# Patient Record
Sex: MALE | Race: WHITE | NOT HISPANIC OR LATINO | Employment: OTHER | URBAN - METROPOLITAN AREA
[De-identification: names, ages, dates, MRNs, and addresses within clinical notes are randomized per-mention and may not be internally consistent; named-entity substitution may affect disease eponyms.]

---

## 2020-08-28 ENCOUNTER — OFFICE VISIT (OUTPATIENT)
Dept: FAMILY MEDICINE CLINIC | Facility: CLINIC | Age: 65
End: 2020-08-28
Payer: COMMERCIAL

## 2020-08-28 VITALS
WEIGHT: 255 LBS | TEMPERATURE: 97.5 F | HEART RATE: 84 BPM | DIASTOLIC BLOOD PRESSURE: 60 MMHG | RESPIRATION RATE: 16 BRPM | OXYGEN SATURATION: 95 % | BODY MASS INDEX: 37.77 KG/M2 | SYSTOLIC BLOOD PRESSURE: 116 MMHG | HEIGHT: 69 IN

## 2020-08-28 DIAGNOSIS — Z11.4 SCREENING FOR HIV (HUMAN IMMUNODEFICIENCY VIRUS): ICD-10-CM

## 2020-08-28 DIAGNOSIS — I48.20 CHRONIC ATRIAL FIBRILLATION (HCC): Primary | ICD-10-CM

## 2020-08-28 DIAGNOSIS — I42.9 CARDIOMYOPATHY, UNSPECIFIED TYPE (HCC): ICD-10-CM

## 2020-08-28 DIAGNOSIS — G47.09 OTHER INSOMNIA: ICD-10-CM

## 2020-08-28 DIAGNOSIS — E78.2 MIXED HYPERLIPIDEMIA: ICD-10-CM

## 2020-08-28 DIAGNOSIS — F41.9 ANXIETY: ICD-10-CM

## 2020-08-28 DIAGNOSIS — Z76.89 ENCOUNTER TO ESTABLISH CARE: ICD-10-CM

## 2020-08-28 DIAGNOSIS — F32.A DEPRESSION, UNSPECIFIED DEPRESSION TYPE: ICD-10-CM

## 2020-08-28 DIAGNOSIS — I10 ESSENTIAL HYPERTENSION: ICD-10-CM

## 2020-08-28 DIAGNOSIS — Z11.59 NEED FOR HEPATITIS C SCREENING TEST: ICD-10-CM

## 2020-08-28 DIAGNOSIS — I25.10 CORONARY ARTERY DISEASE INVOLVING NATIVE CORONARY ARTERY OF NATIVE HEART WITHOUT ANGINA PECTORIS: ICD-10-CM

## 2020-08-28 LAB
ALBUMIN SERPL BCP-MCNC: 3.9 G/DL (ref 3.5–5)
ALP SERPL-CCNC: 150 U/L (ref 46–116)
ALT SERPL W P-5'-P-CCNC: 58 U/L (ref 12–78)
ANION GAP SERPL CALCULATED.3IONS-SCNC: 10 MMOL/L (ref 4–13)
AST SERPL W P-5'-P-CCNC: 52 U/L (ref 5–45)
BASOPHILS # BLD AUTO: 0.08 THOUSANDS/ΜL (ref 0–0.1)
BASOPHILS NFR BLD AUTO: 1 % (ref 0–1)
BILIRUB SERPL-MCNC: 0.59 MG/DL (ref 0.2–1)
BUN SERPL-MCNC: 10 MG/DL (ref 5–25)
CALCIUM SERPL-MCNC: 9.4 MG/DL (ref 8.3–10.1)
CHLORIDE SERPL-SCNC: 105 MMOL/L (ref 100–108)
CHOLEST SERPL-MCNC: 159 MG/DL (ref 50–200)
CO2 SERPL-SCNC: 22 MMOL/L (ref 21–32)
CREAT SERPL-MCNC: 1.11 MG/DL (ref 0.6–1.3)
EOSINOPHIL # BLD AUTO: 0.41 THOUSAND/ΜL (ref 0–0.61)
EOSINOPHIL NFR BLD AUTO: 7 % (ref 0–6)
ERYTHROCYTE [DISTWIDTH] IN BLOOD BY AUTOMATED COUNT: 15.1 % (ref 11.6–15.1)
GFR SERPL CREATININE-BSD FRML MDRD: 69 ML/MIN/1.73SQ M
GLUCOSE SERPL-MCNC: 97 MG/DL (ref 65–140)
HCT VFR BLD AUTO: 38.2 % (ref 36.5–49.3)
HDLC SERPL-MCNC: 61 MG/DL
HGB BLD-MCNC: 12.8 G/DL (ref 12–17)
IMM GRANULOCYTES # BLD AUTO: 0.01 THOUSAND/UL (ref 0–0.2)
IMM GRANULOCYTES NFR BLD AUTO: 0 % (ref 0–2)
LDLC SERPL CALC-MCNC: 37 MG/DL (ref 0–100)
LYMPHOCYTES # BLD AUTO: 1.44 THOUSANDS/ΜL (ref 0.6–4.47)
LYMPHOCYTES NFR BLD AUTO: 26 % (ref 14–44)
MCH RBC QN AUTO: 33.4 PG (ref 26.8–34.3)
MCHC RBC AUTO-ENTMCNC: 33.5 G/DL (ref 31.4–37.4)
MCV RBC AUTO: 100 FL (ref 82–98)
MONOCYTES # BLD AUTO: 0.53 THOUSAND/ΜL (ref 0.17–1.22)
MONOCYTES NFR BLD AUTO: 9 % (ref 4–12)
NEUTROPHILS # BLD AUTO: 3.17 THOUSANDS/ΜL (ref 1.85–7.62)
NEUTS SEG NFR BLD AUTO: 57 % (ref 43–75)
NONHDLC SERPL-MCNC: 98 MG/DL
NRBC BLD AUTO-RTO: 0 /100 WBCS
PLATELET # BLD AUTO: 219 THOUSANDS/UL (ref 149–390)
PMV BLD AUTO: 10.1 FL (ref 8.9–12.7)
POTASSIUM SERPL-SCNC: 3.8 MMOL/L (ref 3.5–5.3)
PROT SERPL-MCNC: 7.8 G/DL (ref 6.4–8.2)
RBC # BLD AUTO: 3.83 MILLION/UL (ref 3.88–5.62)
SODIUM SERPL-SCNC: 137 MMOL/L (ref 136–145)
TRIGL SERPL-MCNC: 306 MG/DL
WBC # BLD AUTO: 5.64 THOUSAND/UL (ref 4.31–10.16)

## 2020-08-28 PROCEDURE — 36415 COLL VENOUS BLD VENIPUNCTURE: CPT | Performed by: NURSE PRACTITIONER

## 2020-08-28 PROCEDURE — 99204 OFFICE O/P NEW MOD 45 MIN: CPT | Performed by: NURSE PRACTITIONER

## 2020-08-28 PROCEDURE — 3288F FALL RISK ASSESSMENT DOCD: CPT | Performed by: NURSE PRACTITIONER

## 2020-08-28 PROCEDURE — 1036F TOBACCO NON-USER: CPT | Performed by: NURSE PRACTITIONER

## 2020-08-28 PROCEDURE — 87389 HIV-1 AG W/HIV-1&-2 AB AG IA: CPT | Performed by: NURSE PRACTITIONER

## 2020-08-28 PROCEDURE — 3078F DIAST BP <80 MM HG: CPT | Performed by: NURSE PRACTITIONER

## 2020-08-28 PROCEDURE — 80053 COMPREHEN METABOLIC PANEL: CPT | Performed by: NURSE PRACTITIONER

## 2020-08-28 PROCEDURE — 1101F PT FALLS ASSESS-DOCD LE1/YR: CPT | Performed by: NURSE PRACTITIONER

## 2020-08-28 PROCEDURE — 3725F SCREEN DEPRESSION PERFORMED: CPT | Performed by: NURSE PRACTITIONER

## 2020-08-28 PROCEDURE — 85025 COMPLETE CBC W/AUTO DIFF WBC: CPT | Performed by: NURSE PRACTITIONER

## 2020-08-28 PROCEDURE — 86803 HEPATITIS C AB TEST: CPT | Performed by: NURSE PRACTITIONER

## 2020-08-28 PROCEDURE — 80061 LIPID PANEL: CPT | Performed by: NURSE PRACTITIONER

## 2020-08-28 PROCEDURE — 3074F SYST BP LT 130 MM HG: CPT | Performed by: NURSE PRACTITIONER

## 2020-08-28 PROCEDURE — 3008F BODY MASS INDEX DOCD: CPT | Performed by: NURSE PRACTITIONER

## 2020-08-28 RX ORDER — CANDESARTAN 32 MG/1
TABLET ORAL DAILY
COMMUNITY
Start: 2020-08-10 | End: 2021-03-08 | Stop reason: SDUPTHER

## 2020-08-28 RX ORDER — ASPIRIN 325 MG/1
325 TABLET, FILM COATED ORAL DAILY
COMMUNITY
End: 2021-07-09 | Stop reason: ALTCHOICE

## 2020-08-28 RX ORDER — METOPROLOL TARTRATE 100 MG/1
TABLET ORAL DAILY
COMMUNITY
Start: 2020-08-10 | End: 2021-04-01 | Stop reason: SDUPTHER

## 2020-08-28 RX ORDER — PRAZOSIN HYDROCHLORIDE 2 MG/1
2 CAPSULE ORAL 3 TIMES DAILY
Qty: 270 CAPSULE | Refills: 1 | Status: SHIPPED | OUTPATIENT
Start: 2020-08-28 | End: 2021-03-11

## 2020-08-28 RX ORDER — ZOLPIDEM TARTRATE 10 MG/1
TABLET ORAL
COMMUNITY
Start: 2020-07-02 | End: 2020-12-22 | Stop reason: SDUPTHER

## 2020-08-28 RX ORDER — PRAZOSIN HYDROCHLORIDE 2 MG/1
2 CAPSULE ORAL 3 TIMES DAILY
COMMUNITY
Start: 2020-07-13 | End: 2020-08-28 | Stop reason: SDUPTHER

## 2020-08-28 RX ORDER — RISPERIDONE 2 MG/1
TABLET, FILM COATED ORAL
COMMUNITY
Start: 2020-07-02 | End: 2021-04-09 | Stop reason: DRUGHIGH

## 2020-08-28 RX ORDER — ALPRAZOLAM 1 MG/1
TABLET ORAL
COMMUNITY
Start: 2020-08-25 | End: 2021-08-13 | Stop reason: HOSPADM

## 2020-08-28 RX ORDER — BUPROPION HYDROCHLORIDE 150 MG/1
150 TABLET, EXTENDED RELEASE ORAL 2 TIMES DAILY
COMMUNITY
Start: 2020-07-08

## 2020-08-28 RX ORDER — NAPROXEN 375 MG/1
375 TABLET ORAL DAILY
COMMUNITY
Start: 2020-08-07 | End: 2020-09-17 | Stop reason: HOSPADM

## 2020-08-28 RX ORDER — PRAVASTATIN SODIUM 40 MG
TABLET ORAL DAILY
COMMUNITY
Start: 2020-07-23 | End: 2021-11-05 | Stop reason: SDUPTHER

## 2020-08-28 RX ORDER — FLUOXETINE HYDROCHLORIDE 20 MG/1
CAPSULE ORAL DAILY
COMMUNITY
Start: 2020-07-02

## 2020-08-28 RX ORDER — AMLODIPINE BESYLATE 5 MG/1
TABLET ORAL
COMMUNITY
Start: 2020-07-23 | End: 2021-03-22 | Stop reason: SDUPTHER

## 2020-08-28 NOTE — PROGRESS NOTES
Assessment/Plan:  1  Chronic atrial fibrillation (HCC)  Managed by cardio  Rate controlled  Continue current meds  Monitor    - CBC and differential  - Comprehensive metabolic panel  2  Coronary artery disease involving native coronary artery of native heart without angina pectoris  Stable  Managed by cardio  Continue current meds  - CBC and differential  - Comprehensive metabolic panel  3  Essential hypertension  Stable  Well controlled  Limit salt   - CBC and differential  - Comprehensive metabolic panel  - prazosin (MINIPRESS) 2 mg capsule; Take 1 capsule (2 mg total) by mouth 3 (three) times a day  Dispense: 270 capsule; Refill: 1  4  Cardiomyopathy, unspecified type ([address] )  Managed by cardio  Stable  No change to current tx plan  - CBC and differential  - Comprehensive metabolic panel  5  Mixed hyperlipidemia  Heart healthy diet  Continue statin  - CBC and differential  - Comprehensive metabolic panel  - Lipid panel  6  Anxiety  Stress management  Activities to divert attention when possible  Conscious breathing techniques as discussed  Coping mechanisms and strategies vary from person to person so try to utilize strategies that you think may work for you (such as meditation, music, etc  )  Consider or continue counseling as discussed  Anti anxiety/depression medications as prescribed  - CBC and differential  - Comprehensive metabolic panel  7  Other insomnia  - CBC and differential  - Comprehensive metabolic panel  8  Encounter to establish care  Heart healthy diet- limit red meat, limit saturated fat, moderate salt intake, limit junk food, etc    Regular exercise  Stress management  Routine labwork and screenings as ordered  - CBC and differential  - Comprehensive metabolic panel  9  Depression, unspecified depression type  Stable  Managed by psych  Anticipatory guidance  - CBC and differential  - Comprehensive metabolic panel  10   Screening for HIV (human immunodeficiency virus)  - HIV 1/2 Antigen/Antibody (4th Generation) w Reflex [facility]  11  Need for hepatitis C screening test  - Hepatitis C antibody    Patient was counseled regarding instructions for management which included: impression/diagnosis, risk/benefits of treatment options, importance of compliance with treatment, risk factor reduction, and prognosis  I have reviewed the instructions with the patient answering all questions and patient verbalized understanding  BMI Counseling: Body mass index is 38 21 kg/m²  The BMI is above normal  Nutrition recommendations include reducing portion sizes, decreasing overall calorie intake, moderation in carbohydrate intake, reducing intake of saturated fat and trans fat and reducing intake of cholesterol  Exercise recommendations include exercising 3-5 times per week  Depression Screening Follow-up Plan: Patient's depression screening was negative with a PHQ-2 score of 0  Patient assessed for underlying major depression  They have no active suicidal ideations  Brief counseling provided and recommend additional follow-up/re-evaluation next office visit  Continue regular follow-up with their psychologist/therapist/psychiatrist who is managing their mental health condition(s)  Falls Plan of Care: Balance, strength, and gait training instructions were provided  Subjective:      Patient ID: Latosha Bal is a 72 y o  male who presents to establish care    Here to establish care   PMH, meds, allergies, PSH, SH, FH reviewed  Multiple cardiac issues, managed by cardiology- Dr [facility] for anxiety, Dr Tevin Mcdermott, sees every 3 months  Lives alone, disabled due to anxiety and depression  etoh- socially, non smoker  No current issues or concerns     Feels well  No recent illness        The following portions of the patient's history were reviewed and updated as appropriate: allergies, current medications, past family history, past medical history, past social history, past surgical history and problem list     Review of Systems   Constitutional: Negative for chills, diaphoresis, fatigue and fever  HENT: Negative for congestion, sinus pressure, sinus pain and sore throat  Eyes: Negative for visual disturbance  Respiratory: Negative for cough, chest tightness, shortness of breath and wheezing  Cardiovascular: Negative for chest pain, palpitations and leg swelling  Gastrointestinal: Negative for abdominal distention, abdominal pain, diarrhea and nausea  Endocrine: Negative for cold intolerance, heat intolerance, polydipsia, polyphagia and polyuria  Genitourinary: Negative for dysuria, frequency and urgency  Musculoskeletal: Negative for arthralgias, back pain and neck pain  Skin: Negative for rash  Allergic/Immunologic: Negative for immunocompromised state  Neurological: Negative for dizziness, weakness and headaches  Hematological: Negative for adenopathy  Psychiatric/Behavioral: Negative for confusion, self-injury and suicidal ideas  The patient is nervous/anxious  Sees psychiatrist for issues with anxiety and depression   All other systems reviewed and are negative  Objective:      /60 (BP Location: Right arm, Patient Position: Sitting, Cuff Size: Adult)   Pulse 84   Temp 97 5 °F (36 4 °C) (Temporal)   Resp 16   Ht 5' 8 5" (1 74 m)   Wt 116 kg (255 lb)   SpO2 95%   BMI 38 21 kg/m²          Physical Exam  Vitals signs reviewed  Constitutional:       General: He is not in acute distress  Appearance: He is obese  He is not ill-appearing  Eyes:      General: No scleral icterus  Neck:      Musculoskeletal: Normal range of motion and neck supple  Vascular: No carotid bruit  Comments: No JVD  Cardiovascular:      Rate and Rhythm: Normal rate and regular rhythm  Pulmonary:      Effort: Pulmonary effort is normal  No respiratory distress  Breath sounds: Normal breath sounds  No wheezing or rhonchi     Musculoskeletal:      Right lower leg: Edema (1+) present  Left lower leg: Edema (1+) present  Skin:     General: Skin is warm and dry  Coloration: Skin is not jaundiced or pale  Neurological:      General: No focal deficit present  Mental Status: He is alert and oriented to person, place, and time  Cranial Nerves: No cranial nerve deficit  Sensory: No sensory deficit  Motor: No weakness  Coordination: Coordination normal       Gait: Gait normal    Psychiatric:         Behavior: Behavior normal          Thought Content: Thought content normal          Judgment: Judgment normal       Comments: Flat affect  Well groomed  Dressed appropriately for the weather  Calm  Pleasant   Cooperative  Good eye contact  Converses freely and appropriately

## 2020-08-28 NOTE — PATIENT INSTRUCTIONS
Continue with current medications  No change to current treatment plan  Labwork as ordered  Heart healthy diet- limit red meat, limit saturated fat, moderate salt intake, limit junk food, etc    Regular exercise  Stress management  Follow up as discussed or return to office earlier if you have any new issues or concerns

## 2020-08-29 LAB — HCV AB SER QL: NORMAL

## 2020-08-31 ENCOUNTER — TELEPHONE (OUTPATIENT)
Dept: ADMINISTRATIVE | Facility: OTHER | Age: 65
End: 2020-08-31

## 2020-08-31 LAB — HIV 1+2 AB+HIV1 P24 AG SERPL QL IA: NORMAL

## 2020-09-17 ENCOUNTER — OFFICE VISIT (OUTPATIENT)
Dept: FAMILY MEDICINE CLINIC | Facility: CLINIC | Age: 65
End: 2020-09-17
Payer: COMMERCIAL

## 2020-09-17 VITALS
SYSTOLIC BLOOD PRESSURE: 128 MMHG | WEIGHT: 256 LBS | RESPIRATION RATE: 16 BRPM | TEMPERATURE: 98.1 F | HEART RATE: 100 BPM | BODY MASS INDEX: 37.92 KG/M2 | HEIGHT: 69 IN | DIASTOLIC BLOOD PRESSURE: 50 MMHG

## 2020-09-17 DIAGNOSIS — Z00.00 MEDICARE ANNUAL WELLNESS VISIT, SUBSEQUENT: Primary | ICD-10-CM

## 2020-09-17 DIAGNOSIS — Z23 NEED FOR IMMUNIZATION AGAINST INFLUENZA: ICD-10-CM

## 2020-09-17 DIAGNOSIS — Z23 NEED FOR VACCINATION WITH 13-POLYVALENT PNEUMOCOCCAL CONJUGATE VACCINE: ICD-10-CM

## 2020-09-17 DIAGNOSIS — Z12.11 SCREENING FOR MALIGNANT NEOPLASM OF COLON: ICD-10-CM

## 2020-09-17 PROBLEM — E78.1 HYPERTRIGLYCERIDEMIA: Status: ACTIVE | Noted: 2020-09-17

## 2020-09-17 PROCEDURE — 3725F SCREEN DEPRESSION PERFORMED: CPT | Performed by: NURSE PRACTITIONER

## 2020-09-17 PROCEDURE — 90662 IIV NO PRSV INCREASED AG IM: CPT

## 2020-09-17 PROCEDURE — G0439 PPPS, SUBSEQ VISIT: HCPCS | Performed by: NURSE PRACTITIONER

## 2020-09-17 PROCEDURE — G0008 ADMIN INFLUENZA VIRUS VAC: HCPCS

## 2020-09-17 PROCEDURE — 1036F TOBACCO NON-USER: CPT | Performed by: NURSE PRACTITIONER

## 2020-09-17 PROCEDURE — 4040F PNEUMOC VAC/ADMIN/RCVD: CPT | Performed by: NURSE PRACTITIONER

## 2020-09-17 PROCEDURE — 3078F DIAST BP <80 MM HG: CPT | Performed by: NURSE PRACTITIONER

## 2020-09-17 PROCEDURE — 90670 PCV13 VACCINE IM: CPT

## 2020-09-17 PROCEDURE — G0009 ADMIN PNEUMOCOCCAL VACCINE: HCPCS

## 2020-09-17 NOTE — PROGRESS NOTES
Assessment and Plan:   1  Medicare annual wellness visit, subsequent  Heart healthy diet- limit red meat, limit saturated fat, moderate salt intake, limit junk food, etc    Regular exercise  Stress management  Routine labwork and screenings as ordered  2  Need for vaccination with 13-polyvalent pneumococcal conjugate vaccine  - PNEUMOCOCCAL CONJUGATE VACCINE 13-VALENT GREATER THAN 6 MONTHS  3  Need for immunization against influenza  - influenza vaccine, high-dose, PF 0 7 mL (FLUZONE HIGH-DOSE)  4  Screening for malignant neoplasm of colon  - Cologuard; Future      BMI Counseling: Body mass index is 38 36 kg/m²  The BMI is above normal  Nutrition recommendations include reducing portion sizes, decreasing overall calorie intake, 3-5 servings of fruits/vegetables daily, consuming healthier snacks, moderation in carbohydrate intake, increasing intake of lean protein, reducing intake of saturated fat and trans fat and reducing intake of cholesterol  Exercise recommendations include exercising 3-5 times per week  Depression Screening Follow-up Plan: Patient's depression screening was positive with a PHQ-2 score of 4  Their PHQ-9 score was 10  Continue regular follow-up with their psychologist/therapist/psychiatrist who is managing their mental health condition(s)  Problem List Items Addressed This Visit     None           Preventive health issues were discussed with patient, and age appropriate screening tests were ordered as noted in patient's After Visit Summary  Personalized health advice and appropriate referrals for health education or preventive services given if needed, as noted in patient's After Visit Summary       History of Present Illness:     Patient presents for Medicare Annual Wellness visit    Patient Care Team:  Meron Leal as PCP - General (Family Medicine)     Problem List:     Patient Active Problem List   Diagnosis    [facility]    Coronary artery disease involving native coronary artery of native heart without angina pectoris    Mixed hyperlipidemia    Essential hypertension    Cardiomyopathy ([facility] )    Anxiety    Other insomnia    Depression      Past Medical and Surgical History:     Past Medical History:   Diagnosis Date    Anxiety     Atrial fibrillation ([facility] )     Depression     Hypertension     Insomnia      No past surgical history on file  Family History:     Family History   Problem Relation Age of Onset    Ovarian cancer Sister     Substance Abuse Neg Hx     Mental illness Neg Hx       Social History:        Social History     Socioeconomic History    Marital status: Single     Spouse name: None    Number of children: None    Years of education: None    Highest education level: None   Occupational History    None   Social Needs    Financial resource strain: None    Food insecurity     Worry: None     Inability: None    Transportation needs     Medical: None     Non-medical: None   Tobacco Use    Smoking status: Former Smoker     Types: Cigarettes     Last attempt to quit: 2015     Years since quittin 0    Smokeless tobacco: Never Used   Substance and Sexual Activity    Alcohol use:  Yes     Alcohol/week: 4 0 standard drinks     Types: 4 Cans of beer per week     Frequency: 2-4 times a month     Drinks per session: 3 or 4     Binge frequency: Never    Drug use: Not Currently     Types: Cocaine    Sexual activity: Not Currently     Partners: Female   Lifestyle    Physical activity     Days per week: None     Minutes per session: None    Stress: None   Relationships    Social connections     Talks on phone: None     Gets together: None     Attends [employer] service: None     Active member of club or organization: None     Attends meetings of clubs or organizations: None     Relationship status: None    Intimate partner violence     Fear of current or ex partner: None     Emotionally abused: None     Physically abused: None     Forced sexual activity: None   Other Topics Concern    None   Social History Narrative    None      Medications and Allergies:     Current Outpatient Medications   Medication Sig Dispense Refill    ALPRAZolam (XANAX) 1 mg tablet TAKE 1 TABLET BY MOUTH THREE TIMES DAILY AS DIRECTED FOR ANXIETY OR PANIC      amLODIPine (NORVASC) 5 mg tablet       aspirin buffered (Bufferin) 325 mg Take 325 mg by mouth daily      buPROPion (WELLBUTRIN SR) 150 mg 12 hr tablet Take 150 mg by mouth 2 (two) times a day       candesartan (ATACAND) 32 MG tablet       FLUoxetine (PROzac) 20 mg capsule       metoprolol tartrate (LOPRESSOR) 100 mg tablet       pravastatin (PRAVACHOL) 40 mg tablet       prazosin (MINIPRESS) 2 mg capsule Take 1 capsule (2 mg total) by mouth 3 (three) times a day 270 capsule 1    risperiDONE (RisperDAL) 2 mg tablet       zolpidem (AMBIEN) 10 mg tablet        No current facility-administered medications for this visit  No Known Allergies   Immunizations: There is no immunization history on file for this patient  Health Maintenance:         Topic Date Due    Hepatitis C Screening  Completed         Topic Date Due    DTaP,Tdap,and Td Vaccines (1 - Tdap) 01/14/1976    Pneumococcal Vaccine: 65+ Years (1 of 1 - PPSV23) 01/14/2020    Influenza Vaccine  07/01/2020      Medicare Health Risk Assessment:     /50 (BP Location: Left arm, Patient Position: Sitting, Cuff Size: Adult)   Pulse 100   Temp 98 1 °F (36 7 °C) (Temporal)   Resp 16   Ht 5' 8 5" (1 74 m)   Wt 116 kg (256 lb)   BMI 38 36 kg/m²      Harini Clifton is here for his Subsequent Wellness visit  Last Medicare Wellness visit information reviewed, patient interviewed and updates made to the record today  Health Risk Assessment:   Patient rates overall health as good  Patient feels that their physical health rating is slightly worse  Eyesight was rated as same  Hearing was rated as same   Patient feels that their emotional and mental health rating is same  Pain experienced in the last 7 days has been none  Patient states that he has experienced no weight loss or gain in last 6 months  Depression Screening:   PHQ-2 Score: 4  PHQ-9 Score: 10      Fall Risk Screening: In the past year, patient has experienced: no history of falling in past year      Home Safety:  Patient does not have trouble with stairs inside or outside of their home  Patient has working smoke alarms and has working carbon monoxide detector  Home safety hazards include: none  Nutrition:   Current diet is Regular  Medications:   Patient is currently taking over-the-counter supplements  OTC medications include: see medication list  Patient is able to manage medications  Activities of Daily Living (ADLs)/Instrumental Activities of Daily Living (IADLs):   Walk and transfer into and out of bed and chair?: Yes  Dress and groom yourself?: Yes    Bathe or shower yourself?: Yes    Feed yourself?  Yes  Do your laundry/housekeeping?: Yes  Manage your money, pay your bills and track your expenses?: Yes  Make your own meals?: Yes    Do your own shopping?: Yes    Previous Hospitalizations:   Any hospitalizations or ED visits within the last 12 months?: No      Advance Care Planning:   Living will: No    Durable POA for healthcare: No    Advanced directive: No    Advanced directive counseling given: No    Five wishes given: Yes    Patient declined ACP directive: No    End of Life Decisions reviewed with patient: No      Cognitive Screening:   Provider or family/friend/caregiver concerned regarding cognition?: No    PREVENTIVE SCREENINGS      Cardiovascular Screening:    General: History Lipid Disorder, Risks and Benefits Discussed and Screening Current      Diabetes Screening:     General: Screening Current and Risks and Benefits Discussed      Colorectal Cancer Screening:     General: Risks and Benefits Discussed    Due for: Cologuard      Prostate Cancer Screening:    General: Risks and Benefits Discussed    Due for: PSA      Osteoporosis Screening:    General: Patient Declines and Risks and Benefits Discussed      Abdominal Aortic Aneurysm (AAA) Screening:    Risk factors include: age between 73-67 yo        General: Risks and Benefits Discussed and Patient Declines      Lung Cancer Screening:     General: Risks and Benefits Discussed and Patient Declines      Hepatitis C Screening:    General: Screening Current    Other Counseling Topics:   Alcohol use counseling, car/seat belt/driving safety, skin self-exam and regular weightbearing exercise  Patient drinks a few beers per day  Education on risks of drinking alcohol  Encouraged moderation  Always wears seatbelts  Performs skin self-exam    Has not been exercising because he recently sprained his ankle  Was going to gym before TONY Simon

## 2020-09-17 NOTE — PATIENT INSTRUCTIONS

## 2020-12-22 ENCOUNTER — OFFICE VISIT (OUTPATIENT)
Dept: FAMILY MEDICINE CLINIC | Facility: CLINIC | Age: 65
End: 2020-12-22
Payer: COMMERCIAL

## 2020-12-22 VITALS
BODY MASS INDEX: 37.77 KG/M2 | TEMPERATURE: 96.6 F | DIASTOLIC BLOOD PRESSURE: 80 MMHG | SYSTOLIC BLOOD PRESSURE: 119 MMHG | HEART RATE: 84 BPM | OXYGEN SATURATION: 96 % | RESPIRATION RATE: 16 BRPM | WEIGHT: 255 LBS | HEIGHT: 69 IN

## 2020-12-22 DIAGNOSIS — F32.A DEPRESSION, UNSPECIFIED DEPRESSION TYPE: ICD-10-CM

## 2020-12-22 DIAGNOSIS — I25.10 CORONARY ARTERY DISEASE INVOLVING NATIVE CORONARY ARTERY OF NATIVE HEART WITHOUT ANGINA PECTORIS: ICD-10-CM

## 2020-12-22 DIAGNOSIS — E78.1 HYPERTRIGLYCERIDEMIA: ICD-10-CM

## 2020-12-22 DIAGNOSIS — M54.50 CHRONIC MIDLINE LOW BACK PAIN WITHOUT SCIATICA: ICD-10-CM

## 2020-12-22 DIAGNOSIS — E78.2 MIXED HYPERLIPIDEMIA: ICD-10-CM

## 2020-12-22 DIAGNOSIS — G47.09 OTHER INSOMNIA: ICD-10-CM

## 2020-12-22 DIAGNOSIS — G89.29 CHRONIC MIDLINE LOW BACK PAIN WITHOUT SCIATICA: ICD-10-CM

## 2020-12-22 DIAGNOSIS — I48.20 CHRONIC ATRIAL FIBRILLATION (HCC): Primary | ICD-10-CM

## 2020-12-22 DIAGNOSIS — I42.9 CARDIOMYOPATHY, UNSPECIFIED TYPE (HCC): ICD-10-CM

## 2020-12-22 DIAGNOSIS — F41.9 ANXIETY: ICD-10-CM

## 2020-12-22 DIAGNOSIS — I10 ESSENTIAL HYPERTENSION: ICD-10-CM

## 2020-12-22 DIAGNOSIS — F33.9 DEPRESSION, RECURRENT (HCC): ICD-10-CM

## 2020-12-22 DIAGNOSIS — I77.810 DILATED AORTIC ROOT (HCC): ICD-10-CM

## 2020-12-22 PROCEDURE — 3725F SCREEN DEPRESSION PERFORMED: CPT | Performed by: NURSE PRACTITIONER

## 2020-12-22 PROCEDURE — 3074F SYST BP LT 130 MM HG: CPT | Performed by: NURSE PRACTITIONER

## 2020-12-22 PROCEDURE — 99214 OFFICE O/P EST MOD 30 MIN: CPT | Performed by: NURSE PRACTITIONER

## 2020-12-22 PROCEDURE — 3079F DIAST BP 80-89 MM HG: CPT | Performed by: NURSE PRACTITIONER

## 2020-12-22 PROCEDURE — 1036F TOBACCO NON-USER: CPT | Performed by: NURSE PRACTITIONER

## 2020-12-22 PROCEDURE — 3008F BODY MASS INDEX DOCD: CPT | Performed by: NURSE PRACTITIONER

## 2020-12-22 RX ORDER — NAPROXEN 500 MG/1
500 TABLET ORAL 2 TIMES DAILY PRN
Qty: 60 TABLET | Refills: 2 | Status: SHIPPED | OUTPATIENT
Start: 2020-12-22 | End: 2021-03-08 | Stop reason: SDUPTHER

## 2020-12-22 RX ORDER — ZOLPIDEM TARTRATE 10 MG/1
10 TABLET ORAL
Qty: 90 TABLET | Refills: 0 | Status: ON HOLD | OUTPATIENT
Start: 2020-12-22 | End: 2021-08-13 | Stop reason: SDUPTHER

## 2021-03-03 ENCOUNTER — IMMUNIZATIONS (OUTPATIENT)
Dept: FAMILY MEDICINE CLINIC | Facility: HOSPITAL | Age: 66
End: 2021-03-03

## 2021-03-03 DIAGNOSIS — Z23 ENCOUNTER FOR IMMUNIZATION: Primary | ICD-10-CM

## 2021-03-03 PROCEDURE — 91301 SARS-COV-2 / COVID-19 MRNA VACCINE (MODERNA) 100 MCG: CPT

## 2021-03-03 PROCEDURE — 0011A SARS-COV-2 / COVID-19 MRNA VACCINE (MODERNA) 100 MCG: CPT

## 2021-03-08 ENCOUNTER — LAB (OUTPATIENT)
Dept: LAB | Facility: CLINIC | Age: 66
End: 2021-03-08
Payer: COMMERCIAL

## 2021-03-08 DIAGNOSIS — I10 ESSENTIAL HYPERTENSION: ICD-10-CM

## 2021-03-08 DIAGNOSIS — E78.2 MIXED HYPERLIPIDEMIA: ICD-10-CM

## 2021-03-08 DIAGNOSIS — I48.20 CHRONIC ATRIAL FIBRILLATION (HCC): ICD-10-CM

## 2021-03-08 DIAGNOSIS — E78.1 HYPERTRIGLYCERIDEMIA: ICD-10-CM

## 2021-03-08 DIAGNOSIS — I25.10 CORONARY ARTERY DISEASE INVOLVING NATIVE CORONARY ARTERY OF NATIVE HEART WITHOUT ANGINA PECTORIS: ICD-10-CM

## 2021-03-08 DIAGNOSIS — G89.29 CHRONIC MIDLINE LOW BACK PAIN WITHOUT SCIATICA: ICD-10-CM

## 2021-03-08 DIAGNOSIS — I10 ESSENTIAL HYPERTENSION: Primary | ICD-10-CM

## 2021-03-08 DIAGNOSIS — M54.50 CHRONIC MIDLINE LOW BACK PAIN WITHOUT SCIATICA: ICD-10-CM

## 2021-03-08 DIAGNOSIS — I42.9 CARDIOMYOPATHY, UNSPECIFIED TYPE (HCC): ICD-10-CM

## 2021-03-08 LAB
ALBUMIN SERPL BCP-MCNC: 3.9 G/DL (ref 3.5–5)
ALP SERPL-CCNC: 245 U/L (ref 46–116)
ALT SERPL W P-5'-P-CCNC: 36 U/L (ref 12–78)
ANION GAP SERPL CALCULATED.3IONS-SCNC: 12 MMOL/L (ref 4–13)
AST SERPL W P-5'-P-CCNC: 43 U/L (ref 5–45)
BASOPHILS # BLD AUTO: 0.07 THOUSANDS/ΜL (ref 0–0.1)
BASOPHILS NFR BLD AUTO: 1 % (ref 0–1)
BILIRUB SERPL-MCNC: 0.83 MG/DL (ref 0.2–1)
BUN SERPL-MCNC: 7 MG/DL (ref 5–25)
CALCIUM SERPL-MCNC: 9.5 MG/DL (ref 8.3–10.1)
CHLORIDE SERPL-SCNC: 103 MMOL/L (ref 100–108)
CHOLEST SERPL-MCNC: 150 MG/DL (ref 50–200)
CO2 SERPL-SCNC: 23 MMOL/L (ref 21–32)
CREAT SERPL-MCNC: 0.76 MG/DL (ref 0.6–1.3)
EOSINOPHIL # BLD AUTO: 0.39 THOUSAND/ΜL (ref 0–0.61)
EOSINOPHIL NFR BLD AUTO: 5 % (ref 0–6)
ERYTHROCYTE [DISTWIDTH] IN BLOOD BY AUTOMATED COUNT: 12.8 % (ref 11.6–15.1)
GFR SERPL CREATININE-BSD FRML MDRD: 95 ML/MIN/1.73SQ M
GLUCOSE SERPL-MCNC: 96 MG/DL (ref 65–140)
HCT VFR BLD AUTO: 42.4 % (ref 36.5–49.3)
HDLC SERPL-MCNC: 65 MG/DL
HGB BLD-MCNC: 14.3 G/DL (ref 12–17)
IMM GRANULOCYTES # BLD AUTO: 0.03 THOUSAND/UL (ref 0–0.2)
IMM GRANULOCYTES NFR BLD AUTO: 0 % (ref 0–2)
LDLC SERPL CALC-MCNC: 37 MG/DL (ref 0–100)
LYMPHOCYTES # BLD AUTO: 1.59 THOUSANDS/ΜL (ref 0.6–4.47)
LYMPHOCYTES NFR BLD AUTO: 21 % (ref 14–44)
MCH RBC QN AUTO: 34 PG (ref 26.8–34.3)
MCHC RBC AUTO-ENTMCNC: 33.7 G/DL (ref 31.4–37.4)
MCV RBC AUTO: 101 FL (ref 82–98)
MONOCYTES # BLD AUTO: 0.91 THOUSAND/ΜL (ref 0.17–1.22)
MONOCYTES NFR BLD AUTO: 12 % (ref 4–12)
NEUTROPHILS # BLD AUTO: 4.69 THOUSANDS/ΜL (ref 1.85–7.62)
NEUTS SEG NFR BLD AUTO: 61 % (ref 43–75)
NONHDLC SERPL-MCNC: 85 MG/DL
NRBC BLD AUTO-RTO: 0 /100 WBCS
PLATELET # BLD AUTO: 247 THOUSANDS/UL (ref 149–390)
PMV BLD AUTO: 9.7 FL (ref 8.9–12.7)
POTASSIUM SERPL-SCNC: 3.7 MMOL/L (ref 3.5–5.3)
PROT SERPL-MCNC: 7.7 G/DL (ref 6.4–8.2)
RBC # BLD AUTO: 4.21 MILLION/UL (ref 3.88–5.62)
SODIUM SERPL-SCNC: 138 MMOL/L (ref 136–145)
TRIGL SERPL-MCNC: 238 MG/DL
WBC # BLD AUTO: 7.68 THOUSAND/UL (ref 4.31–10.16)

## 2021-03-08 PROCEDURE — 80061 LIPID PANEL: CPT

## 2021-03-08 PROCEDURE — 36415 COLL VENOUS BLD VENIPUNCTURE: CPT

## 2021-03-08 PROCEDURE — 85025 COMPLETE CBC W/AUTO DIFF WBC: CPT

## 2021-03-08 PROCEDURE — 80053 COMPREHEN METABOLIC PANEL: CPT

## 2021-03-09 RX ORDER — CANDESARTAN 32 MG/1
32 TABLET ORAL DAILY
Qty: 90 TABLET | Refills: 1 | Status: SHIPPED | OUTPATIENT
Start: 2021-03-09 | End: 2021-08-13 | Stop reason: HOSPADM

## 2021-03-09 RX ORDER — NAPROXEN 500 MG/1
500 TABLET ORAL 2 TIMES DAILY PRN
Qty: 180 TABLET | Refills: 1 | Status: SHIPPED | OUTPATIENT
Start: 2021-03-09 | End: 2021-07-09 | Stop reason: ALTCHOICE

## 2021-03-10 DIAGNOSIS — I10 ESSENTIAL HYPERTENSION: ICD-10-CM

## 2021-03-11 RX ORDER — PRAZOSIN HYDROCHLORIDE 2 MG/1
CAPSULE ORAL
Qty: 270 CAPSULE | Refills: 3 | Status: SHIPPED | OUTPATIENT
Start: 2021-03-11 | End: 2021-08-13 | Stop reason: HOSPADM

## 2021-03-19 RX ORDER — RISPERIDONE 3 MG/1
2 TABLET, FILM COATED ORAL DAILY
COMMUNITY
Start: 2021-01-14 | End: 2021-11-05 | Stop reason: ALTCHOICE

## 2021-03-22 ENCOUNTER — OFFICE VISIT (OUTPATIENT)
Dept: FAMILY MEDICINE CLINIC | Facility: CLINIC | Age: 66
End: 2021-03-22
Payer: COMMERCIAL

## 2021-03-22 VITALS
DIASTOLIC BLOOD PRESSURE: 78 MMHG | SYSTOLIC BLOOD PRESSURE: 104 MMHG | OXYGEN SATURATION: 94 % | BODY MASS INDEX: 37.77 KG/M2 | HEART RATE: 80 BPM | RESPIRATION RATE: 16 BRPM | TEMPERATURE: 97.3 F | WEIGHT: 255 LBS | HEIGHT: 69 IN

## 2021-03-22 DIAGNOSIS — R74.8 ELEVATED ALKALINE PHOSPHATASE LEVEL: ICD-10-CM

## 2021-03-22 DIAGNOSIS — I42.9 CARDIOMYOPATHY, UNSPECIFIED TYPE (HCC): ICD-10-CM

## 2021-03-22 DIAGNOSIS — E78.1 HYPERTRIGLYCERIDEMIA: ICD-10-CM

## 2021-03-22 DIAGNOSIS — I10 ESSENTIAL HYPERTENSION: ICD-10-CM

## 2021-03-22 DIAGNOSIS — M54.50 LOW BACK PAIN WITHOUT SCIATICA, UNSPECIFIED BACK PAIN LATERALITY, UNSPECIFIED CHRONICITY: ICD-10-CM

## 2021-03-22 DIAGNOSIS — I77.810 DILATED AORTIC ROOT (HCC): ICD-10-CM

## 2021-03-22 DIAGNOSIS — I25.10 CORONARY ARTERY DISEASE INVOLVING NATIVE CORONARY ARTERY OF NATIVE HEART WITHOUT ANGINA PECTORIS: ICD-10-CM

## 2021-03-22 DIAGNOSIS — F41.9 ANXIETY: ICD-10-CM

## 2021-03-22 DIAGNOSIS — E78.2 MIXED HYPERLIPIDEMIA: ICD-10-CM

## 2021-03-22 DIAGNOSIS — I48.20 CHRONIC ATRIAL FIBRILLATION (HCC): Primary | ICD-10-CM

## 2021-03-22 DIAGNOSIS — G47.09 OTHER INSOMNIA: ICD-10-CM

## 2021-03-22 DIAGNOSIS — F33.9 DEPRESSION, RECURRENT (HCC): ICD-10-CM

## 2021-03-22 PROCEDURE — 3008F BODY MASS INDEX DOCD: CPT | Performed by: NURSE PRACTITIONER

## 2021-03-22 PROCEDURE — 3078F DIAST BP <80 MM HG: CPT | Performed by: NURSE PRACTITIONER

## 2021-03-22 PROCEDURE — 1160F RVW MEDS BY RX/DR IN RCRD: CPT | Performed by: NURSE PRACTITIONER

## 2021-03-22 PROCEDURE — 3074F SYST BP LT 130 MM HG: CPT | Performed by: NURSE PRACTITIONER

## 2021-03-22 PROCEDURE — 1036F TOBACCO NON-USER: CPT | Performed by: NURSE PRACTITIONER

## 2021-03-22 PROCEDURE — 99214 OFFICE O/P EST MOD 30 MIN: CPT | Performed by: NURSE PRACTITIONER

## 2021-03-22 RX ORDER — AMLODIPINE BESYLATE 5 MG/1
5 TABLET ORAL DAILY
Qty: 90 TABLET | Refills: 1 | Status: SHIPPED | OUTPATIENT
Start: 2021-03-22 | End: 2021-08-13 | Stop reason: HOSPADM

## 2021-03-22 NOTE — PROGRESS NOTES
Assessment/Plan:  1  Chronic atrial fibrillation (HCC)  Stable  Managed by cardio  2  Coronary artery disease involving native coronary artery of native heart without angina pectoris  Stable  Managed by cardio  No recent issues  3  Essential hypertension  Stable  Continue blood pressure medications as ordered  Monitor blood pressure  Bring diary/log of readings to next appointment  Limit salt in diet  - amLODIPine (NORVASC) 5 mg tablet; Take 1 tablet (5 mg total) by mouth daily  Dispense: 90 tablet; Refill: 1  4  Cardiomyopathy, unspecified type ([facility] )  Stable  Managed by cardio  5  Dilated aortic root (HCC)  Stable  Managed by cardio  6  Mixed hyperlipidemia  Heart healthy diet  Regular exercise  7  Anxiety  Stress management  Activities to divert attention when possible  Conscious breathing techniques as discussed  Coping mechanisms and strategies vary from person to person so try to utilize strategies that you think may work for you (such as meditation, music, etc  )  continue counseling as discussed  Anti anxiety/depression medications as prescribed  8  Other insomnia  Stable  No change to current tx plan  9  Depression, recurrent ([facility] )  Stable  Denies suicidal ideation  Managed by psych  Continue current meds  10  Hypertriglyceridemia  - US abdomen complete; Future  11  Elevated alkaline phosphatase level  - US abdomen complete; Future  - XR spine lumbar minimum 4 views non injury; Future  12  Low back pain without sciatica, unspecified back pain laterality, unspecified chronicity  - XR spine lumbar minimum 4 views non injury; Future    Patient was counseled regarding instructions for management which included: impression/diagnosis, risk/benefits of treatment options, importance of compliance with treatment, risk factor reduction, and prognosis  I have reviewed the instructions with the patient answering all questions and patient verbalized understanding  BMI Counseling:  Body mass index is 38 21 kg/m²  The BMI is above normal  Nutrition recommendations include reducing portion sizes, decreasing overall calorie intake, moderation in carbohydrate intake, reducing intake of saturated fat and trans fat and reducing intake of cholesterol  Exercise recommendations include exercising 3-5 times per week  Subjective:      Patient ID: Alexandria Hollins is a 77 y o  male who presents for follow up    Here for follow up  C/o low back pain for "quite a while"  May have started towards end of 2020  No known injury  No radiation  No abd pain  No n/v/d  Drinks a couple of beers a day  No recent illness  No change in diet  Sees psychiatrist regularly who manages meds  The following portions of the patient's history were reviewed and updated as appropriate: allergies, current medications, past family history, past medical history, past social history, past surgical history and problem list     Review of Systems   Constitutional: Negative for activity change, appetite change, fever and unexpected weight change  Respiratory: Negative for chest tightness and shortness of breath  Cardiovascular: Negative for chest pain and palpitations  Gastrointestinal: Negative for abdominal pain, diarrhea, nausea and vomiting  Endocrine: Negative for cold intolerance, heat intolerance, polydipsia, polyphagia and polyuria  Musculoskeletal: Positive for back pain  Neurological: Negative for dizziness and headaches  Psychiatric/Behavioral: Positive for dysphoric mood  Negative for self-injury and suicidal ideas  The patient is nervous/anxious            Objective:    Recent Results (from the past 672 hour(s))   CBC and differential    Collection Time: 03/08/21 11:02 AM   Result Value Ref Range    WBC 7 68 4 31 - 10 16 Thousand/uL    RBC 4 21 3 88 - 5 62 Million/uL    Hemoglobin 14 3 12 0 - 17 0 g/dL    Hematocrit 42 4 36 5 - 49 3 %     (H) 82 - 98 fL    MCH 34 0 26 8 - 34 3 pg    MCHC 33 7 31 4 - 37 4 g/dL    RDW 12 8 11 6 - 15 1 %    MPV 9 7 8 9 - 12 7 fL    Platelets 096 099 - 696 Thousands/uL    nRBC 0 /100 WBCs    Neutrophils Relative 61 43 - 75 %    Immat GRANS % 0 0 - 2 %    Lymphocytes Relative 21 14 - 44 %    Monocytes Relative 12 4 - 12 %    Eosinophils Relative 5 0 - 6 %    Basophils Relative 1 0 - 1 %    Neutrophils Absolute 4 69 1 85 - 7 62 Thousands/µL    Immature Grans Absolute 0 03 0 00 - 0 20 Thousand/uL    Lymphocytes Absolute 1 59 0 60 - 4 47 Thousands/µL    Monocytes Absolute 0 91 0 17 - 1 22 Thousand/µL    Eosinophils Absolute 0 39 0 00 - 0 61 Thousand/µL    Basophils Absolute 0 07 0 00 - 0 10 Thousands/µL   Comprehensive metabolic panel    Collection Time: 03/08/21 11:02 AM   Result Value Ref Range    Sodium 138 136 - 145 mmol/L    Potassium 3 7 3 5 - 5 3 mmol/L    Chloride 103 100 - 108 mmol/L    CO2 23 21 - 32 mmol/L    ANION GAP 12 4 - 13 mmol/L    BUN 7 5 - 25 mg/dL    Creatinine 0 76 0 60 - 1 30 mg/dL    Glucose 96 65 - 140 mg/dL    Calcium 9 5 8 3 - 10 1 mg/dL    AST 43 5 - 45 U/L    ALT 36 12 - 78 U/L    Alkaline Phosphatase 245 (H) 46 - 116 U/L    Total Protein 7 7 6 4 - 8 2 g/dL    Albumin 3 9 3 5 - 5 0 g/dL    Total Bilirubin 0 83 0 20 - 1 00 mg/dL    eGFR 95 ml/min/1 73sq m   Lipid panel    Collection Time: 03/08/21 11:02 AM   Result Value Ref Range    Cholesterol 150 50 - 200 mg/dL    Triglycerides 238 (H) <=150 mg/dL    HDL, Direct 65 >=40 mg/dL    LDL Calculated 37 0 - 100 mg/dL    Non-HDL-Chol (CHOL-HDL) 85 mg/dl   Reviewed lab/diagnostic results with pt including both normal and abnormal findings  In depth counseling and instructions given  All questions answered during visit  /78 (BP Location: Right arm, Patient Position: Sitting, Cuff Size: Adult)   Pulse 80   Temp (!) 97 3 °F (36 3 °C) (Temporal)   Resp 16   Ht 5' 8 5" (1 74 m)   Wt 116 kg (255 lb)   SpO2 94%   BMI 38 21 kg/m²          Physical Exam  Vitals signs reviewed     Constitutional: General: He is not in acute distress  Appearance: He is obese  He is not ill-appearing  Eyes:      General: No scleral icterus  Neck:      Musculoskeletal: Normal range of motion and neck supple  Vascular: No carotid bruit  Comments: No JVD  Cardiovascular:      Rate and Rhythm: Normal rate and regular rhythm  Pulmonary:      Effort: Pulmonary effort is normal  No respiratory distress  Breath sounds: Normal breath sounds  No wheezing  Abdominal:      General: Bowel sounds are normal  There is no distension  Palpations: Abdomen is soft  Tenderness: There is no abdominal tenderness  There is no guarding  Skin:     General: Skin is warm and dry  Coloration: Skin is not jaundiced or pale  Neurological:      Mental Status: He is alert  Psychiatric:         Behavior: Behavior normal          Thought Content: Thought content normal          Judgment: Judgment normal       Comments: Affect flat  Well groomed  Dressed appropriately for the weather  Calm  Pleasant   Cooperative  Good eye contact  Converses freely and appropriately

## 2021-03-31 ENCOUNTER — RA CDI HCC (OUTPATIENT)
Dept: OTHER | Facility: HOSPITAL | Age: 66
End: 2021-03-31

## 2021-03-31 NOTE — PROGRESS NOTES
Nanc[facility] [address]  coding oppertunities             Chart reviewed, (number of) suggestions sent to provider: 1                 DX: [phone] Morbid (severe) obesity due to excess calories- BMI 38 with comorbidity of hypertension

## 2021-04-01 ENCOUNTER — IMMUNIZATIONS (OUTPATIENT)
Dept: FAMILY MEDICINE CLINIC | Facility: HOSPITAL | Age: 66
End: 2021-04-01

## 2021-04-01 DIAGNOSIS — I10 ESSENTIAL HYPERTENSION: ICD-10-CM

## 2021-04-01 DIAGNOSIS — Z23 ENCOUNTER FOR IMMUNIZATION: Primary | ICD-10-CM

## 2021-04-01 DIAGNOSIS — I25.10 CORONARY ARTERY DISEASE INVOLVING NATIVE HEART WITHOUT ANGINA PECTORIS, UNSPECIFIED VESSEL OR LESION TYPE: Primary | ICD-10-CM

## 2021-04-01 PROBLEM — E66.01 SEVERE OBESITY (BMI 35.0-39.9) WITH COMORBIDITY (HCC): Status: ACTIVE | Noted: 2021-04-01

## 2021-04-01 PROCEDURE — 91301 SARS-COV-2 / COVID-19 MRNA VACCINE (MODERNA) 100 MCG: CPT

## 2021-04-01 PROCEDURE — 0012A SARS-COV-2 / COVID-19 MRNA VACCINE (MODERNA) 100 MCG: CPT

## 2021-04-01 RX ORDER — METOPROLOL TARTRATE 100 MG/1
100 TABLET ORAL DAILY
Qty: 90 TABLET | Refills: 1 | Status: SHIPPED | OUTPATIENT
Start: 2021-04-01 | End: 2021-08-13 | Stop reason: HOSPADM

## 2021-04-07 ENCOUNTER — HOSPITAL ENCOUNTER (OUTPATIENT)
Dept: RADIOLOGY | Facility: HOSPITAL | Age: 66
Discharge: HOME | End: 2021-04-07
Payer: COMMERCIAL

## 2021-04-07 DIAGNOSIS — E78.1 HYPERTRIGLYCERIDEMIA: ICD-10-CM

## 2021-04-07 DIAGNOSIS — R74.8 ELEVATED ALKALINE PHOSPHATASE LEVEL: ICD-10-CM

## 2021-04-07 PROCEDURE — 76700 US EXAM ABDOM COMPLETE: CPT

## 2021-04-09 ENCOUNTER — OFFICE VISIT (OUTPATIENT)
Dept: FAMILY MEDICINE CLINIC | Facility: CLINIC | Age: 66
End: 2021-04-09
Payer: COMMERCIAL

## 2021-04-09 ENCOUNTER — TRANSCRIBE ORDERS (OUTPATIENT)
Dept: ADMINISTRATIVE | Facility: HOSPITAL | Age: 66
End: 2021-04-09

## 2021-04-09 ENCOUNTER — APPOINTMENT (OUTPATIENT)
Dept: LAB | Facility: CLINIC | Age: 66
End: 2021-04-09
Payer: COMMERCIAL

## 2021-04-09 VITALS
TEMPERATURE: 97.2 F | RESPIRATION RATE: 16 BRPM | OXYGEN SATURATION: 97 % | WEIGHT: 243 LBS | HEART RATE: 76 BPM | HEIGHT: 69 IN | SYSTOLIC BLOOD PRESSURE: 128 MMHG | BODY MASS INDEX: 35.99 KG/M2 | DIASTOLIC BLOOD PRESSURE: 80 MMHG

## 2021-04-09 DIAGNOSIS — R60.0 LOCALIZED EDEMA: ICD-10-CM

## 2021-04-09 DIAGNOSIS — E66.01 SEVERE OBESITY (BMI 35.0-39.9) WITH COMORBIDITY (HCC): ICD-10-CM

## 2021-04-09 DIAGNOSIS — M54.50 BILATERAL LOW BACK PAIN WITHOUT SCIATICA, UNSPECIFIED CHRONICITY: Primary | ICD-10-CM

## 2021-04-09 DIAGNOSIS — R60.0 EDEMA OF BOTH LEGS: ICD-10-CM

## 2021-04-09 DIAGNOSIS — R06.02 SHORTNESS OF BREATH: ICD-10-CM

## 2021-04-09 DIAGNOSIS — R06.02 SHORTNESS OF BREATH: Primary | ICD-10-CM

## 2021-04-09 DIAGNOSIS — K82.4 GALLBLADDER POLYP: ICD-10-CM

## 2021-04-09 DIAGNOSIS — R74.8 ELEVATED ALKALINE PHOSPHATASE LEVEL: ICD-10-CM

## 2021-04-09 LAB — NT-PROBNP SERPL-MCNC: 645 PG/ML

## 2021-04-09 PROCEDURE — 36415 COLL VENOUS BLD VENIPUNCTURE: CPT

## 2021-04-09 PROCEDURE — 3008F BODY MASS INDEX DOCD: CPT | Performed by: NURSE PRACTITIONER

## 2021-04-09 PROCEDURE — 99214 OFFICE O/P EST MOD 30 MIN: CPT | Performed by: NURSE PRACTITIONER

## 2021-04-09 PROCEDURE — 1160F RVW MEDS BY RX/DR IN RCRD: CPT | Performed by: NURSE PRACTITIONER

## 2021-04-09 PROCEDURE — 83880 ASSAY OF NATRIURETIC PEPTIDE: CPT

## 2021-04-09 PROCEDURE — 1036F TOBACCO NON-USER: CPT | Performed by: NURSE PRACTITIONER

## 2021-04-09 RX ORDER — BACLOFEN 20 MG/1
20 TABLET ORAL
Qty: 20 TABLET | Refills: 0 | Status: SHIPPED | OUTPATIENT
Start: 2021-04-09 | End: 2021-06-10 | Stop reason: SDUPTHER

## 2021-04-09 NOTE — PATIENT INSTRUCTIONS
Naprosyn as needed with food for back pain  Heat 20 minutes at a time to low back  Baclofen at bedtime (muscle relaxer) for low back pain  Xray as ordered  Continue all other medications  Will repeat the liver function tests in 3 months

## 2021-04-14 ENCOUNTER — APPOINTMENT (OUTPATIENT)
Dept: RADIOLOGY | Facility: CLINIC | Age: 66
End: 2021-04-14
Payer: COMMERCIAL

## 2021-04-14 DIAGNOSIS — R74.8 ELEVATED ALKALINE PHOSPHATASE LEVEL: ICD-10-CM

## 2021-04-14 DIAGNOSIS — M54.50 LOW BACK PAIN WITHOUT SCIATICA, UNSPECIFIED BACK PAIN LATERALITY, UNSPECIFIED CHRONICITY: ICD-10-CM

## 2021-04-14 PROCEDURE — 72110 X-RAY EXAM L-2 SPINE 4/>VWS: CPT

## 2021-04-15 NOTE — PROGRESS NOTES
Assessment/Plan:  1  Bilateral low back pain without sciatica, unspecified chronicity  Xray as ordered  - baclofen 20 mg tablet; Take 1 tablet (20 mg total) by mouth daily at bedtime as needed for muscle spasms  Dispense: 20 tablet; Refill: 0  2  Elevated alkaline phosphatase level  Will repeat in 3 month  - Comprehensive metabolic panel; Future  3  Gallbladder polyp  Yearly surveillance ultrasound as per recs  4  Severe obesity (BMI 35 0-39  9) with comorbidity ([address] )  Weight loss is recommended to improve overall health  Dietary changes- limit carbohydrates, decrease overall caloric intake, reduce portion sizes, healthier snack choices, limit saturated fats, increase intake of fruits and vegetables, limit junk food  Increase exercise to 3-5 times per week  Consider adding strength exercises to exercise routine  5  Edema of both legs  Currently undergoing eval by cardio  BNP pending  Echo scheduled  Patient was counseled regarding instructions for management which included: impression/diagnosis, risk/benefits of treatment options, importance of compliance with treatment, risk factor reduction, and prognosis  I have reviewed the instructions with the patient answering all questions and patient verbalized understanding  Subjective:      Patient ID: Daisy Harding is a 77 y o  male who presents for follow up    Here for follow up on elevated LFT, low back pain  Had ultrasound  Has had ongoing low back pain  Xray ordered, but not done yet  Saw cardiology  Has echo scheduled end of April  Denies sob  Denies n/v/d  Denies abd pain          The following portions of the patient's history were reviewed and updated as appropriate: allergies, current medications, past family history, past medical history, past social history, past surgical history and problem list     Review of Systems   Constitutional: Negative for unexpected weight change  Respiratory: Negative for shortness of breath  Cardiovascular: Positive for leg swelling  Negative for palpitations  Gastrointestinal: Negative for abdominal pain, diarrhea, nausea and vomiting  Musculoskeletal: Positive for back pain  Skin: Negative for rash and wound  Neurological: Negative for dizziness and headaches  Objective:  Abdominal ultrasound 4/7/2021  IMPRESSION:     Hepatomegaly     Moderate hepatic steatosis     Borderline splenomegaly     Trace right perinephric nonspecific free fluid     Multiple gallbladder polyps largest 9 mm  According to current literature guidelines (JACR 2013;10:953-956) polyps 7 - 9 mm should be sonographically surveyed annually to ensure no interval growth  Reviewed lab/diagnostic results with pt including both normal and abnormal findings  In depth counseling and instructions given  All questions answered during visit  /80 (BP Location: Right arm, Patient Position: Sitting, Cuff Size: Adult)   Pulse 76   Temp (!) 97 2 °F (36 2 °C) (Temporal)   Resp 16   Ht 5' 8 5" (1 74 m)   Wt 110 kg (243 lb)   SpO2 97%   BMI 36 41 kg/m²          Physical Exam  Vitals signs reviewed  Constitutional:       General: He is not in acute distress  Appearance: He is not ill-appearing  Cardiovascular:      Rate and Rhythm: Normal rate and regular rhythm  Comments: 2-3+ pitting edema BLE  Pulmonary:      Effort: Pulmonary effort is normal  No respiratory distress  Breath sounds: Normal breath sounds  No wheezing or rales  Abdominal:      General: Bowel sounds are normal  There is no distension  Palpations: Abdomen is soft  There is no mass  Tenderness: There is no abdominal tenderness  There is no guarding  Musculoskeletal:      Right lower leg: Edema present  Left lower leg: Edema present  Skin:     General: Skin is warm and dry  Coloration: Skin is not jaundiced or pale  Neurological:      General: No focal deficit present        Mental Status: He is alert and oriented to person, place, and time  Cranial Nerves: No cranial nerve deficit  Sensory: No sensory deficit  Psychiatric:         Behavior: Behavior normal          Thought Content:  Thought content normal          Judgment: Judgment normal       Comments: Affect flat LR @ 30 cc/hr

## 2021-04-21 DIAGNOSIS — M51.36 LUMBAR DEGENERATIVE DISC DISEASE: Primary | ICD-10-CM

## 2021-04-21 DIAGNOSIS — G89.29 CHRONIC BILATERAL LOW BACK PAIN WITHOUT SCIATICA: ICD-10-CM

## 2021-04-21 DIAGNOSIS — M54.50 CHRONIC BILATERAL LOW BACK PAIN WITHOUT SCIATICA: ICD-10-CM

## 2021-04-27 ENCOUNTER — EVALUATION (OUTPATIENT)
Dept: PHYSICAL THERAPY | Facility: CLINIC | Age: 66
End: 2021-04-27
Payer: COMMERCIAL

## 2021-04-27 VITALS — SYSTOLIC BLOOD PRESSURE: 130 MMHG | DIASTOLIC BLOOD PRESSURE: 80 MMHG | HEART RATE: 76 BPM | TEMPERATURE: 98 F

## 2021-04-27 DIAGNOSIS — M54.50 CHRONIC BILATERAL LOW BACK PAIN WITHOUT SCIATICA: ICD-10-CM

## 2021-04-27 DIAGNOSIS — M51.36 LUMBAR DEGENERATIVE DISC DISEASE: ICD-10-CM

## 2021-04-27 DIAGNOSIS — G89.29 CHRONIC BILATERAL LOW BACK PAIN WITHOUT SCIATICA: ICD-10-CM

## 2021-04-27 PROCEDURE — 97161 PT EVAL LOW COMPLEX 20 MIN: CPT | Performed by: PHYSICAL THERAPIST

## 2021-04-27 NOTE — LETTER
2021    Shama Staff, Yojana Mcdonald [address] [address]    Patient: Hari Mendes  YOB: 1955   Date of Visit: 2021      Dear Dr Fernando Rose:    One of your patients, Hari Mendes, was referred to me by the [facility]   Please review the attached evaluation summary from Kacy Carroll recent visit  Please verify that you agree with the plan of care by signing the attached document and sending it back to our office  If you have any questions or concerns, please don't hesitate to call  Sincerely,    Arlis Pallas, PT      Primary Care Provider:      I certify that I have read the below Plan of Care and certify the need for these services furnished under this plan of treatment while under my care  Shama Oquendo, Yojana [facility]elCo[facility] [address] [facility] [address]  [address] [place]           PT Evaluation     Today's date: 2021  Patient name: Hari Mendes  : 1955  MRN: [id number]  Referring provider: TONY Geiger  Dx:   Encounter Diagnosis     ICD-10-CM    1  Lumbar degenerative disc disease  M51 36 Ambulatory referral to Comprehensive Spine PT   2  Chronic bilateral low back pain without sciatica  M54 5 Ambulatory referral to Comprehensive Spine PT    G89 [id number]                   Assessment  Assessment details: 2021: Pt presents with constant pain signs and symptoms present for more than 16 days, are above the knee which are increasing and are consistent with derangement which would benefit from directional specific mechanical correction, as well as poor LE flexibility which will benefit from stretching  Symptoms respond with abolishment upon mechanical assessment to determine directional preference of extension   Pt will benefit from skilled physical therapy 1-2X/week for 4 weeks to address deficits in range of motion, strength and function and return to PLOF by reaching short and long term goals  Impairments: abnormal or restricted ROM, activity intolerance, lacks appropriate home exercise program, pain with function, poor posture  and poor body mechanics  Other impairment: poor lucy to stairs, min difficulty w/ bed mobility  Functional limitations: poor tolerance to bending, lifting from floor or overheadUnderstanding of Dx/Px/POC: good   Prognosis: good    Goals  Short Tem Goals to be met in 4 weeks (target date 2021)  1  Pt to be independent w/ HEP  2  Pt to demonstrate ease w/ bed mobility and transfers  3  Restore lumbar AROM to min calero to improve functional mobility  Long Term Goal to be met in 8 weeks (target date 2021)  1  Pt to demonstrate ease w/ sit to stand w/o UE assist   2  Pt to be independent w/ symptom management and prevention  3  Pt Reduce c/o pain to 0-4/10 with activity and prolonged positions  4  Improve LE flexibility to min calero B/L LE's  5  Improve FOTO score to 53  Plan  Plan details:   Age > 72  PURNIMA was not dangerous  Pt does no  c/o paresthesias  Patient would benefit from: skilled speech therapy  Planned modality interventions: traction, unattended electrical stimulation and thermotherapy: hydrocollator packs  Planned therapy interventions: joint mobilization, manual therapy, abdominal trunk stabilization, patient education, postural training, stretching, transfer training, home exercise program and strengthening  Frequency: 1-2x/week   Plan of Care beginning date: 2021  Plan of Care expiration date: 2021  Treatment plan discussed with: patient        Subjective Evaluation    History of Present Illness  Mechanism of injury: 2021: Pt reports back pain for the past 9 or 10 months which is intermittent, worse w/ prolonged standing and bending over  Pain radiates from low back to his neck  Pain  At best pain ratin  At worst pain rating: 10  Pain location: across low back and B/L paraspinals to neck    Quality: tight  Alleviating factors: sitting  Aggravating factors: standing (bending forward)  Progression: worsening      Diagnostic Tests  X-ray: abnormal (L1 wedge deformity; DDD L2-5)  Treatments  Previous treatment: medication  Current treatment: physical therapy  Patient Goals  Patient goals for therapy: decreased pain  Patient goal: improve standing tolerance        Objective    Flowsheet Rows      Most Recent Value   PT/OT G-Codes   Current Score  40   Projected Score  53      Posture: Lumbar lordosis is absent in standing  There is no lateral shift  Lumbar AROM limitations:  (* =  Pain)     4/27/2021  Lumbar flexion: min  Lumbar extension: shekhar 80%  R side glide:  shekhar 90%  L side glide:  shekhar 90%    Mechanical Asessment: pre-test symtpoms include 5/10 pain across low back  Repeated Extension in Standing (MOHINDER)1x10: abolish    Strength:  Core strength: Poor - diastasis rectii       Right  Left     4/27/2021 4/27/2021  Hip flexion:  4+/5  4+/5  Knee ext  4/5  4/5  Ankle DF  5/5  5/5  Great toe ext  5/5  5/5  Ankle PF  5/5  5/5  Knee flex  4-/5  4-/5      Hip abduction  4+/5  4+/5  Hip extension  3+/*5  3+/5*    Sensation: WNL B/L LE's; pitting edema B/L LE's - noted in last visit w/ PCP - pt following up w/ cardio     Flexibility: poor B/L hams, pirif and hip flexors    Function: standing tolerance 30'      Palpation: poor post to ant mobility t/o lumbar spine  No TTP noted in lumbar paraspinals, QL or gluts          Precautions: diastasis rectii; pitting edema B/L LE's  Past Medical History:   Diagnosis Date    Anxiety     Atrial fibrillation ([facility] [address] )     Depression     Hypertension     Insomnia    SOC: 4/27/2021  FOTO: 4/27/2021  POC expiration: 7/20/2021  Daily Treatment log:  Date 4/27/2021       Visit # 1       Manual                                There Exer         MOHINDER against counter 2x10       Sit LAQ 5"x10 R/L       Hip flexor stretch on step 10"x2 R/L       Sit pirif stretch 10"x2 R/L       Sup ham stretch        bridges        LTR        There activ        Sit to stand                HEP        NMRed        Alt taps to step        Tandem balance                                                                Modalities                                  Access Code: [email or web address]  URL: ExcitingPage [employer]  [email or web address]  Date: 04/27/2021  Prepared by:  Yonis Kumar    Exercises  Seated Piriformis Stretch with Trunk Bend - 1 x daily - 7 x weekly - 1 sets - 3 reps - 20 hold  Seated Long Arc Quad - 1 x daily - 7 x weekly - 1 sets - 10 reps - 5 hold  Hip Flexor Stretch on Step - 1 x daily - 7 x weekly - 1 sets - 3 reps - 20 hold  Standing Lumbar Extension with Counter - 5 x daily - 7 x weekly - 1 sets - 10 reps

## 2021-04-27 NOTE — PROGRESS NOTES
PT Evaluation     Today's date: 2021  Patient name: Alen Mitchell  : 1955  MRN: [id number]  Referring provider: TONY Abrams  Dx:   Encounter Diagnosis     ICD-10-CM    1  Lumbar degenerative disc disease  M51 36 Ambulatory referral to Comprehensive Spine PT   2  Chronic bilateral low back pain without sciatica  M54 5 Ambulatory referral to Comprehensive Spine PT    G89 29                   Assessment  Assessment details: 2021: Pt presents with constant pain signs and symptoms present for more than 16 days, are above the knee which are increasing and are consistent with derangement which would benefit from directional specific mechanical correction, as well as poor LE flexibility which will benefit from stretching  Symptoms respond with abolishment upon mechanical assessment to determine directional preference of extension  Pt will benefit from skilled physical therapy 1-2X/week for 4 weeks to address deficits in range of motion, strength and function and return to PLOF by reaching short and long term goals  Impairments: abnormal or restricted ROM, activity intolerance, lacks appropriate home exercise program, pain with function, poor posture  and poor body mechanics  Other impairment: poor lucy to stairs, min difficulty w/ bed mobility  Functional limitations: poor tolerance to bending, lifting from floor or overheadUnderstanding of Dx/Px/POC: good   Prognosis: good    Goals  Short Tem Goals to be met in 4 weeks (target date 2021)  1  Pt to be independent w/ HEP  2  Pt to demonstrate ease w/ bed mobility and transfers  3  Restore lumbar AROM to min calero to improve functional mobility  Long Term Goal to be met in 8 weeks (target date 2021)  1  Pt to demonstrate ease w/ sit to stand w/o UE assist   2  Pt to be independent w/ symptom management and prevention  3  Pt Reduce c/o pain to 0-4/10 with activity and prolonged positions    4  Improve LE flexibility to min calero B/L VESTA's  5  Improve FOTO score to 53  Plan  Plan details:   Age > 72  PURNIMA was not dangerous  Pt does no  c/o paresthesias  Patient would benefit from: skilled speech therapy  Planned modality interventions: traction, unattended electrical stimulation and thermotherapy: hydrocollator packs  Planned therapy interventions: joint mobilization, manual therapy, abdominal trunk stabilization, patient education, postural training, stretching, transfer training, home exercise program and strengthening  Frequency: 1-2x/week   Plan of Care beginning date: 2021  Plan of Care expiration date: 2021  Treatment plan discussed with: patient        Subjective Evaluation    History of Present Illness  Mechanism of injury: 2021: Pt reports back pain for the past 9 or 10 months which is intermittent, worse w/ prolonged standing and bending over  Pain radiates from low back to his neck  Pain  At best pain ratin  At worst pain rating: 10  Pain location: across low back and B/L paraspinals to neck  Quality: tight  Alleviating factors: sitting  Aggravating factors: standing (bending forward)  Progression: worsening      Diagnostic Tests  X-ray: abnormal (L1 wedge deformity; DDD L2-5)  Treatments  Previous treatment: medication  Current treatment: physical therapy  Patient Goals  Patient goals for therapy: decreased pain  Patient goal: improve standing tolerance        Objective    Flowsheet Rows      Most Recent Value   PT/OT G-Codes   Current Score  40   Projected Score  53      Posture: Lumbar lordosis is absent in standing  There is no lateral shift       Lumbar AROM limitations:  (* =  Pain)     2021  Lumbar flexion: min  Lumbar extension: shekhar 80%  R side glide:  shekhar 90%  L side glide:  shekhar 90%    Mechanical Asessment: pre-test symtpoms include 5/10 pain across low back  Repeated Extension in Standing (MOHINDER)1x10: abolish    Strength:  Core strength: Poor - diastasis rectii       Right  Left     4/27/2021 4/27/2021  Hip flexion:  4+/5  4+/5  Knee ext  4/5  4/5  Ankle DF  5/5  5/5  Great toe ext  5/5  5/5  Ankle PF  5/5  5/5  Knee flex  4-/5  4-/5      Hip abduction  4+/5  4+/5  Hip extension  3+/*5  3+/5*    Sensation: WNL B/L LE's; pitting edema B/L LE's - noted in last visit w/ PCP - pt following up w/ cardio     Flexibility: poor B/L hams, pirif and hip flexors    Function: standing tolerance 30'      Palpation: poor post to ant mobility t/o lumbar spine  No TTP noted in lumbar paraspinals, QL or gluts  Precautions: diastasis rectii; pitting edema B/L LE's  Past Medical History:   Diagnosis Date    Anxiety     Atrial fibrillation ([facility] [address] )     Depression     Hypertension     Insomnia    SOC: 4/27/2021  FOTO: 4/27/2021  POC expiration: 7/20/2021  Daily Treatment log:  Date 4/27/2021       Visit # 1       Manual                                There Exer         MOHINDER against counter 2x10       Sit LAQ 5"x10 R/L       Hip flexor stretch on step 10"x2 R/L       Sit pirif stretch 10"x2 R/L       Sup ham stretch        bridges        LTR        There activ        Sit to stand                HEP        NMRed        Alt taps to step        Tandem balance                                                                Modalities                                  Access Code: [email or web address]  URL: [employer]Page [employer]  com/  Date: 04/27/2021  Prepared by:  Wilmer Epstein    Exercises  Seated Piriformis Stretch with Trunk Bend - 1 x daily - 7 x weekly - 1 sets - 3 reps - 20 hold  Seated Long Arc Quad - 1 x daily - 7 x weekly - 1 sets - 10 reps - 5 hold  Hip Flexor Stretch on Step - 1 x daily - 7 x weekly - 1 sets - 3 reps - 20 hold  Standing Lumbar Extension with Counter - 5 x daily - 7 x weekly - 1 sets - 10 reps

## 2021-04-28 ENCOUNTER — TRANSCRIBE ORDERS (OUTPATIENT)
Dept: PHYSICAL THERAPY | Facility: CLINIC | Age: 66
End: 2021-04-28

## 2021-04-28 DIAGNOSIS — M54.50 CHRONIC BILATERAL LOW BACK PAIN WITHOUT SCIATICA: ICD-10-CM

## 2021-04-28 DIAGNOSIS — G89.29 CHRONIC BILATERAL LOW BACK PAIN WITHOUT SCIATICA: ICD-10-CM

## 2021-04-28 DIAGNOSIS — M51.36 LUMBAR DEGENERATIVE DISC DISEASE: Primary | ICD-10-CM

## 2021-05-04 ENCOUNTER — TELEPHONE (OUTPATIENT)
Dept: PHYSICAL THERAPY | Facility: CLINIC | Age: 66
End: 2021-05-04

## 2021-05-04 NOTE — TELEPHONE ENCOUNTER
Pt had called yesterday stating he will discontinue PT due to copay costs  I attempted to call today to follow up and inquire about status w/ HEP  Google assistant indicates pt is "not available" and does not allow me to leave a message

## 2021-05-17 NOTE — PROGRESS NOTES
[id number]: Will resolve episode of care  Pt had called to self D/C stating he is indep w/ HEP and copay is an issue  I did attempt to call him, unable to leave message w/ "google assistant"

## 2021-06-10 DIAGNOSIS — M54.50 BILATERAL LOW BACK PAIN WITHOUT SCIATICA, UNSPECIFIED CHRONICITY: ICD-10-CM

## 2021-06-10 RX ORDER — CHLORTHALIDONE 25 MG/1
TABLET ORAL
COMMUNITY
Start: 2021-04-15 | End: 2021-08-13 | Stop reason: HOSPADM

## 2021-06-10 RX ORDER — BACLOFEN 20 MG/1
20 TABLET ORAL
Qty: 20 TABLET | Refills: 0 | Status: SHIPPED | OUTPATIENT
Start: 2021-06-10 | End: 2021-07-09 | Stop reason: ALTCHOICE

## 2021-07-01 ENCOUNTER — RA CDI HCC (OUTPATIENT)
Dept: OTHER | Facility: HOSPITAL | Age: 66
End: 2021-07-01

## 2021-07-01 NOTE — PROGRESS NOTES
Nancie [facility]  coding opportunities          Chart reviewed, no opportunity found: CHART REVIEWED, NO OPPORTUNITY FOUND                     Patients insurance company: [employer] [facility], [facility]  - [facility] only)

## 2021-07-09 ENCOUNTER — OFFICE VISIT (OUTPATIENT)
Dept: FAMILY MEDICINE CLINIC | Facility: CLINIC | Age: 66
End: 2021-07-09
Payer: COMMERCIAL

## 2021-07-09 VITALS
SYSTOLIC BLOOD PRESSURE: 120 MMHG | DIASTOLIC BLOOD PRESSURE: 72 MMHG | OXYGEN SATURATION: 93 % | HEIGHT: 69 IN | BODY MASS INDEX: 35.4 KG/M2 | WEIGHT: 239 LBS | HEART RATE: 88 BPM | TEMPERATURE: 96.9 F | RESPIRATION RATE: 16 BRPM

## 2021-07-09 DIAGNOSIS — M54.41 CHRONIC MIDLINE LOW BACK PAIN WITH BILATERAL SCIATICA: ICD-10-CM

## 2021-07-09 DIAGNOSIS — I10 ESSENTIAL HYPERTENSION: Primary | ICD-10-CM

## 2021-07-09 DIAGNOSIS — E78.2 MIXED HYPERLIPIDEMIA: ICD-10-CM

## 2021-07-09 DIAGNOSIS — I48.20 CHRONIC ATRIAL FIBRILLATION (HCC): ICD-10-CM

## 2021-07-09 DIAGNOSIS — G89.29 CHRONIC MIDLINE LOW BACK PAIN WITH BILATERAL SCIATICA: ICD-10-CM

## 2021-07-09 DIAGNOSIS — R60.0 EDEMA OF BOTH LOWER LEGS: ICD-10-CM

## 2021-07-09 DIAGNOSIS — M62.838 MUSCLE SPASM: ICD-10-CM

## 2021-07-09 DIAGNOSIS — M51.36 LUMBAR DEGENERATIVE DISC DISEASE: ICD-10-CM

## 2021-07-09 DIAGNOSIS — E78.1 HYPERTRIGLYCERIDEMIA: ICD-10-CM

## 2021-07-09 DIAGNOSIS — M54.42 CHRONIC MIDLINE LOW BACK PAIN WITH BILATERAL SCIATICA: ICD-10-CM

## 2021-07-09 PROCEDURE — 3008F BODY MASS INDEX DOCD: CPT | Performed by: NURSE PRACTITIONER

## 2021-07-09 PROCEDURE — 99214 OFFICE O/P EST MOD 30 MIN: CPT | Performed by: NURSE PRACTITIONER

## 2021-07-09 PROCEDURE — 3078F DIAST BP <80 MM HG: CPT | Performed by: NURSE PRACTITIONER

## 2021-07-09 PROCEDURE — 1160F RVW MEDS BY RX/DR IN RCRD: CPT | Performed by: NURSE PRACTITIONER

## 2021-07-09 PROCEDURE — 3074F SYST BP LT 130 MM HG: CPT | Performed by: NURSE PRACTITIONER

## 2021-07-09 PROCEDURE — 1036F TOBACCO NON-USER: CPT | Performed by: NURSE PRACTITIONER

## 2021-07-09 RX ORDER — TORSEMIDE 20 MG/1
TABLET ORAL
COMMUNITY
Start: 2021-06-30

## 2021-07-09 RX ORDER — POTASSIUM CHLORIDE 1500 MG/1
TABLET, EXTENDED RELEASE ORAL
COMMUNITY
Start: 2021-06-30 | End: 2021-08-13 | Stop reason: HOSPADM

## 2021-07-09 RX ORDER — METHOCARBAMOL 750 MG/1
750 TABLET, FILM COATED ORAL
Qty: 30 TABLET | Refills: 0 | Status: SHIPPED | OUTPATIENT
Start: 2021-07-09 | End: 2021-08-13 | Stop reason: HOSPADM

## 2021-07-09 RX ORDER — MELOXICAM 15 MG/1
15 TABLET ORAL DAILY
Qty: 30 TABLET | Refills: 0 | Status: SHIPPED | OUTPATIENT
Start: 2021-07-09 | End: 2021-08-13 | Stop reason: HOSPADM

## 2021-07-09 NOTE — PATIENT INSTRUCTIONS
Heart healthy, carbohydrate controlled diet- limit red meat, limit saturated fat, moderate salt intake, limit junk food, etc    Regular exercise  Stress management  Routine labwork and screenings as ordered  Meloxicam 15mg daily  Robaxin 750mg at bedtime as needed for muscle spasm  Schedule appt with orthopedics as discussed  Continue all other medications

## 2021-07-09 NOTE — PROGRESS NOTES
Assessment/Plan:    1  Essential hypertension  Stable  Continue blood pressure medications as ordered  Monitor blood pressure  Bring diary/log of readings to next appointment  Limit salt in diet  - CBC and differential; Future  - Comprehensive metabolic panel; Future  2  Chronic atrial fibrillation (HCC)  Stable  Managed by cardiology  - CBC and differential; Future  - Comprehensive metabolic panel; Future  3  Mixed hyperlipidemia  - CBC and differential; Future  - Comprehensive metabolic panel; Future  - Lipid panel; Future  4  Hypertriglyceridemia  - CBC and differential; Future  - Comprehensive metabolic panel; Future  - Lipid panel; Future  5  Chronic midline low back pain with bilateral sciatica  - Ambulatory referral to Orthopedic Surgery; Future  - meloxicam (MOBIC) 15 mg tablet; Take 1 tablet (15 mg total) by mouth daily  Dispense: 30 tablet; Refill: 0  6  Muscle spasm  - methocarbamol (ROBAXIN) 750 mg tablet; Take 1 tablet (750 mg total) by mouth daily at bedtime as needed for muscle spasms  Dispense: 30 tablet; Refill: 0  7  Lumbar degenerative disc disease  - Ambulatory referral to Orthopedic Surgery; Future  8  Edema of both lower legs  Diuretics, monitor weight  Patient was counseled regarding instructions for management which included: impression/diagnosis, risk/benefits of treatment options, importance of compliance with treatment, risk factor reduction, and prognosis  I have reviewed the instructions with the patient answering all questions and patient verbalized understanding  BMI Counseling: Body mass index is 35 81 kg/m²  The BMI is above normal  Nutrition recommendations include reducing portion sizes, decreasing overall calorie intake, moderation in carbohydrate intake, reducing intake of saturated fat and trans fat and reducing intake of cholesterol  Exercise recommendations include exercising 3-5 times per week  Subjective:      Patient ID: Pierre Cervantes is a 77 y o  male who presents for follow up    Here to follow up on multiple chronic conditions  Has been having low back pain for several months  No known injury  Now with some radiation of pain from low back to thighs with intermittent tingling sensation  Went to therapy for one session but cannot afford copay for ongoing tx  Sometimes back is tight and goes into spasm  Pain iw worse when bending forward and with arms outstretched  Was given rx for baclofen and that helps but makes him groggy  Would like to try something else for pain and spasms  Took naprosyn, didn't help  xrays were done and were normal    Saw cardiology last week   Was put on water pill and potassium  Denies leg pain  No chest pain  No sob  Sees psychiatrist in [address] [place] [address] every 2 months, doing mostly virtual visits  Has been on same meds/doses for years  No recent exac of any psych symptoms  The following portions of the patient's history were reviewed and updated as appropriate: allergies, current medications, past family history, past medical history, past social history, past surgical history and problem list     Review of Systems   Constitutional: Negative for unexpected weight change  Respiratory: Negative for cough and shortness of breath  Cardiovascular: Positive for leg swelling  Negative for chest pain  Endocrine: Negative for cold intolerance, heat intolerance, polydipsia, polyphagia and polyuria  Musculoskeletal: Positive for back pain  Skin: Negative for rash and wound  Neurological: Positive for numbness (from low back to upper legs/thighs)  Psychiatric/Behavioral: Positive for dysphoric mood  Negative for self-injury and suicidal ideas  The patient is nervous/anxious            Objective:      /72 (BP Location: Right arm, Patient Position: Sitting, Cuff Size: Standard)   Pulse 88   Temp (!) 96 9 °F (36 1 °C) (Temporal)   Resp 16   Ht 5' 8 5" (1 74 m)   Wt 108 kg (239 lb)   SpO2 93%   BMI 35 81 kg/m² Physical Exam  Vitals reviewed  Constitutional:       General: He is not in acute distress  Appearance: He is obese  He is not ill-appearing or toxic-appearing  HENT:      Head: Normocephalic  Eyes:      General: No scleral icterus  Neck:      Vascular: No carotid bruit  Comments: 2+ pitting edema BLE, no calf tenderness  No JVD  Cardiovascular:      Rate and Rhythm: Normal rate and regular rhythm  Pulmonary:      Effort: Pulmonary effort is normal  No respiratory distress  Breath sounds: Normal breath sounds  No wheezing or rales  Abdominal:      General: There is no distension  Palpations: Abdomen is soft  Tenderness: There is no abdominal tenderness  Musculoskeletal:      Cervical back: Normal range of motion and neck supple  Right lower leg: Edema present  Left lower leg: Edema present  Comments: No point vertebral tenderness  Full lumbar rom  Neg SLR   Skin:     General: Skin is warm and dry  Coloration: Skin is not jaundiced or pale  Neurological:      General: No focal deficit present  Mental Status: He is oriented to person, place, and time  Cranial Nerves: No cranial nerve deficit  Psychiatric:         Thought Content: Thought content normal          Judgment: Judgment normal       Comments: Affect flat  Well groomed  Dressed appropriately for the weather  Calm  Pleasant   Cooperative  Good eye contact  Converses freely and appropriately

## 2021-08-01 ENCOUNTER — APPOINTMENT (EMERGENCY)
Dept: RADIOLOGY | Facility: HOSPITAL | Age: 66
DRG: 871 | End: 2021-08-01
Payer: COMMERCIAL

## 2021-08-01 ENCOUNTER — HOSPITAL ENCOUNTER (INPATIENT)
Facility: HOSPITAL | Age: 66
LOS: 12 days | Discharge: NURSING, REHAB OR LONG-TERM CARE | DRG: 871 | End: 2021-08-13
Attending: EMERGENCY MEDICINE | Admitting: ANESTHESIOLOGY
Payer: COMMERCIAL

## 2021-08-01 DIAGNOSIS — F41.9 ANXIETY: ICD-10-CM

## 2021-08-01 DIAGNOSIS — I48.91 ATRIAL FIBRILLATION WITH RAPID VENTRICULAR RESPONSE (HCC): ICD-10-CM

## 2021-08-01 DIAGNOSIS — Z72.89 ALCOHOL USE: ICD-10-CM

## 2021-08-01 DIAGNOSIS — K59.00 CONSTIPATION: ICD-10-CM

## 2021-08-01 DIAGNOSIS — E11.69 TYPE 2 DIABETES MELLITUS WITH OTHER SPECIFIED COMPLICATION, WITHOUT LONG-TERM CURRENT USE OF INSULIN (HCC): ICD-10-CM

## 2021-08-01 DIAGNOSIS — I50.9 CHF (CONGESTIVE HEART FAILURE) (HCC): Primary | ICD-10-CM

## 2021-08-01 DIAGNOSIS — G47.09 OTHER INSOMNIA: ICD-10-CM

## 2021-08-01 DIAGNOSIS — I50.23 ACUTE ON CHRONIC SYSTOLIC (CONGESTIVE) HEART FAILURE (HCC): ICD-10-CM

## 2021-08-01 DIAGNOSIS — I48.20 CHRONIC ATRIAL FIBRILLATION (HCC): ICD-10-CM

## 2021-08-01 DIAGNOSIS — G47.30 SLEEP APNEA, UNSPECIFIED TYPE: ICD-10-CM

## 2021-08-01 DIAGNOSIS — J44.1 CHRONIC OBSTRUCTIVE PULMONARY DISEASE WITH ACUTE EXACERBATION (HCC): ICD-10-CM

## 2021-08-01 PROBLEM — I10 ESSENTIAL HYPERTENSION: Chronic | Status: ACTIVE | Noted: 2020-08-28

## 2021-08-01 PROBLEM — F33.9 DEPRESSION, RECURRENT (HCC): Chronic | Status: ACTIVE | Noted: 2020-08-28

## 2021-08-01 PROBLEM — N17.9 AKI (ACUTE KIDNEY INJURY) (HCC): Status: ACTIVE | Noted: 2021-08-01

## 2021-08-01 PROBLEM — E78.2 MIXED HYPERLIPIDEMIA: Chronic | Status: ACTIVE | Noted: 2020-08-28

## 2021-08-01 PROBLEM — J96.01 ACUTE RESPIRATORY FAILURE WITH HYPOXIA (HCC): Status: ACTIVE | Noted: 2021-08-01

## 2021-08-01 PROBLEM — I42.9 CARDIOMYOPATHY (HCC): Chronic | Status: ACTIVE | Noted: 2020-08-28

## 2021-08-01 PROBLEM — E66.01 SEVERE OBESITY (BMI 35.0-39.9) WITH COMORBIDITY (HCC): Chronic | Status: ACTIVE | Noted: 2021-04-01

## 2021-08-01 LAB
ALBUMIN SERPL BCP-MCNC: 2.9 G/DL (ref 3.5–5)
ALP SERPL-CCNC: 226 U/L (ref 46–116)
ALT SERPL W P-5'-P-CCNC: 59 U/L (ref 12–78)
ANION GAP SERPL CALCULATED.3IONS-SCNC: 12 MMOL/L (ref 4–13)
APAP SERPL-MCNC: <2 UG/ML (ref 10–20)
APTT PPP: 49 SECONDS (ref 23–37)
ARTERIAL PATENCY WRIST A: YES
AST SERPL W P-5'-P-CCNC: 59 U/L (ref 5–45)
BACTERIA UR QL AUTO: ABNORMAL /HPF
BASE EXCESS BLDA CALC-SCNC: -4.6 MMOL/L
BASOPHILS # BLD AUTO: 0.03 THOUSANDS/ΜL (ref 0–0.1)
BASOPHILS NFR BLD AUTO: 0 % (ref 0–1)
BILIRUB SERPL-MCNC: 1.65 MG/DL (ref 0.2–1)
BILIRUB UR QL STRIP: ABNORMAL
BODY TEMPERATURE: 98.8 DEGREES FEHRENHEIT
BUN SERPL-MCNC: 21 MG/DL (ref 5–25)
CALCIUM ALBUM COR SERPL-MCNC: 10.4 MG/DL (ref 8.3–10.1)
CALCIUM SERPL-MCNC: 9.5 MG/DL (ref 8.3–10.1)
CHLORIDE SERPL-SCNC: 104 MMOL/L (ref 100–108)
CK MB SERPL-MCNC: 4.2 NG/ML (ref 0–5)
CK MB SERPL-MCNC: <1 % (ref 0–2.5)
CK SERPL-CCNC: 1565 U/L (ref 39–308)
CLARITY UR: CLEAR
CO2 SERPL-SCNC: 24 MMOL/L (ref 21–32)
COLOR UR: ABNORMAL
CREAT SERPL-MCNC: 1.46 MG/DL (ref 0.6–1.3)
EOSINOPHIL # BLD AUTO: 0 THOUSAND/ΜL (ref 0–0.61)
EOSINOPHIL NFR BLD AUTO: 0 % (ref 0–6)
ERYTHROCYTE [DISTWIDTH] IN BLOOD BY AUTOMATED COUNT: 13.5 % (ref 11.6–15.1)
ETHANOL SERPL-MCNC: <3 MG/DL (ref 0–3)
FINE GRAN CASTS URNS QL MICRO: ABNORMAL /LPF
GFR SERPL CREATININE-BSD FRML MDRD: 49 ML/MIN/1.73SQ M
GLUCOSE SERPL-MCNC: 112 MG/DL (ref 65–140)
GLUCOSE UR STRIP-MCNC: NEGATIVE MG/DL
HCO3 BLDA-SCNC: 20.2 MMOL/L (ref 22–28)
HCT VFR BLD AUTO: 37.7 % (ref 36.5–49.3)
HGB BLD-MCNC: 12.5 G/DL (ref 12–17)
HGB UR QL STRIP.AUTO: ABNORMAL
HYALINE CASTS #/AREA URNS LPF: ABNORMAL /LPF
IMM GRANULOCYTES # BLD AUTO: 0.17 THOUSAND/UL (ref 0–0.2)
IMM GRANULOCYTES NFR BLD AUTO: 1 % (ref 0–2)
INR PPP: 1.52 (ref 0.84–1.19)
IPAP: 10
KETONES UR STRIP-MCNC: NEGATIVE MG/DL
LACTATE SERPL-SCNC: 1.8 MMOL/L (ref 0.5–2)
LEUKOCYTE ESTERASE UR QL STRIP: NEGATIVE
LIPASE SERPL-CCNC: 58 U/L (ref 73–393)
LYMPHOCYTES # BLD AUTO: 0.32 THOUSANDS/ΜL (ref 0.6–4.47)
LYMPHOCYTES NFR BLD AUTO: 2 % (ref 14–44)
MCH RBC QN AUTO: 34.6 PG (ref 26.8–34.3)
MCHC RBC AUTO-ENTMCNC: 33.2 G/DL (ref 31.4–37.4)
MCV RBC AUTO: 104 FL (ref 82–98)
MONOCYTES # BLD AUTO: 0.52 THOUSAND/ΜL (ref 0.17–1.22)
MONOCYTES NFR BLD AUTO: 4 % (ref 4–12)
MUCOUS THREADS UR QL AUTO: ABNORMAL
NEUTROPHILS # BLD AUTO: 13.1 THOUSANDS/ΜL (ref 1.85–7.62)
NEUTS SEG NFR BLD AUTO: 93 % (ref 43–75)
NITRITE UR QL STRIP: NEGATIVE
NON VENT- BIPAP: ABNORMAL
NON-SQ EPI CELLS URNS QL MICRO: ABNORMAL /HPF
NRBC BLD AUTO-RTO: 0 /100 WBCS
NT-PROBNP SERPL-MCNC: 4649 PG/ML
O2 CT BLDA-SCNC: 16.5 ML/DL (ref 16–23)
OXYHGB MFR BLDA: 96.2 % (ref 94–97)
PCO2 BLDA: 36.5 MM HG (ref 36–44)
PCO2 TEMP ADJ BLDA: 36.7 MM HG (ref 36–44)
PEEP MAX SETTING VENT: 5 CM[H2O]
PH BLD: 7.36 [PH] (ref 7.35–7.45)
PH BLDA: 7.36 [PH] (ref 7.35–7.45)
PH UR STRIP.AUTO: 5.5 [PH]
PLATELET # BLD AUTO: 209 THOUSANDS/UL (ref 149–390)
PMV BLD AUTO: 10.3 FL (ref 8.9–12.7)
PO2 BLD: 97.7 MM HG (ref 75–129)
PO2 BLDA: 97.1 MM HG (ref 75–129)
POTASSIUM SERPL-SCNC: 4.2 MMOL/L (ref 3.5–5.3)
PROT SERPL-MCNC: 7.2 G/DL (ref 6.4–8.2)
PROT UR STRIP-MCNC: ABNORMAL MG/DL
PROTHROMBIN TIME: 18.2 SECONDS (ref 11.6–14.5)
RBC # BLD AUTO: 3.61 MILLION/UL (ref 3.88–5.62)
RBC #/AREA URNS AUTO: ABNORMAL /HPF
SALICYLATES SERPL-MCNC: <3 MG/DL (ref 3–20)
SARS-COV-2 RNA RESP QL NAA+PROBE: NEGATIVE
SODIUM SERPL-SCNC: 140 MMOL/L (ref 136–145)
SP GR UR STRIP.AUTO: 1.02 (ref 1–1.03)
SPECIMEN SOURCE: ABNORMAL
TROPONIN I SERPL-MCNC: <0.02 NG/ML
TSH SERPL DL<=0.05 MIU/L-ACNC: 1.2 UIU/ML (ref 0.36–3.74)
UROBILINOGEN UR QL STRIP.AUTO: 1 E.U./DL
VENT BIPAP FIO2: 50 %
WBC # BLD AUTO: 14.14 THOUSAND/UL (ref 4.31–10.16)
WBC #/AREA URNS AUTO: ABNORMAL /HPF

## 2021-08-01 PROCEDURE — 94760 N-INVAS EAR/PLS OXIMETRY 1: CPT

## 2021-08-01 PROCEDURE — 96375 TX/PRO/DX INJ NEW DRUG ADDON: CPT

## 2021-08-01 PROCEDURE — 93005 ELECTROCARDIOGRAM TRACING: CPT

## 2021-08-01 PROCEDURE — 87081 CULTURE SCREEN ONLY: CPT | Performed by: ANESTHESIOLOGY

## 2021-08-01 PROCEDURE — 80179 DRUG ASSAY SALICYLATE: CPT | Performed by: EMERGENCY MEDICINE

## 2021-08-01 PROCEDURE — 70450 CT HEAD/BRAIN W/O DYE: CPT

## 2021-08-01 PROCEDURE — 83690 ASSAY OF LIPASE: CPT | Performed by: EMERGENCY MEDICINE

## 2021-08-01 PROCEDURE — 99223 1ST HOSP IP/OBS HIGH 75: CPT | Performed by: ANESTHESIOLOGY

## 2021-08-01 PROCEDURE — 71045 X-RAY EXAM CHEST 1 VIEW: CPT

## 2021-08-01 PROCEDURE — 83880 ASSAY OF NATRIURETIC PEPTIDE: CPT | Performed by: EMERGENCY MEDICINE

## 2021-08-01 PROCEDURE — U0005 INFEC AGEN DETEC AMPLI PROBE: HCPCS | Performed by: EMERGENCY MEDICINE

## 2021-08-01 PROCEDURE — 82805 BLOOD GASES W/O2 SATURATION: CPT | Performed by: EMERGENCY MEDICINE

## 2021-08-01 PROCEDURE — 85610 PROTHROMBIN TIME: CPT | Performed by: EMERGENCY MEDICINE

## 2021-08-01 PROCEDURE — 96374 THER/PROPH/DIAG INJ IV PUSH: CPT

## 2021-08-01 PROCEDURE — 82550 ASSAY OF CK (CPK): CPT | Performed by: EMERGENCY MEDICINE

## 2021-08-01 PROCEDURE — 84484 ASSAY OF TROPONIN QUANT: CPT | Performed by: EMERGENCY MEDICINE

## 2021-08-01 PROCEDURE — U0003 INFECTIOUS AGENT DETECTION BY NUCLEIC ACID (DNA OR RNA); SEVERE ACUTE RESPIRATORY SYNDROME CORONAVIRUS 2 (SARS-COV-2) (CORONAVIRUS DISEASE [COVID-19]), AMPLIFIED PROBE TECHNIQUE, MAKING USE OF HIGH THROUGHPUT TECHNOLOGIES AS DESCRIBED BY CMS-2020-01-R: HCPCS | Performed by: EMERGENCY MEDICINE

## 2021-08-01 PROCEDURE — 80143 DRUG ASSAY ACETAMINOPHEN: CPT | Performed by: EMERGENCY MEDICINE

## 2021-08-01 PROCEDURE — 84484 ASSAY OF TROPONIN QUANT: CPT | Performed by: PHYSICIAN ASSISTANT

## 2021-08-01 PROCEDURE — 82553 CREATINE MB FRACTION: CPT | Performed by: EMERGENCY MEDICINE

## 2021-08-01 PROCEDURE — 99291 CRITICAL CARE FIRST HOUR: CPT | Performed by: EMERGENCY MEDICINE

## 2021-08-01 PROCEDURE — 87040 BLOOD CULTURE FOR BACTERIA: CPT | Performed by: EMERGENCY MEDICINE

## 2021-08-01 PROCEDURE — 80053 COMPREHEN METABOLIC PANEL: CPT | Performed by: EMERGENCY MEDICINE

## 2021-08-01 PROCEDURE — 85730 THROMBOPLASTIN TIME PARTIAL: CPT | Performed by: EMERGENCY MEDICINE

## 2021-08-01 PROCEDURE — 82077 ASSAY SPEC XCP UR&BREATH IA: CPT | Performed by: EMERGENCY MEDICINE

## 2021-08-01 PROCEDURE — 85025 COMPLETE CBC W/AUTO DIFF WBC: CPT | Performed by: EMERGENCY MEDICINE

## 2021-08-01 PROCEDURE — 94002 VENT MGMT INPAT INIT DAY: CPT

## 2021-08-01 PROCEDURE — 84443 ASSAY THYROID STIM HORMONE: CPT | Performed by: EMERGENCY MEDICINE

## 2021-08-01 PROCEDURE — 83605 ASSAY OF LACTIC ACID: CPT | Performed by: EMERGENCY MEDICINE

## 2021-08-01 PROCEDURE — 81001 URINALYSIS AUTO W/SCOPE: CPT | Performed by: EMERGENCY MEDICINE

## 2021-08-01 PROCEDURE — 99285 EMERGENCY DEPT VISIT HI MDM: CPT

## 2021-08-01 PROCEDURE — 36415 COLL VENOUS BLD VENIPUNCTURE: CPT | Performed by: EMERGENCY MEDICINE

## 2021-08-01 PROCEDURE — 87086 URINE CULTURE/COLONY COUNT: CPT | Performed by: EMERGENCY MEDICINE

## 2021-08-01 RX ORDER — RISPERIDONE 1 MG/1
2 TABLET, FILM COATED ORAL DAILY
Status: DISCONTINUED | OUTPATIENT
Start: 2021-08-02 | End: 2021-08-13 | Stop reason: HOSPADM

## 2021-08-01 RX ORDER — METHOCARBAMOL 500 MG/1
750 TABLET, FILM COATED ORAL
Status: DISCONTINUED | OUTPATIENT
Start: 2021-08-01 | End: 2021-08-11

## 2021-08-01 RX ORDER — ZOLPIDEM TARTRATE 5 MG/1
10 TABLET ORAL
Status: DISCONTINUED | OUTPATIENT
Start: 2021-08-01 | End: 2021-08-01

## 2021-08-01 RX ORDER — METOPROLOL TARTRATE 5 MG/5ML
5 INJECTION INTRAVENOUS ONCE
Status: COMPLETED | OUTPATIENT
Start: 2021-08-01 | End: 2021-08-01

## 2021-08-01 RX ORDER — ALPRAZOLAM 0.5 MG/1
1 TABLET ORAL 3 TIMES DAILY PRN
Status: DISCONTINUED | OUTPATIENT
Start: 2021-08-01 | End: 2021-08-04

## 2021-08-01 RX ORDER — BUPROPION HYDROCHLORIDE 150 MG/1
150 TABLET ORAL DAILY
Status: DISCONTINUED | OUTPATIENT
Start: 2021-08-02 | End: 2021-08-13 | Stop reason: HOSPADM

## 2021-08-01 RX ORDER — FOLIC ACID 1 MG/1
1 TABLET ORAL DAILY
Status: DISCONTINUED | OUTPATIENT
Start: 2021-08-02 | End: 2021-08-13 | Stop reason: HOSPADM

## 2021-08-01 RX ORDER — HEPARIN SODIUM 5000 [USP'U]/ML
5000 INJECTION, SOLUTION INTRAVENOUS; SUBCUTANEOUS EVERY 8 HOURS SCHEDULED
Status: DISCONTINUED | OUTPATIENT
Start: 2021-08-01 | End: 2021-08-09

## 2021-08-01 RX ORDER — DILTIAZEM HYDROCHLORIDE 5 MG/ML
15 INJECTION INTRAVENOUS ONCE
Status: COMPLETED | OUTPATIENT
Start: 2021-08-01 | End: 2021-08-01

## 2021-08-01 RX ORDER — PRAVASTATIN SODIUM 40 MG
40 TABLET ORAL
Status: DISCONTINUED | OUTPATIENT
Start: 2021-08-02 | End: 2021-08-05

## 2021-08-01 RX ORDER — LANOLIN ALCOHOL/MO/W.PET/CERES
100 CREAM (GRAM) TOPICAL DAILY
Status: DISCONTINUED | OUTPATIENT
Start: 2021-08-02 | End: 2021-08-13 | Stop reason: HOSPADM

## 2021-08-01 RX ORDER — ACETAMINOPHEN 325 MG/1
650 TABLET ORAL EVERY 6 HOURS PRN
Status: DISCONTINUED | OUTPATIENT
Start: 2021-08-01 | End: 2021-08-13 | Stop reason: HOSPADM

## 2021-08-01 RX ORDER — METOPROLOL TARTRATE 50 MG/1
100 TABLET, FILM COATED ORAL DAILY
Status: DISCONTINUED | OUTPATIENT
Start: 2021-08-02 | End: 2021-08-04

## 2021-08-01 RX ORDER — METOPROLOL TARTRATE 5 MG/5ML
5 INJECTION INTRAVENOUS EVERY 6 HOURS PRN
Status: DISCONTINUED | OUTPATIENT
Start: 2021-08-01 | End: 2021-08-11

## 2021-08-01 RX ORDER — FLUOXETINE HYDROCHLORIDE 20 MG/1
20 CAPSULE ORAL DAILY
Status: DISCONTINUED | OUTPATIENT
Start: 2021-08-02 | End: 2021-08-13 | Stop reason: HOSPADM

## 2021-08-01 RX ORDER — LORAZEPAM 2 MG/ML
0.5 INJECTION INTRAMUSCULAR ONCE
Status: COMPLETED | OUTPATIENT
Start: 2021-08-01 | End: 2021-08-01

## 2021-08-01 RX ORDER — FUROSEMIDE 10 MG/ML
40 INJECTION INTRAMUSCULAR; INTRAVENOUS ONCE
Status: COMPLETED | OUTPATIENT
Start: 2021-08-01 | End: 2021-08-01

## 2021-08-01 RX ADMIN — HEPARIN SODIUM 5000 UNITS: 5000 INJECTION INTRAVENOUS; SUBCUTANEOUS at 21:53

## 2021-08-01 RX ADMIN — DILTIAZEM HYDROCHLORIDE 15 MG: 5 INJECTION INTRAVENOUS at 18:25

## 2021-08-01 RX ADMIN — ALPRAZOLAM 1 MG: 0.5 TABLET ORAL at 22:35

## 2021-08-01 RX ADMIN — DILTIAZEM HYDROCHLORIDE 15 MG: 5 INJECTION INTRAVENOUS at 23:33

## 2021-08-01 RX ADMIN — FUROSEMIDE 40 MG: 10 INJECTION, SOLUTION INTRAMUSCULAR; INTRAVENOUS at 17:30

## 2021-08-01 RX ADMIN — LORAZEPAM 0.5 MG: 2 INJECTION INTRAMUSCULAR; INTRAVENOUS at 18:00

## 2021-08-01 RX ADMIN — ACETAMINOPHEN 650 MG: 325 TABLET, FILM COATED ORAL at 23:25

## 2021-08-01 RX ADMIN — METOPROLOL TARTRATE 5 MG: 5 INJECTION INTRAVENOUS at 22:09

## 2021-08-01 RX ADMIN — METOPROLOL TARTRATE 5 MG: 5 INJECTION INTRAVENOUS at 22:49

## 2021-08-01 NOTE — ASSESSMENT & PLAN NOTE
· Hx cardiomyopathy echo 2017 with EF 40% without significant valvular abnormality, subsequent nuclear stress test 2017 EF reported 65%   · Follows with cardiology, no follow up echos noted - check ECHO   · On home torsemide 20 mg daily and chlorthalidone 25 mg daily, reports taking as prescribed   · CXR with evidence of volume overload, BNP elevated 4649  · Received 40 mg IV Lasix x 1 in ER   · Place gallo to assess response   · Will redose lasix as needed for UOP and goal negative fluid balance
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
## 2021-08-01 NOTE — ASSESSMENT & PLAN NOTE
· Presented with multiple days of fatigue, dry cough and shortness of breath, hypoxic to 88% on room air on presentation   Placed on 4 L NC with improvement in spo2 but persistent increased WOB requiring BIPAP without significant improvement   · Suspect etiology CHF exacerbation with CXR patchy opacifications concerning for volume overload, BNP 4649  · Less likely infectious process, afebrile, COVID negative, CXR without focal consolidation - monitor off abx at this time   · Continue BIPAP 8/6 50%  · Continue diuresis for goal negative fluid balance

## 2021-08-01 NOTE — ED PROVIDER NOTES
History  Chief Complaint   Patient presents with    Weakness - Generalized     feeling "crappy" for a few days, bought cold medicine at the [employer] store  Known drinker, states he hasn't drank in several days  Patient fell due to being weak  77yoM hx ETOH, AFib, suspected CHF (on torsemide but doesn't know why), c/o feeling weak and having a cough/SOB for a few days; had a fall yesterday, called EMS for help today  Denies head injury or neck pain  No chest pain  EMS reports alcohol and pill bottles all over the home, pt denies being a drinker to me, although reports to charge RN his "last drink was 3d ago "            Prior to Admission Medications   Prescriptions Last Dose Informant Patient Reported? Taking?    ALPRAZolam (XANAX) 1 mg tablet  Self Yes No   Sig: TAKE 1 TABLET BY MOUTH THREE TIMES DAILY AS DIRECTED FOR ANXIETY OR PANIC   FLUoxetine (PROzac) 20 mg capsule  Self Yes No   Sig: daily    Klor-Con M20 20 MEQ tablet  Self Yes No   amLODIPine (NORVASC) 5 mg tablet  Self No No   Sig: Take 1 tablet (5 mg total) by mouth daily   buPROPion (WELLBUTRIN SR) 150 mg 12 hr tablet  Self Yes No   Sig: Take 150 mg by mouth 2 (two) times a day    candesartan (ATACAND) 32 MG tablet  Self No No   Sig: Take 1 tablet (32 mg total) by mouth daily   chlorthalidone 25 mg tablet  Self Yes No   meloxicam (MOBIC) 15 mg tablet   No No   Sig: Take 1 tablet (15 mg total) by mouth daily   methocarbamol (ROBAXIN) 750 mg tablet   No No   Sig: Take 1 tablet (750 mg total) by mouth daily at bedtime as needed for muscle spasms   metoprolol tartrate (LOPRESSOR) 100 mg tablet  Self No No   Sig: Take 1 tablet (100 mg total) by mouth daily   pravastatin (PRAVACHOL) 40 mg tablet  Self Yes No   Sig: daily    prazosin (MINIPRESS) 2 mg capsule  Self No No   Sig: TAKE 1 CAPSULE THREE TIMES A DAY   risperiDONE (RisperDAL) 3 mg tablet  Self Yes No   Sig: Take 2 mg by mouth daily    torsemide (DEMADEX) 20 mg tablet  Self Yes No   zolpidem (AMBIEN) 10 mg tablet  Self No No   Sig: Take 1 tablet (10 mg total) by mouth daily at bedtime as needed for sleep      Facility-Administered Medications: None       Past Medical History:   Diagnosis Date    Anxiety     Atrial fibrillation ([facility] [address] )     Depression     Hypertension     Insomnia        History reviewed  No pertinent surgical history  Family History   Problem Relation Age of Onset    Ovarian cancer Sister     Substance Abuse Neg Hx     Mental illness Neg Hx      I have reviewed and agree with the history as documented  E-Cigarette/Vaping    E-Cigarette Use Never User      E-Cigarette/Vaping Substances     Social History     Tobacco Use    Smoking status: Current Some Day Smoker     Packs/day: 1 00     Years: 40 00     Pack years: 40 00     Types: Cigarettes     Last attempt to quit: 2015     Years since quittin 9    Smokeless tobacco: Never Used   Vaping Use    Vaping Use: Never used   Substance Use Topics    Alcohol use: Yes     Alcohol/week: 4 0 standard drinks     Types: 4 Cans of beer per week    Drug use: Not Currently     Types: Cocaine       Review of Systems   Constitutional: Negative for fever  Respiratory: Positive for cough and shortness of breath  Gastrointestinal: Negative for abdominal pain  Musculoskeletal: Negative for back pain  Neurological: Negative for headaches  All other systems reviewed and are negative  Physical Exam  Physical Exam  Vitals reviewed  Constitutional:       General: He is not in acute distress  Appearance: He is well-developed  He is ill-appearing  HENT:      Head: Normocephalic and atraumatic  Right Ear: External ear normal       Left Ear: External ear normal       Nose: Nose normal  No rhinorrhea  Mouth/Throat:      Mouth: Mucous membranes are moist    Eyes:      Conjunctiva/sclera: Conjunctivae normal    Cardiovascular:      Rate and Rhythm: Normal rate and regular rhythm     Pulmonary:      Effort: Pulmonary effort is normal       Breath sounds: Rhonchi and rales present  No wheezing  Abdominal:      Palpations: Abdomen is soft  Tenderness: There is no abdominal tenderness  Musculoskeletal:      Cervical back: Neck supple  Right lower leg: Edema present  Left lower leg: Edema present  Skin:     General: Skin is warm and dry  Neurological:      Mental Status: He is oriented to person, place, and time     Psychiatric:         Mood and Affect: Mood normal          Vital Signs  ED Triage Vitals   Temperature Pulse Respirations Blood Pressure SpO2   08/01/21 1406 08/01/21 1406 08/01/21 1410 08/01/21 1410 08/01/21 1406   98 8 °F (37 1 °C) (!) 128 14 111/68 (!) 88 %      Temp Source Heart Rate Source Patient Position - Orthostatic VS BP Location FiO2 (%)   08/01/21 1406 08/01/21 1406 08/01/21 1410 08/01/21 1410 08/01/21 1704   Oral Monitor Sitting Right arm 50      Pain Score       08/01/21 2325       4           Vitals:    08/12/21 2107 08/12/21 2137 08/12/21 2257 08/13/21 0815   BP:  105/66 103/73 97/53   Pulse: 102 82 88 56   Patient Position - Orthostatic VS:  Lying Lying          Visual Acuity  Visual Acuity      Most Recent Value   L Pupil Size (mm)  2   R Pupil Size (mm)  3   L Pupil Shape  Round   R Pupil Shape  Round          ED Medications  Medications   furosemide (LASIX) injection 40 mg (40 mg Intravenous Given 8/1/21 1730)   LORazepam (ATIVAN) injection 0 5 mg (0 5 mg Intravenous Given 8/1/21 1800)   diltiazem (CARDIZEM) injection 15 mg (15 mg Intravenous Given 8/1/21 1825)   metoprolol (LOPRESSOR) injection 5 mg (5 mg Intravenous Given 8/1/21 2249)   diltiazem (CARDIZEM) injection 15 mg (15 mg Intravenous Given 8/1/21 2333)   diltiazem (CARDIZEM) injection 10 mg (10 mg Intravenous Given 8/2/21 0827)   furosemide (LASIX) injection 40 mg (40 mg Intravenous Given 8/2/21 1047)   diltiazem (CARDIZEM) injection 10 mg (10 mg Intravenous Given 8/2/21 1047)   albumin human (FLEXBUMIN) 5 % injection 12 5 g (0 g Intravenous Stopped 8/2/21 1150)   cefTRIAXone (ROCEPHIN) IVPB (premix in dextrose) 2,000 mg 50 mL (0 mg Intravenous Stopped 8/8/21 1230)   azithromycin (ZITHROMAX) 500 mg in sodium chloride 0 9 % 250 mL IVPB (0 mg Intravenous Stopped 8/6/21 1430)   metoprolol (LOPRESSOR) injection 5 mg (5 mg Intravenous Given 8/2/21 2300)   metoprolol (LOPRESSOR) injection 5 mg (5 mg Intravenous Given 8/3/21 0825)   LORazepam (ATIVAN) injection 0 5 mg (0 5 mg Intravenous Given 8/3/21 0824)   furosemide (LASIX) injection 40 mg (40 mg Intravenous Given 8/3/21 1247)   furosemide (LASIX) injection 40 mg (40 mg Intravenous Given 8/4/21 0822)   metroNIDAZOLE (FLAGYL) IVPB (premix) 500 mg 100 mL (500 mg Intravenous New Bag 8/8/21 1820)   furosemide (LASIX) injection 40 mg (40 mg Intravenous Given 8/4/21 1616)   potassium chloride (K-DUR,KLOR-CON) CR tablet 40 mEq (40 mEq Oral Given 8/5/21 0856)   furosemide (LASIX) injection 40 mg (40 mg Intravenous Given 8/5/21 1027)   potassium chloride (K-DUR,KLOR-CON) CR tablet 40 mEq (40 mEq Oral Given 8/6/21 0622)   furosemide (LASIX) injection 40 mg (40 mg Intravenous Given 8/6/21 0850)   predniSONE tablet 30 mg (30 mg Oral Given 8/12/21 0852)   potassium chloride (K-DUR,KLOR-CON) CR tablet 40 mEq (40 mEq Oral Given 8/11/21 1032)       Diagnostic Studies  Results Reviewed     Procedure Component Value Units Date/Time    Blood culture #1 [[id number]] Collected: 08/01/21 1602    Lab Status: Final result Specimen: Blood from Hand, Left Updated: 08/07/21 0001     Blood Culture No Growth After 5 Days  Blood culture #2 [[id number]] Collected: 08/01/21 1601    Lab Status: Final result Specimen: Blood from Arm, Left Updated: 08/07/21 0001     Blood Culture No Growth After 5 Days      Urine culture [[id number]] Collected: 08/01/21 1932    Lab Status: Final result Specimen: Urine, Indwelling Gudino Catheter Updated: 08/02/21 1705     Urine Culture No Growth <1000 cfu/mL    Comprehensive metabolic panel [[id number]]  (Abnormal) Collected: 08/02/21 0545    Lab Status: Final result Specimen: Blood from Arm, Right Updated: 08/02/21 0615     Sodium 142 mmol/L      Potassium 4 7 mmol/L      Chloride 105 mmol/L      CO2 24 mmol/L      ANION GAP 13 mmol/L      BUN 24 mg/dL      Creatinine 1 49 mg/dL      Glucose 110 mg/dL      Calcium 9 4 mg/dL      Corrected Calcium 10 4 mg/dL      AST 83 U/L      ALT 57 U/L      Alkaline Phosphatase 187 U/L      Total Protein 7 9 g/dL      Albumin 2 8 g/dL      Total Bilirubin 2 04 mg/dL      eGFR 48 ml/min/1 73sq m     Narrative:      [facility] guidelines for Chronic Kidney Disease (CKD):     Stage 1 with normal or high GFR (GFR > 90 mL/min/1 73 square meters)    Stage 2 Mild CKD (GFR = 60-89 mL/min/1 73 square meters)    Stage 3A Moderate CKD (GFR = 45-59 mL/min/1 73 square meters)    Stage 3B Moderate CKD (GFR = 30-44 mL/min/1 73 square meters)    Stage 4 Severe CKD (GFR = 15-29 mL/min/1 73 square meters)    Stage 5 End Stage CKD (GFR <15 mL/min/1 73 square meters)  Note: GFR calculation is accurate only with a steady state creatinine    Magnesium [[id number]]  (Normal) Collected: 08/02/21 0545    Lab Status: Final result Specimen: Blood from Arm, Right Updated: 08/02/21 0615     Magnesium 2 2 mg/dL     Calcium, ionized [[id number]]  (Normal) Collected: 08/02/21 0545    Lab Status: Final result Specimen: Blood from Arm, Right Updated: 08/02/21 0554     Calcium, Ionized 1 22 mmol/L     CBC [[id number]]  (Abnormal) Collected: 08/02/21 0545    Lab Status: Final result Specimen: Blood from Arm, Right Updated: 08/02/21 0552     WBC 13 21 Thousand/uL      RBC 3 36 Million/uL      Hemoglobin 11 3 g/dL      Hematocrit 35 2 %       fL      MCH 33 6 pg      MCHC 32 1 g/dL      RDW 13 9 %      Platelets 171 Thousands/uL      MPV 9 5 fL     Troponin I [[id number]]  (Normal) Collected: 08/01/21 2158    Lab Status: Final result Specimen: Blood from Arm, Right Updated: 08/01/21 2228     Troponin I <0 02 ng/mL     Troponin I [[id number]]  (Normal) Collected: 08/01/21 1935    Lab Status: Final result Specimen: Blood from Arm, Left Updated: 08/01/21 2009     Troponin I <0 02 ng/mL     Urine Microscopic [[id number]]  (Abnormal) Collected: 08/01/21 1931    Lab Status: Final result Specimen: Urine, Straight Cath Updated: 08/01/21 1951     RBC, UA 1-2 /hpf      WBC, UA 4-10 /hpf      Epithelial Cells None Seen /hpf      Bacteria, UA Moderate /hpf      Hyaline Casts, UA 1-2 /lpf      Fine granular casts 3-5 /lpf      MUCUS THREADS Occasional    UA (URINE) with reflex to Scope [[id number]]  (Abnormal) Collected: 08/01/21 1931    Lab Status: Final result Specimen: Urine, Straight Cath Updated: 08/01/21 1943     Color, UA Orange     Clarity, UA Clear     Specific Gravity, UA 1 025     pH, UA 5 5     Leukocytes, UA Negative     Nitrite, UA Negative     Protein,  (2+) mg/dl      Glucose, UA Negative mg/dl      Ketones, UA Negative mg/dl      Urobilinogen, UA 1 0 E U /dl      Bilirubin, UA Interference- unable to analyze     Blood, UA Large    CKMB [[id number]]  (Normal) Collected: 08/01/21 1601    Lab Status: Final result Specimen: Blood from Arm, Left Updated: 08/01/21 1758     CK-MB Index <1 0 %      CK-MB 4 2 ng/mL     Blood gas, arterial [[id number]]  (Abnormal) Collected: 08/01/21 1717    Lab Status: Final result Specimen: Blood, Arterial from Radial, Left Updated: 08/01/21 1722     pH, Arterial 7 361     PH ART TC 7 360     pCO2, Arterial 36 5 mm Hg      PCO2 (TC) Arterial 36 7 mm Hg      pO2, Arterial 97 1 mm Hg      PO2 (TC) Arterial 97 7 mm Hg      HCO3, Arterial 20 2 mmol/L      Base Excess, Arterial -4 6 mmol/L      O2 Content, Arterial 16 5 mL/dL      O2 HGB,Arterial  96 2 %      SOURCE Radial, Left     CARLOS TEST Yes     Temperature 98 8 Degrees Fehrenheit      Non Vent type BIPAP BIPAP     IPAP 10     EPAP 5     BIPAP fio2 50 %     Novel Coronavirus [facility] - 2 Hour Stat [[id number]]  (Normal) Collected: 08/01/21 1608    Lab Status: Final result Specimen: Nares from Nasopharyngeal Swab Updated: 08/01/21 1715     SARS-CoV-2 Negative    Narrative: The specimen collection materials, transport medium, and/or testing methodology utilized in the production of these test results have been proven to be reliable in a limited validation with an abbreviated program under the Emergency Utilization Authorization provided by the FDA  Testing reported as "Presumptive positive" will be confirmed with secondary testing to ensure result accuracy  Clinical caution and judgement should be used with the interpretation of these results with consideration of the clinical impression and other laboratory testing  Testing reported as "Positive" or "Negative" has been proven to be accurate according to standard laboratory validation requirements  All testing is performed with control materials showing appropriate reactivity at standard intervals        Troponin I [[id number]]  (Normal) Collected: 08/01/21 1602    Lab Status: Final result Specimen: Blood from Hand, Left Updated: 08/01/21 1713     Troponin I <7 90 ng/mL     Salicylate level [[id number]]  (Abnormal) Collected: 08/01/21 1601    Lab Status: Final result Specimen: Blood from Arm, Left Updated: 84/46/96 9135     Salicylate Lvl <3 mg/dL     Acetaminophen level-"If concentration is detectable, please discuss with medical  on call " [[id number]]  (Abnormal) Collected: 08/01/21 1601    Lab Status: Final result Specimen: Blood from Arm, Left Updated: 08/01/21 1700     Acetaminophen Level <2 ug/mL     CK (with reflex to MB) [[id number]]  (Abnormal) Collected: 08/01/21 1601    Lab Status: Final result Specimen: Blood from Arm, Left Updated: 08/01/21 1655     Total CK 1,565 U/L     Protime-INR [[id number]]  (Abnormal) Collected: 08/01/21 1637    Lab Status: Final result Specimen: Blood from Arm, Left Updated: 08/01/21 1655     Protime 18 2 seconds      INR 1 52    APTT [[id number]]  (Abnormal) Collected: 08/01/21 1637    Lab Status: Final result Specimen: Blood from Arm, Left Updated: 08/01/21 1655     PTT 49 seconds     Lipase [[id number]]  (Abnormal) Collected: 08/01/21 1601    Lab Status: Final result Specimen: Blood from Arm, Left Updated: 08/01/21 1643     Lipase 58 u/L     NT-BNP PRO [[id number]]  (Abnormal) Collected: 08/01/21 1601    Lab Status: Final result Specimen: Blood from Arm, Left Updated: 08/01/21 1643     NT-proBNP 4,649 pg/mL     TSH [[id number]]  (Normal) Collected: 08/01/21 1601    Lab Status: Final result Specimen: Blood from Arm, Left Updated: 08/01/21 1643     TSH 3RD GENERATON 1 197 uIU/mL     Narrative:      Patients undergoing fluorescein dye angiography may retain small amounts of fluorescein in the body for 48-72 hours post procedure  Samples containing fluorescein can produce falsely depressed TSH values  If the patient had this procedure,a specimen should be resubmitted post fluorescein clearance  Lactic acid [[id number]]  (Normal) Collected: 08/01/21 1601    Lab Status: Final result Specimen: Blood from Arm, Left Updated: 08/01/21 1638     LACTIC ACID 1 8 mmol/L     Narrative:      Result may be elevated if tourniquet was used during collection      CBC and differential [[id number]]  (Abnormal) Collected: 08/01/21 1602    Lab Status: Final result Specimen: Blood from Hand, Left Updated: 08/01/21 1637     WBC 14 14 Thousand/uL      RBC 3 61 Million/uL      Hemoglobin 12 5 g/dL      Hematocrit 37 7 %       fL      MCH 34 6 pg      MCHC 33 2 g/dL      RDW 13 5 %      MPV 10 3 fL      Platelets 591 Thousands/uL      nRBC 0 /100 WBCs      Neutrophils Relative 93 %      Immat GRANS % 1 %      Lymphocytes Relative 2 %      Monocytes Relative 4 %      Eosinophils Relative 0 %      Basophils Relative 0 %      Neutrophils Absolute 13 10 Thousands/µL      Immature Grans Absolute 0 17 Thousand/uL Lymphocytes Absolute 0 32 Thousands/µL      Monocytes Absolute 0 52 Thousand/µL      Eosinophils Absolute 0 00 Thousand/µL      Basophils Absolute 0 03 Thousands/µL     Narrative: This is an appended report  These results have been appended to a previously verified report  Comprehensive metabolic panel [[id number]]  (Abnormal) Collected: 08/01/21 1601    Lab Status: Final result Specimen: Blood from Arm, Left Updated: 08/01/21 1635     Sodium 140 mmol/L      Potassium 4 2 mmol/L      Chloride 104 mmol/L      CO2 24 mmol/L      ANION GAP 12 mmol/L      BUN 21 mg/dL      Creatinine 1 46 mg/dL      Glucose 112 mg/dL      Calcium 9 5 mg/dL      Corrected Calcium 10 4 mg/dL      AST 59 U/L      ALT 59 U/L      Alkaline Phosphatase 226 U/L      Total Protein 7 2 g/dL      Albumin 2 9 g/dL      Total Bilirubin 1 65 mg/dL      eGFR 49 ml/min/1 73sq m     Narrative:      [facility] guidelines for Chronic Kidney Disease (CKD):     Stage 1 with normal or high GFR (GFR > 90 mL/min/1 73 square meters)    Stage 2 Mild CKD (GFR = 60-89 mL/min/1 73 square meters)    Stage 3A Moderate CKD (GFR = 45-59 mL/min/1 73 square meters)    Stage 3B Moderate CKD (GFR = 30-44 mL/min/1 73 square meters)    Stage 4 Severe CKD (GFR = 15-29 mL/min/1 73 square meters)    Stage 5 End Stage CKD (GFR <15 mL/min/1 73 square meters)  Note: GFR calculation is accurate only with a steady state creatinine    Ethanol [[id number]]  (Normal) Collected: 08/01/21 1601    Lab Status: Final result Specimen: Blood from Arm, Left Updated: 08/01/21 1634     Ethanol Lvl <3 mg/dL                  XR chest portable   Final Result by Nader Muñiz MD (08/13 1396)      Partial clearing of bilateral pulmonary opacities, most likely multifocal pneumonia, since 8/6/2021                    Workstation performed: [id number]         XR chest portable ICU   Final Result by Scarlet Milligan MD (08/06 0932)      Improved airspace disease  Workstation performed: [id number]         XR chest portable ICU   Final Result by Miguelito Lr MD (14/09 0327)      Interval worsening of bilateral airspace disease  Workstation performed: [id number]         XR chest portable   Final Result by Rocio Durham DO (08/04 1426)      Slight worsening of bilateral airspace disease, favored to represent multifocal pneumonia  Correlate with clinical scenario  Workstation performed: [id number]         XR chest portable ICU   Final Result by Barbara Espinoza MD (08/04 1059)      Areas of consolidation in the right upper and lower lobes and, probably, the left lower lobe, either asymmetric pulmonary edema or multifocal pneumonia  Workstation performed: [id number]         CT head without contrast   Final Result by Stewart Isabel MD (08/01 1541)      No acute intracranial abnormality  Workstation performed: [id number]         XR chest 1 view portable   Final Result by Riddhi Tobar MD (08/02 0818)      Right upper and lower lobe consolidation concerning for multifocal pneumonia                    Workstation performed: [id number]                    Procedures  CriticalCare Time  Performed by: Alisa Victoria DO  Authorized by: Alisa Victoria DO     Critical care provider statement:     Critical care time (minutes):  54    Critical care was time spent personally by me on the following activities:  Ordering and review of laboratory studies, ordering and review of radiographic studies, re-evaluation of patient's condition, ordering and performing treatments and interventions, discussions with consultants and evaluation of patient's response to treatment             ED Course                                           MDM  Number of Diagnoses or Management Options  Atrial fibrillation with rapid ventricular response ([facility] [address] )  CHF (congestive heart failure) ([facility] [address] )  Diagnosis management comments: Exam most consistent with CHF, confirmed on bedside u/s and CXR  Pt started on bipap for tachypnea, lasix 40mg IV given  EKG shows rapid AFib with slight ST depressions laterally  Repeat looks similar  No chest pain  Pt requesting his daily dose of xanax, gave ativan 0 5mg IV - no significant change in HR, doubt significant ETOH withdrawal    Given persistent tachycardia, decision made to give cardizem 15mg IV  HR improving down to 100  Pt accepted to stepdown ICU by Dr Ivet Gary           Disposition  Final diagnoses:   [facility]   Atrial fibrillation with rapid ventricular response ([facility] )     Time reflects when diagnosis was documented in both MDM as applicable and the Disposition within this note     Time User Action Codes Description Comment    8/1/2021  6:37 PM Enrique Dopp Add [I50 9] CHF (congestive heart failure) ([facility] )     8/1/2021  6:38 PM Enrique Dopp Add [I48 91] Atrial fibrillation with rapid ventricular response ([facility] )     8/3/2021 10:08 AM Detjose ckOg Add [I48 20] Chronic atrial fibrillation ([facility] )     8/11/2021  6:28 AM LunguMichele Add [I50 23] Acute on chronic systolic (congestive) heart failure ([facility] )     8/12/2021  8:50 AM [facility] Add [G47 30] Sleep apnea, unspecified type     8/13/2021  2:21 PM Christian Millers, [address] [place] [address] Drive [J21 58] Other insomnia     8/13/2021  2:22 PM Luciana Apley Modify [G47 09] Other insomnia     8/13/2021  2:27 PM Luciana Apley Add [F41 9] Anxiety     8/13/2021  2:27 PM Luciana Apley Add [E11 69] Type 2 diabetes mellitus with other specified complication, without long-term current use of insulin ([facility] )     8/13/2021  2:27 PM Luciana Apley Add [Z72 89] Alcohol use     8/13/2021  2:27 PM Luciana Apley Add [K59 00] Constipation     8/13/2021  2:27 PM Luciana Apley Add [J44 1] Suspected Chronic obstructive pulmonary disease with acute exacerbation [facility])       ED Disposition     ED Disposition Condition Date/Time Comment    Admit Stable Sun Aug 1, 2021  6:37 PM Case was discussed with Dr Joelle Chen and the patient's admission status was agreed to be Admission Status: inpatient status to the service of Dr Joelle Chen MD Documentation      Most Recent Value   Accepting Facility Name, [address] [facility]    (Name & Tel number)  [facility]   Transported by (Company and Unit #)  [employer]      RN Documentation      Most [address] [facility] [address] [facility]    (Name & Tel number)  [facility]   Transported by [employer] and Unit #)  [facility]   Level of Care  Other (Comment) [W/C Van]   Transfer Date  08/13/21   Transfer Time  1600      Follow-up Information     Follow up With Specialties Details Why Contact Info Additional [address] Sleep Medicine Follow up Please call to schedule overnight sleep study to qualify for home CPAP machine  [address] [address]  [facility]  [address] , [address] [facility], [place], [place], [address] [facility]    Marvin Everett PA-C Pulmonology, Pulmonary Disease, Physician Assistant Follow up Please schedule an appointment to follow-up as soon as possible   [facility]  [phone]       [facility], [facility] Family Medicine, Nurse Practitioner Follow up  [address] [address]  [phone]       [facility] Cardiology Schedule an appointment as soon as possible for a visit in 2 week(s)  [address] [facility], [place] [address] [facility], [facility] [facility] [address]  [address] [address] [place], [place], [address]          Discharge Medication List as of 8/13/2021  3:27 PM      START taking these medications    Details   apixaban (ELIQUIS) 5 mg Take 1 tablet (5 mg total) by mouth 2 (two) times a day, Starting Thu 8/12/2021, No Print      folic acid (FOLVITE) 1 mg tablet Take 1 tablet (1 mg total) by mouth daily, Starting Sat 8/14/2021, No Print      !! insulin lispro (HumaLOG) 100 units/mL injection Inject 2-12 Units under the skin 3 (three) times a day before meals, Starting Fri 8/13/2021, No Print      !! insulin lispro (HumaLOG) 100 units/mL injection Inject 2-12 Units under the skin daily at bedtime, Starting Fri 8/13/2021, No Print      ipratropium-albuterol (DUO-NEB) 0 5-2 5 mg/3 mL nebulizer solution Take 3 mL by nebulization every 6 (six) hours as needed for wheezing, Starting Fri 8/13/2021, No Print      metFORMIN (GLUCOPHAGE) 500 mg tablet Take 1 tablet (500 mg total) by mouth daily with breakfast, Starting Fri 8/13/2021, No Print      polyethylene glycol (MIRALAX) 17 g packet Take 17 g by mouth daily as needed (Constipation), Starting Fri 8/13/2021, No Print      predniSONE 10 mg tablet Take 20mg/day for 2 days then 10mg/day for 2 days, No Print      senna-docusate sodium (SENOKOT S) 8 6-50 mg per tablet Take 1 tablet by mouth daily at bedtime, Starting Fri 8/13/2021, No Print      thiamine 100 MG tablet Take 1 tablet (100 mg total) by mouth daily, Starting Sat 8/14/2021, No Print       !! - Potential duplicate medications found  Please discuss with provider        CONTINUE these medications which have CHANGED    Details   ALPRAZolam (XANAX) 0 5 mg tablet Take 1 tablet (0 5 mg total) by mouth 2 (two) times a day as needed for anxiety (DAYTIME ONLY) for up to 6 doses, Starting Fri 8/13/2021, Print      metoprolol tartrate (LOPRESSOR) 100 mg tablet Take 1 tablet (100 mg total) by mouth every 12 (twelve) hours, Starting Thu 8/12/2021, No Print      zolpidem (AMBIEN) 5 mg tablet Take 1 tablet (5 mg total) by mouth daily at bedtime as needed for sleep for up to 3 days, Starting Fri 8/13/2021, Until Mon 8/16/2021 at 2359, Print         CONTINUE these medications which have NOT CHANGED    Details   buPROPion (WELLBUTRIN SR) 150 mg 12 hr tablet Take 150 mg by mouth 2 (two) times a day , Starting Wed 7/8/2020, Historical Med      FLUoxetine (PROzac) 20 mg capsule daily , Starting Thu 7/2/2020, Historical Med      pravastatin (PRAVACHOL) 40 mg tablet daily , Starting Thu 7/23/2020, Historical Med      risperiDONE (RisperDAL) 3 mg tablet Take 2 mg by mouth daily , Starting Thu 1/14/2021, Historical Med      torsemide (DEMADEX) 20 mg tablet Starting Wed 6/30/2021, Historical Med         STOP taking these medications       amLODIPine (NORVASC) 5 mg tablet Comments:   Reason for Stopping:         candesartan (ATACAND) 32 MG tablet Comments:   Reason for Stopping:         chlorthalidone 25 mg tablet Comments:   Reason for Stopping:         Klor-Con M20 20 MEQ tablet Comments:   Reason for Stopping:         meloxicam (MOBIC) 15 mg tablet Comments:   Reason for Stopping:         methocarbamol (ROBAXIN) 750 mg tablet Comments:   Reason for Stopping:         prazosin (MINIPRESS) 2 mg capsule Comments:   Reason for Stopping:             Outpatient Discharge Orders   Discharge Diet     Discharge Condtion:  Stabilized     Physical Therapy Eval And Treat     Occupational Therapy Eval and Treat     Activity:  As Tolerated     Future Lab Orders at SNF     Discharge Condtion:  Stabilized     Occupational Therapy Eval and Treat     Physical Therapy Eval And Treat     Split Study   Standing Status: Future Standing Exp   Date: 02/12/22       PDMP Review     None          ED Provider  Electronically Signed by           Dileep Portillo DO  08/19/21 5377

## 2021-08-01 NOTE — ASSESSMENT & PLAN NOTE
· Chronic atrial fibrillation on metoprolol 100 mg daily with RVR rates 130-140s in ER   · Received 15 mg diltiazem x 1 in ER with rate improvement to 100s  · Will give metoprolol bolus dosing PRN for rate control   · Continue home PO metoprolol   · On no chronic anticoagulation - per chart review DOAC too expensive and not interested in wafarin   · Will hold systemic AC here as AF chronic and on no home [facility]

## 2021-08-01 NOTE — ASSESSMENT & PLAN NOTE
· Cr 1 46 on presentation   · Unclear baseline with minimal prior labs, 0 7-1 1   · Likely 2/2 to congestion in setting of CHF exacerbation   · Place gallo   · UA pending   · Lasix for UOP and goal negative fluid balance

## 2021-08-01 NOTE — ASSESSMENT & PLAN NOTE
· Home regimen: Amlodipine 5 mg daily, Candesartan 32 mg daily, prazosin 2 mg TID, metoprolol 100 mg daily, chlorthalidone 25 mg daily   · Hold home medications besides metoprolol while working on rate control for atrial fibrillation with RVR

## 2021-08-02 ENCOUNTER — APPOINTMENT (INPATIENT)
Dept: NON INVASIVE DIAGNOSTICS | Facility: HOSPITAL | Age: 66
DRG: 871 | End: 2021-08-02
Payer: COMMERCIAL

## 2021-08-02 ENCOUNTER — APPOINTMENT (INPATIENT)
Dept: RADIOLOGY | Facility: HOSPITAL | Age: 66
DRG: 871 | End: 2021-08-02
Payer: COMMERCIAL

## 2021-08-02 LAB
ALBUMIN SERPL BCP-MCNC: 2.8 G/DL (ref 3.5–5)
ALP SERPL-CCNC: 187 U/L (ref 46–116)
ALT SERPL W P-5'-P-CCNC: 57 U/L (ref 12–78)
ANION GAP SERPL CALCULATED.3IONS-SCNC: 13 MMOL/L (ref 4–13)
AST SERPL W P-5'-P-CCNC: 83 U/L (ref 5–45)
ATRIAL RATE: 141 BPM
ATRIAL RATE: 87 BPM
BACTERIA UR CULT: NORMAL
BILIRUB SERPL-MCNC: 2.04 MG/DL (ref 0.2–1)
BUN SERPL-MCNC: 24 MG/DL (ref 5–25)
CA-I BLD-SCNC: 1.22 MMOL/L (ref 1.12–1.32)
CALCIUM ALBUM COR SERPL-MCNC: 10.4 MG/DL (ref 8.3–10.1)
CALCIUM SERPL-MCNC: 9.4 MG/DL (ref 8.3–10.1)
CHLORIDE SERPL-SCNC: 105 MMOL/L (ref 100–108)
CO2 SERPL-SCNC: 24 MMOL/L (ref 21–32)
CREAT SERPL-MCNC: 1.49 MG/DL (ref 0.6–1.3)
ERYTHROCYTE [DISTWIDTH] IN BLOOD BY AUTOMATED COUNT: 13.9 % (ref 11.6–15.1)
GFR SERPL CREATININE-BSD FRML MDRD: 48 ML/MIN/1.73SQ M
GLUCOSE SERPL-MCNC: 110 MG/DL (ref 65–140)
HCT VFR BLD AUTO: 35.2 % (ref 36.5–49.3)
HGB BLD-MCNC: 11.3 G/DL (ref 12–17)
MAGNESIUM SERPL-MCNC: 2.2 MG/DL (ref 1.6–2.6)
MCH RBC QN AUTO: 33.6 PG (ref 26.8–34.3)
MCHC RBC AUTO-ENTMCNC: 32.1 G/DL (ref 31.4–37.4)
MCV RBC AUTO: 105 FL (ref 82–98)
PLATELET # BLD AUTO: 235 THOUSANDS/UL (ref 149–390)
PMV BLD AUTO: 9.5 FL (ref 8.9–12.7)
POTASSIUM SERPL-SCNC: 4.7 MMOL/L (ref 3.5–5.3)
PROCALCITONIN SERPL-MCNC: 7.64 NG/ML
PROT SERPL-MCNC: 7.9 G/DL (ref 6.4–8.2)
QRS AXIS: -4 DEGREES
QRS AXIS: -7 DEGREES
QRSD INTERVAL: 100 MS
QRSD INTERVAL: 106 MS
QT INTERVAL: 308 MS
QT INTERVAL: 318 MS
QTC INTERVAL: 447 MS
QTC INTERVAL: 454 MS
RBC # BLD AUTO: 3.36 MILLION/UL (ref 3.88–5.62)
SODIUM SERPL-SCNC: 142 MMOL/L (ref 136–145)
T WAVE AXIS: 0 DEGREES
T WAVE AXIS: 5 DEGREES
VENTRICULAR RATE: 119 BPM
VENTRICULAR RATE: 131 BPM
WBC # BLD AUTO: 13.21 THOUSAND/UL (ref 4.31–10.16)

## 2021-08-02 PROCEDURE — 84145 PROCALCITONIN (PCT): CPT | Performed by: STUDENT IN AN ORGANIZED HEALTH CARE EDUCATION/TRAINING PROGRAM

## 2021-08-02 PROCEDURE — 80053 COMPREHEN METABOLIC PANEL: CPT | Performed by: PHYSICIAN ASSISTANT

## 2021-08-02 PROCEDURE — 93010 ELECTROCARDIOGRAM REPORT: CPT | Performed by: INTERNAL MEDICINE

## 2021-08-02 PROCEDURE — 97163 PT EVAL HIGH COMPLEX 45 MIN: CPT

## 2021-08-02 PROCEDURE — 94760 N-INVAS EAR/PLS OXIMETRY 1: CPT

## 2021-08-02 PROCEDURE — 97167 OT EVAL HIGH COMPLEX 60 MIN: CPT

## 2021-08-02 PROCEDURE — 94660 CPAP INITIATION&MGMT: CPT

## 2021-08-02 PROCEDURE — 82330 ASSAY OF CALCIUM: CPT | Performed by: PHYSICIAN ASSISTANT

## 2021-08-02 PROCEDURE — 93306 TTE W/DOPPLER COMPLETE: CPT

## 2021-08-02 PROCEDURE — 99291 CRITICAL CARE FIRST HOUR: CPT | Performed by: INTERNAL MEDICINE

## 2021-08-02 PROCEDURE — 93306 TTE W/DOPPLER COMPLETE: CPT | Performed by: INTERNAL MEDICINE

## 2021-08-02 PROCEDURE — 71045 X-RAY EXAM CHEST 1 VIEW: CPT

## 2021-08-02 PROCEDURE — 83735 ASSAY OF MAGNESIUM: CPT | Performed by: PHYSICIAN ASSISTANT

## 2021-08-02 PROCEDURE — 85027 COMPLETE CBC AUTOMATED: CPT | Performed by: PHYSICIAN ASSISTANT

## 2021-08-02 RX ORDER — METOPROLOL TARTRATE 5 MG/5ML
5 INJECTION INTRAVENOUS ONCE
Status: COMPLETED | OUTPATIENT
Start: 2021-08-02 | End: 2021-08-02

## 2021-08-02 RX ORDER — DILTIAZEM HYDROCHLORIDE 5 MG/ML
10 INJECTION INTRAVENOUS ONCE
Status: COMPLETED | OUTPATIENT
Start: 2021-08-02 | End: 2021-08-02

## 2021-08-02 RX ORDER — CEFTRIAXONE 2 G/50ML
2000 INJECTION, SOLUTION INTRAVENOUS EVERY 24 HOURS
Status: COMPLETED | OUTPATIENT
Start: 2021-08-02 | End: 2021-08-08

## 2021-08-02 RX ORDER — ALBUMIN, HUMAN INJ 5% 5 %
12.5 SOLUTION INTRAVENOUS ONCE
Status: COMPLETED | OUTPATIENT
Start: 2021-08-02 | End: 2021-08-02

## 2021-08-02 RX ORDER — FUROSEMIDE 10 MG/ML
40 INJECTION INTRAMUSCULAR; INTRAVENOUS ONCE
Status: COMPLETED | OUTPATIENT
Start: 2021-08-02 | End: 2021-08-02

## 2021-08-02 RX ADMIN — AZITHROMYCIN MONOHYDRATE 500 MG: 500 INJECTION, POWDER, LYOPHILIZED, FOR SOLUTION INTRAVENOUS at 11:50

## 2021-08-02 RX ADMIN — HEPARIN SODIUM 5000 UNITS: 5000 INJECTION INTRAVENOUS; SUBCUTANEOUS at 21:26

## 2021-08-02 RX ADMIN — ACETAMINOPHEN 650 MG: 325 TABLET, FILM COATED ORAL at 17:02

## 2021-08-02 RX ADMIN — DILTIAZEM HYDROCHLORIDE 10 MG: 5 INJECTION, SOLUTION INTRAVENOUS at 08:27

## 2021-08-02 RX ADMIN — METOPROLOL TARTRATE 100 MG: 50 TABLET, FILM COATED ORAL at 08:28

## 2021-08-02 RX ADMIN — DILTIAZEM HYDROCHLORIDE 10 MG: 5 INJECTION, SOLUTION INTRAVENOUS at 10:47

## 2021-08-02 RX ADMIN — ALBUMIN (HUMAN) 12.5 G: 12.5 INJECTION, SOLUTION INTRAVENOUS at 11:11

## 2021-08-02 RX ADMIN — THIAMINE HCL TAB 100 MG 100 MG: 100 TAB at 08:28

## 2021-08-02 RX ADMIN — BUPROPION 150 MG: 150 TABLET, EXTENDED RELEASE ORAL at 08:59

## 2021-08-02 RX ADMIN — ACETAMINOPHEN 650 MG: 325 TABLET, FILM COATED ORAL at 11:06

## 2021-08-02 RX ADMIN — ALPRAZOLAM 1 MG: 0.5 TABLET ORAL at 06:45

## 2021-08-02 RX ADMIN — FLUOXETINE 20 MG: 20 CAPSULE ORAL at 08:59

## 2021-08-02 RX ADMIN — FUROSEMIDE 40 MG: 10 INJECTION, SOLUTION INTRAMUSCULAR; INTRAVENOUS at 10:47

## 2021-08-02 RX ADMIN — METOPROLOL TARTRATE 5 MG: 5 INJECTION INTRAVENOUS at 06:45

## 2021-08-02 RX ADMIN — RISPERIDONE 2 MG: 1 TABLET ORAL at 08:59

## 2021-08-02 RX ADMIN — HEPARIN SODIUM 5000 UNITS: 5000 INJECTION INTRAVENOUS; SUBCUTANEOUS at 14:32

## 2021-08-02 RX ADMIN — PRAVASTATIN SODIUM 40 MG: 40 TABLET ORAL at 17:02

## 2021-08-02 RX ADMIN — HEPARIN SODIUM 5000 UNITS: 5000 INJECTION INTRAVENOUS; SUBCUTANEOUS at 06:06

## 2021-08-02 RX ADMIN — METOPROLOL TARTRATE 5 MG: 5 INJECTION INTRAVENOUS at 23:00

## 2021-08-02 RX ADMIN — CEFTRIAXONE 2000 MG: 2 INJECTION, SOLUTION INTRAVENOUS at 11:49

## 2021-08-02 RX ADMIN — FOLIC ACID 1 MG: 1 TABLET ORAL at 08:28

## 2021-08-02 RX ADMIN — ALPRAZOLAM 1 MG: 0.5 TABLET ORAL at 16:57

## 2021-08-02 NOTE — ASSESSMENT & PLAN NOTE
· Hx cardiomyopathy echo 2017 with EF 40% without significant valvular abnormality, subsequent nuclear stress test 2017 EF reported 65%   · Troponin negative x 3  · Follows with cardiology, no follow up echos noted - check ECHO   · On home torsemide 20 mg daily and chlorthalidone 25 mg daily, reports taking as prescribed   · CXR with evidence of volume overload, BNP elevated 4649  · Received 40 mg IV Lasix x 1 in ER   · Gudino placed with initially 700 cc drained with continued adequate UOP   · Redose lasix today for goal negative fluid balance

## 2021-08-02 NOTE — ASSESSMENT & PLAN NOTE
· Chronic atrial fibrillation on metoprolol 100 mg daily with RVR rates 130-140s in ER   · Received 15 mg diltiazem x 1 in ER with rate improvement to 100s  · HR came back up to 140-150s, received metoprolol total 10 mg without any improvement, redosed with dilitazem 15 mg and HR responded to 110-120s   · Continue home PO metoprolol   · On no chronic anticoagulation - per chart review DOAC too expensive and not interested in wafarin   · Will hold systemic AC here as AF chronic and on no home [facility]

## 2021-08-02 NOTE — ASSESSMENT & PLAN NOTE
· Cr 1 46 on presentation   · Unclear baseline with minimal prior labs, 0 7-1 1   · Likely 2/2 to congestion in setting of CHF exacerbation   · Gudino in place    · UA with mod bacteria, 4-10 WBC but negative leuks/nitrites   · Reflexed to culture   · Lasix for goal negative fluid balance

## 2021-08-02 NOTE — UTILIZATION REVIEW
Initial Clinical Review    Admission: Date/Time/Statement:   Admission Orders (From admission, onward)     Ordered        08/01/21 1839  Inpatient Admission  Once                   Orders Placed This Encounter   Procedures    Inpatient Admission     Standing Status:   Standing     Number of Occurrences:   1     Order Specific Question:   Level of Care     Answer:   Level 1 Stepdown [13]     Order Specific Question:   Estimated length of stay     Answer:   More than 2 Midnights     Order Specific Question:   Certification     Answer:   I certify that inpatient services are medically necessary for this patient for a duration of greater than two midnights  See H&P and MD Progress Notes for additional information about the patient's course of treatment  ED Arrival Information     Expected Arrival Acuity    - 8/1/2021 13:58 Urgent         Means of arrival Escorted by Service Admission type    Ambulance [place] [facility]/ICU Urgent         Arrival complaint    weakness        Chief Complaint   Patient presents with    Weakness - Generalized     feeling "crappy" for a few days, bought cold medicine at the [employer] store  Known drinker, states he hasn't drank in several days  Patient fell due to being weak  Initial Presentation:   77 yom to E$R via EMS from home s/p fall yesterday, c/o weakness, cough & SOB for few days  Hx ETOH, AFib, suspected CHF (on torsemide but doesn't know why)  EMS reports alcohol and pill bottles all over the home, pt denies being a drinker, although reports to charge RN his "last drink was 3d ago "  presents hypoxic, tachypneictachycardic with rhonchi & rales, BLE edema noted  Admission work-up showing leukocytosis, elevated creatinine, LFT's, CK, BNP,multifocal pneumonia & afib  Admitted to inpatient admission for acute respiratory failure with hypoxia 2nd pneumonia   Placed on BIPAP for work of breathing/Thoracoabdominal dyssynchrony and accessory muscle use noted, started on IVABT & IV cardizem gtt  Date: 8/2/21 Day 2:   HPI/24hr events: admitted to ICU for acute hypoxic respiratory failure requiring BIPAP with continued respiratory distress and AF RVR  received 40 mg IV lasix and 15 mg diltiazem in ER with eventual improvement in respiratory distress and HR  Remained on BIPAP overnight  HR initially controlled but returned to 140-150s, trialed metoprolol 5 mg x 2 without any improvement in HR  Redosed with diltiazem 15 mg and HR improved to 110-120s  Lungs with rhonchi noted  Patient HR remains elevated in 150s-160s this morning  Patient required additional dose of diltiazem 10mg, at 0830, initially responded but HR then increased again to 150s  Patient then started on cardizem drip  Temp 99 6  WBC remains elevated at 13 21  Procalcitionin ordered, start empiric treatment for CAP given consolidation on CXR      ED Triage Vitals   Temperature Pulse Respirations Blood Pressure SpO2   08/01/21 1406 08/01/21 1406 08/01/21 1410 08/01/21 1410 08/01/21 1406   98 8 °F (37 1 °C) (!) 128 14 111/68 (!) 88 %      Temp Source Heart Rate Source Patient Position - Orthostatic VS BP Location FiO2 (%)   08/01/21 1406 08/01/21 1406 08/01/21 1410 08/01/21 1410 08/01/21 1704   Oral Monitor Sitting Right arm 50      Pain Score       08/01/21 2325       4          Wt Readings from Last 1 Encounters:   08/01/21 110 kg (243 lb)     Additional Vital Signs:   08/02/21 1146  --  117Abnormal   32Abnormal   101/55  72  91 %  --  --  --  --  --  --  --   08/02/21 1139  --  --  --  --  --  92 %  100  --  40 L/min  --  High flow nasal cannula  --  --   08/02/21 0951  --  --  --  --  --  94 %  --  --  --  --  --  Face mask  --   08/02/21 0800  --  120Abnormal   19  99/67  79  93 %  --  --  --  --  --  --  --   08/02/21 0700  97 9 °F (36 6 °C)  143Abnormal   48Abnormal   104/70  81  95 %  --  --  --  --  --  --  --   08/02/21 0600  --  124Abnormal   60Abnormal   106/59  74  95 %  --  --  --  --  --  --  --   08/02/21 0500  --  141Abnormal   34Abnormal   112/67  85  99 %  --  --  --  --  --  --  --   08/02/21 0425  --  --  --  --  --  97 %  50  --  --  --  BiPAP  Face mask  --   08/02/21 0400  --  120Abnormal   50Abnormal   111/68  85  98 %  --  --  --  --  --  --  --   08/02/21 0358  97 7 °F (36 5 °C)  --  --  --  --  --  --  --  --  --  --  --  --   08/02/21 0300  --  106Abnormal   28Abnormal   95/59  71  95 %  --  --  --  --  --  --  --   08/02/21 0200  --  109Abnormal   32Abnormal   89/56Abnormal   67  90 %  --  --  --  --  --  --  --   08/02/21 0100  --  121Abnormal   30Abnormal   106/58  74  97 %  --  --  --  --  --  --  --   08/02/21 0031  --  --  --  --  --  96 %  50  --  --  --  BiPAP  Face mask  --   08/02/21 0000  97 7 °F (36 5 °C)  --  --  --  --  --  --  --  --  --  --  --  --   08/01/21 2200  --  146Abnormal   33Abnormal   137/83  102  97 %  --  --  --  --  --  --  --   08/01/21 2100  --  115Abnormal   31Abnormal   120/78  94  97 %  --  --  --  --  --  --  --   08/01/21 2016  --  --  --  --  --  93 %  50  --  --  --  BiPAP  Face mask  --   08/01/21 1945  --  110Abnormal   20  --  --  92 %  --  --  --  --  --  --  --   08/01/21 1930  --  116Abnormal   26Abnormal   --  --  92 %  --  --  --  --  --  --  --   08/01/21 1915  --  116Abnormal   20  --  --  94 %  --  --  --  --  BiPAP  --  --     Pertinent Labs/Diagnostic Test Results:   Results from last 7 days   Lab Units 08/01/21  1608   SARS-COV-2  Negative     Results from last 7 days   Lab Units 08/02/21  0545 08/01/21  1602   WBC Thousand/uL 13 21* 14 14*   HEMOGLOBIN g/dL 11 3* 12 5   HEMATOCRIT % 35 2* 37 7   PLATELETS Thousands/uL 235 209   NEUTROS ABS Thousands/µL  --  13 10*     Results from last 7 days   Lab Units 08/02/21  0545 08/01/21  1601   SODIUM mmol/L 142 140   POTASSIUM mmol/L 4 7 4 2   CHLORIDE mmol/L 105 104   CO2 mmol/L 24 24   ANION GAP mmol/L 13 12   BUN mg/dL 24 21   CREATININE mg/dL 1 49* 1 46*   EGFR ml/min/1 73sq m 48 49   CALCIUM mg/dL 9 4 9  5   CALCIUM, IONIZED mmol/L 1 22  --    MAGNESIUM mg/dL 2 2  --      Results from last 7 days   Lab Units 08/02/21  0545 08/01/21  1601   AST U/L 83* 59*   ALT U/L 57 59   ALK PHOS U/L 187* 226*   TOTAL PROTEIN g/dL 7 9 7 2   ALBUMIN g/dL 2 8* 2 9*   TOTAL BILIRUBIN mg/dL 2 04* 1 65*     Results from last 7 days   Lab Units 08/02/21  0545 08/01/21  1601   GLUCOSE RANDOM mg/dL 110 112     Results from last 7 days   Lab Units 08/01/21  1717   PH ART  7 361   PCO2 ART mm Hg 36 5   PO2 ART mm Hg 97 1   HCO3 ART mmol/L 20 2*   BASE EXC ART mmol/L -4 6   O2 CONTENT ART mL/dL 16 5   O2 HGB, ARTERIAL % 96 2   ABG SOURCE  Radial, Left     Results from last 7 days   Lab Units 08/01/21  1601   CK TOTAL U/L 1,565*   CK MB INDEX % <1 0   CK MB ng/mL 4 2     Results from last 7 days   Lab Units 08/01/21  2158 08/01/21  1935 08/01/21  1602   TROPONIN I ng/mL <0 02 <0 02 <0 02     Results from last 7 days   Lab Units 08/01/21  1637   PROTIME seconds 18 2*   INR  1 52*   PTT seconds 49*     Results from last 7 days   Lab Units 08/01/21  1601   TSH 3RD GENERATON uIU/mL 1  197     Results from last 7 days   Lab Units 08/01/21  1601   LACTIC ACID mmol/L 1 8     Results from last 7 days   Lab Units 08/01/21  1601   NT-PRO BNP pg/mL 4,649*     Results from last 7 days   Lab Units 08/01/21  1601   LIPASE u/L 58*     Results from last 7 days   Lab Units 08/01/21  1931   CLARITY UA  Clear   COLOR UA  [place]   SPEC GRAV UA  1 025   PH UA  5 5   GLUCOSE UA mg/dl Negative   KETONES UA mg/dl Negative   BLOOD UA  Large*   PROTEIN UA mg/dl 100 (2+)*   NITRITE UA  Negative   BILIRUBIN UA  Interference- unable to analyze*   UROBILINOGEN UA E U /dl 1 0   LEUKOCYTES UA  Negative   WBC UA /hpf 4-10*   RBC UA /hpf 1-2   BACTERIA UA /hpf Moderate*   EPITHELIAL CELLS WET PREP /hpf None Seen   MUCUS THREADS  Occasional*     Results from last 7 days   Lab Units 08/01/21  1601   ETHANOL LVL mg/dL <3   ACETAMINOPHEN LVL ug/mL <2*   SALICYLATE LVL mg/dL <3* Results from last 7 days   Lab Units 08/01/21  1602 08/01/21  1601   BLOOD CULTURE  Received in Microbiology Lab  Culture in Progress  Received in Microbiology Lab  Culture in Progress  8/1  Cxr=  Right upper and lower lobe consolidation concerning for multifocal pneumonia  CT head=  No acute intracranial abnormality  Ekg=   Atrial fibrillation with rapid ventricular response  ST & T wave abnormality, consider anterolateral ischemia    ED Treatment:   Medication Administration from 08/01/2021 1358 to 08/01/2021 1955       Date/Time Order Dose Route Action     08/01/2021 1730 furosemide (LASIX) injection 40 mg 40 mg Intravenous Given     08/01/2021 1800 LORazepam (ATIVAN) injection 0 5 mg 0 5 mg Intravenous Given     08/01/2021 1825 diltiazem (CARDIZEM) injection 15 mg 15 mg Intravenous Given        Past Medical History:   Diagnosis Date    Anxiety     Atrial fibrillation ([facility] )     Depression     Hypertension     Insomnia      Present on Admission:   Acute [facility] failure with hypoxia ([facility])   Cardiomyopathy ([facility] )   Chronic atrial fibrillation ([facility])   Essential hypertension   Mixed hyperlipidemia   Severe obesity (BMI 35 0-39  9) with comorbidity ([place] [address] )   Depression, recurrent ([facility] )   Anxiety   MAX (acute kidney injury) ([facility] )    Admitting Diagnosis: CHF (congestive heart failure) (HCC) [I50 9]  Weakness [R53 1]  Atrial fibrillation with rapid ventricular response (HCC) [I48 91]  Age/Sex: 77 y o  male  Admission Orders:  Nursing dysphagia assessment  Scd/foot pumps  Pt/ot eval & tx  Gudino cath  Neuro checks q4h    Scheduled Medications:  azithromycin, 500 mg, Intravenous, Q24H  buPROPion, 150 mg, Oral, Daily  cefTRIAXone, 2,000 mg, Intravenous, Q24H  FLUoxetine, 20 mg, Oral, Daily  folic acid, 1 mg, Oral, Daily  heparin (porcine), 5,000 Units, Subcutaneous, Q8H [address]  metoprolol tartrate, 100 mg, Oral, Daily  pravastatin, 40 mg, Oral, Daily With Dinner  risperiDONE, 2 mg, Oral, Daily  thiamine, 100 mg, Oral, Daily    Continuous IV Infusions:  diltiazem, 1-15 mg/hr, Intravenous, Titrated    PRN Meds:  acetaminophen, 650 mg, Oral, Q6H PRN  ALPRAZolam, 1 mg, Oral, TID PRN  methocarbamol, 750 mg, Oral, HS PRN  metoprolol, 5 mg, Intravenous, Q6H PRN  Network Utilization Review Department  ATTENTION: Please call with any questions or concerns to [phone] and carefully listen to the prompts so that you are directed to the right person  All voicemails are confidential   Judith List all requests for admission clinical reviews, approved or denied determinations and any other requests to dedicated fax number below belonging to the campus where the patient is receiving treatment   List of dedicated fax numbers for the Facilities:  [address] DENIALS (Administrative/Medical Necessity) [phone]   [address] (Maternity/NICU/Pediatrics) [phone]   [address] Andrzej Leoneel Devin [phone] [address] [place] [address] [facility] [facility] [phone]

## 2021-08-02 NOTE — ASSESSMENT & PLAN NOTE
· Presented with multiple days of fatigue, dry cough and shortness of breath, hypoxic to 88% on room air on presentation   Placed on 4 L NC with improvement in spo2 but persistent increased WOB requiring BIPAP without significant improvement   · Suspect etiology CHF exacerbation with CXR patchy opacifications concerning for volume overload, BNP 4649  · Less likely infectious process, afebrile, COVID negative, CXR without focal consolidation - monitor off abx at this time   · BIPAP 8/4 50%   · WOB significantly improved, trial weaning to NC as tolerated   · Continue diuresis for goal negative fluid balance

## 2021-08-02 NOTE — QUICK NOTE
Interim note update:  Patient HR remains elevated in 150s-160s this morning  Patient required additional dose of diltiazem 10mg, at 0830, initially responded but HR then increased again to 150s  Patient then started on cardizem drip  Patient temperature increased to 99 6  WBC remains elevated at 13 21  Will check Procalcitionin and start empiric treatment for CAP given consolidation on CXR  Family update over phone, all questions answered      To be cosigned by Dr Maura Bailey

## 2021-08-02 NOTE — PROGRESS NOTES
Mally 45  Progress Note - Jan Pakiara 1955, 77 y o  male MRN: [id number]  Unit/Bed#: ICU 12 Encounter: [id number]  Primary Care Provider: Jermain Perez   Date and time admitted to hospital: 8/1/2021  1:59 PM    * Acute respiratory failure with hypoxia [facility])  Assessment & Plan  · Presented with multiple days of fatigue, dry cough and shortness of breath, hypoxic to 88% on room air on presentation   Placed on 4 L NC with improvement in spo2 but persistent increased WOB requiring BIPAP without significant improvement   · Suspect etiology CHF exacerbation with CXR patchy opacifications concerning for volume overload, BNP 4649  · Less likely infectious process, afebrile, COVID negative, CXR without focal consolidation - monitor off abx at this time   · BIPAP 8/4 50%   · WOB significantly improved, trial weaning to NC as tolerated   · Continue diuresis for goal negative fluid balance      [facility]  Assessment & Plan  · Hx cardiomyopathy echo 2017 with EF 40% without significant valvular abnormality, subsequent nuclear stress test 2017 EF reported 65%   · Troponin negative x 3  · Follows with cardiology, no follow up echos noted - check ECHO   · On home torsemide 20 mg daily and chlorthalidone 25 mg daily, reports taking as prescribed   · CXR with evidence of volume overload, BNP elevated 4649  · Received 40 mg IV Lasix x 1 in ER   · Gudino placed with initially 700 cc drained with continued adequate UOP   · Redose lasix today for goal negative fluid balance     MAX (acute kidney injury) ([facility] )  Assessment & Plan  · Cr 1 46 on presentation   · Unclear baseline with minimal prior labs, 0 7-1 1   · Likely 2/2 to congestion in setting of CHF exacerbation   · Gudino in place    · UA with mod bacteria, 4-10 WBC but negative leuks/nitrites   · Reflexed to culture   · Lasix for goal negative fluid balance     Chronic atrial fibrillation (HCC)  Assessment & Plan  · Chronic atrial fibrillation on metoprolol 100 mg daily with RVR rates 130-140s in ER   · Received 15 mg diltiazem x 1 in ER with rate improvement to 100s  · HR came back up to 140-150s, received metoprolol total 10 mg without any improvement, redosed with dilitazem 15 mg and HR responded to 110-120s   · Continue home PO metoprolol   · On no chronic anticoagulation - per chart review DOAC too expensive and not interested in wafarin   · Will hold systemic AC here as AF chronic and on no home AC       Essential hypertension  Assessment & Plan  · Home regimen: Amlodipine 5 mg daily, Candesartan 32 mg daily, prazosin 2 mg TID, metoprolol 100 mg daily, chlorthalidone 25 mg daily   · Hold home medications besides metoprolol while working on rate control for atrial fibrillation with RVR     Mixed hyperlipidemia  Assessment & Plan  · Statin    Anxiety  Assessment & Plan  · Home Xanax 1 mg TID prn     Depression, recurrent (HCC)  Assessment & Plan  · Continue home Wellbutrin, Prozac and risperidone     Severe obesity (BMI 35 0-39  9) with comorbidity ([facility] )  Assessment & Plan  · Lifestyle counseling when appropriate     ----------------------------------------------------------------------------------------  HPI/24hr events: admitted to ICU for acute hypoxic respiratory failure requiring BIPAP with continued respiratory distress and AF RVR  received 40 mg IV lasix and 15 mg diltiazem in ER with eventual improvement in respiratory distress and HR  Remained on BIPAP overnight  HR initially controlled but returned to 140-150s, trialed metoprolol 5 mg x 2 without any improvement in HR  Redosed with diltiazem 15 mg and HR improved to 110-120s  Disposition: Continue Stepdown Level 1 level of care   Code Status: Level 3 - DNAR and DNI  ---------------------------------------------------------------------------------------  SUBJECTIVE  Reports breathing feels improved  Review of Systems   Constitutional: Positive for fatigue   Negative for chills, diaphoresis and fever  Respiratory: Positive for cough  Negative for shortness of breath  Cardiovascular: Negative for chest pain, palpitations and leg swelling  Gastrointestinal: Negative for abdominal pain, diarrhea and vomiting  Genitourinary: Negative for difficulty urinating  Skin: Negative for color change, pallor, rash and wound  Neurological: Positive for weakness  Negative for dizziness, light-headedness and headaches  Psychiatric/Behavioral: Negative for confusion  Review of systems was reviewed and negative unless stated above in HPI/24-hour events   ---------------------------------------------------------------------------------------  OBJECTIVE    Vitals   Vitals:    21 0200 21 0300 21 0358 21 0425   BP: (!) 89/56 95/59     BP Location:       Pulse: (!) 109 (!) 106     Resp: (!) 32 (!) 28     Temp:   97 7 °F (36 5 °C)    TempSrc:   Temporal    SpO2: 90% 95%  97%   Weight:       Height:         Temp (24hrs), Av 1 °F (36 7 °C), Min:97 7 °F (36 5 °C), Max:98 8 °F (37 1 °C)  Current: Temperature: 97 7 °F (36 5 °C)          Respiratory:  SpO2: SpO2: 97 %, SpO2 Device: O2 Device: BiPAP  Nasal Cannula O2 Flow Rate (L/min): 4 L/min    Invasive/non-invasive ventilation settings   Respiratory    Lab Data (Last 4 hours)    None         O2/Vent Data (Last 4 hours)      425          Non-Invasive Ventilation Mode BiPAP                   Physical Exam  Vitals reviewed  Constitutional:       General: He is awake  He is not in acute distress  Appearance: He is obese  He is ill-appearing  He is not toxic-appearing or diaphoretic  Interventions: Face mask in place  HENT:      Head: Normocephalic and atraumatic  Eyes:      Comments: L pupil chronically irregular lateral border    Cardiovascular:      Rate and Rhythm: Tachycardia present  Rhythm irregularly irregular  Heart sounds: Normal heart sounds     Pulmonary:      Effort: No tachypnea, accessory muscle usage or respiratory distress  Breath sounds: Rhonchi present  Abdominal:      General: Abdomen is protuberant  There is no distension  Palpations: Abdomen is soft  Tenderness: There is no abdominal tenderness  Musculoskeletal:      Right lower leg: No edema  Left lower leg: No edema  Skin:     General: Skin is warm and dry  Neurological:      General: No focal deficit present  Mental Status: He is alert and oriented to person, place, and time  Laboratory and Diagnostics:  Results from last 7 days   Lab Units 08/01/21  1602   WBC Thousand/uL 14 14*   HEMOGLOBIN g/dL 12 5   HEMATOCRIT % 37 7   PLATELETS Thousands/uL 209   NEUTROS PCT % 93*   MONOS PCT % 4     Results from last 7 days   Lab Units 08/01/21  1601   SODIUM mmol/L 140   POTASSIUM mmol/L 4 2   CHLORIDE mmol/L 104   CO2 mmol/L 24   ANION GAP mmol/L 12   BUN mg/dL 21   CREATININE mg/dL 1 46*   CALCIUM mg/dL 9 5   GLUCOSE RANDOM mg/dL 112   ALT U/L 59   AST U/L 59*   ALK PHOS U/L 226*   ALBUMIN g/dL 2 9*   TOTAL BILIRUBIN mg/dL 1 65*          Results from last 7 days   Lab Units 08/01/21  1637   INR  1 52*   PTT seconds 49*      Results from last 7 days   Lab Units 08/01/21  2158 08/01/21  1935 08/01/21  1602   TROPONIN I ng/mL <0 02 <0 02 <0 02     Results from last 7 days   Lab Units 08/01/21  1601   LACTIC ACID mmol/L 1 8     ABG:  Results from last 7 days   Lab Units 08/01/21  1717   PH ART  7 361   PCO2 ART mm Hg 36 5   PO2 ART mm Hg 97 1   HCO3 ART mmol/L 20 2*   BASE EXC ART mmol/L -4 6   ABG SOURCE  Radial, Left     VBG:  Results from last 7 days   Lab Units 08/01/21  1717   ABG SOURCE  Radial, Left           Micro  Results from last 7 days   Lab Units 08/01/21  1602 08/01/21  1601   BLOOD CULTURE  Received in Microbiology Lab  Culture in Progress  Received in Microbiology Lab  Culture in Progress  EKG:AF rate 122 on telemetry   Imaging: I have personally reviewed pertinent reports     and I have personally reviewed pertinent films in PACS    Intake and Output  I/O       07/31 0701 - 08/01 0700 08/01 0701 - 08/02 0700    Urine (mL/kg/hr)  700    Total Output  700    Net  -700                Height and Weights   Height: 5' 10" (177 8 cm)  IBW (Ideal Body Weight): 73 kg  Body mass index is 34 87 kg/m²  Weight (last 2 days)     Date/Time   Weight    08/01/21 1410   110 (243)                Nutrition       Diet Orders   (From admission, onward)             Start     Ordered    08/01/21 1842  Diet NPO  Diet effective now     Question Answer Comment   Diet Type NPO    RD to adjust diet per protocol?  No        08/01/21 1843                  Active Medications  Scheduled Meds:  Current Facility-Administered Medications   Medication Dose Route Frequency Provider Last Rate    acetaminophen  650 mg Oral Q6H PRN Azzie Helm, PA-C      ALPRAZolam  1 mg Oral TID PRN Azzie Helm, PA-C      buPROPion  150 mg Oral Daily Azzie Ralph, [place]      FLUoxetine  20 mg Oral Daily Azzie Hel, [place]      folic acid  1 mg Oral Daily Azzie Hel, [place]      heparin (porcine)  5,000 Units Subcutaneous [facility] Azzie Ralph, [place]      methocarbamol  750 mg Oral HS PRN Azzie Helm, PA-C      metoprolol  5 mg Intravenous Q6H PRN Azzie Hel, PA-C      metoprolol tartrate  100 mg Oral Daily Azzie Ralph, [place]      pravastatin  40 mg Oral Daily With Dinner Azzie Helm, PA-C      risperiDONE  2 mg Oral Daily Azzie Helm, PA-C      thiamine  100 mg Oral Daily Azzie Helm, PA-C       Continuous Infusions:     PRN Meds:   acetaminophen, 650 mg, Q6H PRN  ALPRAZolam, 1 mg, TID PRN  methocarbamol, 750 mg, HS PRN  metoprolol, 5 mg, Q6H PRN        Invasive Devices Review  Invasive Devices     Peripheral Intravenous Line            Peripheral IV 08/01/21 Left Antecubital 1 day    Peripheral IV 08/01/21 Left Hand <1 day          Drain            Urethral Catheter Straight-tip 16 Fr  <1 day Rationale for remaining devices: Terese for accurate I/O with diuresis   ---------------------------------------------------------------------------------------  Advance Directive and Living Will:      Power of :    POLST:    ---------------------------------------------------------------------------------------  Care Time Delivered:   No Critical Care time spent       Norma Colorado PA-C      Portions of the record may have been created with voice recognition software  Occasional wrong word or "sound a like" substitutions may have occurred due to the inherent limitations of voice recognition software    Read the chart carefully and recognize, using context, where substitutions have occurred

## 2021-08-02 NOTE — PHYSICAL THERAPY NOTE
PT EVALUATION       08/02/21 1345   Note Type   Note type Evaluation   Pain Assessment   Pain Assessment Tool Pain Assessment not indicated - pt denies pain   Home Living   Type of [address] One level   Bathroom Shower/Tub   (4 JEN)   Home Equipment Other (Comment)  (none)   Prior Function   Level of [place] Independent with ADLs and functional mobility   Lives With Alone   IADLs Independent   Restrictions/Precautions   Other Precautions Chair Alarm; Bed Alarm; Fall Risk;O2  (high flow 02)   General   Additional Pertinent History Pt admitted wt acute respiratory failure now in ICU on high flow 02 with afib with RVR  Cognition   Arousal/Participation Arousable  (sleepy)   Orientation Level Oriented to person;Oriented to place   Following Commands Follows one step commands without difficulty   RLE Assessment   RLE Assessment   (ROM WFL, MMT 4-/5)   LLE Assessment   LLE Assessment   (ROM WFL, MMT 4-/5)   Bed Mobility   Supine to Sit 3  Moderate assistance   Transfers   Sit to Stand 4  Minimal assistance   Additional items Increased time required;Verbal cues   Stand to Sit 4  Minimal assistance   Additional items Increased time required;Verbal cues   Stand pivot 4  Minimal assistance   Balance   Static Sitting Good   Dynamic Sitting Fair +   Static Standing Fair +   Dynamic Standing Fair +   Endurance Deficit   Endurance Deficit   (Sp02 95% on high flow with activity,  bpm)   Activity Tolerance   Activity Tolerance Patient limited by fatigue   Assessment   Prognosis Good   Problem List Decreased strength;Decreased endurance; Impaired balance;Decreased mobility   Assessment Patient seen for Physical Therapy evaluation  Patient admitted with Acute respiratory failure with hypoxia ([facility] )  Comorbidities affecting patient's physical performance include: afib, cardiomyopathy, anxiety, depression, obesity    Personal factors affecting patient at time of initial evaluation include: stairs to enter home, inability to ambulate household distances and inability to perform ADLS  Prior to admission, patient was independent with functional mobility without assistive device and independent with ADLS  Please find objective findings from Physical Therapy assessment regarding body systems outlined above with impairments and limitations including weakness, decreased endurance, decreased activity tolerance, decreased functional mobility tolerance and fall risk  The Barthel Index was used as a functional outcome tool presenting with a score of 20 today indicating marked limitations of functional mobility and ADLS  Patient's clinical presentation is currently unstable/unpredictable as seen in patient's presentation of vital sign response, increased fall risk, new onset of impairment of functional mobility, decreased endurance and new onset of weakness  Pt would benefit from continued Physical Therapy treatment to address deficits as defined above and maximize level of functional mobility  As demonstrated by objective findings, the assigned level of complexity for this evaluation is high  The patient's -[facility] Basic Mobility Inpatient Short Form Raw Score is 11, Standardized Score is 30 25  A standardized score less than 42 9 suggests the patient may benefit from discharge to post-acute rehabilitation services  Goals   Patient Goals none stated when asked   STG Expiration Date 08/02/21   Short Term Goal #1 improve bed mobility to min assist, improve transfers to min assist, pt will ambulate with min assist and walker 50 feet   LTG Expiration Date 08/16/21   Long Term Goal #1 supervision bed mobility , supervision transfers, supervision ambulation with a walker 75 feet with minimal SOB  Plan   Treatment/Interventions ADL retraining;Functional transfer training;LE strengthening/ROM; Therapeutic exercise;Gait training;Bed mobility; Equipment eval/education; Endurance training;Elevations; Patient/family training   PT Frequency 5x/wk   Recommendation   PT Discharge Recommendation Post acute rehabilitation services   Equipment Recommended   (possible walker)   AM-PAC Basic Mobility Inpatient   Turning in Bed Without Bedrails 2   Lying on Back to Sitting on Edge of Flat Bed 2   Moving Bed to Chair 2   Standing Up From Chair 3   Walk in Room 1   Climb 3-5 Stairs 1   Basic Mobility Inpatient Raw Score 11   Basic Mobility Standardized Score 30 25   Barthel Index   Feeding 5   Bathing 0   Grooming Score 0   Dressing Score 0   Bladder Score 0   Bowels Score 5   Toilet Use Score 5   Transfers (Bed/Chair) Score 5   Mobility (Level Surface) Score 0   Stairs Score 0   Barthel Index Score 21   Licensure   [place] License Number  Bebo CROWLEY  [id number]

## 2021-08-03 ENCOUNTER — APPOINTMENT (INPATIENT)
Dept: RADIOLOGY | Facility: HOSPITAL | Age: 66
DRG: 871 | End: 2021-08-03
Payer: COMMERCIAL

## 2021-08-03 PROBLEM — Z78.9 ALCOHOL USE: Status: ACTIVE | Noted: 2021-08-03

## 2021-08-03 PROBLEM — F10.90 ALCOHOL USE: Status: ACTIVE | Noted: 2021-08-03

## 2021-08-03 PROBLEM — Z72.89 ALCOHOL USE: Status: ACTIVE | Noted: 2021-08-03

## 2021-08-03 PROBLEM — R65.10 SIRS (SYSTEMIC INFLAMMATORY RESPONSE SYNDROME) (HCC): Status: ACTIVE | Noted: 2021-08-03

## 2021-08-03 LAB
ANION GAP SERPL CALCULATED.3IONS-SCNC: 12 MMOL/L (ref 4–13)
BASOPHILS # BLD MANUAL: 0 THOUSAND/UL (ref 0–0.1)
BASOPHILS NFR MAR MANUAL: 0 % (ref 0–1)
BUN SERPL-MCNC: 27 MG/DL (ref 5–25)
CALCIUM SERPL-MCNC: 9.4 MG/DL (ref 8.3–10.1)
CHLORIDE SERPL-SCNC: 105 MMOL/L (ref 100–108)
CO2 SERPL-SCNC: 25 MMOL/L (ref 21–32)
CREAT SERPL-MCNC: 1.19 MG/DL (ref 0.6–1.3)
EOSINOPHIL # BLD MANUAL: 0 THOUSAND/UL (ref 0–0.4)
EOSINOPHIL NFR BLD MANUAL: 0 % (ref 0–6)
ERYTHROCYTE [DISTWIDTH] IN BLOOD BY AUTOMATED COUNT: 14.2 % (ref 11.6–15.1)
GFR SERPL CREATININE-BSD FRML MDRD: 63 ML/MIN/1.73SQ M
GLUCOSE SERPL-MCNC: 105 MG/DL (ref 65–140)
HCT VFR BLD AUTO: 32.5 % (ref 36.5–49.3)
HGB BLD-MCNC: 10.5 G/DL (ref 12–17)
L PNEUMO1 AG UR QL IA.RAPID: NEGATIVE
LYMPHOCYTES # BLD AUTO: 0.45 THOUSAND/UL (ref 0.6–4.47)
LYMPHOCYTES # BLD AUTO: 4 % (ref 14–44)
MAGNESIUM SERPL-MCNC: 2.4 MG/DL (ref 1.6–2.6)
MCH RBC QN AUTO: 33.8 PG (ref 26.8–34.3)
MCHC RBC AUTO-ENTMCNC: 32.3 G/DL (ref 31.4–37.4)
MCV RBC AUTO: 105 FL (ref 82–98)
MONOCYTES # BLD AUTO: 0.11 THOUSAND/UL (ref 0–1.22)
MONOCYTES NFR BLD: 1 % (ref 4–12)
MRSA NOSE QL CULT: NORMAL
NEUTROPHILS # BLD MANUAL: 10.67 THOUSAND/UL (ref 1.85–7.62)
NEUTS BAND NFR BLD MANUAL: 40 % (ref 0–8)
NEUTS SEG NFR BLD AUTO: 55 % (ref 43–75)
NRBC BLD AUTO-RTO: 0 /100 WBCS
PLATELET # BLD AUTO: 209 THOUSANDS/UL (ref 149–390)
PLATELET BLD QL SMEAR: ADEQUATE
PMV BLD AUTO: 9.4 FL (ref 8.9–12.7)
POLYCHROMASIA BLD QL SMEAR: PRESENT
POTASSIUM SERPL-SCNC: 4 MMOL/L (ref 3.5–5.3)
PROCALCITONIN SERPL-MCNC: 8.8 NG/ML
RBC # BLD AUTO: 3.11 MILLION/UL (ref 3.88–5.62)
RBC MORPH BLD: PRESENT
S PNEUM AG UR QL: NEGATIVE
SODIUM SERPL-SCNC: 142 MMOL/L (ref 136–145)
TOTAL CELLS COUNTED SPEC: 100
WBC # BLD AUTO: 11.23 THOUSAND/UL (ref 4.31–10.16)

## 2021-08-03 PROCEDURE — 71045 X-RAY EXAM CHEST 1 VIEW: CPT

## 2021-08-03 PROCEDURE — 87449 NOS EACH ORGANISM AG IA: CPT | Performed by: NURSE PRACTITIONER

## 2021-08-03 PROCEDURE — 99291 CRITICAL CARE FIRST HOUR: CPT | Performed by: INTERNAL MEDICINE

## 2021-08-03 PROCEDURE — 94003 VENT MGMT INPAT SUBQ DAY: CPT

## 2021-08-03 PROCEDURE — 84145 PROCALCITONIN (PCT): CPT | Performed by: NURSE PRACTITIONER

## 2021-08-03 PROCEDURE — 85007 BL SMEAR W/DIFF WBC COUNT: CPT | Performed by: NURSE PRACTITIONER

## 2021-08-03 PROCEDURE — 80048 BASIC METABOLIC PNL TOTAL CA: CPT | Performed by: NURSE PRACTITIONER

## 2021-08-03 PROCEDURE — 85027 COMPLETE CBC AUTOMATED: CPT | Performed by: NURSE PRACTITIONER

## 2021-08-03 PROCEDURE — 94760 N-INVAS EAR/PLS OXIMETRY 1: CPT

## 2021-08-03 PROCEDURE — 83735 ASSAY OF MAGNESIUM: CPT | Performed by: NURSE PRACTITIONER

## 2021-08-03 PROCEDURE — 99223 1ST HOSP IP/OBS HIGH 75: CPT | Performed by: INTERNAL MEDICINE

## 2021-08-03 PROCEDURE — 94640 AIRWAY INHALATION TREATMENT: CPT

## 2021-08-03 RX ORDER — FUROSEMIDE 10 MG/ML
40 INJECTION INTRAMUSCULAR; INTRAVENOUS ONCE
Status: COMPLETED | OUTPATIENT
Start: 2021-08-03 | End: 2021-08-03

## 2021-08-03 RX ORDER — LORAZEPAM 2 MG/ML
0.5 INJECTION INTRAMUSCULAR ONCE
Status: COMPLETED | OUTPATIENT
Start: 2021-08-03 | End: 2021-08-03

## 2021-08-03 RX ORDER — METOPROLOL TARTRATE 5 MG/5ML
5 INJECTION INTRAVENOUS ONCE
Status: COMPLETED | OUTPATIENT
Start: 2021-08-03 | End: 2021-08-03

## 2021-08-03 RX ORDER — LORAZEPAM 2 MG/ML
INJECTION INTRAMUSCULAR
Status: COMPLETED
Start: 2021-08-03 | End: 2021-08-03

## 2021-08-03 RX ORDER — IPRATROPIUM BROMIDE AND ALBUTEROL SULFATE 2.5; .5 MG/3ML; MG/3ML
3 SOLUTION RESPIRATORY (INHALATION)
Status: DISCONTINUED | OUTPATIENT
Start: 2021-08-03 | End: 2021-08-09

## 2021-08-03 RX ORDER — METHYLPREDNISOLONE SODIUM SUCCINATE 40 MG/ML
40 INJECTION, POWDER, LYOPHILIZED, FOR SOLUTION INTRAMUSCULAR; INTRAVENOUS EVERY 12 HOURS SCHEDULED
Status: DISCONTINUED | OUTPATIENT
Start: 2021-08-03 | End: 2021-08-04

## 2021-08-03 RX ADMIN — METOPROLOL TARTRATE 5 MG: 5 INJECTION INTRAVENOUS at 08:25

## 2021-08-03 RX ADMIN — THIAMINE HCL TAB 100 MG 100 MG: 100 TAB at 08:27

## 2021-08-03 RX ADMIN — HEPARIN SODIUM 5000 UNITS: 5000 INJECTION INTRAVENOUS; SUBCUTANEOUS at 21:41

## 2021-08-03 RX ADMIN — ALPRAZOLAM 1 MG: 0.5 TABLET ORAL at 02:46

## 2021-08-03 RX ADMIN — LORAZEPAM 0.5 MG: 2 INJECTION INTRAMUSCULAR at 08:24

## 2021-08-03 RX ADMIN — METHYLPREDNISOLONE SODIUM SUCCINATE 40 MG: 40 INJECTION, POWDER, FOR SOLUTION INTRAMUSCULAR; INTRAVENOUS at 20:50

## 2021-08-03 RX ADMIN — FOLIC ACID 1 MG: 1 TABLET ORAL at 08:27

## 2021-08-03 RX ADMIN — DILTIAZEM HYDROCHLORIDE 10 MG/HR: 5 INJECTION INTRAVENOUS at 20:19

## 2021-08-03 RX ADMIN — IPRATROPIUM BROMIDE AND ALBUTEROL SULFATE 3 ML: 2.5; .5 SOLUTION RESPIRATORY (INHALATION) at 19:56

## 2021-08-03 RX ADMIN — METHYLPREDNISOLONE SODIUM SUCCINATE 40 MG: 40 INJECTION, POWDER, FOR SOLUTION INTRAMUSCULAR; INTRAVENOUS at 11:18

## 2021-08-03 RX ADMIN — DILTIAZEM HYDROCHLORIDE 15 MG/HR: 5 INJECTION INTRAVENOUS at 10:25

## 2021-08-03 RX ADMIN — IPRATROPIUM BROMIDE AND ALBUTEROL SULFATE 3 ML: 2.5; .5 SOLUTION RESPIRATORY (INHALATION) at 11:18

## 2021-08-03 RX ADMIN — AZITHROMYCIN MONOHYDRATE 500 MG: 500 INJECTION, POWDER, LYOPHILIZED, FOR SOLUTION INTRAVENOUS at 11:51

## 2021-08-03 RX ADMIN — RISPERIDONE 2 MG: 1 TABLET ORAL at 08:28

## 2021-08-03 RX ADMIN — ALPRAZOLAM 1 MG: 0.5 TABLET ORAL at 12:05

## 2021-08-03 RX ADMIN — FUROSEMIDE 40 MG: 10 INJECTION, SOLUTION INTRAMUSCULAR; INTRAVENOUS at 12:47

## 2021-08-03 RX ADMIN — BUPROPION 150 MG: 150 TABLET, EXTENDED RELEASE ORAL at 08:28

## 2021-08-03 RX ADMIN — FLUOXETINE 20 MG: 20 CAPSULE ORAL at 08:29

## 2021-08-03 RX ADMIN — HEPARIN SODIUM 5000 UNITS: 5000 INJECTION INTRAVENOUS; SUBCUTANEOUS at 06:33

## 2021-08-03 RX ADMIN — METOPROLOL TARTRATE 5 MG: 5 INJECTION INTRAVENOUS at 02:59

## 2021-08-03 RX ADMIN — HEPARIN SODIUM 5000 UNITS: 5000 INJECTION INTRAVENOUS; SUBCUTANEOUS at 14:21

## 2021-08-03 RX ADMIN — LORAZEPAM 0.5 MG: 2 INJECTION INTRAMUSCULAR; INTRAVENOUS at 08:24

## 2021-08-03 RX ADMIN — IPRATROPIUM BROMIDE AND ALBUTEROL SULFATE 3 ML: 2.5; .5 SOLUTION RESPIRATORY (INHALATION) at 15:14

## 2021-08-03 RX ADMIN — CEFTRIAXONE 2000 MG: 2 INJECTION, SOLUTION INTRAVENOUS at 11:08

## 2021-08-03 RX ADMIN — DILTIAZEM HYDROCHLORIDE 5 MG/HR: 5 INJECTION INTRAVENOUS at 03:58

## 2021-08-03 RX ADMIN — ACETAMINOPHEN 650 MG: 325 TABLET, FILM COATED ORAL at 12:05

## 2021-08-03 RX ADMIN — METOPROLOL TARTRATE 100 MG: 50 TABLET, FILM COATED ORAL at 12:05

## 2021-08-03 NOTE — PROGRESS NOTES
08/03/21 [facility]   Readmission Root Cause   30 Day Readmission No   Patient Information   Mental Status Other (Comment)  (high flow Bipap)   Primary Caregiver Self   Accompanied by/Relationship  (CM) spoke to the patient's sister Oscar Coronel via phone at [phone], as the patient is currently in the ICU on an high flow Bipap  The CM role was discussed with the sister and the following information was obtained  Consult; yes and addressed throughout the assessment; LOS: 2 days; BUNDLE:NO; READMISSION: NO;  Rudy Chinchilla is the PCP  [facility] are the pharmacy's of choice  According to the sister, the Pt does not have a HX HHC/inpatient rehab  However upon this admission, the rehab department is recommending STR  This was discussed with the sister and facility options were reviewed  As a result, the sister requested that a referral be sent to [facility] on behalf of her brother  The sister also noted that as her brother progresses, she and him may decide that home may be the best option  CM expressed understanding and informed the sister that, this can continue to be discussed as her brother progresses and in the meanwhile the referral to [facility] would be initiated as requested and she agreed  Patient does have a history of Anxiety, Depression, and  Alcohol use  According to the sister the patient also has a 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 which he is currently prescribed along with Prozac, Ativan, and Wellbutrin, and Risperdal  According to the sister, the patient is also prescribed Ambien to assist with his insomnia  Additionally, the patient sees Dr Brett Araiza, a psychiatrist from [address] who manages his medications and schedules virtual visits with the patient quarterly, as per the sister   [place] of Choice offered and CM discussed patient goals to ensure the patient's needs are met upon discharge  Additionally, the patient's sister was encouraged to continue to assist her brother w/ F/U'ing with the PCP as required  The CM dept will con't to F/U with the Pt through D/C  No further questions and/or concerns expressed at this time     Support System Immediate family   Legal Information   Advance Directives   (None that the sister is aware of, however she is the personal rep)   Advance Directives Status Discussed - Patient/Family [address] Proxy Appointed No   Activities of Daily Living Prior to Admission   Functional Status Independent   Assistive Device No device needed   Living Arrangement House;Lives alone  (mobile home community w/3 JEN)   [facility] SSI/SSD   [place] [employer] Transportation   Means of Transport to Hospitals in [place]: Drives Self

## 2021-08-03 NOTE — PLAN OF CARE
Problem: RESPIRATORY - ADULT  Goal: Achieves optimal ventilation and oxygenation  Description: INTERVENTIONS:  - Assess for changes in respiratory status  - Assess for changes in mentation and behavior  - Position to facilitate oxygenation and minimize respiratory effort  - Oxygen administered by appropriate delivery if ordered  - Initiate smoking cessation education as indicated  - Encourage broncho-pulmonary hygiene including cough, deep breathe, Incentive Spirometry  - Assess the need for suctioning and aspirate as needed  - Assess and instruct to report SOB or any respiratory difficulty  - Respiratory Therapy support as indicated  Outcome: Progressing     Problem: MOBILITY - ADULT  Goal: Maintain or return to baseline ADL function  Description: INTERVENTIONS:  -  Assess patient's ability to carry out ADLs; assess patient's baseline for ADL function and identify physical deficits which impact ability to perform ADLs (bathing, care of mouth/teeth, toileting, grooming, dressing, etc )  - Assess/evaluate cause of self-care deficits   - Assess range of motion  - Assess patient's mobility; develop plan if impaired  - Assess patient's need for assistive devices and provide as appropriate  - Encourage maximum independence but intervene and supervise when necessary  - Involve family in performance of ADLs  - Assess for home care needs following discharge   - Consider OT consult to assist with ADL evaluation and planning for discharge  - Provide patient education as appropriate  Outcome: Progressing  Goal: Maintains/Returns to pre admission functional level  Description: INTERVENTIONS:  - Perform BMAT or MOVE assessment daily    - Set and communicate daily mobility goal to care team and patient/family/caregiver  - Collaborate with rehabilitation services on mobility goals if consulted  - Perform Range of Motion 4 times a day  - Reposition patient every 2 hours    - Dangle patient 2 times a day  - Stand patient 2 times a day  - Ambulate patient 2 times a day  - Out of bed to chair 2 times a day   - Out of bed for meals 3 times a day  - Out of bed for toileting  - Record patient progress and toleration of activity level   Outcome: Progressing     Problem: Potential for Falls  Goal: Patient will remain free of falls  Description: INTERVENTIONS:  - Educate patient/family on patient safety including physical limitations  - Instruct patient to call for assistance with activity   - Consult OT/PT to assist with strengthening/mobility   - Keep Call bell within reach  - Keep bed low and locked with side rails adjusted as appropriate  - Keep care items and personal belongings within reach  - Initiate and maintain comfort rounds  - Make Fall Risk Sign visible to staff  - Offer Toileting every 2 Hours, in advance of need  - Initiate/Maintain alarm  - Obtain necessary fall risk management equipment:   - Apply yellow socks and bracelet for high fall risk patients  - Consider moving patient to room near nurses station  Outcome: Progressing     Problem: Prexisting or High Potential for Compromised Skin Integrity  Goal: Skin integrity is maintained or improved  Description: INTERVENTIONS:  - Identify patients at risk for skin breakdown  - Assess and monitor skin integrity  - Assess and monitor nutrition and hydration status  - Monitor labs   - Assess for incontinence   - Turn and reposition patient  - Assist with mobility/ambulation  - Relieve pressure over bony prominences  - Avoid friction and shearing  - Provide appropriate hygiene as needed including keeping skin clean and dry  - Evaluate need for skin moisturizer/barrier cream  - Collaborate with interdisciplinary team   - Patient/family teaching  - Consider wound care consult   Outcome: Progressing

## 2021-08-03 NOTE — CONSULTS
Consultation - Cardiology   Alexandria Aragon 77 y o  male MRN: [id number]  Unit/Bed#: ICU 12 Encounter: [id number]    Assessment/Plan     Assessment:  1  Acute respiratory failure with hypoxia secondary to multilobar pneumonia  2  Chronic atrial fibrillation with rapid ventricular response  3  Cardiomyopathy with EF of 40%  4  Acute kidney injury on chronic kidney disease  5  Hypertension  6  Dyslipidemia  7  Anxiety/panic attack  8  Alcohol use    Plan:  Patient has been admitted to the intensive care service  1  Continue IV antibiotics and steroids per primary team    2  Continue Cardizem at 15 milligrams/hour IV during the acute phase of his illness while pneumonia is being treated    3  Continue his beta-blocker as he was taking at home with Lopressor 100 mg b i d     4  CHADS2 Vasc = 4 or 4 8% risk of stroke per year  Patient in the past has refused Coumadin therapy and novel agents such as Eliquis and Xarelto were too expensive for him  5  Continue heparin for DVT prophylaxis and encourage early mobilization    6  If patient's heart rate remains elevated, would add digoxin, avoid amiodarone as this may convert patient and increase risk for embolization since he has long-term atrial fibrillation and is not on anticoagulation  History of Present Illness   Physician Requesting Consult: Gemma Parker MD  Reason for Consult / Principal Problem:  Rapid atrial fibrillation in the setting of multilobar pneumonia      HPI: Alexandria Aragon is a 77y o  year old male who presented to the emergency room with complaints of generalized fatigue, shortness of breath, chest heaviness, and just feeling unwell  He was taking over-the-counter cold medicine which really did not help  On presentation, Patient was noted to be in rapid atrial fibrillation  He does have a history of chronic atrial fibrillation    Chest x-ray perform was concerning for multi lobar pneumonia with consolidation seen in both right upper and lower lobes  Patient was started on IV antibiotics, and also was started on IV Cardizem  Patient has a history of chronic atrial fibrillation and follows with Cardiology at [facility], other history is for chronic back pain, coronary artery disease, hypertension, dyslipidemia, cardiomyopathy with known EF of 40% with global hypokinesis  There is also history of anxiety and panic attacks and history of alcohol use  Echocardiogram performed yesterday demonstrated EF 40%, mild biatrial dilatation, mild TR with PA pressure of 30 and dilation of his IVC  Inpatient consult to Cardiology  Consult performed by: TONY Braden  Consult ordered by: Tonny Reddy MD          Review of Systems   Constitutional: Negative  Negative for activity change, appetite change, fatigue and fever  HENT: Negative for congestion, ear discharge, mouth sores, rhinorrhea, sneezing and trouble swallowing  Eyes: Negative  Negative for photophobia and visual disturbance  Respiratory: Positive for shortness of breath and wheezing  Cardiovascular: Positive for palpitations and leg swelling  Gastrointestinal: Negative  Endocrine: Negative  Negative for polydipsia, polyphagia and polyuria  Genitourinary: Negative for difficulty urinating  Musculoskeletal: Negative  Skin: Negative  Neurological: Negative  Negative for dizziness, syncope, speech difficulty, weakness and light-headedness  Hematological: Negative  Psychiatric/Behavioral: Negative  Historical Information   Past Medical History:   Diagnosis Date    Anxiety     Atrial fibrillation ([facility] [address] )     Depression     Hypertension     Insomnia      History reviewed  No pertinent surgical history    Social History     Substance and Sexual Activity   Alcohol Use Yes    Alcohol/week: 4 0 standard drinks    Types: 4 Cans of beer per week     Social History     Substance and Sexual Activity Drug Use Not Currently    Types: Cocaine     E-Cigarette/Vaping    E-Cigarette Use Never User      E-Cigarette/Vaping Substances     Social History     Tobacco Use   Smoking Status Current Some Day Smoker    Types: Cigarettes    Last attempt to quit: 2015    Years since quittin 9   Smokeless Tobacco Never Used     Family History:   Family History   Problem Relation Age of Onset    Ovarian cancer Sister     Substance Abuse Neg Hx     Mental illness Neg Hx        Meds/Allergies   all current active meds have been reviewed, current meds:   Current Facility-Administered Medications   Medication Dose Route Frequency    acetaminophen (TYLENOL) tablet 650 mg  650 mg Oral Q6H PRN    ALPRAZolam (XANAX) tablet 1 mg  1 mg Oral TID PRN    azithromycin (ZITHROMAX) 500 mg in sodium chloride 0 9 % 250 mL IVPB  500 mg Intravenous Q24H    buPROPion (WELLBUTRIN XL) 24 hr tablet 150 mg  150 mg Oral Daily    cefTRIAXone (ROCEPHIN) IVPB (premix in dextrose) 2,000 mg 50 mL  2,000 mg Intravenous Q24H    diltiazem (CARDIZEM) 125 mg in sodium chloride 0 9 % 125 mL infusion  1-15 mg/hr Intravenous Titrated    FLUoxetine (PROzac) capsule 20 mg  20 mg Oral Daily    folic acid (FOLVITE) tablet 1 mg  1 mg Oral Daily    heparin (porcine) subcutaneous injection 5,000 Units  5,000 Units Subcutaneous Q8H [address]    ipratropium-albuterol (DUO-NEB) 0 5-2 5 mg/3 mL inhalation solution 3 mL  3 mL Nebulization Q4H    methocarbamol (ROBAXIN) tablet 750 mg  750 mg Oral HS PRN    methylPREDNISolone sodium succinate (Solu-MEDROL) injection 40 mg  40 mg Intravenous Q12H [address]    metoprolol (LOPRESSOR) injection 5 mg  5 mg Intravenous Q6H PRN    metoprolol tartrate (LOPRESSOR) tablet 100 mg  100 mg Oral Daily    pravastatin (PRAVACHOL) tablet 40 mg  40 mg Oral Daily With Dinner    risperiDONE (RisperDAL) tablet 2 mg  2 mg Oral Daily    thiamine tablet 100 mg  100 mg Oral Daily    and PTA meds:   Prior to Admission Medications Prescriptions Last Dose Informant Patient Reported? Taking? ALPRAZolam (XANAX) 1 mg tablet  Self Yes No   Sig: TAKE 1 TABLET BY MOUTH THREE TIMES DAILY AS DIRECTED FOR ANXIETY OR PANIC   FLUoxetine (PROzac) 20 mg capsule  Self Yes No   Sig: daily    Klor-Con M20 20 MEQ tablet  Self Yes No   amLODIPine (NORVASC) 5 mg tablet  Self No No   Sig: Take 1 tablet (5 mg total) by mouth daily   buPROPion (WELLBUTRIN SR) 150 mg 12 hr tablet  Self Yes No   Sig: Take 150 mg by mouth 2 (two) times a day    candesartan (ATACAND) 32 MG tablet  Self No No   Sig: Take 1 tablet (32 mg total) by mouth daily   chlorthalidone 25 mg tablet  Self Yes No   meloxicam (MOBIC) 15 mg tablet   No No   Sig: Take 1 tablet (15 mg total) by mouth daily   methocarbamol (ROBAXIN) 750 mg tablet   No No   Sig: Take 1 tablet (750 mg total) by mouth daily at bedtime as needed for muscle spasms   metoprolol tartrate (LOPRESSOR) 100 mg tablet  Self No No   Sig: Take 1 tablet (100 mg total) by mouth daily   pravastatin (PRAVACHOL) 40 mg tablet  Self Yes No   Sig: daily    prazosin (MINIPRESS) 2 mg capsule  Self No No   Sig: TAKE 1 CAPSULE THREE TIMES A DAY   risperiDONE (RisperDAL) 3 mg tablet  Self Yes No   Sig: Take 2 mg by mouth daily    torsemide (DEMADEX) 20 mg tablet  Self Yes No   zolpidem (AMBIEN) 10 mg tablet  Self No No   Sig: Take 1 tablet (10 mg total) by mouth daily at bedtime as needed for sleep      Facility-Administered Medications: None     No Known Allergies    Objective   Vitals: Blood pressure 112/69, pulse (!) 113, temperature 99 6 °F (37 6 °C), temperature source Temporal, resp  rate (!) 32, height 5' 10" (1 778 m), weight 108 kg (237 lb 3 4 oz), SpO2 (!) 83 %    Orthostatic Blood Pressures      Most Recent Value   Blood Pressure  112/69 filed at 08/03/2021 1230   Patient Position - Orthostatic VS  Lying filed at 08/03/2021 1200            Intake/Output Summary (Last 24 hours) at 8/3/2021 1254  Last data filed at 8/3/2021 1145  Gross per 24 hour   Intake 506 46 ml   Output 2200 ml   Net -1693 54 ml       Invasive Devices     Peripheral Intravenous Line            Peripheral IV 08/01/21 Left Antecubital 2 days    Peripheral IV 08/01/21 Left Hand 1 day          Drain            Urethral Catheter Straight-tip 16 Fr  1 day                Physical Exam  Vitals and nursing note reviewed  Constitutional:       Appearance: Normal appearance  He is well-developed  He is obese  He is ill-appearing  HENT:      Head: Normocephalic and atraumatic  Right Ear: External ear normal       Left Ear: External ear normal    Eyes:      General: No scleral icterus  Right eye: No discharge  Left eye: No discharge  Conjunctiva/sclera: Conjunctivae normal    Cardiovascular:      Rate and Rhythm: Tachycardia present  Rhythm irregular  Heart sounds: No murmur heard  Pulmonary:      Effort: Pulmonary effort is normal  No respiratory distress  Breath sounds: Examination of the right-lower field reveals decreased breath sounds, wheezing and rhonchi  Examination of the left-lower field reveals decreased breath sounds, wheezing and rhonchi  Decreased breath sounds, wheezing and rhonchi present  Comments: Scattered rhonchi with decreased air exchange and inspiratory and expiratory wheezing throughout all fields, patient on high-flow oxygen  Abdominal:      General: Bowel sounds are normal  There is no distension  Palpations: Abdomen is soft  Tenderness: There is no abdominal tenderness  Musculoskeletal:      Cervical back: Neck supple  Right lower leg: Edema present  Left lower leg: Edema present  Skin:     General: Skin is warm and dry  Capillary Refill: Capillary refill takes less than 2 seconds  Neurological:      Mental Status: He is alert  Psychiatric:         Mood and Affect: Affect is blunt and flat  Lab Results:   I have personally reviewed pertinent lab results      CBC with diff: Results from last 7 days   Lab Units 08/03/21  0502   WBC Thousand/uL 11 23*   RBC Million/uL 3 11*   HEMOGLOBIN g/dL 10 5*   HEMATOCRIT % 32 5*   MCV fL 105*   MCH pg 33 8   MCHC g/dL 32 3   RDW % 14 2   MPV fL 9 4   PLATELETS Thousands/uL 209     CMP:   Results from last 7 days   Lab Units 08/03/21  0502 08/02/21  0545   SODIUM mmol/L 142 142   POTASSIUM mmol/L 4 0 4 7   CHLORIDE mmol/L 105 105   CO2 mmol/L 25 24   BUN mg/dL 27* 24   CREATININE mg/dL 1 19 1 49*   CALCIUM mg/dL 9 4 9 4   AST U/L  --  83*   ALT U/L  --  57   ALK PHOS U/L  --  187*   EGFR ml/min/1 73sq m 63 48     Troponin:   0   Lab Value Date/Time    TROPONINI <0 02 08/01/2021 2158    TROPONINI <0 02 08/01/2021 1935    TROPONINI <0 02 08/01/2021 1602     BNP:   Results from last 7 days   Lab Units 08/03/21  0502   POTASSIUM mmol/L 4 0   CHLORIDE mmol/L 105   CO2 mmol/L 25   BUN mg/dL 27*   CREATININE mg/dL 1 19   CALCIUM mg/dL 9 4   EGFR ml/min/1 73sq m 63     Coags:   Results from last 7 days   Lab Units 08/01/21  1637   PTT seconds 49*   INR  1 52*     TSH:   Results from last 7 days   Lab Units 08/01/21  1601   TSH 3RD GENERATON uIU/mL 1  197     Magnesium:   Results from last 7 days   Lab Units 08/03/21  0502   MAGNESIUM mg/dL 2 4     Lipid Profile:     Imaging: I have personally reviewed pertinent reports      EKG:  Admission EKG demonstrates rapid atrial fibrillation with some mild ST depression seen in the anterior lateral leads, no previous EKG for comparison, troponins were negative x3  VTE Prophylaxis: Sequential compression device Marli Comber)     Code Status: Level 3 - DNAR and DNI  Advance Directive and Living Will:      Power of :    POLST:      Jermain Sung  Cardiology

## 2021-08-03 NOTE — ASSESSMENT & PLAN NOTE
· Hx cardiomyopathy echo 2017 with EF 40% without significant valvular abnormality, subsequent nuclear stress test 2017 EF reported 65%   · Troponin negative x 3  · Follows with cardiology, no follow up echos noted  · Echo 8/2: EF 40%, difficult to assess given Afib with rapid rate  Estimated PAP 30s     · On home torsemide 20 mg daily and chlorthalidone 25 mg daily, reports taking as prescribed   · CXR with evidence of volume overload, BNP elevated 4649  · Received lasix 40mg yesterday with net negative fluid balance  · Consider repeat diuresis today pending morning labs  · Trend I&Os  · Daily weights

## 2021-08-03 NOTE — ASSESSMENT & PLAN NOTE
· Presented with multiple days of fatigue, dry cough and shortness of breath, hypoxic to 88% on room air on presentation  Placed on 4 L NC with improvement in spo2 but persistent increased WOB requiring BIPAP without significant improvement   · Suspect etiology CHF exacerbation vs pneumonia   · CXR patchy opacifications concerning for volume overload  · BNP 4649  · Procalcitonin elevated  · low grade temp POA   continuing ceftraixone/azithromycin to cover for CAP   · BIPAP 10/4 60% HS, HFNC during day  · Wean HFNC as tolerarated  · Continue diuresis for goal negative fluid balance  · Trend WBC/fever curve  · Pulmonary hygiene

## 2021-08-03 NOTE — QUICK NOTE
Called patient's sister to update on hospital progress  All questions answered, no additional concerns

## 2021-08-03 NOTE — ASSESSMENT & PLAN NOTE
SIRs criteria met POA secondary to leukocytosis, tachycardia, tachypnea  · procal elevated to 7  · UA with moderate bacteria, 4-10 WBC  Negative nitrites, leukocytes   Urine culture negative thus far  · BC negative x 24 hours  · CXR without consolidation, however given hypoxia and elevated procal associated with leukocytosis, empiric CAP initiated  · Continue ceftiaxone/azithromycin, day 2  · Strep pneumo, legionella urine antigen pending  · Trend WBC, fever curve, procal

## 2021-08-03 NOTE — ASSESSMENT & PLAN NOTE
· Cr 1 46 on presentation   · Unclear baseline with minimal prior labs, 0 7-1 1   · Likely 2/2 to congestion in setting of CHF exacerbation   · Gudino in place    · UA with mod bacteria, 4-10 WBC but negative leuks/nitrites   · Reflexed to culture   · Received lasix  · Consider redosing today pending AM labs

## 2021-08-03 NOTE — ASSESSMENT & PLAN NOTE
· Chronic atrial fibrillation on metoprolol 100 mg daily with RVR rates 130-140s in ER   · Received 15 mg diltiazem x 1 in ER with rate improvement to 100s  · HR came back up to 140-150s, received metoprolol total 10 mg without any improvement, redosed with dilitazem 15 mg and HR responded to 110-120s   · Given 5mg IV lopressor x 2 overnight for elevated HR, ultimately placed on cardizem infusion for HR control   Cardizem currently at 10mg/hr  · Goal to wean to off today given cardiomyopathy   · Continue home PO metoprolol, consider transitioning to 50mg BID for improved control, uptitrate as needed   · Not on chronic anticoagulation - per chart review DOAC too expensive and not interested in wafarin   · Will hold systemic AC here as AF chronic and on no home [facility]

## 2021-08-03 NOTE — PROGRESS NOTES
Mally 45  Progress Note - Michalene Kanner 1955, 77 y o  male MRN: [id number]  Unit/Bed#: ICU 12 Encounter: [id number]  Primary Care Provider: Bandar Lowe   Date and time admitted to hospital: 8/1/2021  1:59 PM    * Acute respiratory failure with hypoxia [facility])  Assessment & Plan  · Presented with multiple days of fatigue, dry cough and shortness of breath, hypoxic to 88% on room air on presentation  Placed on 4 L NC with improvement in spo2 but persistent increased WOB requiring BIPAP without significant improvement   · Suspect etiology CHF exacerbation vs pneumonia   · CXR patchy opacifications concerning for volume overload  · BNP 4649  · Procalcitonin elevated  · low grade temp POA  continuing ceftraixone/azithromycin to cover for CAP   · BIPAP 10/4 60% HS, HFNC during day  · Wean HFNC as tolerarated  · Continue diuresis for goal negative fluid balance  · Trend WBC/fever curve  · Pulmonary hygiene       Card[facility] ([facility] )  Assessment & Plan  · Hx cardiomyopathy echo 2017 with EF 40% without significant valvular abnormality, subsequent nuclear stress test 2017 EF reported 65%   · Troponin negative x 3  · Follows with cardiology, no follow up echos noted  · Echo 8/2: EF 40%, difficult to assess given Afib with rapid rate  Estimated PAP 30s     · On home torsemide 20 mg daily and chlorthalidone 25 mg daily, reports taking as prescribed   · CXR with evidence of volume overload, BNP elevated 4649  · Received lasix 40mg yesterday with net negative fluid balance  · Consider repeat diuresis today pending morning labs  · Trend I&Os  · Daily weights    MAX (acute kidney injury) ([facility] )  Assessment & Plan  · Cr 1 46 on presentation   · Unclear baseline with minimal prior labs, 0 7-1 1   · Likely 2/2 to congestion in setting of CHF exacerbation   · Gudino in place    · UA with mod bacteria, 4-10 WBC but negative leuks/nitrites   · Reflexed to culture   · Received lasix  · Consider redosing today pending AM labs    Chronic atrial fibrillation (HCC)  Assessment & Plan  · Chronic atrial fibrillation on metoprolol 100 mg daily with RVR rates 130-140s in ER   · Received 15 mg diltiazem x 1 in ER with rate improvement to 100s  · HR came back up to 140-150s, received metoprolol total 10 mg without any improvement, redosed with dilitazem 15 mg and HR responded to 110-120s   · Given 5mg IV lopressor x 2 overnight for elevated HR, ultimately placed on cardizem infusion for HR control  Cardizem currently at 10mg/hr  · Goal to wean to off today given cardiomyopathy   · Continue home PO metoprolol, consider transitioning to 50mg BID for improved control, uptitrate as needed   · Not on chronic anticoagulation - per chart review DOAC too expensive and not interested in wafarin   · Will hold systemic AC here as AF chronic and on no home AC       Essential hypertension  Assessment & Plan  · Home regimen: Amlodipine 5 mg daily, Candesartan 32 mg daily, prazosin 2 mg TID, metoprolol 100 mg daily, chlorthalidone 25 mg daily   · Hold home medications besides metoprolol while working on rate control for atrial fibrillation with RVR     Mixed hyperlipidemia  Assessment & Plan  · Statin    Anxiety  Assessment & Plan  · Home Xanax 1 mg TID prn     Depression, recurrent ([facility])  Assessment & Plan  · Continue home Wellbutrin, Prozac and risperidone     Severe obesity (BMI 35 0-39  9) with comorbidity ([facility] )  Assessment & Plan  · Lifestyle counseling when appropriate     Alcohol use  Assessment & Plan  · Unclear amount of alcohol consumption  · Continue thiamine/folic acid  · Monitor for signs of alcohol withdrawal     SIRS (systemic inflammatory response syndrome) ([facility])  Assessment & Plan  SIRs criteria met POA secondary to leukocytosis, tachycardia, tachypnea  · procal elevated to 7  · UA with moderate bacteria, 4-10 WBC  Negative nitrites, leukocytes   Urine culture negative thus far  · BC negative x 24 hours  · CXR without consolidation, however given hypoxia and elevated procal associated with leukocytosis, empiric CAP initiated  · Continue ceftiaxone/azithromycin, day 2  · Strep pneumo, legionella urine antigen pending  · Trend WBC, fever curve, procal         ----------------------------------------------------------------------------------------  HPI/24hr events: Ceftriaxone/azithromycin started yesterday for CAP coverage  Overnight, patient in Afib with HR as high as 140  Given 5mg IV lopressor x 2 without significant improvement  Ultimately started on cardizem infusion, currently at 10mg/hr  Wore BiPAP HS  Disposition: Continue Stepdown Level 1 level of care   Code Status: Level 3 - DNAR and DNI  ---------------------------------------------------------------------------------------  SUBJECTIVE  Patient wearing BiPAP upon exam       ---------------------------------------------------------------------------------------  OBJECTIVE    Vitals   Vitals:    21 0300 21 0320 21 0400 21 0500   BP: 132/86  115/73 92/50   BP Location:   Right arm    Pulse: (!) 167  (!) 164 (!) 112   Resp: (!) 32  (!) 35 (!) 32   Temp:   97 5 °F (36 4 °C)    TempSrc:   Temporal    SpO2: 94% 94% 92% (!) 87%   Weight:       Height:         Temp (24hrs), Av 9 °F (36 6 °C), Min:97 1 °F (36 2 °C), Max:99 7 °F (37 6 °C)  Current: Temperature: 97 5 °F (36 4 °C)          Respiratory:  SpO2: SpO2: (!) 87 %    Nasal Cannula O2 Flow Rate (L/min): 40 L/min    Invasive/non-invasive ventilation settings   Respiratory    Lab Data (Last 4 hours)    None         O2/Vent Data (Last 4 hours)       0320          Non-Invasive Ventilation Mode BiPAP                   Physical Exam  Constitutional:       General: He is sleeping  Appearance: He is diaphoretic  HENT:      Head: Normocephalic and atraumatic  Mouth/Throat:      Mouth: Mucous membranes are dry  Eyes:      Extraocular Movements: Extraocular movements intact  Cardiovascular:      Rate and Rhythm: Tachycardia present  Rhythm irregular  Pulses: Normal pulses  Heart sounds: Normal heart sounds  Pulmonary:      Effort: Tachypnea present  Breath sounds: Decreased breath sounds present  No wheezing or rhonchi  Comments: Assessed on BiPAP  Abdominal:      General: Abdomen is flat  Bowel sounds are normal  There is no distension  Palpations: Abdomen is soft  Tenderness: There is no abdominal tenderness  Genitourinary:     Comments: Gudino with gina urine  Skin:     General: Skin is warm  Capillary Refill: Capillary refill takes less than 2 seconds  Findings: No lesion  Neurological:      General: No focal deficit present  Mental Status: He is oriented to person, place, and time and easily aroused           Laboratory and Diagnostics:  Results from last 7 days   Lab Units 08/03/21  0502 08/02/21  0545 08/01/21  1602   WBC Thousand/uL 11 23* 13 21* 14 14*   HEMOGLOBIN g/dL 10 5* 11 3* 12 5   HEMATOCRIT % 32 5* 35 2* 37 7   PLATELETS Thousands/uL 209 235 209   NEUTROS PCT %  --   --  93*   MONOS PCT %  --   --  4     Results from last 7 days   Lab Units 08/03/21  0502 08/02/21  0545 08/01/21  1601   SODIUM mmol/L 142 142 140   POTASSIUM mmol/L 4 0 4 7 4 2   CHLORIDE mmol/L 105 105 104   CO2 mmol/L 25 24 24   ANION GAP mmol/L 12 13 12   BUN mg/dL 27* 24 21   CREATININE mg/dL 1 19 1 49* 1 46*   CALCIUM mg/dL 9 4 9 4 9 5   GLUCOSE RANDOM mg/dL 105 110 112   ALT U/L  --  57 59   AST U/L  --  83* 59*   ALK PHOS U/L  --  187* 226*   ALBUMIN g/dL  --  2 8* 2 9*   TOTAL BILIRUBIN mg/dL  --  2 04* 1 65*     Results from last 7 days   Lab Units 08/03/21  0502 08/02/21  0545   MAGNESIUM mg/dL 2 4 2 2      Results from last 7 days   Lab Units 08/01/21  1637   INR  1 52*   PTT seconds 49*      Results from last 7 days   Lab Units 08/01/21  2158 08/01/21  1935 08/01/21  1602   TROPONIN I ng/mL <0 02 <0 02 <0 02     Results from last 7 days Lab Units 08/01/21  1601   LACTIC ACID mmol/L 1 8     ABG:  Results from last 7 days   Lab Units 08/01/21  1717   PH ART  7 361   PCO2 ART mm Hg 36 5   PO2 ART mm Hg 97 1   HCO3 ART mmol/L 20 2*   BASE EXC ART mmol/L -4 6   ABG SOURCE  Radial, Left     VBG:  Results from last 7 days   Lab Units 08/01/21  1717   ABG SOURCE  Radial, Left     Results from last 7 days   Lab Units 08/02/21  1227   PROCALCITONIN ng/ml 7 64*       Micro  Results from last 7 days   Lab Units 08/01/21  1932 08/01/21  1602 08/01/21  1601   BLOOD CULTURE   --  No Growth at 24 hrs  No Growth at 24 hrs  URINE CULTURE  No Growth <1000 cfu/mL  --   --        EKG: afib with RVR  Imaging: I have personally reviewed pertinent reports  and I have personally reviewed pertinent films in PACS    Intake and Output  I/O       08/01 0701 - 08/02 0700 08/02 0701 - 08/03 0700    P  O   500    I V  (mL/kg)  10 (0 1)    Total Intake(mL/kg)  510 (4 6)    Urine (mL/kg/hr) 1400 1575 (0 6)    Total Output 1400 1575    Net -1400 -1065                Height and Weights   Height: 5' 10" (177 8 cm)  IBW (Ideal Body Weight): 73 kg  Body mass index is 34 87 kg/m²  Weight (last 2 days)     Date/Time   Weight    08/01/21 1410   110 (243)                Nutrition       Diet Orders   (From admission, onward)             Start     Ordered    08/01/21 1842  Diet NPO  Diet effective now     Question Answer Comment   Diet Type NPO    RD to adjust diet per protocol?  No        08/01/21 1843                  Active Medications  Scheduled Meds:  Current Facility-Administered Medications   Medication Dose Route Frequency Provider Last Rate    acetaminophen  650 mg Oral Q6H PRN Clarke Ramírez PA-C      ALPRAZolam  1 mg Oral TID PRN Clarke Rmaírez PA-C      azithromycin  500 mg Intravenous Q24H Alesha Bell  mg (08/02/21 1150)    buPROPion  150 mg Oral Daily Clarke Ramírez PA-C      cefTRIAXone  2,000 mg Intravenous Q24H Alesha Bell MD 2,000 mg (08/02/21 1149)    diltiazem  1-15 mg/hr Intravenous Titrated TONY Bustillos 10 mg/hr (08/03/21 0511)    FLUoxetine  20 mg Oral Daily [place], [place]      folic acid  1 mg Oral Daily [facility] [place], [place]      heparin (porcine)  5,000 Units Subcutaneous [place], [place]      methocarbamol  750 mg Oral HS PRN [place], [place]      metoprolol  5 mg Intravenous Q6H PRN [place], [place]      metoprolol tartrate  100 mg Oral Daily [place], [place]      pravastatin  40 mg Oral Daily With Christoph Greco, [place]      risperiDONE  2 mg Oral Daily [facility] [place], [place]      thiamine  100 mg Oral Daily Clarke Ramírez PA-C       Continuous Infusions:  diltiazem, 1-15 mg/hr, Last Rate: 10 mg/hr (08/03/21 0511)      PRN Meds:   acetaminophen, 650 mg, Q6H PRN  ALPRAZolam, 1 mg, TID PRN  methocarbamol, 750 mg, HS PRN  metoprolol, 5 mg, Q6H PRN        Invasive Devices Review  Invasive Devices     Peripheral Intravenous Line            Peripheral IV 08/01/21 Left Antecubital 2 days    Peripheral IV 08/01/21 Left Hand 1 day          Drain            Urethral Catheter Straight-tip 16 Fr  1 day                Rationale for remaining devices:   ---------------------------------------------------------------------------------------  Advance Directive and Living Will:      Power of :    POLST:    ---------------------------------------------------------------------------------------  Care Time Delivered:   No Critical Care time spent       TONY Bustillos      Portions of the record may have been created with voice recognition software  Occasional wrong word or "sound a like" substitutions may have occurred due to the inherent limitations of voice recognition software    Read the chart carefully and recognize, using context, where substitutions have occurred

## 2021-08-03 NOTE — ASSESSMENT & PLAN NOTE
· Unclear amount of alcohol consumption  · Continue thiamine/folic acid  · Monitor for signs of alcohol withdrawal

## 2021-08-03 NOTE — PROGRESS NOTES
08/03/21 1025   Discharge Communications   Discharge planning discussed with:    Freedom of Choice Yes   IMM Given (Date): 08/03/21   IMM Given to: Family   CM Handoff Comments 8/3: D/C 24hrs; DX: Acute Respiratory Failure w  Hypoxia; IV lasix; high flow Bipap; IV ABX; referral to [facility] initiated; insurance prior auth needed for STR stay; transportation will be needed upon D/C  Contacts   Patient Contacts Lali Marquise (Sister)   Realtionship to Patient: Family   Contact Method Phone   Phone Number [phone] (M)   Reason/Outcome Continuity of Care;Emergency Contact; Referral;Discharge Planning

## 2021-08-04 ENCOUNTER — APPOINTMENT (INPATIENT)
Dept: RADIOLOGY | Facility: HOSPITAL | Age: 66
DRG: 871 | End: 2021-08-04
Payer: COMMERCIAL

## 2021-08-04 PROBLEM — J18.9 PNEUMONIA: Status: ACTIVE | Noted: 2021-08-04

## 2021-08-04 PROBLEM — N17.9 AKI (ACUTE KIDNEY INJURY) (HCC): Status: RESOLVED | Noted: 2021-08-01 | Resolved: 2021-08-04

## 2021-08-04 LAB
ANION GAP SERPL CALCULATED.3IONS-SCNC: 10 MMOL/L (ref 4–13)
ARTERIAL PATENCY WRIST A: YES
BASE EXCESS BLDA CALC-SCNC: 0.9 MMOL/L
BODY TEMPERATURE: 97.2 DEGREES FEHRENHEIT
BUN SERPL-MCNC: 28 MG/DL (ref 5–25)
CALCIUM SERPL-MCNC: 9.8 MG/DL (ref 8.3–10.1)
CHLORIDE SERPL-SCNC: 107 MMOL/L (ref 100–108)
CO2 SERPL-SCNC: 26 MMOL/L (ref 21–32)
CREAT SERPL-MCNC: 1 MG/DL (ref 0.6–1.3)
ERYTHROCYTE [DISTWIDTH] IN BLOOD BY AUTOMATED COUNT: 14.4 % (ref 11.6–15.1)
GFR SERPL CREATININE-BSD FRML MDRD: 78 ML/MIN/1.73SQ M
GLUCOSE SERPL-MCNC: 151 MG/DL (ref 65–140)
HCO3 BLDA-SCNC: 26.2 MMOL/L (ref 22–28)
HCT VFR BLD AUTO: 32.4 % (ref 36.5–49.3)
HGB BLD-MCNC: 10.7 G/DL (ref 12–17)
IPAP: 10
MAGNESIUM SERPL-MCNC: 2.9 MG/DL (ref 1.6–2.6)
MCH RBC QN AUTO: 34.2 PG (ref 26.8–34.3)
MCHC RBC AUTO-ENTMCNC: 33 G/DL (ref 31.4–37.4)
MCV RBC AUTO: 104 FL (ref 82–98)
NON VENT- BIPAP: ABNORMAL
O2 CT BLDA-SCNC: 14.3 ML/DL (ref 16–23)
OXYHGB MFR BLDA: 87.3 % (ref 94–97)
PCO2 BLDA: 44.5 MM HG (ref 36–44)
PCO2 TEMP ADJ BLDA: 43 MM HG (ref 36–44)
PEEP MAX SETTING VENT: 6 CM[H2O]
PH BLD: 7.4 [PH] (ref 7.35–7.45)
PH BLDA: 7.39 [PH] (ref 7.35–7.45)
PLATELET # BLD AUTO: 251 THOUSANDS/UL (ref 149–390)
PMV BLD AUTO: 9.6 FL (ref 8.9–12.7)
PO2 BLD: 54.7 MM HG (ref 75–129)
PO2 BLDA: 57.8 MM HG (ref 75–129)
POTASSIUM SERPL-SCNC: 3.8 MMOL/L (ref 3.5–5.3)
PROCALCITONIN SERPL-MCNC: 5.22 NG/ML
RBC # BLD AUTO: 3.13 MILLION/UL (ref 3.88–5.62)
SODIUM SERPL-SCNC: 143 MMOL/L (ref 136–145)
SPECIMEN SOURCE: ABNORMAL
VENT BIPAP FIO2: 60 %
WBC # BLD AUTO: 12.72 THOUSAND/UL (ref 4.31–10.16)

## 2021-08-04 PROCEDURE — 94640 AIRWAY INHALATION TREATMENT: CPT

## 2021-08-04 PROCEDURE — 94668 MNPJ CHEST WALL SBSQ: CPT

## 2021-08-04 PROCEDURE — 82805 BLOOD GASES W/O2 SATURATION: CPT | Performed by: NURSE PRACTITIONER

## 2021-08-04 PROCEDURE — 94660 CPAP INITIATION&MGMT: CPT

## 2021-08-04 PROCEDURE — 94760 N-INVAS EAR/PLS OXIMETRY 1: CPT

## 2021-08-04 PROCEDURE — 99291 CRITICAL CARE FIRST HOUR: CPT | Performed by: INTERNAL MEDICINE

## 2021-08-04 PROCEDURE — 85027 COMPLETE CBC AUTOMATED: CPT | Performed by: NURSE PRACTITIONER

## 2021-08-04 PROCEDURE — 94669 MECHANICAL CHEST WALL OSCILL: CPT

## 2021-08-04 PROCEDURE — 71045 X-RAY EXAM CHEST 1 VIEW: CPT

## 2021-08-04 PROCEDURE — 83735 ASSAY OF MAGNESIUM: CPT | Performed by: NURSE PRACTITIONER

## 2021-08-04 PROCEDURE — 92610 EVALUATE SWALLOWING FUNCTION: CPT

## 2021-08-04 PROCEDURE — 84145 PROCALCITONIN (PCT): CPT | Performed by: NURSE PRACTITIONER

## 2021-08-04 PROCEDURE — 80048 BASIC METABOLIC PNL TOTAL CA: CPT | Performed by: NURSE PRACTITIONER

## 2021-08-04 PROCEDURE — 99233 SBSQ HOSP IP/OBS HIGH 50: CPT | Performed by: INTERNAL MEDICINE

## 2021-08-04 RX ORDER — METHYLPREDNISOLONE SODIUM SUCCINATE 40 MG/ML
40 INJECTION, POWDER, LYOPHILIZED, FOR SOLUTION INTRAMUSCULAR; INTRAVENOUS EVERY 8 HOURS SCHEDULED
Status: DISCONTINUED | OUTPATIENT
Start: 2021-08-04 | End: 2021-08-08

## 2021-08-04 RX ORDER — FUROSEMIDE 10 MG/ML
40 INJECTION INTRAMUSCULAR; INTRAVENOUS ONCE
Status: COMPLETED | OUTPATIENT
Start: 2021-08-04 | End: 2021-08-04

## 2021-08-04 RX ORDER — ALPRAZOLAM 0.5 MG/1
0.5 TABLET ORAL 3 TIMES DAILY PRN
Status: DISCONTINUED | OUTPATIENT
Start: 2021-08-04 | End: 2021-08-11

## 2021-08-04 RX ORDER — METOPROLOL TARTRATE 100 MG/1
100 TABLET ORAL EVERY 12 HOURS SCHEDULED
Status: DISCONTINUED | OUTPATIENT
Start: 2021-08-04 | End: 2021-08-13 | Stop reason: HOSPADM

## 2021-08-04 RX ORDER — ACETYLCYSTEINE 200 MG/ML
3 SOLUTION ORAL; RESPIRATORY (INHALATION)
Status: DISCONTINUED | OUTPATIENT
Start: 2021-08-04 | End: 2021-08-09

## 2021-08-04 RX ADMIN — RISPERIDONE 2 MG: 1 TABLET ORAL at 08:24

## 2021-08-04 RX ADMIN — IPRATROPIUM BROMIDE AND ALBUTEROL SULFATE 3 ML: 2.5; .5 SOLUTION RESPIRATORY (INHALATION) at 23:00

## 2021-08-04 RX ADMIN — FLUOXETINE 20 MG: 20 CAPSULE ORAL at 08:23

## 2021-08-04 RX ADMIN — HEPARIN SODIUM 5000 UNITS: 5000 INJECTION INTRAVENOUS; SUBCUTANEOUS at 21:23

## 2021-08-04 RX ADMIN — METRONIDAZOLE 500 MG: 500 INJECTION, SOLUTION INTRAVENOUS at 18:01

## 2021-08-04 RX ADMIN — ACETYLCYSTEINE 600 MG: 200 SOLUTION ORAL; RESPIRATORY (INHALATION) at 11:31

## 2021-08-04 RX ADMIN — IPRATROPIUM BROMIDE AND ALBUTEROL SULFATE 3 ML: 2.5; .5 SOLUTION RESPIRATORY (INHALATION) at 19:52

## 2021-08-04 RX ADMIN — THIAMINE HCL TAB 100 MG 100 MG: 100 TAB at 08:22

## 2021-08-04 RX ADMIN — METHYLPREDNISOLONE SODIUM SUCCINATE 40 MG: 40 INJECTION, POWDER, FOR SOLUTION INTRAMUSCULAR; INTRAVENOUS at 08:22

## 2021-08-04 RX ADMIN — IPRATROPIUM BROMIDE AND ALBUTEROL SULFATE 3 ML: 2.5; .5 SOLUTION RESPIRATORY (INHALATION) at 07:48

## 2021-08-04 RX ADMIN — PRAVASTATIN SODIUM 40 MG: 40 TABLET ORAL at 16:16

## 2021-08-04 RX ADMIN — METRONIDAZOLE 500 MG: 500 INJECTION, SOLUTION INTRAVENOUS at 10:37

## 2021-08-04 RX ADMIN — METHYLPREDNISOLONE SODIUM SUCCINATE 40 MG: 40 INJECTION, POWDER, FOR SOLUTION INTRAMUSCULAR; INTRAVENOUS at 21:23

## 2021-08-04 RX ADMIN — HEPARIN SODIUM 5000 UNITS: 5000 INJECTION INTRAVENOUS; SUBCUTANEOUS at 14:06

## 2021-08-04 RX ADMIN — AZITHROMYCIN MONOHYDRATE 500 MG: 500 INJECTION, POWDER, LYOPHILIZED, FOR SOLUTION INTRAVENOUS at 12:25

## 2021-08-04 RX ADMIN — CEFTRIAXONE 2000 MG: 2 INJECTION, SOLUTION INTRAVENOUS at 10:37

## 2021-08-04 RX ADMIN — METHYLPREDNISOLONE SODIUM SUCCINATE 40 MG: 40 INJECTION, POWDER, FOR SOLUTION INTRAMUSCULAR; INTRAVENOUS at 14:06

## 2021-08-04 RX ADMIN — HEPARIN SODIUM 5000 UNITS: 5000 INJECTION INTRAVENOUS; SUBCUTANEOUS at 05:30

## 2021-08-04 RX ADMIN — ALPRAZOLAM 1 MG: 0.5 TABLET ORAL at 14:13

## 2021-08-04 RX ADMIN — BUPROPION 150 MG: 150 TABLET, EXTENDED RELEASE ORAL at 08:23

## 2021-08-04 RX ADMIN — FOLIC ACID 1 MG: 1 TABLET ORAL at 08:22

## 2021-08-04 RX ADMIN — IPRATROPIUM BROMIDE AND ALBUTEROL SULFATE 3 ML: 2.5; .5 SOLUTION RESPIRATORY (INHALATION) at 00:31

## 2021-08-04 RX ADMIN — METOPROLOL TARTRATE 100 MG: 100 TABLET, FILM COATED ORAL at 21:23

## 2021-08-04 RX ADMIN — ACETYLCYSTEINE 600 MG: 200 SOLUTION ORAL; RESPIRATORY (INHALATION) at 23:00

## 2021-08-04 RX ADMIN — IPRATROPIUM BROMIDE AND ALBUTEROL SULFATE 3 ML: 2.5; .5 SOLUTION RESPIRATORY (INHALATION) at 04:11

## 2021-08-04 RX ADMIN — FUROSEMIDE 40 MG: 10 INJECTION, SOLUTION INTRAMUSCULAR; INTRAVENOUS at 08:22

## 2021-08-04 RX ADMIN — FUROSEMIDE 40 MG: 10 INJECTION, SOLUTION INTRAMUSCULAR; INTRAVENOUS at 16:16

## 2021-08-04 RX ADMIN — IPRATROPIUM BROMIDE AND ALBUTEROL SULFATE 3 ML: 2.5; .5 SOLUTION RESPIRATORY (INHALATION) at 11:31

## 2021-08-04 RX ADMIN — IPRATROPIUM BROMIDE AND ALBUTEROL SULFATE 3 ML: 2.5; .5 SOLUTION RESPIRATORY (INHALATION) at 15:49

## 2021-08-04 RX ADMIN — ACETYLCYSTEINE 600 MG: 200 SOLUTION ORAL; RESPIRATORY (INHALATION) at 15:59

## 2021-08-04 RX ADMIN — METOPROLOL TARTRATE 100 MG: 50 TABLET, FILM COATED ORAL at 08:23

## 2021-08-04 NOTE — SPEECH THERAPY NOTE
Speech-Language Pathology Bedside Swallow Evaluation      Patient Name: Jan Ashford    Today's Date: 8/4/2021     Problem List  Principal Problem:    Acute respiratory failure with hypoxia [facility])  Active Problems:    [facility] )    Mixed hyperlipidemia    Essential hypertension    Cardiomyopathy ([facility] )    Anxiety    Depression, recurrent (HCC)    Severe obesity (BMI 35 0-39  9) with comorbidity ([place] [address] )    MAX (acute kidney injury) ([facility] )    SIRS (systemic inflammatory response syndrome) ([place] [address] )    Alcohol use      Past Medical History  Past Medical History:   Diagnosis Date    Anxiety     Atrial fibrillation ([facility] )     Depression     Hypertension     Insomnia        Summary   Pt presented with functional appearing oral and pharyngeal stage swallowing skills with materials administered today  Risk/s for Aspiration: at least mild given respiratory challenge and risk for fatigue at this time     Recommended Diet: soft/level 3 diet and thin liquids   Recommended Form of Meds: as tolerated   Aspiration precautions and swallowing strategies: upright posture and slow rate of feeding  Other Recommendations: Continue frequent oral care        Current Medical Status  Jan Ashford is a 77 y o  male PMH cardiomyopathy, chronic AF not on AC, HTN, HLD, anxiety/depression, obesity who presented to the ER 8/1 with c/o of dry cough, fatigue/weakness and SOB since Friday, sweating and recent fever of 100 4  He also had a fall yesterday secondary to weakness and had to crawl to the phone after  Presented with EMS to the ER, spo2 was 88% on room air and improved with 4 L NC O2 but had persistent increased WOB and was placed on BIPAP  DX: Acute hypoxemic respiratory failure secondary to decompensated heart failure  Systolic cardiomyopathy  Atrial fibrillation, chronic, with RVR  B/L pneumonia  HTN  HLD  ? Alcohol use  Obesity, BMI 35  Patient has claustrophobia and refuses to use BiPAP    Counselled to use BiPAP for at least short periods during the day and at night  Currently his saturations have improved to 93%  Previously it was in the [de-identified]  Her heart rate is controlled with Cardizem infusion  He has mild hypotension  He also has history of smoking and possibly has a component of COPD  We will start him on nebulized bronchodilators and IV Solu-Medrol  I spoke to him at length regarding intubation and mechanical ventilatory support  He restated that he does not want to be intubated or placed on mechanical ventilatory support  He also does not want to be resuscitated in the event of cardiac arrest   He is aware of the consequences  He continues to be critically ill  Currently he is DNR DNI status  SLP Swallowing Evaluation requested at this time  Current Precautions:  Fall    Allergies:  No known food allergies    Past medical history:  Please see H&P for details    Social/Education/Vocational Hx:  Pt lived alone  Sister is involved/supportive  Swallow Information   Current Risks for Dysphagia & Aspiration: respiratory challenge  Current Diet: NPO   Baseline Diet: regular diet and thin liquids      Baseline Assessment   Behavior/Cognition: alert  Speech/Language Status: able to participate in basic conversation and able to follow commands  Patient Positioning: upright in bed  Pain Status/Interventions/Response to Interventions:  No report of or nonverbal indications of pain         Swallow Mechanism Exam  Facial: symmetrical  Labial: WFL  Lingual: mildly reduced (related to generalized weakness, no s/s focal weakness)  Velum: unable to visualize  Mandible: adequate ROM  Dentition: adequate  Vocal quality: mildly weak  Volitional Cough: mild/mod weak  Respiratory Status:  on HFNC with an FiO2 of 100 %; baseline O2 sat: 90%      Consistencies Assessed and Performance   Consistencies Administered: thin liquids, nectar thick, honey thick, puree and soft solids    Oral Stage: Functional to min imapired  Mastication was adequate with the materials administered today  Bolus formation and transfer were functional with min oral residue noted  No overt s/s reduced oral control  Pharyngeal Stage: WFL suspected  Swallow Mechanics:  Swallowing initiation appeared prompt  Laryngeal rise was palpated and judged to be within functional limits  No coughing, throat clearing, change in vocal quality or respiratory status noted today       Esophageal Concerns: none reported    Strategies and Efficacy: I fed pt slowly given respiratory challenge at this time    Summary and Recommendations (see above)    Results Reviewed with: patient and RN     Treatment Recommended: yes briefly to ensure diet tolerance over time     Patient Stated Goal: "good cold drinks"      Short Term Goals:    -Pt will tolerate Dysphagia 3/advanced (dental soft) diet and thin liquid with no significant s/s oral or pharyngeal dysphagia across 1-2 diagnostic session/s     -Patient will tolerate trials of upgraded food texture with no significant s/s of oral or pharyngeal dysphagia including aspiration across 1-3 diagnostic sessions

## 2021-08-04 NOTE — ASSESSMENT & PLAN NOTE
· Cr 1 46 on presentation   · Unclear baseline with minimal prior labs, 0 7-1 1   · Likely 2/2 to congestion in setting of CHF exacerbation   · Gudino in place    · UA with mod bacteria, 4-10 WBC but negative leuks/nitrites   · Reflexed to culture    · Received lasix  · Cr improving, now normalized

## 2021-08-04 NOTE — ASSESSMENT & PLAN NOTE
· CXR on admission with dense consolidations  · Pro calcitonin elevated 7 64, reflex 8 8  · Blood cultures NG @48H  · Strep and Legionella Ag neg  · Not producing sputum to culture  · Start mucomyst, chest wall oscillation to assist with clearance  · Continue ceftriaxone/azithromycin, day 3  · Start Metronidazole for possible aspiration  · Found with multiple empty bottles of alcohol at home    Had episode of choking on water overnight 8/3  · Trend WBC/fever curve

## 2021-08-04 NOTE — ASSESSMENT & PLAN NOTE
· Hx cardiomyopathy echo 2017 with EF 40% without significant valvular abnormality, subsequent nuclear stress test 2017 EF reported 65%   · Troponin negative x 3  · Follows with cardiology, no follow up echos noted  · Echo 8/2: EF 40%, difficult to assess given Afib with rapid rate  Estimated PAP 30s  · At home torsemide 20 mg daily and chlorthalidone 25 mg daily, reports taking as prescribed   · CXR with evidence of volume overload, BNP elevated 4649  · Received lasix 40mg yesterday, net positive I/Os over 24H but making adequate urine    · Consider repeat diuresis today pending morning labs  · Trend I&Os  · Daily weights

## 2021-08-04 NOTE — ASSESSMENT & PLAN NOTE
· Chronic atrial fibrillation on metoprolol 100 mg daily with RVR rates 130-140s in ER  · Received 15 mg diltiazem x 1 in ER with rate improvement to 100s  · HR came back up to 140-150s, received metoprolol total 10 mg without any improvement, redosed with dilitazem 15 mg and HR responded to 110-120s   · 8/2- Given 5mg IV lopressor x 2 overnight for elevated HR, ultimately placed on cardizem infusion for HR control  · Cardizem currently at 5mg/hr  · Goal to wean to off today given cardiomyopathy  · On chart review, last visit with cardiology patient supposed to take lopressor 100mg BID, recent refill in April for lopressor 100mg QD  Will restart lopressor 100mg BID    · Not on chronic anticoagulation - per chart review DOAC too expensive and not interested in wafarin   · Will hold systemic AC here as AF chronic and on no home [facility]

## 2021-08-04 NOTE — PLAN OF CARE
Problem: RESPIRATORY - ADULT  Goal: Achieves optimal ventilation and oxygenation  Description: INTERVENTIONS:  - Assess for changes in respiratory status  - Assess for changes in mentation and behavior  - Position to facilitate oxygenation and minimize respiratory effort  - Oxygen administered by appropriate delivery if ordered  - Initiate smoking cessation education as indicated  - Encourage broncho-pulmonary hygiene including cough, deep breathe, Incentive Spirometry  - Assess the need for suctioning and aspirate as needed  - Assess and instruct to report SOB or any respiratory difficulty  - Respiratory Therapy support as indicated  Outcome: Progressing     Problem: MOBILITY - ADULT  Goal: Maintain or return to baseline ADL function  Description: INTERVENTIONS:  -  Assess patient's ability to carry out ADLs; assess patient's baseline for ADL function and identify physical deficits which impact ability to perform ADLs (bathing, care of mouth/teeth, toileting, grooming, dressing, etc )  - Assess/evaluate cause of self-care deficits   - Assess range of motion  - Assess patient's mobility; develop plan if impaired  - Assess patient's need for assistive devices and provide as appropriate  - Encourage maximum independence but intervene and supervise when necessary  - Involve family in performance of ADLs  - Assess for home care needs following discharge   - Consider OT consult to assist with ADL evaluation and planning for discharge  - Provide patient education as appropriate  Outcome: Progressing  Goal: Maintains/Returns to pre admission functional level  Description: INTERVENTIONS:  - Perform BMAT or MOVE assessment daily    - Set and communicate daily mobility goal to care team and patient/family/caregiver  - Collaborate with rehabilitation services on mobility goals if consulted  - Reposition patient every 2 hours    - Out of bed for toileting  - Record patient progress and toleration of activity level   Outcome: Progressing     Problem: Potential for Falls  Goal: Patient will remain free of falls  Description: INTERVENTIONS:  - Educate patient/family on patient safety including physical limitations  - Instruct patient to call for assistance with activity   - Consult OT/PT to assist with strengthening/mobility   - Keep Call bell within reach  - Keep bed low and locked with side rails adjusted as appropriate  - Keep care items and personal belongings within reach  - Initiate and maintain comfort rounds  - Make Fall Risk Sign visible to staff  - Offer Toileting every 2 Hours, in advance of need  - Initiate/Maintain bed alarm  - Apply yellow socks and bracelet for high fall risk patients  - Consider moving patient to room near nurses station  Outcome: Progressing     Problem: Prexisting or High Potential for Compromised Skin Integrity  Goal: Skin integrity is maintained or improved  Description: INTERVENTIONS:  - Identify patients at risk for skin breakdown  - Assess and monitor skin integrity  - Assess and monitor nutrition and hydration status  - Monitor labs   - Assess for incontinence   - Turn and reposition patient  - Assist with mobility/ambulation  - Relieve pressure over bony prominences  - Avoid friction and shearing  - Provide appropriate hygiene as needed including keeping skin clean and dry  - Evaluate need for skin moisturizer/barrier cream  - Collaborate with interdisciplinary team   - Patient/family teaching  - Consider wound care consult   Outcome: Progressing

## 2021-08-04 NOTE — PROGRESS NOTES
Progress Note - Cardiology   [facility]     Nidia Mcdonald 77 y o  male MRN: [id number]  : 1955  Unit/Bed#: ICU 12 Encounter: [id number]    Assessment and Plan:   1  Acute respiratory failure with hypoxia secondary to multilobar pneumonia:  Concern for aspiration pneumonia    -  continue high-flow oxygen and titrate as patient needs    -  continue antibiotics and steroids per primary team    2  Chronic atrial fibrillation with rapid ventricular response:  Patient continues with atrial fibrillation with elevated rates, this most likely is being driven by his pneumonia    -  continue Lopressor 100 mg b i d     -  patient on heparin subQ q 8 hours per primary team     -  CHADS2 Vasc score = 4 or 4 8% risk of stroke per year    -  patient has refused Coumadin therapy in the past a novel agents were too expensive for him  -  will continue to monitor    3  Cardiomyopathy:  EF of 40%     -  continue IV diuretics    -  continue Lopressor 100 mg b i d     -  I&O, daily weights and monitor labs    -  low-sodium diet    4  Acute kidney injury on chronic kidney disease:  Creatinine slowly improving with IV diuretics and antibiotics  Will continue monitor    5  Hypertension:  Blood pressure is currently stable  6  Dyslipidemia:  Continue Pravachol 40 mg daily    7  Anxiety/panic attack:  Continue Risperdal, Wellbutrin and Prozac    8  Alcohol use:  Monitor for DTs  On folic acid and Thiamine      Subjective / Objective:   Patient seen and examined  He appears to be extremely weak and toxic in appearance  Still requiring high flow oxygen  Chest x-ray appears to have worsening infiltrates  Will continue to monitor    Vitals: Blood pressure 128/78, pulse 99, temperature 97 8 °F (36 6 °C), temperature source Temporal, resp  rate (!) 24, height 5' 10" (1 778 m), weight 105 kg (231 lb 4 2 oz), SpO2 96 %    Vitals:    21 0600 21 0531   Weight: 108 kg (237 lb 3 4 oz) 105 kg (231 lb 4 2 oz)     Body mass index is 33 18 kg/m²  BP Readings from Last 3 Encounters:   08/04/21 128/78   07/09/21 120/72   04/27/21 130/80     Orthostatic Blood Pressures      Most Recent Value   Blood Pressure  128/78 filed at 08/04/2021 1200   Patient Position - Orthostatic VS  Lying filed at 08/04/2021 1200        I/O       08/02 0701 - 08/03 0700 08/03 0701 - 08/04 0700 08/04 0701 - 08/05 0700    P  O  500 1190     I V  (mL/kg) 21 4 (0 2) 250 8 (2 4)     IV Piggyback  300     Total Intake(mL/kg) 521 4 (4 8) 1740 8 (16 6)     Urine (mL/kg/hr) 1925 (0 7) 1630 (0 6) 1200 (1 5)    Total Output 1925 1630 1200    Net -1403 6 +110 8 -1200               Invasive Devices     Peripheral Intravenous Line            Peripheral IV 08/01/21 Left Antecubital 3 days    Peripheral IV 08/01/21 Left Hand 2 days          Drain            Urethral Catheter Straight-tip 16 Fr  2 days                  Intake/Output Summary (Last 24 hours) at 8/4/2021 1436  Last data filed at 8/4/2021 1000  Gross per 24 hour   Intake 481 71 ml   Output 2305 ml   Net -1823 29 ml         Physical Exam:   Physical Exam  Constitutional:       Appearance: He is obese  He is ill-appearing  HENT:      Right Ear: External ear normal       Left Ear: External ear normal       Nose: Nose normal    Eyes:      General: No scleral icterus  Right eye: No discharge  Left eye: No discharge  Cardiovascular:      Rate and Rhythm: Tachycardia present  Rhythm irregular  Pulses: Normal pulses  Heart sounds: Heart sounds are distant  Pulmonary:      Breath sounds: Examination of the right-middle field reveals decreased breath sounds, wheezing, rhonchi and rales  Examination of the right-lower field reveals decreased breath sounds, wheezing, rhonchi and rales  Examination of the left-lower field reveals decreased breath sounds, wheezing, rhonchi and rales  Decreased breath sounds, wheezing, rhonchi and rales present        Comments: Decreased breath sounds with inspiratory and expiratory wheezing in all fields and both fine and coarse crackles  Abdominal:      General: Bowel sounds are normal       Palpations: Abdomen is soft  Musculoskeletal:      Cervical back: Normal range of motion and neck supple  Right lower leg: Edema present  Left lower leg: Edema present  Skin:     General: Skin is warm and dry  Capillary Refill: Capillary refill takes less than 2 seconds  Neurological:      General: No focal deficit present  Mental Status: He is alert  Mental status is at baseline     Psychiatric:         Mood and Affect: Mood normal                    Medications/ Allergies:     Current Facility-Administered Medications   Medication Dose Route Frequency Provider Last Rate    acetaminophen  650 mg Oral Q6H PRN Justen Swan PA-C      acetylcysteine  3 mL Nebulization Q8H Lorri Álvarez PA-C      ALPRAZolam  1 mg Oral TID PRN Justen Swan PA-C      azithromycin  500 mg Intravenous Q24H Rick Rosa  mg (08/04/21 1225)    buPROPion  150 mg Oral Daily Justen Swan PA-C      cefTRIAXone  2,000 mg Intravenous Q24H Rick Rosa MD 2,000 mg (08/04/21 1037)    FLUoxetine  20 mg Oral Daily Justen Swan PA-C      folic acid  1 mg Oral Daily Justen Swan PA-C      heparin (porcine)  5,000 Units Subcutaneous [facility] Steven Huggins [place]      ipratropium-albuterol  3 mL Nebulization Q4H Rick Rosa MD      methocarbamol  750 mg Oral HS PRN Justen Swan PA-C      methylPREDNISolone sodium succinate  40 mg Intravenous [facility] Rick Rosa MD      metoprolol  5 mg Intravenous Q6H PRN Justen Swan PA-C      metoprolol tartrate  100 mg Oral Q12H [facility] Apple Pleitez MD      metroNIDAZOLE  500 mg Intravenous Q8H Lorri Álvarez PA-C 500 mg (08/04/21 1037)    pravastatin  40 mg Oral Daily With Dinner Justen Swan PA-C      risperiDONE  2 mg Oral Daily Justen Swan PA-C      thiamine  100 mg Oral Daily Bonnetta Brittle, PA-C       acetaminophen, 650 mg, Q6H PRN  ALPRAZolam, 1 mg, TID PRN  methocarbamol, 750 mg, HS PRN  metoprolol, 5 mg, Q6H PRN      No Known Allergies    VTE Pharmacologic Prophylaxis:   Sequential compression device (Venodyne)     Labs:   Troponins:  Results from last 7 days   Lab Units 08/01/21  2158 08/01/21  1935 08/01/21  1602 08/01/21  1601   CK TOTAL U/L  --   --   --  1,565*   TROPONIN I ng/mL <0 02 <0 02 <0 02  --    CK MB INDEX %  --   --   --  <1 0     CBC with diff:  Results from last 7 days   Lab Units 08/04/21  0530 08/03/21  0502 08/02/21  0545 08/01/21  1602   WBC Thousand/uL 12 72* 11 23* 13 21* 14 14*   HEMOGLOBIN g/dL 10 7* 10 5* 11 3* 12 5   HEMATOCRIT % 32 4* 32 5* 35 2* 37 7   MCV fL 104* 105* 105* 104*   PLATELETS Thousands/uL 251 209 235 209   MCH pg 34 2 33 8 33 6 34 6*   MCHC g/dL 33 0 32 3 32 1 33 2   RDW % 14 4 14 2 13 9 13 5   MPV fL 9 6 9 4 9 5 10 3   NRBC AUTO /100 WBCs  --  0  --  0     CMP:  Results from last 7 days   Lab Units 08/04/21  0530 08/03/21  0502 08/02/21  0545 08/01/21  1601   SODIUM mmol/L 143 142 142 140   POTASSIUM mmol/L 3 8 4 0 4 7 4 2   CHLORIDE mmol/L 107 105 105 104   CO2 mmol/L 26 25 24 24   ANION GAP mmol/L 10 12 13 12   BUN mg/dL 28* 27* 24 21   CREATININE mg/dL 1 00 1 19 1 49* 1 46*   CALCIUM mg/dL 9 8 9 4 9 4 9 5   AST U/L  --   --  83* 59*   ALT U/L  --   --  57 59   ALK PHOS U/L  --   --  187* 226*   TOTAL PROTEIN g/dL  --   --  7 9 7 2   ALBUMIN g/dL  --   --  2 8* 2 9*   TOTAL BILIRUBIN mg/dL  --   --  2 04* 1 65*   EGFR ml/min/1 73sq m 78 63 48 49     Magnesium:  Results from last 7 days   Lab Units 08/04/21  0530 08/03/21  0502 08/02/21  0545   MAGNESIUM mg/dL 2 9* 2 4 2 2     Coags:  Results from last 7 days   Lab Units 08/01/21  1637   PTT seconds 49*   INR  1 52*     TSH:  Results from last 7 days   Lab Units 08/01/21  1601   TSH 3RD GENERATON uIU/mL 1 197     NT-proBNP:   Recent Labs     08/01/21  1601   NTBNP 4,649* Imaging & Testing   I have personally reviewed pertinent reports  XR chest portable    Result Date: 8/4/2021  Narrative: CHEST INDICATION:  Hypoxia  COMPARISON:  8/2/2021 8/1/2021 EXAM PERFORMED/VIEWS:  XR CHEST PORTABLE FINDINGS: Cardiomediastinal silhouette appears unremarkable  Bilateral airspace disease, right greater than left, slightly worsening particularly on the left  No pneumothorax or pleural effusion  Osseous structures appear within normal limits for patient age  Impression: Slight worsening of bilateral airspace disease, favored to represent multifocal pneumonia  Correlate with clinical scenario  Workstation performed: [id number]     XR chest 1 view portable    Result Date: 8/2/2021  Narrative: CHEST INDICATION:   sob  COMPARISON:  None EXAM PERFORMED/VIEWS:  XR CHEST PORTABLE  AP semierect Images:  1 FINDINGS: Cardiomediastinal silhouette appears unremarkable  Dense consolidation in the peripheral right lower lobe noted with masslike consolidation in the right upper lobe also identified  Left lung clear  No pleural effusion or pneumothorax  Osseous structures appear within normal limits for patient age  Impression: Right upper and lower lobe consolidation concerning for multifocal pneumonia  Workstation performed: [id number]     CT head without contrast    Result Date: 8/1/2021  Narrative: CT BRAIN - WITHOUT CONTRAST INDICATION:   ams  COMPARISON:  None  TECHNIQUE:  CT examination of the brain was performed  In addition to axial images, sagittal and coronal 2D reformatted images were created and submitted for interpretation  Radiation dose length product (DLP) for this visit:  1104 39 mGy-cm   This examination, like all CT scans performed in the [facility], was performed utilizing techniques to minimize radiation dose exposure, including the use of iterative reconstruction and automated exposure control  IMAGE QUALITY:  Diagnostic   FINDINGS: PARENCHYMA: Decreased attenuation is noted in periventricular and subcortical white matter demonstrating an appearance that is statistically most likely to represent mild microangiopathic change  No CT signs of acute infarction  No intracranial mass, mass effect or midline shift  No acute parenchymal hemorrhage  VENTRICLES AND EXTRA-AXIAL SPACES:  Normal for the patient's age  VISUALIZED ORBITS AND PARANASAL SINUSES:  Unremarkable  CALVARIUM AND EXTRACRANIAL SOFT TISSUES:  Normal      Impression: No acute intracranial abnormality  Workstation performed: [id number]     XR chest portable ICU    Result Date: 2021  Narrative: CHEST INDICATION:   Hypoxia, concern for CHF exacerbation  COMPARISON:  2021 EXAM PERFORMED/VIEWS:  XR CHEST PORTABLE ICU FINDINGS: Cardiomediastinal silhouette appears unremarkable  Patchy consolidation in the right upper and lower lobes, unchanged  Faint patchy opacities in the left lower lobe  No evidence of pleural effusion or pneumothorax  Osseous structures appear within normal limits for patient age  Impression: Areas of consolidation in the right upper and lower lobes and, probably, the left lower lobe, either asymmetric pulmonary edema or multifocal pneumonia  Workstation performed: [id number]        EKG / Monitor: Personally reviewed      Atrial fibrillation with a moderate to rapid response    Cardiac testing:   Results for orders placed during the hospital encounter of 21    Echo complete with contrast if indicated    Narrative  [address]  [address] [facility] [address]  [phone]    Transthoracic Echocardiogram  2D, M-mode, Doppler, and Color Doppler    Study date:  02-Aug-2021    Patient: Josefina Richards  MR number: [id number]  Account number: [de-identified]  : 1955  Age: 77 years  Gender: Male  Status: Inpatient  Location: Bedside  Height: 70 in  Weight: 242 4 lb  BP: 106/ 59 mmHg    Indications: Acute Respiratory failure  Diagnoses: J96 91 - Respiratory failure, unspecified with hypoxia    Sonographer:  BILL Do  Referring Physician:  Candace Murphy PA-C  Group:  [id number]  Interpreting Physician:  Todd Leal MD    SUMMARY    PROCEDURE INFORMATION:  The heart rate was 119 bpm, at the start of the study  Patient was in atrial fibrillation with rapid ventricular rate    LEFT VENTRICLE:  Systolic function was moderately reduced  Ejection fraction was estimated to be around 40 %  Hard to assess set correctly due to irregular and fast heartbeat  There was mild diffuse hypokinesis  Wall thickness was at the upper limits of normal   There was mild borderline concentric hypertrophy  LEFT ATRIUM:  The atrium was mildly to moderately dilated  RIGHT ATRIUM:  The atrium was mildly dilated  MITRAL VALVE:  There was mild regurgitation  TRICUSPID VALVE:  There was mild regurgitation  Estimated peak PA pressure was 30 mmHg  IVC, HEPATIC VEINS:  The inferior vena cava was mildly dilated  The respirophasic change in diameter was more than 50%  HISTORY: PRIOR HISTORY: Cardiomyopathy,A Fib ,HTN,CAD,Acute kidney injury,Hyperlipidemia,Hypertriglyceridemia  PROCEDURE: The procedure was performed at the bedside  This was a routine study  The transthoracic approach was used  The study included complete 2D imaging, M-mode, complete spectral Doppler, and color Doppler  The heart rate was 119 bpm,  at the start of the study  Patient was in atrial fibrillation with rapid ventricular rate Images were obtained from the parasternal, apical, subcostal, and suprasternal notch acoustic windows  Echocardiographic views were limited due to  patient on mechanical ventilator  Image quality was adequate  LEFT VENTRICLE: Size was normal  Systolic function was moderately reduced  Ejection fraction was estimated to be around 40 %   Hard to assess set correctly due to irregular and fast heartbeat There was mild diffuse hypokinesis  Wall  thickness was at the upper limits of normal  There was mild borderline concentric hypertrophy  DOPPLER: The study was not technically sufficient to allow evaluation of LV diastolic function due to atrial fibrillation  RIGHT VENTRICLE: The size was normal  Systolic function was normal  Wall thickness was normal     LEFT ATRIUM: The atrium was mildly to moderately dilated  RIGHT ATRIUM: The atrium was mildly dilated  MITRAL VALVE: There was normal leaflet separation  DOPPLER: The transmitral velocity was within the normal range  There was no evidence for stenosis  There was mild regurgitation  AORTIC VALVE: The valve was trileaflet  Leaflets exhibited mildly increased thickness and normal cuspal separation  DOPPLER: Transaortic velocity was within the normal range  There was no evidence for stenosis  There was no regurgitation  TRICUSPID VALVE: DOPPLER: There was mild regurgitation  Estimated peak PA pressure was 30 mmHg  PULMONIC VALVE: DOPPLER: There was no significant regurgitation  PERICARDIUM: There was no thickening or calcification  There was no pericardial effusion  AORTA: The root exhibited normal size  SYSTEMIC VEINS: IVC: The inferior vena cava was mildly dilated  The respirophasic change in diameter was more than 50%      SYSTEM MEASUREMENT TABLES    2D  EF (Teich): 51 89 %  %FS: 26 73 %  Ao Diam: 3 53 cm  EDV(Teich): 126 75 ml  ESV(Teich): 60 98 ml  IVSd: 1 13 cm  LA Area: 30 65 cm2  LA Diam: 4 34 cm  LVEDV MOD A4C: 105 83 ml  LVEF MOD A4C: 30 31 %  LVESV MOD A4C: 73 76 ml  LVIDd: 5 15 cm  LVIDs: 3 77 cm  LVLd A4C: 8 08 cm  LVLs A4C: 7 89 cm  LVOT Diam: 2 21 cm  LVPWd: 0 95 cm  RA Area: 27 2 cm2  RVIDd: 3 06 cm  SV (Teich): 65 77 ml  SV MOD A4C: 32 08 ml    CW  AV Vmax: 1 09 m/s  AV maxP 77 mmHg  TR Vmax: 2 31 m/s  TR maxP 35 mmHg    MM  TAPSE: 1 82 cm    PW  CHEMO Vmax, Pt: 3 42 cm2  E' Lat: 0 11 m/s  E' Sept: 0 09 m/s  LVOT Env Ti: 186 49 ms  LVOT VTI: 14 57 cm  LVOT Vmax: 1 m/s  LVOT Vmax: 0 97 m/s  LVOT Vmean: 0 78 m/s  LVOT maxP 03 mmHg  LVOT maxPG: 3 79 mmHg  LVOT meanP 68 mmHg    [facility]    Prepared and electronically signed by    Alden Marin MD  Signed 02-Aug-2021 10:06:00      Skylar Munoz        "This note has been constructed using a voice recognition system  Therefore there may be syntax, spelling, and/or grammatical errors   Please call if you have any questions  "

## 2021-08-04 NOTE — ASSESSMENT & PLAN NOTE
SIRs criteria met POA secondary to leukocytosis, tachycardia, tachypnea  · procal elevated to 7  · UA with moderate bacteria, 4-10 WBC  Negative nitrites, leukocytes   Urine culture negative thus far  · BC negative x 48 hours  · CXR dense consolidation in Right lower lobe concerning for PNA  · Consider CAP  · Continue ceftiaxone/azithromycin, day 3  · Strep pneumo, legionella urine antigen negative  · Trend WBC, fever curve, procal

## 2021-08-04 NOTE — PLAN OF CARE
Problem: RESPIRATORY - ADULT  Goal: Achieves optimal ventilation and oxygenation  Description: INTERVENTIONS:  - Assess for changes in respiratory status  - Assess for changes in mentation and behavior  - Position to facilitate oxygenation and minimize respiratory effort  - Oxygen administered by appropriate delivery if ordered  - Initiate smoking cessation education as indicated  - Encourage broncho-pulmonary hygiene including cough, deep breathe, Incentive Spirometry  - Assess the need for suctioning and aspirate as needed  - Assess and instruct to report SOB or any respiratory difficulty  - Respiratory Therapy support as indicated  Outcome: Progressing     Problem: MOBILITY - ADULT  Goal: Maintain or return to baseline ADL function  Description: INTERVENTIONS:  -  Assess patient's ability to carry out ADLs; assess patient's baseline for ADL function and identify physical deficits which impact ability to perform ADLs (bathing, care of mouth/teeth, toileting, grooming, dressing, etc )  - Assess/evaluate cause of self-care deficits   - Assess range of motion  - Assess patient's mobility; develop plan if impaired  - Assess patient's need for assistive devices and provide as appropriate  - Encourage maximum independence but intervene and supervise when necessary  - Involve family in performance of ADLs  - Assess for home care needs following discharge   - Consider OT consult to assist with ADL evaluation and planning for discharge  - Provide patient education as appropriate  Outcome: Progressing  Goal: Maintains/Returns to pre admission functional level  Description: INTERVENTIONS:  - Perform BMAT or MOVE assessment daily    - Set and communicate daily mobility goal to care team and patient/family/caregiver  - Collaborate with rehabilitation services on mobility goals if consulted  - Perform Range of Motion 4 times a day  - Reposition patient every 2 hours    - Dangle patient 2 times a day  - Stand patient 2 times a day  - Ambulate patient 2 times a day  - Out of bed to chair 2 times a day   - Out of bed for meals 3 times a day  - Out of bed for toileting  - Record patient progress and toleration of activity level   Outcome: Progressing     Problem: Potential for Falls  Goal: Patient will remain free of falls  Description: INTERVENTIONS:  - Educate patient/family on patient safety including physical limitations  - Instruct patient to call for assistance with activity   - Consult OT/PT to assist with strengthening/mobility   - Keep Call bell within reach  - Keep bed low and locked with side rails adjusted as appropriate  - Keep care items and personal belongings within reach  - Initiate and maintain comfort rounds  - Make Fall Risk Sign visible to staff  - Offer Toileting every 2 Hours, in advance of need  - Initiate/Maintain bed alarm  - Obtain necessary fall risk management equipment:   - Apply yellow socks and bracelet for high fall risk patients  - Consider moving patient to room near nurses station  Outcome: Progressing     Problem: Prexisting or High Potential for Compromised Skin Integrity  Goal: Skin integrity is maintained or improved  Description: INTERVENTIONS:  - Identify patients at risk for skin breakdown  - Assess and monitor skin integrity  - Assess and monitor nutrition and hydration status  - Monitor labs   - Assess for incontinence   - Turn and reposition patient  - Assist with mobility/ambulation  - Relieve pressure over bony prominences  - Avoid friction and shearing  - Provide appropriate hygiene as needed including keeping skin clean and dry  - Evaluate need for skin moisturizer/barrier cream  - Collaborate with interdisciplinary team   - Patient/family teaching  - Consider wound care consult   Outcome: Progressing

## 2021-08-04 NOTE — ASSESSMENT & PLAN NOTE
· At home takes pravastatin 40mg QD  · Last lipid panel 5 months ago: HDL 65, LDL 37,   · Continue pravastatin

## 2021-08-04 NOTE — ASSESSMENT & PLAN NOTE
· Presented with multiple days of fatigue, dry cough and shortness of breath, hypoxic to 88% on room air on presentation  Placed on 4 L NC with improvement in spo2 but persistent increased WOB requiring BIPAP without significant improvement  · Suspect etiology CHF exacerbation vs pneumonia  · BNP 4649  · CXR dense consolidation concerning for PNA  · Procalcitonin elevated  · low grade temp POA  Tmax 100 1   · Continue ceftraixone/azithromycin to cover for CAP, day 3  · Start Flagyl for possible aspiration PNA  · Patient found with multiple empty alcohol bottles at home  Had episode of choking drinking water 8/3 overnight  · Continue Duonebs Q4H    Increase solumedrol frequency to q8H  · BIPAP 10/6 70%, HFNC during day  · Wean HFNC as tolerarated  · Continue diuresis for goal negative fluid balance  · Trend WBC/fever curve  · Pulmonary hygiene

## 2021-08-04 NOTE — PROGRESS NOTES
Mally 45  Progress Note - Donna Hernandez 1955, 77 y o  male MRN: [id number]  Unit/Bed#: ICU 12 Encounter: [id number]  Primary Care Provider: Bandar Gary   Date and time admitted to hospital: 8/1/2021  1:59 PM    * Acute respiratory failure with hypoxia [facility])  Assessment & Plan  · Presented with multiple days of fatigue, dry cough and shortness of breath, hypoxic to 88% on room air on presentation  Placed on 4 L NC with improvement in spo2 but persistent increased WOB requiring BIPAP without significant improvement  · Suspect etiology CHF exacerbation vs pneumonia  · BNP 4649  · CXR dense consolidation concerning for PNA  · Procalcitonin elevated  · low grade temp POA  Tmax 100 1   · Continue ceftraixone/azithromycin to cover for CAP, day 3  · Start Flagyl for possible aspiration PNA  · Patient found with multiple empty alcohol bottles at home  Had episode of choking drinking water 8/3 overnight  · Continue Duonebs Q4H  Increase solumedrol frequency to q8H  · BIPAP 10/6 70%, HFNC during day  · Wean HFNC as tolerarated  · Continue diuresis for goal negative fluid balance  · Trend WBC/fever curve  · Pulmonary hygiene    Pneumonia  Assessment & Plan  · CXR on admission with dense consolidations  · Pro calcitonin elevated 7 64, reflex 8 8  · Blood cultures NG @48H  · Strep and Legionella Ag neg  · Not producing sputum to culture  · Start mucomyst, chest wall oscillation to assist with clearance  · Continue ceftriaxone/azithromycin, day 3  · Start Metronidazole for possible aspiration  · Found with multiple empty bottles of alcohol at home    Had episode of choking on water overnight 8/3  · Trend WBC/fever curve    Alcohol use  Assessment & Plan  · Unclear amount of alcohol consumption  · Continue thiamine/folic acid  · Monitor for signs of alcohol withdrawal     SIRS (systemic inflammatory response syndrome) (HCC)  Assessment & Plan  SIRs criteria met POA secondary to leukocytosis, tachycardia, tachypnea  · procal elevated to 7  · UA with moderate bacteria, 4-10 WBC  Negative nitrites, leukocytes  Urine culture negative thus far  · BC negative x 48 hours  · CXR dense consolidation in Right lower lobe concerning for PNA  · Consider CAP  · Continue ceftiaxone/azithromycin, day 3  · Strep pneumo, legionella urine antigen negative  · Trend WBC, fever curve, procal     Severe obesity (BMI 35 0-39  9) with comorbidity ([facility] )  Assessment & Plan  · Lifestyle counseling when appropriate     Depression, recurrent ([facility] )  Assessment & Plan  · Continue home Wellbutrin, Prozac and risperidone     Anxiety  Assessment & Plan  · Home Xanax 1 mg TID prn     Cardiomyopathy ([facility] )  Assessment & Plan  · Hx cardiomyopathy echo 2017 with EF 40% without significant valvular abnormality, subsequent nuclear stress test 2017 EF reported 65%   · Troponin negative x 3  · Follows with cardiology, no follow up echos noted  · Echo 8/2: EF 40%, difficult to assess given Afib with rapid rate  Estimated PAP 30s  · At home torsemide 20 mg daily and chlorthalidone 25 mg daily, reports taking as prescribed   · CXR with evidence of volume overload, BNP elevated 4649  · Received lasix 40mg yesterday, net positive I/Os over 24H but making adequate urine    · Consider repeat diuresis today pending morning labs  · Trend I&Os  · Daily weights    Essential hypertension  Assessment & Plan  · Home regimen: Amlodipine 5 mg daily, Candesartan 32 mg daily, prazosin 2 mg TID, metoprolol 100 mg daily, chlorthalidone 25 mg daily   · Hold home medications besides metoprolol while working on rate control for atrial fibrillation with RVR    Mixed hyperlipidemia  Assessment & Plan  · At home takes pravastatin 40mg QD  · Last lipid panel 5 months ago: HDL 65, LDL 37,   · Continue pravastatin    Chronic atrial fibrillation (HCC)  Assessment & Plan  · Chronic atrial fibrillation on metoprolol 100 mg daily with RVR rates 130-140s in ER  · Received 15 mg diltiazem x 1 in ER with rate improvement to 100s  · HR came back up to 140-150s, received metoprolol total 10 mg without any improvement, redosed with dilitazem 15 mg and HR responded to 110-120s   · 8/2- Given 5mg IV lopressor x 2 overnight for elevated HR, ultimately placed on cardizem infusion for HR control  · Cardizem currently at 5mg/hr  · Goal to wean to off today given cardiomyopathy  · On chart review, last visit with cardiology patient supposed to take lopressor 100mg BID, recent refill in April for lopressor 100mg QD  Will restart lopressor 100mg BID  · Not on chronic anticoagulation - per chart review DOAC too expensive and not interested in wafarin   · Will hold systemic AC here as AF chronic and on no home AC       MAX (acute kidney injury) (HCC)-resolved as of 8/4/2021  Assessment & Plan  · Cr 1 46 on presentation   · Unclear baseline with minimal prior labs, 0 7-1 1   · Likely 2/2 to congestion in setting of CHF exacerbation   · Gudino in place    · UA with mod bacteria, 4-10 WBC but negative leuks/nitrites   · Reflexed to culture    · Received lasix  · Cr improving, now normalized      ----------------------------------------------------------------------------------------  HPI/24hr events: Patient heart rate better controlled after xanax and increased diltiazem drip to 15mg/h yesterday afternoon  HR remains well controlled, and cardizem weaned down now at 5mg/h  Patient continues to require BiPAP, VBG last night showed normal pH Patient had episode of choking when drinking water last night, speech evaluation ordered  No  Disposition: Continue Critical Care   Code Status: Level 3 - DNAR and DNI  ---------------------------------------------------------------------------------------  SUBJECTIVE  Patient seen and examined at bedside  Patient     Review of Systems   Constitutional: Positive for fatigue  Negative for chills and fever     Eyes: Positive for visual disturbance  Respiratory: Positive for cough  Cardiovascular: Negative for chest pain, palpitations and leg swelling  Gastrointestinal: Negative for abdominal distention, abdominal pain, constipation, diarrhea, nausea and vomiting  Genitourinary: Negative for difficulty urinating  Musculoskeletal: Negative for arthralgias and myalgias  Skin: Negative for rash and wound  Neurological: Positive for headaches  Review of systems was reviewed and negative unless stated above in HPI/24-hour events   ---------------------------------------------------------------------------------------  OBJECTIVE    Vitals   Vitals:    21 0600 21 0630 21 0700 21 0748   BP: 107/59 110/66 113/63    BP Location:       Pulse: 103 101 99    Resp: (!) 26 (!) 29 (!) 25    Temp:       TempSrc:       SpO2: 93% 95% 94% 94%   Weight:       Height:         Temp (24hrs), Av 4 °F (36 9 °C), Min:97 °F (36 1 °C), Max:100 1 °F (37 8 °C)  Current: Temperature: 98 9 °F (37 2 °C)          Respiratory:  SpO2: SpO2: 96 %  Nasal Cannula O2 Flow Rate (L/min): 40 L/min    Invasive/non-invasive ventilation settings   Respiratory    Lab Data (Last 4 hours)    None         O2/Vent Data (Last 4 hours)       0412  0748        Non-Invasive Ventilation Mode BiPAP BiPAP                  Physical Exam  Vitals reviewed  Constitutional:       General: He is awake  Appearance: He is obese  He is ill-appearing  Interventions: Face mask in place  Eyes:      Comments: Left eye irregular pupil, chronic  Decreased vision in left eye, chronic   Cardiovascular:      Rate and Rhythm: Tachycardia present  Rhythm irregular  Heart sounds: No murmur heard  No friction rub  No gallop  Pulmonary:      Effort: Tachypnea present  Breath sounds: Decreased breath sounds and rhonchi present  Abdominal:      General: Abdomen is flat  Bowel sounds are normal  There is no distension        Palpations: Abdomen is soft  Tenderness: There is no abdominal tenderness  Genitourinary:     Comments: Gudino in place, gina urine  Musculoskeletal:      Right lower leg: No edema  Left lower leg: No edema  Skin:     General: Skin is warm and dry  Capillary Refill: Capillary refill takes less than 2 seconds  Neurological:      General: No focal deficit present  Mental Status: He is oriented to person, place, and time           Laboratory and Diagnostics:  Results from last 7 days   Lab Units 08/04/21  0530 08/03/21  0502 08/02/21  0545 08/01/21  1602   WBC Thousand/uL 12 72* 11 23* 13 21* 14 14*   HEMOGLOBIN g/dL 10 7* 10 5* 11 3* 12 5   HEMATOCRIT % 32 4* 32 5* 35 2* 37 7   PLATELETS Thousands/uL 251 209 235 209   NEUTROS PCT %  --   --   --  93*   BANDS PCT %  --  40*  --   --    MONOS PCT %  --   --   --  4   MONO PCT %  --  1*  --   --      Results from last 7 days   Lab Units 08/04/21  0530 08/03/21  0502 08/02/21  0545 08/01/21  1601   SODIUM mmol/L 143 142 142 140   POTASSIUM mmol/L 3 8 4 0 4 7 4 2   CHLORIDE mmol/L 107 105 105 104   CO2 mmol/L 26 25 24 24   ANION GAP mmol/L 10 12 13 12   BUN mg/dL 28* 27* 24 21   CREATININE mg/dL 1 00 1 19 1 49* 1 46*   CALCIUM mg/dL 9 8 9 4 9 4 9 5   GLUCOSE RANDOM mg/dL 151* 105 110 112   ALT U/L  --   --  57 59   AST U/L  --   --  83* 59*   ALK PHOS U/L  --   --  187* 226*   ALBUMIN g/dL  --   --  2 8* 2 9*   TOTAL BILIRUBIN mg/dL  --   --  2 04* 1 65*     Results from last 7 days   Lab Units 08/04/21  0530 08/03/21  0502 08/02/21  0545   MAGNESIUM mg/dL 2 9* 2 4 2 2      Results from last 7 days   Lab Units 08/01/21  1637   INR  1 52*   PTT seconds 49*      Results from last 7 days   Lab Units 08/01/21  2158 08/01/21  1935 08/01/21  1602   TROPONIN I ng/mL <0 02 <0 02 <0 02     Results from last 7 days   Lab Units 08/01/21  1601   LACTIC ACID mmol/L 1 8     ABG:  Results from last 7 days   Lab Units 08/04/21  0027   PH ART  7 388   PCO2 ART mm Hg 44 5*   PO2 ART mm Hg 57 8*   HCO3 ART mmol/L 26 2   BASE EXC ART mmol/L 0 9   ABG SOURCE  Radial, Right     VBG:  Results from last 7 days   Lab Units 08/04/21  0027   ABG SOURCE  Radial, Right     Results from last 7 days   Lab Units 08/03/21  0502 08/02/21  1227   PROCALCITONIN ng/ml 8 80* 7 64*       Micro  Results from last 7 days   Lab Units 08/03/21  0230 08/01/21  2028 08/01/21  1932 08/01/21  1602 08/01/21  1601   BLOOD CULTURE   --   --   --  No Growth at 48 hrs  No Growth at 48 hrs  URINE CULTURE   --   --  No Growth <1000 cfu/mL  --   --    MRSA CULTURE ONLY   --  No Methicillin Resistant Staphlyococcus aureus (MRSA) isolated  --   --   --    LEGIONELLA URINARY ANTIGEN  Negative  --   --   --   --    STREP PNEUMONIAE ANTIGEN, URINE  Negative  --   --   --   --        EKG: afib  Imaging: I have personally reviewed pertinent reports  and I have personally reviewed pertinent films in PACS    Intake and Output  I/O       08/02 0701 - 08/03 0700 08/03 0701 - 08/04 0700 08/04 0701 - 08/05 0700    P  O  500 1190     I V  (mL/kg) 21 4 (0 2) 250 8 (2 4)     IV Piggyback  300     Total Intake(mL/kg) 521 4 (4 8) 1740 8 (16 6)     Urine (mL/kg/hr) 1925 (0 7) 1630 (0 6)     Total Output 1925 1630     Net -1403 6 +110 8                  Height and Weights   Height: 5' 10" (177 8 cm)  IBW (Ideal Body Weight): 73 kg  Body mass index is 33 18 kg/m²  Weight (last 2 days)     Date/Time   Weight    08/04/21 0531   105 (231 26)    08/03/21 0600   108 (237 22)                Nutrition       Diet Orders   (From admission, onward)             Start     Ordered    08/03/21 1146  Room Service  Once     Question:  Type of Service  Answer:  Room Service-Appropriate    08/03/21 1145    08/01/21 1842  Diet NPO  Diet effective now     Question Answer Comment   Diet Type NPO    RD to adjust diet per protocol?  No        08/01/21 1303                  Active Medications  Scheduled Meds:  Current Facility-Administered Medications   Medication Dose Route Frequency Provider Last Rate    acetaminophen  650 mg Oral Q6H PRN Allyn Stein PA-C      ALPRAZolam  1 mg Oral TID PRN Allyn Stein PA-C      azithromycin  500 mg Intravenous Q24H Krista Frankel, MD Stopped (08/03/21 1401)    buPROPion  150 mg Oral Daily [place], [place]      cefTRIAXone  2,000 mg Intravenous Q24H Krista Frankel, MD Stopped (08/03/21 1145)    diltiazem  1-15 mg/hr Intravenous Titrated Era PalARNULFO rahmanNP 5 mg/hr (08/03/21 2140)    FLUoxetine  20 mg Oral Daily Allyn Stein PA-C      folic acid  1 mg Oral Daily [place], [place]      furosemide  40 mg Intravenous Once Krista Frankel, MD      heparin (porcine)  5,000 Units Subcutaneous [place], [place]      ipratropium-albuterol  3 mL Nebulization Q4H Krista Frankel, MD      methocarbamol  750 mg Oral HS PRN Allyn Stein PA-C      methylPREDNISolone sodium succinate  40 mg Intravenous Q12H [address] [place] [address] MD Adrien      metoprolol  5 mg Intravenous Q6H PRN Allyn Stein PA-C      metoprolol tartrate  100 mg Oral Daily [place], [place]      pravastatin  40 mg Oral Daily With [employer], [place]      risperiDONE  2 mg Oral Daily [place], [place]      thiamine  100 mg Oral Daily Allyn Stein PA-C       Continuous Infusions:  diltiazem, 1-15 mg/hr, Last Rate: 5 mg/hr (08/03/21 2140)      PRN Meds:   acetaminophen, 650 mg, Q6H PRN  ALPRAZolam, 1 mg, TID PRN  methocarbamol, 750 mg, HS PRN  metoprolol, 5 mg, Q6H PRN        Invasive Devices Review  Invasive Devices     Peripheral Intravenous Line            Peripheral IV 08/01/21 Left Antecubital 3 days    Peripheral IV 08/01/21 Left Hand 2 days          Drain            Urethral Catheter Straight-tip 16 Fr  2 days                Rationale for remaining devices:   ---------------------------------------------------------------------------------------  Advance Directive and Living Will:      Power of :    POLST: ---------------------------------------------------------------------------------------  Care Time Delivered:   No Critical Care time spent       Rosalina Garcia MD      Portions of the record may have been created with voice recognition software  Occasional wrong word or "sound a like" substitutions may have occurred due to the inherent limitations of voice recognition software    Read the chart carefully and recognize, using context, where substitutions have occurred

## 2021-08-04 NOTE — CASE MANAGEMENT
Day 3:     AI received a call from the patient's sister Dewey via [phone] inquiring about having  assist the patient with paying his bills for the month  AI explained that having the sister assist her brother would be a better alternative and Dewey agreed  Additionally, AI informed both Dewey and the patient that [facility] responded to the referral for STR placement and noted that the patient is out of network, however, if he decides on their facility there would be a 20% co-pay  The patient expressed being ok with this option, however, the sister wanted to discuss it further with the patient  CM provided additional options and other facilities that were in network with the insurance and according to Dewey, she would prefer to still wait before making a final decision about expanding the STR facility search  Dewey feels that having the patient come straight home may be best, but still requested time to discuss with her brother  AI explained the discharge process and how a prior auth would be needed once a STR facility was chosen and being proactive would assist with making a smooth transition if STR was their final choice and Dewey expressed understanding  The CM department continue to F/U

## 2021-08-05 PROBLEM — A41.9 SEPSIS (HCC): Status: ACTIVE | Noted: 2021-08-03

## 2021-08-05 LAB
ALBUMIN SERPL BCP-MCNC: 2 G/DL (ref 3.5–5)
ALP SERPL-CCNC: 179 U/L (ref 46–116)
ALT SERPL W P-5'-P-CCNC: 192 U/L (ref 12–78)
ANION GAP SERPL CALCULATED.3IONS-SCNC: 8 MMOL/L (ref 4–13)
AST SERPL W P-5'-P-CCNC: 277 U/L (ref 5–45)
BASOPHILS # BLD AUTO: 0.03 THOUSANDS/ΜL (ref 0–0.1)
BASOPHILS NFR BLD AUTO: 0 % (ref 0–1)
BILIRUB DIRECT SERPL-MCNC: 1.11 MG/DL (ref 0–0.2)
BILIRUB SERPL-MCNC: 1.64 MG/DL (ref 0.2–1)
BUN SERPL-MCNC: 30 MG/DL (ref 5–25)
CALCIUM SERPL-MCNC: 10.1 MG/DL (ref 8.3–10.1)
CHLORIDE SERPL-SCNC: 111 MMOL/L (ref 100–108)
CO2 SERPL-SCNC: 30 MMOL/L (ref 21–32)
CREAT SERPL-MCNC: 0.98 MG/DL (ref 0.6–1.3)
EOSINOPHIL # BLD AUTO: 0 THOUSAND/ΜL (ref 0–0.61)
EOSINOPHIL NFR BLD AUTO: 0 % (ref 0–6)
ERYTHROCYTE [DISTWIDTH] IN BLOOD BY AUTOMATED COUNT: 14.5 % (ref 11.6–15.1)
GFR SERPL CREATININE-BSD FRML MDRD: 80 ML/MIN/1.73SQ M
GLUCOSE SERPL-MCNC: 186 MG/DL (ref 65–140)
HCT VFR BLD AUTO: 33.3 % (ref 36.5–49.3)
HGB BLD-MCNC: 11 G/DL (ref 12–17)
IMM GRANULOCYTES # BLD AUTO: 0.21 THOUSAND/UL (ref 0–0.2)
IMM GRANULOCYTES NFR BLD AUTO: 2 % (ref 0–2)
LYMPHOCYTES # BLD AUTO: 0.42 THOUSANDS/ΜL (ref 0.6–4.47)
LYMPHOCYTES NFR BLD AUTO: 3 % (ref 14–44)
MAGNESIUM SERPL-MCNC: 2.8 MG/DL (ref 1.6–2.6)
MCH RBC QN AUTO: 33.7 PG (ref 26.8–34.3)
MCHC RBC AUTO-ENTMCNC: 33 G/DL (ref 31.4–37.4)
MCV RBC AUTO: 102 FL (ref 82–98)
MONOCYTES # BLD AUTO: 0.5 THOUSAND/ΜL (ref 0.17–1.22)
MONOCYTES NFR BLD AUTO: 4 % (ref 4–12)
NEUTROPHILS # BLD AUTO: 11.63 THOUSANDS/ΜL (ref 1.85–7.62)
NEUTS SEG NFR BLD AUTO: 91 % (ref 43–75)
NRBC BLD AUTO-RTO: 0 /100 WBCS
PLATELET # BLD AUTO: 299 THOUSANDS/UL (ref 149–390)
PMV BLD AUTO: 10.1 FL (ref 8.9–12.7)
POTASSIUM SERPL-SCNC: 3.6 MMOL/L (ref 3.5–5.3)
PROCALCITONIN SERPL-MCNC: 2.26 NG/ML
PROT SERPL-MCNC: 7.1 G/DL (ref 6.4–8.2)
RBC # BLD AUTO: 3.26 MILLION/UL (ref 3.88–5.62)
SODIUM SERPL-SCNC: 149 MMOL/L (ref 136–145)
WBC # BLD AUTO: 12.79 THOUSAND/UL (ref 4.31–10.16)

## 2021-08-05 PROCEDURE — 94640 AIRWAY INHALATION TREATMENT: CPT

## 2021-08-05 PROCEDURE — 94760 N-INVAS EAR/PLS OXIMETRY 1: CPT

## 2021-08-05 PROCEDURE — 94669 MECHANICAL CHEST WALL OSCILL: CPT

## 2021-08-05 PROCEDURE — 99233 SBSQ HOSP IP/OBS HIGH 50: CPT | Performed by: INTERNAL MEDICINE

## 2021-08-05 PROCEDURE — 85025 COMPLETE CBC W/AUTO DIFF WBC: CPT | Performed by: STUDENT IN AN ORGANIZED HEALTH CARE EDUCATION/TRAINING PROGRAM

## 2021-08-05 PROCEDURE — 84145 PROCALCITONIN (PCT): CPT | Performed by: STUDENT IN AN ORGANIZED HEALTH CARE EDUCATION/TRAINING PROGRAM

## 2021-08-05 PROCEDURE — 94003 VENT MGMT INPAT SUBQ DAY: CPT

## 2021-08-05 PROCEDURE — 94668 MNPJ CHEST WALL SBSQ: CPT

## 2021-08-05 PROCEDURE — 83735 ASSAY OF MAGNESIUM: CPT | Performed by: STUDENT IN AN ORGANIZED HEALTH CARE EDUCATION/TRAINING PROGRAM

## 2021-08-05 PROCEDURE — 80048 BASIC METABOLIC PNL TOTAL CA: CPT | Performed by: STUDENT IN AN ORGANIZED HEALTH CARE EDUCATION/TRAINING PROGRAM

## 2021-08-05 PROCEDURE — 94660 CPAP INITIATION&MGMT: CPT

## 2021-08-05 PROCEDURE — 94664 DEMO&/EVAL PT USE INHALER: CPT

## 2021-08-05 PROCEDURE — 80076 HEPATIC FUNCTION PANEL: CPT | Performed by: STUDENT IN AN ORGANIZED HEALTH CARE EDUCATION/TRAINING PROGRAM

## 2021-08-05 RX ORDER — FUROSEMIDE 10 MG/ML
40 INJECTION INTRAMUSCULAR; INTRAVENOUS ONCE
Status: COMPLETED | OUTPATIENT
Start: 2021-08-05 | End: 2021-08-05

## 2021-08-05 RX ORDER — POTASSIUM CHLORIDE 20 MEQ/1
40 TABLET, EXTENDED RELEASE ORAL ONCE
Status: COMPLETED | OUTPATIENT
Start: 2021-08-05 | End: 2021-08-05

## 2021-08-05 RX ADMIN — BUPROPION 150 MG: 150 TABLET, EXTENDED RELEASE ORAL at 08:58

## 2021-08-05 RX ADMIN — IPRATROPIUM BROMIDE AND ALBUTEROL SULFATE 3 ML: 2.5; .5 SOLUTION RESPIRATORY (INHALATION) at 04:33

## 2021-08-05 RX ADMIN — METRONIDAZOLE 500 MG: 500 INJECTION, SOLUTION INTRAVENOUS at 17:48

## 2021-08-05 RX ADMIN — IPRATROPIUM BROMIDE AND ALBUTEROL SULFATE 3 ML: 2.5; .5 SOLUTION RESPIRATORY (INHALATION) at 19:58

## 2021-08-05 RX ADMIN — HEPARIN SODIUM 5000 UNITS: 5000 INJECTION INTRAVENOUS; SUBCUTANEOUS at 13:31

## 2021-08-05 RX ADMIN — ACETYLCYSTEINE 600 MG: 200 SOLUTION ORAL; RESPIRATORY (INHALATION) at 15:29

## 2021-08-05 RX ADMIN — METRONIDAZOLE 500 MG: 500 INJECTION, SOLUTION INTRAVENOUS at 10:31

## 2021-08-05 RX ADMIN — FOLIC ACID 1 MG: 1 TABLET ORAL at 08:57

## 2021-08-05 RX ADMIN — IPRATROPIUM BROMIDE AND ALBUTEROL SULFATE 3 ML: 2.5; .5 SOLUTION RESPIRATORY (INHALATION) at 23:00

## 2021-08-05 RX ADMIN — FLUOXETINE 20 MG: 20 CAPSULE ORAL at 08:57

## 2021-08-05 RX ADMIN — AZITHROMYCIN MONOHYDRATE 500 MG: 500 INJECTION, POWDER, LYOPHILIZED, FOR SOLUTION INTRAVENOUS at 12:21

## 2021-08-05 RX ADMIN — FUROSEMIDE 40 MG: 10 INJECTION, SOLUTION INTRAMUSCULAR; INTRAVENOUS at 10:27

## 2021-08-05 RX ADMIN — ACETYLCYSTEINE 600 MG: 200 SOLUTION ORAL; RESPIRATORY (INHALATION) at 23:00

## 2021-08-05 RX ADMIN — ALPRAZOLAM 0.5 MG: 0.5 TABLET ORAL at 08:56

## 2021-08-05 RX ADMIN — IPRATROPIUM BROMIDE AND ALBUTEROL SULFATE 3 ML: 2.5; .5 SOLUTION RESPIRATORY (INHALATION) at 11:34

## 2021-08-05 RX ADMIN — HEPARIN SODIUM 5000 UNITS: 5000 INJECTION INTRAVENOUS; SUBCUTANEOUS at 21:29

## 2021-08-05 RX ADMIN — ACETYLCYSTEINE 600 MG: 200 SOLUTION ORAL; RESPIRATORY (INHALATION) at 07:59

## 2021-08-05 RX ADMIN — HEPARIN SODIUM 5000 UNITS: 5000 INJECTION INTRAVENOUS; SUBCUTANEOUS at 05:43

## 2021-08-05 RX ADMIN — THIAMINE HCL TAB 100 MG 100 MG: 100 TAB at 08:57

## 2021-08-05 RX ADMIN — METOPROLOL TARTRATE 100 MG: 100 TABLET, FILM COATED ORAL at 21:28

## 2021-08-05 RX ADMIN — METOPROLOL TARTRATE 100 MG: 100 TABLET, FILM COATED ORAL at 08:56

## 2021-08-05 RX ADMIN — CEFTRIAXONE 2000 MG: 2 INJECTION, SOLUTION INTRAVENOUS at 11:41

## 2021-08-05 RX ADMIN — METHYLPREDNISOLONE SODIUM SUCCINATE 40 MG: 40 INJECTION, POWDER, FOR SOLUTION INTRAMUSCULAR; INTRAVENOUS at 13:31

## 2021-08-05 RX ADMIN — IPRATROPIUM BROMIDE AND ALBUTEROL SULFATE 3 ML: 2.5; .5 SOLUTION RESPIRATORY (INHALATION) at 15:29

## 2021-08-05 RX ADMIN — METRONIDAZOLE 500 MG: 500 INJECTION, SOLUTION INTRAVENOUS at 01:31

## 2021-08-05 RX ADMIN — METHYLPREDNISOLONE SODIUM SUCCINATE 40 MG: 40 INJECTION, POWDER, FOR SOLUTION INTRAMUSCULAR; INTRAVENOUS at 05:43

## 2021-08-05 RX ADMIN — RISPERIDONE 2 MG: 1 TABLET ORAL at 08:58

## 2021-08-05 RX ADMIN — ALPRAZOLAM 0.5 MG: 0.5 TABLET ORAL at 13:40

## 2021-08-05 RX ADMIN — METHYLPREDNISOLONE SODIUM SUCCINATE 40 MG: 40 INJECTION, POWDER, FOR SOLUTION INTRAMUSCULAR; INTRAVENOUS at 21:29

## 2021-08-05 RX ADMIN — ALPRAZOLAM 0.5 MG: 0.5 TABLET ORAL at 21:29

## 2021-08-05 RX ADMIN — IPRATROPIUM BROMIDE AND ALBUTEROL SULFATE 3 ML: 2.5; .5 SOLUTION RESPIRATORY (INHALATION) at 07:59

## 2021-08-05 RX ADMIN — POTASSIUM CHLORIDE 40 MEQ: 1500 TABLET, EXTENDED RELEASE ORAL at 08:56

## 2021-08-05 NOTE — PROGRESS NOTES
Progress Note - Cardiology   [facility]     Roe Charles 77 y o  male MRN: [id number]  : 1955  Unit/Bed#: ICU 12 Encounter: [id number]    Assessment and Plan:   1  Acute respiratory failure with hypoxia secondary to multilobar pneumonia:  Concern for aspiration pneumonia    -  continue high-flow oxygen and titrate as patient needs    -  continue antibiotics and steroids per primary team     2  Chronic atrial fibrillation with rapid ventricular response:  Patient continues with atrial fibrillation with elevated rates, this most likely is being driven by his pneumonia    -  continue Lopressor 100 mg b i d     -  patient on heparin subQ q 8 hours per primary team     -  CHADS2 Vasc score = 4 or 4 8% risk of stroke per year    -  patient has refused Coumadin therapy in the past a novel agents were too expensive for him  -  will continue to monitor     3  Cardiomyopathy:  EF of 40%     -  sodium slightly elevated today, diuretics held  Will continue monitor and adjust medications as needed during hospitalization    -  continue Lopressor 100 mg b i d     -  I&O, daily weights and monitor labs    -  low-sodium diet     4  Acute kidney injury on chronic kidney disease:   slowly improving  Will continue to monitor      5  Hypertension:  Blood pressure is currently stable      6  Dyslipidemia:  Continue Pravachol 40 mg daily     7  Anxiety/panic attack:  Continue Risperdal, Wellbutrin and Prozac     8  Alcohol use:  Monitor for DTs  On folic acid and Thiamine    Subjective / Objective:   Patient seen and examined  Condition appears to be slowly improving with IV antibiotics and steroids  Remains in the chronic atrial fibrillation:  Heart rate slowly improving with improved respiratory status  Vitals: Blood pressure 128/79, pulse 101, temperature (!) 97 °F (36 1 °C), temperature source Temporal, resp   rate 19, height 5' 10" (1 778 m), weight 101 kg (223 lb 12 3 oz), SpO2 92 %   Vitals:    08/04/21 0531 08/05/21 0400   Weight: 105 kg (231 lb 4 2 oz) 101 kg (223 lb 12 3 oz)     Body mass index is 32 11 kg/m²  BP Readings from Last 3 Encounters:   08/05/21 128/79   07/09/21 120/72   04/27/21 130/80     Orthostatic Blood Pressures      Most Recent Value   Blood Pressure  128/79 filed at 08/05/2021 1500   Patient Position - Orthostatic VS  Lying filed at 08/05/2021 0400        I/O       08/03 0701 - 08/04 0700 08/04 0701 - 08/05 0700 08/05 0701 - 08/06 0700    P  O  1190 120 600    I V  (mL/kg) 250 8 (2 4) 60 (0 6)     IV Piggyback 300 600 350    Total Intake(mL/kg) 1740 8 (16 6) 780 (7 7) 950 (9 4)    Urine (mL/kg/hr) 1630 (0 6) 3280 (1 4) 2200 (2 3)    Total Output 1630 3280 2200    Net +110 8 -2500 -1250               Invasive Devices     Peripheral Intravenous Line            Peripheral IV 08/05/21 Distal;Right;Upper;Ventral (anterior) Arm <1 day    Peripheral IV 08/05/21 Right;Dorsal (posterior) Hand <1 day          Drain            Urethral Catheter Straight-tip 16 Fr  3 days                  Intake/Output Summary (Last 24 hours) at 8/5/2021 1627  Last data filed at 8/5/2021 1337  Gross per 24 hour   Intake 1330 ml   Output 3880 ml   Net -2550 ml         Physical Exam:   Physical Exam  Vitals and nursing note reviewed  Constitutional:       General: He is not in acute distress  Appearance: Normal appearance  He is well-developed  He is obese  HENT:      Head: Normocephalic  Right Ear: External ear normal       Left Ear: External ear normal       Nose: Nose normal    Eyes:      General: No scleral icterus  Right eye: No discharge  Left eye: No discharge  Neck:      Thyroid: No thyromegaly  Cardiovascular:      Rate and Rhythm: Tachycardia present  Rhythm irregular  Pulses: Normal pulses  Heart sounds: Heart sounds are distant  Pulmonary:      Effort: Pulmonary effort is normal  No accessory muscle usage or respiratory distress        Breath sounds: Examination of the right-lower field reveals decreased breath sounds  Examination of the left-lower field reveals decreased breath sounds  Decreased breath sounds, wheezing and rhonchi present  Comments: Still with coarse and fine rhonchi, but aeration slowly improving  Abdominal:      General: Bowel sounds are normal       Palpations: Abdomen is soft  Musculoskeletal:      Cervical back: Normal range of motion and neck supple  Right lower leg: No edema  Left lower leg: No edema  Skin:     General: Skin is warm and dry  Capillary Refill: Capillary refill takes less than 2 seconds  Neurological:      General: No focal deficit present  Mental Status: He is alert and oriented to person, place, and time  Mental status is at baseline                     Medications/ Allergies:     Current Facility-Administered Medications   Medication Dose Route Frequency Provider Last Rate    acetaminophen  650 mg Oral Q6H PRN Godfrey Alston PA-C      acetylcysteine  3 mL Nebulization Q8H Lorri Álvarez PA-C      ALPRAZolam  0 5 mg Oral TID PRN Juan Miguel Roblero PA-C      azithromycin  500 mg Intravenous Q24H Lorri Álvarez PA-C Stopped (08/05/21 1337)    buPROPion  150 mg Oral Daily Godfrey Alston PA-C      cefTRIAXone  2,000 mg Intravenous Q24H Norma Logan MD 2,000 mg (08/05/21 1141)    FLUoxetine  20 mg Oral Daily Godfrey Alston PA-C      folic acid  1 mg Oral Daily Michelle Ng      heparin (porcine)  5,000 Units [facility] [place]      ipratropium-albuterol  3 mL Nebulization Q4H Norma Logan MD      methocarbamol  750 mg Oral HS PRN Godfrey Alston PA-C      methylPREDNISolone sodium succinate  40 mg Intravenous [facility] Norma Logan MD      metoprolol  5 mg Intravenous Q6H PRN Godfrey Alston PA-C      metoprolol tartrate  100 mg Oral Q12H 304 Matthew Jurado MD      metroNIDAZOLE  500 mg Intravenous Q8H Jose Medley PA-C Stopped (08/05/21 1101)    risperiDONE  2 mg Oral Daily Mike Ferguson PA-C      thiamine  100 mg Oral Daily Mike Ferguson PA-C       acetaminophen, 650 mg, Q6H PRN  ALPRAZolam, 0 5 mg, TID PRN  methocarbamol, 750 mg, HS PRN  metoprolol, 5 mg, Q6H PRN      No Known Allergies    VTE Pharmacologic Prophylaxis:   Sequential compression device (Venodyne)     Labs:   Troponins:  Results from last 7 days   Lab Units 08/01/21  2158 08/01/21  1935 08/01/21  1602 08/01/21  1601   CK TOTAL U/L  --   --   --  1,565*   TROPONIN I ng/mL <0 02 <0 02 <0 02  --    CK MB INDEX %  --   --   --  <1 0     CBC with diff:  Results from last 7 days   Lab Units 08/05/21  0619 08/04/21  0530 08/03/21  0502 08/02/21  0545 08/01/21  1602   WBC Thousand/uL 12 79* 12 72* 11 23* 13 21* 14 14*   HEMOGLOBIN g/dL 11 0* 10 7* 10 5* 11 3* 12 5   HEMATOCRIT % 33 3* 32 4* 32 5* 35 2* 37 7   MCV fL 102* 104* 105* 105* 104*   PLATELETS Thousands/uL 299 251 209 235 209   MCH pg 33 7 34 2 33 8 33 6 34 6*   MCHC g/dL 33 0 33 0 32 3 32 1 33 2   RDW % 14 5 14 4 14 2 13 9 13 5   MPV fL 10 1 9 6 9 4 9 5 10 3   NRBC AUTO /100 WBCs 0  --  0  --  0     CMP:  Results from last 7 days   Lab Units 08/05/21  0619 08/04/21  0530 08/03/21  0502 08/02/21  0545 08/01/21  1601   SODIUM mmol/L 149* 143 142 142 140   POTASSIUM mmol/L 3 6 3 8 4 0 4 7 4 2   CHLORIDE mmol/L 111* 107 105 105 104   CO2 mmol/L 30 26 25 24 24   ANION GAP mmol/L 8 10 12 13 12   BUN mg/dL 30* 28* 27* 24 21   CREATININE mg/dL 0 98 1 00 1 19 1 49* 1 46*   CALCIUM mg/dL 10 1 9 8 9 4 9 4 9 5   AST U/L 277*  --   --  83* 59*   ALT U/L 192*  --   --  57 59   ALK PHOS U/L 179*  --   --  187* 226*   TOTAL PROTEIN g/dL 7 1  --   --  7 9 7 2   ALBUMIN g/dL 2 0*  --   --  2 8* 2 9*   TOTAL BILIRUBIN mg/dL 1 64*  --   --  2 04* 1 65*   EGFR ml/min/1 73sq m 80 78 63 48 49     Magnesium:  Results from last 7 days   Lab Units 08/05/21  0619 08/04/21  0530 08/03/21  0502 08/02/21  0545   MAGNESIUM mg/dL 2 8* 2  9* 2 4 2 2     Coags:  Results from last 7 days   Lab Units 08/01/21  1637   PTT seconds 49*   INR  1 52*     TSH:  Results from last 7 days   Lab Units 08/01/21  1601   TSH 3RD GENERATON uIU/mL 1 197       Imaging & Testing   I have personally reviewed pertinent reports  XR chest portable    Result Date: 8/4/2021  Narrative: CHEST INDICATION:  Hypoxia  COMPARISON:  8/2/2021 8/1/2021 EXAM PERFORMED/VIEWS:  XR CHEST PORTABLE FINDINGS: Cardiomediastinal silhouette appears unremarkable  Bilateral airspace disease, right greater than left, slightly worsening particularly on the left  No pneumothorax or pleural effusion  Osseous structures appear within normal limits for patient age  Impression: Slight worsening of bilateral airspace disease, favored to represent multifocal pneumonia  Correlate with clinical scenario  Workstation performed: [id number]     XR chest 1 view portable    Result Date: 8/2/2021  Narrative: CHEST INDICATION:   sob  COMPARISON:  None EXAM PERFORMED/VIEWS:  XR CHEST PORTABLE  AP semierect Images:  1 FINDINGS: Cardiomediastinal silhouette appears unremarkable  Dense consolidation in the peripheral right lower lobe noted with masslike consolidation in the right upper lobe also identified  Left lung clear  No pleural effusion or pneumothorax  Osseous structures appear within normal limits for patient age  Impression: Right upper and lower lobe consolidation concerning for multifocal pneumonia  Workstation performed: [id number]     CT head without contrast    Result Date: 8/1/2021  Narrative: CT BRAIN - WITHOUT CONTRAST INDICATION:   ams  COMPARISON:  None  TECHNIQUE:  CT examination of the brain was performed  In addition to axial images, sagittal and coronal 2D reformatted images were created and submitted for interpretation  Radiation dose length product (DLP) for this visit:  1104 39 mGy-cm     This examination, like all CT scans performed in the [facility], was performed utilizing techniques to minimize radiation dose exposure, including the use of iterative reconstruction and automated exposure control  IMAGE QUALITY:  Diagnostic  FINDINGS: PARENCHYMA: Decreased attenuation is noted in periventricular and subcortical white matter demonstrating an appearance that is statistically most likely to represent mild microangiopathic change  No CT signs of acute infarction  No intracranial mass, mass effect or midline shift  No acute parenchymal hemorrhage  VENTRICLES AND EXTRA-AXIAL SPACES:  Normal for the patient's age  VISUALIZED ORBITS AND PARANASAL SINUSES:  Unremarkable  CALVARIUM AND EXTRACRANIAL SOFT TISSUES:  Normal      Impression: No acute intracranial abnormality  Workstation performed: [id number]     XR chest portable ICU    Result Date: 8/5/2021  Narrative: CHEST INDICATION:   hypoxia  COMPARISON:  Chest x-ray performed the previous day  EXAM PERFORMED/VIEWS:  XR CHEST PORTABLE ICU FINDINGS: Heart shadow is obscured by adjacent opacity  Extensive bilateral airspace disease appears worse and more dense  No pneumothorax or pleural effusion  Osseous structures appear within normal limits for patient age  Impression: Interval worsening of bilateral airspace disease  Workstation performed: [id number]     XR chest portable ICU    Result Date: 8/4/2021  Narrative: CHEST INDICATION:   Hypoxia, concern for CHF exacerbation  COMPARISON:  August 1, 2021 EXAM PERFORMED/VIEWS:  XR CHEST PORTABLE ICU FINDINGS: Cardiomediastinal silhouette appears unremarkable  Patchy consolidation in the right upper and lower lobes, unchanged  Faint patchy opacities in the left lower lobe  No evidence of pleural effusion or pneumothorax  Osseous structures appear within normal limits for patient age  Impression: Areas of consolidation in the right upper and lower lobes and, probably, the left lower lobe, either asymmetric pulmonary edema or multifocal pneumonia   Workstation performed: [id number]        EKG / Monitor: Personally reviewed  Atrial fibrillation, heart rates slowly improving    Cardiac testing:   Results for orders placed during the hospital encounter of 21    Echo complete with contrast if indicated    Narrative  [address]  [address] [facility]  [place]  [phone]    Transthoracic Echocardiogram  2D, M-mode, Doppler, and Color Doppler    Study date:  02-Aug-2021    Patient: Riaz Aguilera  MR number: [id number]  Account number: [de-identified]  : 1955  Age: 77 years  Gender: Male  Status: Inpatient  Location: Bedside  Height: 70 in  Weight: 242 4 lb  BP: 106/ 59 mmHg    Indications: Acute Respiratory failure  Diagnoses: J96 91 - Respiratory failure, unspecified with hypoxia    Sonographer:  BILL Pineda  Referring Physician:  Vasyl Salvador PA-C  Group:  [facility]  Interpreting Physician:  Tk Devi MD    SUMMARY    PROCEDURE INFORMATION:  The heart rate was 119 bpm, at the start of the study  Patient was in atrial fibrillation with rapid ventricular rate    LEFT VENTRICLE:  Systolic function was moderately reduced  Ejection fraction was estimated to be around 40 %  Hard to assess set correctly due to irregular and fast heartbeat  There was mild diffuse hypokinesis  Wall thickness was at the upper limits of normal   There was mild borderline concentric hypertrophy  LEFT ATRIUM:  The atrium was mildly to moderately dilated  RIGHT ATRIUM:  The atrium was mildly dilated  MITRAL VALVE:  There was mild regurgitation  TRICUSPID VALVE:  There was mild regurgitation  Estimated peak PA pressure was 30 mmHg  IVC, HEPATIC VEINS:  The inferior vena cava was mildly dilated  The respirophasic change in diameter was more than 50%  HISTORY: PRIOR HISTORY: Cardiomyopathy,A Fib ,HTN,CAD,Acute kidney injury,Hyperlipidemia,Hypertriglyceridemia      PROCEDURE: The procedure was performed at the bedside  This was a routine study  The transthoracic approach was used  The study included complete 2D imaging, M-mode, complete spectral Doppler, and color Doppler  The heart rate was 119 bpm,  at the start of the study  Patient was in atrial fibrillation with rapid ventricular rate Images were obtained from the parasternal, apical, subcostal, and suprasternal notch acoustic windows  Echocardiographic views were limited due to  patient on mechanical ventilator  Image quality was adequate  LEFT VENTRICLE: Size was normal  Systolic function was moderately reduced  Ejection fraction was estimated to be around 40 %  Hard to assess set correctly due to irregular and fast heartbeat There was mild diffuse hypokinesis  Wall  thickness was at the upper limits of normal  There was mild borderline concentric hypertrophy  DOPPLER: The study was not technically sufficient to allow evaluation of LV diastolic function due to atrial fibrillation  RIGHT VENTRICLE: The size was normal  Systolic function was normal  Wall thickness was normal     LEFT ATRIUM: The atrium was mildly to moderately dilated  RIGHT ATRIUM: The atrium was mildly dilated  MITRAL VALVE: There was normal leaflet separation  DOPPLER: The transmitral velocity was within the normal range  There was no evidence for stenosis  There was mild regurgitation  AORTIC VALVE: The valve was trileaflet  Leaflets exhibited mildly increased thickness and normal cuspal separation  DOPPLER: Transaortic velocity was within the normal range  There was no evidence for stenosis  There was no regurgitation  TRICUSPID VALVE: DOPPLER: There was mild regurgitation  Estimated peak PA pressure was 30 mmHg  PULMONIC VALVE: DOPPLER: There was no significant regurgitation  PERICARDIUM: There was no thickening or calcification  There was no pericardial effusion  AORTA: The root exhibited normal size      SYSTEMIC VEINS: IVC: The inferior vena cava was mildly dilated  The respirophasic change in diameter was more than 50%  SYSTEM MEASUREMENT TABLES    2D  EF (Teich): 51 89 %  %FS: 26 73 %  Ao Diam: 3 53 cm  EDV(Teich): 126 75 ml  ESV(Teich): 60 98 ml  IVSd: 1 13 cm  LA Area: 30 65 cm2  LA Diam: 4 34 cm  LVEDV MOD A4C: 105 83 ml  LVEF MOD A4C: 30 31 %  LVESV MOD A4C: 73 76 ml  LVIDd: 5 15 cm  LVIDs: 3 77 cm  LVLd A4C: 8 08 cm  LVLs A4C: 7 89 cm  LVOT Diam: 2 21 cm  LVPWd: 0 95 cm  RA Area: 27 2 cm2  RVIDd: 3 06 cm  SV (Teich): 65 77 ml  SV MOD A4C: 32 08 ml    CW  AV Vmax: 1 09 m/s  AV maxP 77 mmHg  TR Vmax: 2 31 m/s  TR maxP 35 mmHg    MM  TAPSE: 1 82 cm    PW  CHEMO Vmax, Pt: 3 42 cm2  E' Lat: 0 11 m/s  E' Sept: 0 09 m/s  LVOT Env  Ti: 186 49 ms  LVOT VTI: 14 57 cm  LVOT Vmax: 1 m/s  LVOT Vmax: 0 97 m/s  LVOT Vmean: 0 78 m/s  LVOT maxP 03 mmHg  LVOT maxPG: 3 79 mmHg  LVOT meanP 68 mmHg    [employer] Accredited [facility]    Prepared and electronically signed by    Radha Santos MD  Signed 02-Aug-2021 10:06:00        TONY Henry        "This note has been constructed using a voice recognition system  Therefore there may be syntax, spelling, and/or grammatical errors   Please call if you have any questions  "

## 2021-08-05 NOTE — ASSESSMENT & PLAN NOTE
· CXR on admission with dense consolidations in right  · Pro calcitonin elevated 7 6, reflex 8 8  · Tending down, 8/4 was 5 2  · Blood cultures NG @ 72H  · Strep and Legionella Ag neg  · Sputum culture ordered, but not producing sputum to culture  · Continue ceftriaxone/azithromycin, day 4  · Continue Metronidazole, day 2  · Found with multiple empty bottles of alcohol at home    Had episode of choking on water overnight 8/3  · Trend WBC/fever curve, procal

## 2021-08-05 NOTE — ASSESSMENT & PLAN NOTE
· At home takes pravastatin 40mg QD  · Last lipid panel 5 months ago: HDL 65, LDL 37,   · Hold pravastatin given elevated LFTs

## 2021-08-05 NOTE — PLAN OF CARE
Problem: RESPIRATORY - ADULT  Goal: Achieves optimal ventilation and oxygenation  Description: INTERVENTIONS:  - Assess for changes in respiratory status  - Assess for changes in mentation and behavior  - Position to facilitate oxygenation and minimize respiratory effort  - Oxygen administered by appropriate delivery if ordered  - Initiate smoking cessation education as indicated  - Encourage broncho-pulmonary hygiene including cough, deep breathe, Incentive Spirometry  - Assess the need for suctioning and aspirate as needed  - Assess and instruct to report SOB or any respiratory difficulty  - Respiratory Therapy support as indicated  Outcome: Progressing     Problem: MOBILITY - ADULT  Goal: Maintain or return to baseline ADL function  Description: INTERVENTIONS:  -  Assess patient's ability to carry out ADLs; assess patient's baseline for ADL function and identify physical deficits which impact ability to perform ADLs (bathing, care of mouth/teeth, toileting, grooming, dressing, etc )  - Assess/evaluate cause of self-care deficits   - Assess range of motion  - Assess patient's mobility; develop plan if impaired  - Assess patient's need for assistive devices and provide as appropriate  - Encourage maximum independence but intervene and supervise when necessary  - Involve family in performance of ADLs  - Assess for home care needs following discharge   - Consider OT consult to assist with ADL evaluation and planning for discharge  - Provide patient education as appropriate  Outcome: Progressing  Goal: Maintains/Returns to pre admission functional level  Description: INTERVENTIONS:  - Perform BMAT or MOVE assessment daily    - Set and communicate daily mobility goal to care team and patient/family/caregiver  - Collaborate with rehabilitation services on mobility goals if consulted  -- Reposition patient every 2 hours    - Dangle patient 2 times a day  -- Out of bed for toileting  - Record patient progress and toleration of activity level   Outcome: Progressing     Problem: Potential for Falls  Goal: Patient will remain free of falls  Description: INTERVENTIONS:  - Educate patient/family on patient safety including physical limitations  - Instruct patient to call for assistance with activity   - Consult OT/PT to assist with strengthening/mobility   - Keep Call bell within reach  - Keep bed low and locked with side rails adjusted as appropriate  - Keep care items and personal belongings within reach  - Initiate and maintain comfort rounds  - Make Fall Risk Sign visible to staff  - Offer Toileting every 2 Hours, in advance of need  - Initiate/Maintain bed alarm  - Apply yellow socks and bracelet for high fall risk patients  - Consider moving patient to room near nurses station  Outcome: Progressing     Problem: Prexisting or High Potential for Compromised Skin Integrity  Goal: Skin integrity is maintained or improved  Description: INTERVENTIONS:  - Identify patients at risk for skin breakdown  - Assess and monitor skin integrity  - Assess and monitor nutrition and hydration status  - Monitor labs   - Assess for incontinence   - Turn and reposition patient  - Assist with mobility/ambulation  - Relieve pressure over bony prominences  - Avoid friction and shearing  - Provide appropriate hygiene as needed including keeping skin clean and dry  - Evaluate need for skin moisturizer/barrier cream  - Collaborate with interdisciplinary team   - Patient/family teaching  - Consider wound care consult   Outcome: Progressing     Problem: Nutrition/Hydration-ADULT  Goal: Nutrient/Hydration intake appropriate for improving, restoring or maintaining nutritional needs  Description: Monitor and assess patient's nutrition/hydration status for malnutrition  Collaborate with interdisciplinary team and initiate plan and interventions as ordered  Monitor patient's weight and dietary intake as ordered or per policy   Utilize nutrition screening tool and intervene as necessary  Determine patient's food preferences and provide high-protein, high-caloric foods as appropriate       INTERVENTIONS:  - Monitor oral intake, urinary output, labs, and treatment plans  - Assess nutrition and hydration status and recommend course of action  - Evaluate amount of meals eaten  - Assist patient with eating if necessary   - Allow adequate time for meals  - Recommend/ encourage appropriate diets, oral nutritional supplements, and vitamin/mineral supplements  - Assess need for intravenous fluids  - Provide specific nutrition/hydration education as appropriate  - Include patient/family/caregiver in decisions related to nutrition  Outcome: Progressing

## 2021-08-05 NOTE — ASSESSMENT & PLAN NOTE
· Hx cardiomyopathy echo 2017 with EF 40% without significant valvular abnormality, subsequent nuclear stress test 2017 EF reported 65%   · Troponin negative x 3  · Follows with cardiology, no follow up echos noted  · Echo 8/2: EF 40%, difficult to assess given Afib with rapid rate  Estimated PAP 30s     · At home torsemide 20 mg daily and chlorthalidone 25 mg daily, reports taking as prescribed   · CXR with evidence of volume overload, BNP elevated 4649  · 8/5 - Received lasix 40mg x2 yesterday, net -2 5L over 24H, -5 1L since admission  · Consider repeat diuresis today pending morning labs  · Trend I&Os  · Daily weights

## 2021-08-05 NOTE — PROGRESS NOTES
Mally 45  Progress Note - Fab Lemons 1955, 77 y o  male MRN: [id number]  Unit/Bed#: ICU 12 Encounter: [id number]  Primary Care Provider: Bandar Garland   Date and time admitted to hospital: 8/1/2021  1:59 PM    * Acute respiratory failure with hypoxia [facility])  Assessment & Plan  · Presented with multiple days of fatigue, dry cough and shortness of breath, hypoxic to 88% on room air on presentation  Placed on 4 L NC with improvement in spo2 but persistent increased WOB requiring BIPAP without significant improvement  · Likely 2/2 pneumonia, possible component of CHF exacerbation  · BNP 4649  · CXR dense consolidation concerning for PNA vs CHF  · Procalcitonin elevated  · low grade temp POA  Tmax 100 1   · Continue ceftraixone/azithromycin to cover for CAP, day 4  · Continue Flagyl, day 2  · Patient found with multiple empty alcohol bottles at home  Had episode of choking drinking water 8/3 overnight  · Continue solumedrol 40mg q8H, Duonebs Q4H, mucomyst q8H  · BIPAP 10/6 70%, HFNC during day  · Wean HFNC as tolerarated  · Continue diuresis for goal negative fluid balance  · Trend WBC/fever curve, procal  · Pulmonary hygiene    Pneumonia  Assessment & Plan  · CXR on admission with dense consolidations in right  · Pro calcitonin elevated 7 6, reflex 8 8  · Tending down, 8/4 was 5 2  · Blood cultures NG @ 72H  · Strep and Legionella Ag neg  · Sputum culture ordered, but not producing sputum to culture  · Continue ceftriaxone/azithromycin, day 4  · Continue Metronidazole, day 2  · Found with multiple empty bottles of alcohol at home  Had episode of choking on water overnight 8/3  · Trend WBC/fever curve, procal    Alcohol use  Assessment & Plan  · Unclear amount of alcohol consumption  · Continue thiamine/folic acid  · Monitor for signs of alcohol withdrawal     Sepsis ([facility] [address] )  Assessment & Plan  SIRs criteria met POA secondary to leukocytosis, tachycardia, tachypnea     · procal elevated to 7 6, reflex 8 8  · UA with moderate bacteria, 4-10 WBC  Negative nitrites, leukocytes  Urine culture negative  · BC negative x 72 hours  · Strep pneumo, legionella urine antigen negative  · Initial CXR dense consolidation in Right lower lobe concerning for PNA  · Consider CAP vs aspiration PNA  · Continue ceftiaxone/azithromycin, day 4  · Continue Flagyl day 2  · Trend WBC, fever curve, procal    Severe obesity (BMI 35 0-39  9) with comorbidity ([facility] )  Assessment & Plan  · Lifestyle counseling when appropriate     Depression, recurrent ([facility] )  Assessment & Plan  · Continue home Wellbutrin, Prozac and risperidone     Anxiety  Assessment & Plan  · Xanax decreased to 0 5 mg TID prn    [facility]  Assessment & Plan  · Hx cardiomyopathy echo 2017 with EF 40% without significant valvular abnormality, subsequent nuclear stress test 2017 EF reported 65%   · Troponin negative x 3  · Follows with cardiology, no follow up echos noted  · Echo 8/2: EF 40%, difficult to assess given Afib with rapid rate  Estimated PAP 30s     · At home torsemide 20 mg daily and chlorthalidone 25 mg daily, reports taking as prescribed   · CXR with evidence of volume overload, BNP elevated 4649  · 8/5 - Received lasix 40mg x2 yesterday, net -2 5L over 24H, -5 1L since admission  · Consider repeat diuresis today pending morning labs  · Trend I&Os  · Daily weights        Essential hypertension  Assessment & Plan  · Home regimen: Amlodipine 5 mg daily, Candesartan 32 mg daily, prazosin 2 mg TID, metoprolol 100 mg daily, chlorthalidone 25 mg daily, torsemide 20mg qd  · Hold home medications besides metoprolol while working on rate control for atrial fibrillation with RVR    Mixed hyperlipidemia  Assessment & Plan  · At home takes pravastatin 40mg QD  · Last lipid panel 5 months ago: HDL 65, LDL 37,   · Hold pravastatin given elevated LFTs    Chronic atrial fibrillation (HCC)  Assessment & Plan  · Chronic atrial fibrillation on metoprolol 100 mg daily with RVR rates 130-140s in ER   · Received 15 mg diltiazem x 1 in ER with rate improvement to 100s  · HR came back up to 140-150s, received metoprolol total 10 mg without any improvement, redosed with dilitazem 15 mg and HR responded to 110-120s   · 8/2- Given 5mg IV lopressor x 2 overnight for elevated HR, ultimately placed on cardizem infusion for HR control  · 8/5 - Cardizem drip weaned off yesterday  · Continuet lopressor 100mg BID  · Not on chronic anticoagulation - per chart review DOAC too expensive and not interested in wafarin   · Will hold systemic AC here as AF chronic and on no home AC       ----------------------------------------------------------------------------------------  HPI/24hr events: Patient weaned off cardizem drip yesterday, now on lopressor 100mg BID  Chest xray yesterday showed worsening consolidation, started on flagyl  No  Disposition: Continue Critical Care   Code Status: Level 3 - DNAR and DNI  ---------------------------------------------------------------------------------------  SUBJECTIVE  Patient reports feeling better then yesterday  Review of Systems   Constitutional: Negative for chills and fever  HENT: Negative for congestion  Respiratory: Positive for cough  Negative for shortness of breath  Cardiovascular: Negative for chest pain, palpitations and leg swelling  Gastrointestinal: Positive for constipation  Negative for abdominal pain, diarrhea, nausea and vomiting  Genitourinary: Negative for difficulty urinating  Musculoskeletal: Negative for arthralgias and myalgias  Skin: Negative for rash and wound       Review of systems was reviewed and negative unless stated above in HPI/24-hour events   ---------------------------------------------------------------------------------------  OBJECTIVE    Vitals   Vitals:    08/05/21 0600 08/05/21 0624 08/05/21 0700 08/05/21 0733   BP: 141/88  132/92    BP Location:       Pulse: 103 (!) 106 105   Resp: (!) 26  (!) 27 (!) 28   Temp:    98 3 °F (36 8 °C)   TempSrc:    Temporal   SpO2: 98% 94% 91% 90%   Weight:       Height:         Temp (24hrs), Av 7 °F (36 5 °C), Min:97 °F (36 1 °C), Max:98 3 °F (36 8 °C)  Current: Temperature: 98 3 °F (36 8 °C)          Respiratory:  SpO2: SpO2: 90 %  Nasal Cannula O2 Flow Rate (L/min): 40 L/min    Invasive/non-invasive ventilation settings   Respiratory    Lab Data (Last 4 hours)    None         O2/Vent Data (Last 4 hours)       043          Non-Invasive Ventilation Mode BiPAP                   Physical Exam  Vitals reviewed  Constitutional:       Appearance: He is obese  He is ill-appearing  Eyes:      Comments: Left pupil irregular, chronic  Blind Left eye, chronic   Cardiovascular:      Rate and Rhythm: Tachycardia present  Rhythm irregular  Heart sounds: No murmur heard  No friction rub  Pulmonary:      Effort: Tachypnea present  Breath sounds: Rhonchi present  Comments: On HFNC, 80% 30L  Sat 90%  Abdominal:      General: Abdomen is flat  Bowel sounds are normal       Palpations: Abdomen is soft  Tenderness: There is no abdominal tenderness  Genitourinary:     Comments: Gudino in place, yellow urine  Skin:     General: Skin is warm  Capillary Refill: Capillary refill takes less than 2 seconds  Coloration: Skin is jaundiced  Comments: clammy   Neurological:      General: No focal deficit present  Mental Status: He is alert and oriented to person, place, and time           Laboratory and Diagnostics:  Results from last 7 days   Lab Units 21  0619 21  0530 21  0502 21  0545 21  1602   WBC Thousand/uL 12 79* 12 72* 11 23* 13 21* 14 14*   HEMOGLOBIN g/dL 11 0* 10 7* 10 5* 11 3* 12 5   HEMATOCRIT % 33 3* 32 4* 32 5* 35 2* 37 7   PLATELETS Thousands/uL 299 251 209 235 209   NEUTROS PCT % 91*  --   --   --  93*   BANDS PCT %  --   --  40*  --   --    MONOS PCT % 4  --   -- --  4   MONO PCT %  --   --  1*  --   --      Results from last 7 days   Lab Units 08/05/21  0619 08/04/21  0530 08/03/21  0502 08/02/21  0545 08/01/21  1601   SODIUM mmol/L 149* 143 142 142 140   POTASSIUM mmol/L 3 6 3 8 4 0 4 7 4 2   CHLORIDE mmol/L 111* 107 105 105 104   CO2 mmol/L 30 26 25 24 24   ANION GAP mmol/L 8 10 12 13 12   BUN mg/dL 30* 28* 27* 24 21   CREATININE mg/dL 0 98 1 00 1 19 1 49* 1 46*   CALCIUM mg/dL 10 1 9 8 9 4 9 4 9 5   GLUCOSE RANDOM mg/dL 186* 151* 105 110 112   ALT U/L 192*  --   --  57 59   AST U/L 277*  --   --  83* 59*   ALK PHOS U/L 179*  --   --  187* 226*   ALBUMIN g/dL 2 0*  --   --  2 8* 2 9*   TOTAL BILIRUBIN mg/dL 1 64*  --   --  2 04* 1 65*     Results from last 7 days   Lab Units 08/05/21  0619 08/04/21  0530 08/03/21  0502 08/02/21  0545   MAGNESIUM mg/dL 2 8* 2 9* 2 4 2 2      Results from last 7 days   Lab Units 08/01/21  1637   INR  1 52*   PTT seconds 49*      Results from last 7 days   Lab Units 08/01/21  2158 08/01/21  1935 08/01/21  1602   TROPONIN I ng/mL <0 02 <0 02 <0 02     Results from last 7 days   Lab Units 08/01/21  1601   LACTIC ACID mmol/L 1 8     ABG:  Results from last 7 days   Lab Units 08/04/21  0027   PH ART  7 388   PCO2 ART mm Hg 44 5*   PO2 ART mm Hg 57 8*   HCO3 ART mmol/L 26 2   BASE EXC ART mmol/L 0 9   ABG SOURCE  Radial, Right     VBG:  Results from last 7 days   Lab Units 08/04/21  0027   ABG SOURCE  Radial, Right     Results from last 7 days   Lab Units 08/04/21  0530 08/03/21  0502 08/02/21  1227   PROCALCITONIN ng/ml 5 22* 8 80* 7 64*       Micro  Results from last 7 days   Lab Units 08/03/21  0230 08/01/21 2028 08/01/21  1932 08/01/21  1602 08/01/21  1601   BLOOD CULTURE   --   --   --  No Growth at 72 hrs  No Growth at 72 hrs     URINE CULTURE   --   --  No Growth <1000 cfu/mL  --   --    MRSA CULTURE ONLY   --  No Methicillin Resistant Staphlyococcus aureus (MRSA) isolated  --   --   --    LEGIONELLA URINARY ANTIGEN  Negative  --   --   -- --    STREP PNEUMONIAE ANTIGEN, URINE  Negative  --   --   --   --        EKG: Afib  Imaging: I have personally reviewed pertinent reports  and I have personally reviewed pertinent films in PACS    Intake and Output  I/O       08/03 0701 - 08/04 0700 08/04 0701 - 08/05 0700 08/05 0701 - 08/06 0700    P  O  1190 120     I V  (mL/kg) 250 8 (2 4) 60 (0 6)     IV Piggyback 300 600     Total Intake(mL/kg) 1740 8 (16 6) 780 (7 7)     Urine (mL/kg/hr) 1630 (0 6) 3280 (1 4) 150 (2 2)    Total Output 1630 3280 150    Net +110 8 -2500 -150                 Height and Weights   Height: 5' 10" (177 8 cm)  IBW (Ideal Body Weight): 73 kg  Body mass index is 32 11 kg/m²  Weight (last 2 days)     Date/Time   Weight    08/05/21 0400   101 (223 77)    08/04/21 0531   105 (231 26)    08/03/21 0600   108 (237 22)                Nutrition       Diet Orders   (From admission, onward)             Start     Ordered    08/04/21 1753  Diet Dysphagia/Modified Consistency; Dysphagia 3-Dental Soft; Nectar Thick Liquid; Fluid Restriction 1500 ML, Sodium 2 GM  Diet effective now     Question Answer Comment   Diet Type Dysphagia/Modified Consistency    Dysphagia/Modified Consistency Dysphagia 3-Dental Soft    Liquid Modifier Nectar Thick Liquid    Other Restriction(s): Fluid Restriction 1500 ML    Other Restriction(s): Sodium 2 GM    RD to adjust diet per protocol?  No        08/04/21 1756    08/03/21 1146  Room Service  Once     Question:  Type of Service  Answer:  Room Service-Appropriate    08/03/21 1145                  Active Medications  Scheduled Meds:  Current Facility-Administered Medications   Medication Dose Route Frequency Provider Last Rate    acetaminophen  650 mg Oral Q6H PRN Godfrey Alston PA-C      acetylcysteine  3 mL Nebulization Q8H Lorri Álvarez PA-C      ALPRAZolam  0 5 mg Oral TID PRN Juan Miguel Roblero PA-C      azithromycin  500 mg Intravenous Q24H Norma Logan MD Stopped (08/04/21 1330)    buPROPion  150 mg Oral Daily Nora Emperor, [place]      cefTRIAXone  2,000 mg Intravenous Q24H Lakesha Ortiz MD Stopped (08/04/21 1108)    FLUoxetine  20 mg Oral Daily Nora Emperor, [place]      folic acid  1 mg Oral Daily Nora Emperor, [place]      heparin (porcine)  5,000 Units Subcutaneous [facility] Nora Emperor, [place]      ipratropium-albuterol  3 mL Nebulization Q4H Lakesha Ortiz MD      methocarbamol  750 mg Oral HS PRN Nora Emperor, PA-C      methylPREDNISolone sodium succinate  40 mg Intravenous [facility] Lakesha Ortiz MD      metoprolol  5 mg Intravenous Q6H PRN Nora Emperor, PA-MARYCARMEN      metoprolol tartrate  100 mg Oral Q12H [address] Lakesha Ortiz MD      metroNIDAZOLE  500 mg Intravenous Q8H Claus Lew PA-C Stopped (08/05/21 0201)    potassium chloride  40 mEq Oral Once Lakesha Ortiz MD      pravastatin  40 mg Oral Daily With [employer]      risperiDONE  2 mg Oral Daily Nora Emperor, PA-MARYCARMEN      thiamine  100 mg Oral Daily Nora Emperor, PA-C       Continuous Infusions:     PRN Meds:   acetaminophen, 650 mg, Q6H PRN  ALPRAZolam, 0 5 mg, TID PRN  methocarbamol, 750 mg, HS PRN  metoprolol, 5 mg, Q6H PRN        Invasive Devices Review  Invasive Devices     Peripheral Intravenous Line            Peripheral IV 08/05/21 Distal;Right;Upper;Ventral (anterior) Arm <1 day    Peripheral IV 08/05/21 Right;Dorsal (posterior) Hand <1 day          Drain            Urethral Catheter Straight-tip 16 Fr  3 days                Rationale for remaining devices: IV medications  ---------------------------------------------------------------------------------------  Advance Directive and Living Will:      Power of :    POLST:    ---------------------------------------------------------------------------------------  Care Time Delivered:   No Critical Care time spent       Lakesha Ortiz MD      Portions of the record may have been created with voice recognition software    Occasional wrong word or "sound a like" substitutions may have occurred due to the inherent limitations of voice recognition software    Read the chart carefully and recognize, using context, where substitutions have occurred

## 2021-08-05 NOTE — ASSESSMENT & PLAN NOTE
· Presented with multiple days of fatigue, dry cough and shortness of breath, hypoxic to 88% on room air on presentation  Placed on 4 L NC with improvement in spo2 but persistent increased WOB requiring BIPAP without significant improvement  · Likely 2/2 pneumonia, possible component of CHF exacerbation  · BNP 4649  · CXR dense consolidation concerning for PNA vs CHF  · Procalcitonin elevated  · low grade temp POA  Tmax 100 1   · Continue ceftraixone/azithromycin to cover for CAP, day 4  · Continue Flagyl, day 2  · Patient found with multiple empty alcohol bottles at home   Had episode of choking drinking water 8/3 overnight  · Continue solumedrol 40mg q8H, Duonebs Q4H, mucomyst q8H  · BIPAP 10/6 70%, HFNC during day  · Wean HFNC as tolerarated  · Continue diuresis for goal negative fluid balance  · Trend WBC/fever curve, procal  · Pulmonary hygiene

## 2021-08-05 NOTE — ASSESSMENT & PLAN NOTE
· Home regimen: Amlodipine 5 mg daily, Candesartan 32 mg daily, prazosin 2 mg TID, metoprolol 100 mg daily, chlorthalidone 25 mg daily, torsemide 20mg qd  · Hold home medications besides metoprolol while working on rate control for atrial fibrillation with RVR

## 2021-08-05 NOTE — ASSESSMENT & PLAN NOTE
SIRs criteria met POA secondary to leukocytosis, tachycardia, tachypnea  · procal elevated to 7 6, reflex 8 8  · UA with moderate bacteria, 4-10 WBC  Negative nitrites, leukocytes   Urine culture negative  · BC negative x 72 hours  · Strep pneumo, legionella urine antigen negative  · Initial CXR dense consolidation in Right lower lobe concerning for PNA  · Consider CAP vs aspiration PNA  · Continue ceftiaxone/azithromycin, day 4  · Continue Flagyl day 2  · Trend WBC, fever curve, procal

## 2021-08-05 NOTE — ASSESSMENT & PLAN NOTE
· Chronic atrial fibrillation on metoprolol 100 mg daily with RVR rates 130-140s in ER   · Received 15 mg diltiazem x 1 in ER with rate improvement to 100s  · HR came back up to 140-150s, received metoprolol total 10 mg without any improvement, redosed with dilitazem 15 mg and HR responded to 110-120s   · 8/2- Given 5mg IV lopressor x 2 overnight for elevated HR, ultimately placed on cardizem infusion for HR control  · 8/5 - Cardizem drip weaned off yesterday  · Continuet lopressor 100mg BID    · Not on chronic anticoagulation - per chart review DOAC too expensive and not interested in wafarin   · Will hold systemic AC here as AF chronic and on no home [facility]

## 2021-08-06 ENCOUNTER — APPOINTMENT (INPATIENT)
Dept: RADIOLOGY | Facility: HOSPITAL | Age: 66
DRG: 871 | End: 2021-08-06
Payer: COMMERCIAL

## 2021-08-06 LAB
ANION GAP SERPL CALCULATED.3IONS-SCNC: 7 MMOL/L (ref 4–13)
BASOPHILS # BLD MANUAL: 0 THOUSAND/UL (ref 0–0.1)
BASOPHILS NFR MAR MANUAL: 0 % (ref 0–1)
BUN SERPL-MCNC: 31 MG/DL (ref 5–25)
CALCIUM SERPL-MCNC: 9.8 MG/DL (ref 8.3–10.1)
CHLORIDE SERPL-SCNC: 110 MMOL/L (ref 100–108)
CO2 SERPL-SCNC: 31 MMOL/L (ref 21–32)
CREAT SERPL-MCNC: 0.99 MG/DL (ref 0.6–1.3)
EOSINOPHIL # BLD MANUAL: 0 THOUSAND/UL (ref 0–0.4)
EOSINOPHIL NFR BLD MANUAL: 0 % (ref 0–6)
ERYTHROCYTE [DISTWIDTH] IN BLOOD BY AUTOMATED COUNT: 14.6 % (ref 11.6–15.1)
GFR SERPL CREATININE-BSD FRML MDRD: 79 ML/MIN/1.73SQ M
GLUCOSE SERPL-MCNC: 220 MG/DL (ref 65–140)
GLUCOSE SERPL-MCNC: 226 MG/DL (ref 65–140)
GLUCOSE SERPL-MCNC: 237 MG/DL (ref 65–140)
GLUCOSE SERPL-MCNC: 242 MG/DL (ref 65–140)
GLUCOSE SERPL-MCNC: 280 MG/DL (ref 65–140)
HCT VFR BLD AUTO: 33.8 % (ref 36.5–49.3)
HGB BLD-MCNC: 11 G/DL (ref 12–17)
LG PLATELETS BLD QL SMEAR: PRESENT
LYMPHOCYTES # BLD AUTO: 0.38 THOUSAND/UL (ref 0.6–4.47)
LYMPHOCYTES # BLD AUTO: 4 % (ref 14–44)
MAGNESIUM SERPL-MCNC: 2.7 MG/DL (ref 1.6–2.6)
MCH RBC QN AUTO: 33.4 PG (ref 26.8–34.3)
MCHC RBC AUTO-ENTMCNC: 32.5 G/DL (ref 31.4–37.4)
MCV RBC AUTO: 103 FL (ref 82–98)
MONOCYTES # BLD AUTO: 0.48 THOUSAND/UL (ref 0–1.22)
MONOCYTES NFR BLD: 5 % (ref 4–12)
NEUTROPHILS # BLD MANUAL: 8.72 THOUSAND/UL (ref 1.85–7.62)
NEUTS BAND NFR BLD MANUAL: 3 % (ref 0–8)
NEUTS SEG NFR BLD AUTO: 88 % (ref 43–75)
NRBC BLD AUTO-RTO: 1 /100 WBCS
PLATELET # BLD AUTO: 351 THOUSANDS/UL (ref 149–390)
PLATELET BLD QL SMEAR: ADEQUATE
PMV BLD AUTO: 9.9 FL (ref 8.9–12.7)
POLYCHROMASIA BLD QL SMEAR: PRESENT
POTASSIUM SERPL-SCNC: 3.6 MMOL/L (ref 3.5–5.3)
PROCALCITONIN SERPL-MCNC: 1.23 NG/ML
RBC # BLD AUTO: 3.29 MILLION/UL (ref 3.88–5.62)
RBC MORPH BLD: PRESENT
SODIUM SERPL-SCNC: 148 MMOL/L (ref 136–145)
TOTAL CELLS COUNTED SPEC: 100
TOXIC GRANULES BLD QL SMEAR: PRESENT
WBC # BLD AUTO: 9.58 THOUSAND/UL (ref 4.31–10.16)

## 2021-08-06 PROCEDURE — 85007 BL SMEAR W/DIFF WBC COUNT: CPT | Performed by: STUDENT IN AN ORGANIZED HEALTH CARE EDUCATION/TRAINING PROGRAM

## 2021-08-06 PROCEDURE — 94760 N-INVAS EAR/PLS OXIMETRY 1: CPT

## 2021-08-06 PROCEDURE — 97110 THERAPEUTIC EXERCISES: CPT

## 2021-08-06 PROCEDURE — 94669 MECHANICAL CHEST WALL OSCILL: CPT

## 2021-08-06 PROCEDURE — 99232 SBSQ HOSP IP/OBS MODERATE 35: CPT | Performed by: INTERNAL MEDICINE

## 2021-08-06 PROCEDURE — 94640 AIRWAY INHALATION TREATMENT: CPT

## 2021-08-06 PROCEDURE — 94660 CPAP INITIATION&MGMT: CPT

## 2021-08-06 PROCEDURE — 80048 BASIC METABOLIC PNL TOTAL CA: CPT | Performed by: STUDENT IN AN ORGANIZED HEALTH CARE EDUCATION/TRAINING PROGRAM

## 2021-08-06 PROCEDURE — 71045 X-RAY EXAM CHEST 1 VIEW: CPT

## 2021-08-06 PROCEDURE — 92526 ORAL FUNCTION THERAPY: CPT

## 2021-08-06 PROCEDURE — 82948 REAGENT STRIP/BLOOD GLUCOSE: CPT

## 2021-08-06 PROCEDURE — 85027 COMPLETE CBC AUTOMATED: CPT | Performed by: STUDENT IN AN ORGANIZED HEALTH CARE EDUCATION/TRAINING PROGRAM

## 2021-08-06 PROCEDURE — 94664 DEMO&/EVAL PT USE INHALER: CPT

## 2021-08-06 PROCEDURE — 84145 PROCALCITONIN (PCT): CPT | Performed by: STUDENT IN AN ORGANIZED HEALTH CARE EDUCATION/TRAINING PROGRAM

## 2021-08-06 PROCEDURE — 83735 ASSAY OF MAGNESIUM: CPT | Performed by: STUDENT IN AN ORGANIZED HEALTH CARE EDUCATION/TRAINING PROGRAM

## 2021-08-06 PROCEDURE — 94668 MNPJ CHEST WALL SBSQ: CPT

## 2021-08-06 RX ORDER — FUROSEMIDE 10 MG/ML
40 INJECTION INTRAMUSCULAR; INTRAVENOUS ONCE
Status: COMPLETED | OUTPATIENT
Start: 2021-08-06 | End: 2021-08-06

## 2021-08-06 RX ORDER — POTASSIUM CHLORIDE 20 MEQ/1
40 TABLET, EXTENDED RELEASE ORAL ONCE
Status: COMPLETED | OUTPATIENT
Start: 2021-08-06 | End: 2021-08-06

## 2021-08-06 RX ORDER — AMLODIPINE BESYLATE 5 MG/1
5 TABLET ORAL DAILY
Status: DISCONTINUED | OUTPATIENT
Start: 2021-08-06 | End: 2021-08-07

## 2021-08-06 RX ADMIN — BUPROPION 150 MG: 150 TABLET, EXTENDED RELEASE ORAL at 08:50

## 2021-08-06 RX ADMIN — METRONIDAZOLE 500 MG: 500 INJECTION, SOLUTION INTRAVENOUS at 02:08

## 2021-08-06 RX ADMIN — METOPROLOL TARTRATE 100 MG: 100 TABLET, FILM COATED ORAL at 08:49

## 2021-08-06 RX ADMIN — METRONIDAZOLE 500 MG: 500 INJECTION, SOLUTION INTRAVENOUS at 11:13

## 2021-08-06 RX ADMIN — METRONIDAZOLE 500 MG: 500 INJECTION, SOLUTION INTRAVENOUS at 18:07

## 2021-08-06 RX ADMIN — METHYLPREDNISOLONE SODIUM SUCCINATE 40 MG: 40 INJECTION, POWDER, FOR SOLUTION INTRAMUSCULAR; INTRAVENOUS at 13:20

## 2021-08-06 RX ADMIN — ALPRAZOLAM 0.5 MG: 0.5 TABLET ORAL at 06:02

## 2021-08-06 RX ADMIN — IPRATROPIUM BROMIDE AND ALBUTEROL SULFATE 3 ML: 2.5; .5 SOLUTION RESPIRATORY (INHALATION) at 19:37

## 2021-08-06 RX ADMIN — AZITHROMYCIN MONOHYDRATE 500 MG: 500 INJECTION, POWDER, LYOPHILIZED, FOR SOLUTION INTRAVENOUS at 13:10

## 2021-08-06 RX ADMIN — POTASSIUM CHLORIDE 40 MEQ: 1500 TABLET, EXTENDED RELEASE ORAL at 06:22

## 2021-08-06 RX ADMIN — THIAMINE HCL TAB 100 MG 100 MG: 100 TAB at 08:50

## 2021-08-06 RX ADMIN — IPRATROPIUM BROMIDE AND ALBUTEROL SULFATE 3 ML: 2.5; .5 SOLUTION RESPIRATORY (INHALATION) at 07:38

## 2021-08-06 RX ADMIN — FLUOXETINE 20 MG: 20 CAPSULE ORAL at 08:50

## 2021-08-06 RX ADMIN — AMLODIPINE BESYLATE 5 MG: 5 TABLET ORAL at 12:17

## 2021-08-06 RX ADMIN — INSULIN LISPRO 3 UNITS: 100 INJECTION, SOLUTION INTRAVENOUS; SUBCUTANEOUS at 07:17

## 2021-08-06 RX ADMIN — METHYLPREDNISOLONE SODIUM SUCCINATE 40 MG: 40 INJECTION, POWDER, FOR SOLUTION INTRAMUSCULAR; INTRAVENOUS at 05:07

## 2021-08-06 RX ADMIN — IPRATROPIUM BROMIDE AND ALBUTEROL SULFATE 3 ML: 2.5; .5 SOLUTION RESPIRATORY (INHALATION) at 15:10

## 2021-08-06 RX ADMIN — ACETYLCYSTEINE 600 MG: 200 SOLUTION ORAL; RESPIRATORY (INHALATION) at 23:00

## 2021-08-06 RX ADMIN — FUROSEMIDE 40 MG: 10 INJECTION, SOLUTION INTRAMUSCULAR; INTRAVENOUS at 08:50

## 2021-08-06 RX ADMIN — CEFTRIAXONE 2000 MG: 2 INJECTION, SOLUTION INTRAVENOUS at 12:17

## 2021-08-06 RX ADMIN — METOPROLOL TARTRATE 100 MG: 100 TABLET, FILM COATED ORAL at 20:35

## 2021-08-06 RX ADMIN — HEPARIN SODIUM 5000 UNITS: 5000 INJECTION INTRAVENOUS; SUBCUTANEOUS at 05:07

## 2021-08-06 RX ADMIN — ALPRAZOLAM 0.5 MG: 0.5 TABLET ORAL at 16:39

## 2021-08-06 RX ADMIN — HEPARIN SODIUM 5000 UNITS: 5000 INJECTION INTRAVENOUS; SUBCUTANEOUS at 13:19

## 2021-08-06 RX ADMIN — METOPROLOL TARTRATE 5 MG: 5 INJECTION INTRAVENOUS at 16:39

## 2021-08-06 RX ADMIN — ACETYLCYSTEINE 600 MG: 200 SOLUTION ORAL; RESPIRATORY (INHALATION) at 15:10

## 2021-08-06 RX ADMIN — RISPERIDONE 2 MG: 1 TABLET ORAL at 08:50

## 2021-08-06 RX ADMIN — INSULIN LISPRO 4 UNITS: 100 INJECTION, SOLUTION INTRAVENOUS; SUBCUTANEOUS at 12:17

## 2021-08-06 RX ADMIN — HEPARIN SODIUM 5000 UNITS: 5000 INJECTION INTRAVENOUS; SUBCUTANEOUS at 22:34

## 2021-08-06 RX ADMIN — IPRATROPIUM BROMIDE AND ALBUTEROL SULFATE 3 ML: 2.5; .5 SOLUTION RESPIRATORY (INHALATION) at 03:34

## 2021-08-06 RX ADMIN — ACETYLCYSTEINE 600 MG: 200 SOLUTION ORAL; RESPIRATORY (INHALATION) at 07:38

## 2021-08-06 RX ADMIN — IPRATROPIUM BROMIDE AND ALBUTEROL SULFATE 3 ML: 2.5; .5 SOLUTION RESPIRATORY (INHALATION) at 11:34

## 2021-08-06 RX ADMIN — IPRATROPIUM BROMIDE AND ALBUTEROL SULFATE 3 ML: 2.5; .5 SOLUTION RESPIRATORY (INHALATION) at 23:00

## 2021-08-06 RX ADMIN — INSULIN LISPRO 2 UNITS: 100 INJECTION, SOLUTION INTRAVENOUS; SUBCUTANEOUS at 16:32

## 2021-08-06 RX ADMIN — ALPRAZOLAM 0.5 MG: 0.5 TABLET ORAL at 22:34

## 2021-08-06 RX ADMIN — FOLIC ACID 1 MG: 1 TABLET ORAL at 08:50

## 2021-08-06 RX ADMIN — METHYLPREDNISOLONE SODIUM SUCCINATE 40 MG: 40 INJECTION, POWDER, FOR SOLUTION INTRAMUSCULAR; INTRAVENOUS at 22:34

## 2021-08-06 RX ADMIN — ACETAMINOPHEN 650 MG: 325 TABLET, FILM COATED ORAL at 05:07

## 2021-08-06 NOTE — ASSESSMENT & PLAN NOTE
· Home regimen: Amlodipine 5 mg daily, Candesartan 32 mg daily, prazosin 2 mg TID, metoprolol 100 mg daily, chlorthalidone 25 mg daily, torsemide 20mg qd  · Hold home medications besides metoprolol while working on rate control for atrial fibrillation with RVR  · Patient BP elevated this morning, SBP 150s-160s, one reading in 180s patient anxious and improved with Xanax    · Consider restarting amlodipine if remains hypertensive

## 2021-08-06 NOTE — ASSESSMENT & PLAN NOTE
· Chronic atrial fibrillation on metoprolol 100 mg daily with RVR rates 130-140s in ER   · Received 15 mg diltiazem x 1 in ER with rate improvement to 100s  · HR came back up to 140-150s, received metoprolol total 10 mg without any improvement, redosed with dilitazem 15 mg and HR responded to 110-120s   · 8/2- Given 5mg IV lopressor x 2 overnight for elevated HR, ultimately placed on cardizem infusion for HR control    · Cardizem drip weaned off on 8/4  · Continue lopressor 100mg BID  · Not on chronic anticoagulation - per chart review DOAC too expensive and not interested in wafarin   · Will hold systemic AC here as AF chronic and on no home [facility]

## 2021-08-06 NOTE — ASSESSMENT & PLAN NOTE
· CXR on admission with dense consolidations in right  · Pro calcitonin elevated 7 6, reflex 8 8  · Tending down, 2 2 today  · Blood cultures NG @ 4 day  · Strep and Legionella Ag neg  · Sputum culture ordered, but not producing sputum to culture  · Continue ceftriaxone/azithromycin, day 5  · Continue Metronidazole, day 3  · Found with multiple empty bottles of alcohol at home    Had episode of choking on water overnight 8/3  · Trend WBC/fever curve, procal

## 2021-08-06 NOTE — PROGRESS NOTES
Mally 45  Progress Note - Alexandria Aragon 1955, 77 y o  male MRN: [id number]  Unit/Bed#: ICU 12 Encounter: [id number]  Primary Care Provider: Bandar Adames   Date and time admitted to hospital: 8/1/2021  1:59 PM    * Acute respiratory failure with hypoxia [facility])  Assessment & Plan  · Presented with multiple days of fatigue, dry cough and shortness of breath, hypoxic to 88% on room air on presentation  Placed on 4 L NC with improvement in spo2 but persistent increased WOB requiring BIPAP without significant improvement  · Likely 2/2 pneumonia, possible component of CHF exacerbation  · BNP 4649  · CXR dense consolidation concerning for PNA vs CHF  · Procalcitonin elevated  · low grade temp POA  Tmax 100 1   · Continue ceftraixone/azithromycin to cover for CAP, day 5  · Continue Flagyl, day 3  · Patient found with multiple empty alcohol bottles at home  Had episode of choking drinking water 8/3 overnight  · Continue solumedrol 40mg q8H, Duonebs Q4H, mucomyst q8H, chest PT  · BIPAP 10/6 70%, HFNC during day   · Wean HFNC as tolerarated  · Continue diuresis for goal negative fluid balance  · Trend WBC/fever curve, procal  · Pulmonary hygiene    Pneumonia  Assessment & Plan  · CXR on admission with dense consolidations in right  · Pro calcitonin elevated 7 6, reflex 8 8  · Tending down, 2 2 today  · Blood cultures NG @ 4 day  · Strep and Legionella Ag neg  · Sputum culture ordered, but not producing sputum to culture  · Continue ceftriaxone/azithromycin, day 5  · Continue Metronidazole, day 3  · Found with multiple empty bottles of alcohol at home  Had episode of choking on water overnight 8/3  · Trend WBC/fever curve, procal    Alcohol use  Assessment & Plan  · Unclear amount of alcohol consumption  · Continue thiamine/folic acid  · Monitor for signs of alcohol withdrawal     Sepsis ([facility] )  Assessment & Plan  SIRs criteria met POA secondary to leukocytosis, tachycardia, tachypnea  · procal elevated to 7 6, reflex 8 8  · UA with moderate bacteria, 4-10 WBC  Negative nitrites, leukocytes  Urine culture negative  · BC negative x 72 hours  · Strep pneumo, legionella urine antigen negative  · Initial CXR dense consolidation in Right lower lobe concerning for PNA  · Consider CAP vs aspiration PNA  · Continue ceftiaxone/azithromycin, day 4  · Continue Flagyl day 2  · Trend WBC, fever curve, procal    Severe obesity (BMI 35 0-39  9) with comorbidity ([facility] )  Assessment & Plan  · Lifestyle counseling when appropriate     Depression, recurrent ([facility] )  Assessment & Plan  · Continue home Wellbutrin, Prozac and risperidone     Anxiety  Assessment & Plan  · Xanax decreased to 0 5 mg TID prn    Cardiomyopathy ([facility] )  Assessment & Plan  · Hx cardiomyopathy echo 2017 with EF 40% without significant valvular abnormality, subsequent nuclear stress test 2017 EF reported 65%   · Troponin negative x 3  · Follows with cardiology, no follow up echos noted  · Echo 8/2: EF 40%, difficult to assess given Afib with rapid rate  Estimated PAP 30s  · At home torsemide 20 mg daily and chlorthalidone 25 mg daily, reports taking as prescribed   · CXR with evidence of volume overload, BNP elevated 4649  · Received lasix 40mg x2 on 8/4, hypernatremic following day and decreased back 40mg qd  · 8/6- Net I/O -1 8L over 24h, -7 0L since admission  · Consider repeat diuresis today pending morning labs  · Trend I&Os   · Daily weights        Essential hypertension  Assessment & Plan  · Home regimen: Amlodipine 5 mg daily, Candesartan 32 mg daily, prazosin 2 mg TID, metoprolol 100 mg daily, chlorthalidone 25 mg daily, torsemide 20mg qd  · Hold home medications besides metoprolol while working on rate control for atrial fibrillation with RVR  · Patient BP elevated this morning, SBP 150s-160s, one reading in 180s patient anxious and improved with Xanax    · Consider restarting     Mixed hyperlipidemia  Assessment & Plan  · At home takes pravastatin 40mg QD  · Last lipid panel 5 months ago: HDL 65, LDL 37,   · Hold pravastatin given elevated LFTs    Chronic atrial fibrillation (HCC)  Assessment & Plan  · Chronic atrial fibrillation on metoprolol 100 mg daily with RVR rates 130-140s in ER   · Received 15 mg diltiazem x 1 in ER with rate improvement to 100s  · HR came back up to 140-150s, received metoprolol total 10 mg without any improvement, redosed with dilitazem 15 mg and HR responded to 110-120s   · 8/2- Given 5mg IV lopressor x 2 overnight for elevated HR, ultimately placed on cardizem infusion for HR control  · Cardizem drip weaned off on 8/4  · Continue lopressor 100mg BID  · Not on chronic anticoagulation - per chart review DOAC too expensive and not interested in wafarin   · Will hold systemic AC here as AF chronic and on no home AC         ----------------------------------------------------------------------------------------  HPI/24hr events: None    No  Disposition: Continue Critical Care   Code Status: Level 3 - DNAR and DNI  ---------------------------------------------------------------------------------------  SUBJECTIVE  Patient reports feeling better, still with some SOB  Review of Systems   Constitutional: Negative for chills and fever  HENT: Negative for congestion  Respiratory: Positive for cough  Negative for shortness of breath  Cardiovascular: Negative for chest pain, palpitations and leg swelling  Gastrointestinal: Positive for constipation  Negative for abdominal distention, diarrhea, nausea and vomiting  Musculoskeletal: Negative for arthralgias and myalgias  Skin: Negative for rash and wound  Neurological: Negative for dizziness and headaches       Review of systems was reviewed and negative unless stated above in HPI/24-hour events   ---------------------------------------------------------------------------------------  OBJECTIVE    Vitals   Vitals:    08/06/21 0600 08/06/21 0630 08/06/21 0715 21 0742   BP: (!) 182/97 165/91     Pulse: (!) 118 (!) 107 103    Resp: 20     Temp:   98 2 °F (36 8 °C)    TempSrc:   Tympanic    SpO2: 98% 99% 99% 94%   Weight: 102 kg (224 lb 6 9 oz)      Height:         Temp (24hrs), Av 8 °F (36 6 °C), Min:97 °F (36 1 °C), Max:98 4 °F (36 9 °C)  Current: Temperature: 98 2 °F (36 8 °C)          Respiratory:  SpO2: SpO2: 96 %  Nasal Cannula O2 Flow Rate (L/min): 30 L/min    Invasive/non-invasive ventilation settings   Respiratory    Lab Data (Last 4 hours)    None         O2/Vent Data (Last 4 hours)    None                Physical Exam  Constitutional:       Appearance: He is obese  He is ill-appearing  Eyes:      Comments: Left pupil irregular, blind left eye  Chronic   Cardiovascular:      Rate and Rhythm: Tachycardia present  Rhythm irregular  Heart sounds: No murmur heard  Pulmonary:      Effort: No respiratory distress  Breath sounds: Examination of the right-lower field reveals rhonchi  Examination of the left-lower field reveals rhonchi  Rhonchi present  Comments: Sat 96% on 30L, 60%  Abdominal:      General: Bowel sounds are normal  There is no distension  Palpations: Abdomen is soft  Tenderness: There is no abdominal tenderness  Genitourinary:     Comments: Gudino in place, yellow urine  Musculoskeletal:      Right lower leg: No edema  Left lower leg: No edema  Skin:     General: Skin is warm  Capillary Refill: Capillary refill takes less than 2 seconds  Coloration: Skin is jaundiced  Neurological:      General: No focal deficit present  Mental Status: He is alert and oriented to person, place, and time           Laboratory and Diagnostics:  Results from last 7 days   Lab Units 21  0526 21  0619 21  0530 21  0502 21  0545 21  1602   WBC Thousand/uL 9 58 12 79* 12 72* 11 23* 13 21* 14 14*   HEMOGLOBIN g/dL 11 0* 11 0* 10 7* 10 5* 11 3* 12 5   HEMATOCRIT % 33 8* 33 3* 32 4* 32 5* 35 2* 37 7   PLATELETS Thousands/uL 351 299 251 209 235 209   NEUTROS PCT %  --  91*  --   --   --  93*   BANDS PCT % 3  --   --  40*  --   --    MONOS PCT %  --  4  --   --   --  4   MONO PCT % 5  --   --  1*  --   --      Results from last 7 days   Lab Units 08/06/21  0526 08/05/21  0619 08/04/21  0530 08/03/21  0502 08/02/21  0545 08/01/21  1601   SODIUM mmol/L 148* 149* 143 142 142 140   POTASSIUM mmol/L 3 6 3 6 3 8 4 0 4 7 4 2   CHLORIDE mmol/L 110* 111* 107 105 105 104   CO2 mmol/L 31 30 26 25 24 24   ANION GAP mmol/L 7 8 10 12 13 12   BUN mg/dL 31* 30* 28* 27* 24 21   CREATININE mg/dL 0 99 0 98 1 00 1 19 1 49* 1 46*   CALCIUM mg/dL 9 8 10 1 9 8 9 4 9 4 9 5   GLUCOSE RANDOM mg/dL 226* 186* 151* 105 110 112   ALT U/L  --  192*  --   --  57 59   AST U/L  --  277*  --   --  83* 59*   ALK PHOS U/L  --  179*  --   --  187* 226*   ALBUMIN g/dL  --  2 0*  --   --  2 8* 2 9*   TOTAL BILIRUBIN mg/dL  --  1 64*  --   --  2 04* 1 65*     Results from last 7 days   Lab Units 08/06/21  0526 08/05/21  0619 08/04/21  0530 08/03/21  0502 08/02/21  0545   MAGNESIUM mg/dL 2 7* 2 8* 2 9* 2 4 2 2      Results from last 7 days   Lab Units 08/01/21  1637   INR  1 52*   PTT seconds 49*      Results from last 7 days   Lab Units 08/01/21  2158 08/01/21  1935 08/01/21  1602   TROPONIN I ng/mL <0 02 <0 02 <0 02     Results from last 7 days   Lab Units 08/01/21  1601   LACTIC ACID mmol/L 1 8     ABG:  Results from last 7 days   Lab Units 08/04/21  0027   PH ART  7 388   PCO2 ART mm Hg 44 5*   PO2 ART mm Hg 57 8*   HCO3 ART mmol/L 26 2   BASE EXC ART mmol/L 0 9   ABG SOURCE  Radial, Right     VBG:  Results from last 7 days   Lab Units 08/04/21  0027   ABG SOURCE  Radial, Right     Results from last 7 days   Lab Units 08/05/21  1041 08/04/21  0530 08/03/21  0502 08/02/21  1227   PROCALCITONIN ng/ml 2 26* 5 22* 8 80* 7 64*       Micro  Results from last 7 days   Lab Units 08/03/21  0230 08/01/21  2028 08/01/21  1932 08/01/21  1602 08/01/21  1601   BLOOD CULTURE   --   --   --  No Growth After 4 Days  No Growth After 4 Days  URINE CULTURE   --   --  No Growth <1000 cfu/mL  --   --    MRSA CULTURE ONLY   --  No Methicillin Resistant Staphlyococcus aureus (MRSA) isolated  --   --   --    LEGIONELLA URINARY ANTIGEN  Negative  --   --   --   --    STREP PNEUMONIAE ANTIGEN, URINE  Negative  --   --   --   --        EKG: Afib  Imaging: I have personally reviewed pertinent reports  and I have personally reviewed pertinent films in PACS    Intake and Output  I/O       08/04 0701 - 08/05 0700 08/05 0701 - 08/06 0700 08/06 0701 - 08/07 0700    P  O  120 1410     I V  (mL/kg) 60 (0 6)      IV Piggyback 600 600     Total Intake(mL/kg) 780 (7 7) 2010 (19 7)     Urine (mL/kg/hr) 3280 (1 4) 3900 (1 6)     Total Output 3280 3900     Net -2500 -1890                  Height and Weights   Height: 5' 10" (177 8 cm)  IBW (Ideal Body Weight): 73 kg  Body mass index is 32 2 kg/m²  Weight (last 2 days)     Date/Time   Weight    08/06/21 0600   102 (224 43)    08/05/21 0400   101 (223 77)    08/04/21 0531   105 (231 26)                Nutrition       Diet Orders   (From admission, onward)             Start     Ordered    08/04/21 1753  Diet Dysphagia/Modified Consistency; Dysphagia 3-Dental Soft; Nectar Thick Liquid; Fluid Restriction 1500 ML, Sodium 2 GM  Diet effective now     Question Answer Comment   Diet Type Dysphagia/Modified Consistency    Dysphagia/Modified Consistency Dysphagia 3-Dental Soft    Liquid Modifier Nectar Thick Liquid    Other Restriction(s): Fluid Restriction 1500 ML    Other Restriction(s): Sodium 2 GM    RD to adjust diet per protocol?  No        08/04/21 1756    08/03/21 1146  Room Service  Once     Question:  Type of Service  Answer:  Room Service-Appropriate    08/03/21 1145                  Active Medications  Scheduled Meds:  Current Facility-Administered Medications   Medication Dose Route Frequency Provider Last Rate  acetaminophen  650 mg Oral Q6H PRN Yasmany Salas PA-C      acetylcysteine  3 mL Nebulization Q8H Lorri Álvarez PA-C      ALPRAZolam  0 5 mg Oral TID PRN Lala Romero PA-C      azithromycin  500 mg Intravenous Q24H Sarah Wang PA-C Stopped (08/05/21 1337)    buPROPion  150 mg Oral Daily [place], [place]      cefTRIAXone  2,000 mg Intravenous Q24H Norma Dial MD Stopped (08/05/21 1215)    FLUoxetine  20 mg Oral Daily Yasmany Salas PA-C      folic acid  1 mg Oral Daily Yasmany Marit[place], [place]      heparin (porcine)  5,000 Units Subcutaneous [facility] [place]      insulin lispro  1-6 Units Subcutaneous TID [facility] Norma Dial [facility]      ipratropium-albuterol  3 mL Nebulization Q4H Norma Dial MD      methocarbamol  750 mg Oral HS PRN Yasmany Salas PA-C      methylPREDNISolone sodium succinate  40 mg Intravenous [facility] Norma Dial MD      metoprolol  5 mg Intravenous Q6H PRN Yasmany Salas PA-C      metoprolol tartrate  100 mg Oral Q12H [address] Norma Dial MD      metroNIDAZOLE  500 mg Intravenous Q8H Sarah Wang PA-C Stopped (08/06/21 0238)    risperiDONE  2 mg Oral Daily Yasmany Salas PA-C      thiamine  100 mg Oral Daily Yasmany Salas PA-C       Continuous Infusions:     PRN Meds:   acetaminophen, 650 mg, Q6H PRN  ALPRAZolam, 0 5 mg, TID PRN  methocarbamol, 750 mg, HS PRN  metoprolol, 5 mg, Q6H PRN        Invasive Devices Review  Invasive Devices     Peripheral Intravenous Line            Peripheral IV 08/05/21 Distal;Right;Upper;Ventral (anterior) Arm 1 day    Peripheral IV 08/05/21 Right;Dorsal (posterior) Hand 1 day          Drain            Urethral Catheter Straight-tip 16 Fr  4 days                  Norma Dial MD      Portions of the record may have been created with voice recognition software  Occasional wrong word or "sound a like" substitutions may have occurred due to the inherent limitations of voice recognition software  Read the chart carefully and recognize, using context, where substitutions have occurred

## 2021-08-06 NOTE — PLAN OF CARE
Problem: RESPIRATORY - ADULT  Goal: Achieves optimal ventilation and oxygenation  Description: INTERVENTIONS:  - Assess for changes in respiratory status  - Assess for changes in mentation and behavior  - Position to facilitate oxygenation and minimize respiratory effort  - Oxygen administered by appropriate delivery if ordered  - Initiate smoking cessation education as indicated  - Encourage broncho-pulmonary hygiene including cough, deep breathe, Incentive Spirometry  - Assess the need for suctioning and aspirate as needed  - Assess and instruct to report SOB or any respiratory difficulty  - Respiratory Therapy support as indicated  Outcome: Progressing     Problem: MOBILITY - ADULT  Goal: Maintain or return to baseline ADL function  Description: INTERVENTIONS:  -  Assess patient's ability to carry out ADLs; assess patient's baseline for ADL function and identify physical deficits which impact ability to perform ADLs (bathing, care of mouth/teeth, toileting, grooming, dressing, etc )  - Assess/evaluate cause of self-care deficits   - Assess range of motion  - Assess patient's mobility; develop plan if impaired  - Assess patient's need for assistive devices and provide as appropriate  - Encourage maximum independence but intervene and supervise when necessary  - Involve family in performance of ADLs  - Assess for home care needs following discharge   - Consider OT consult to assist with ADL evaluation and planning for discharge  - Provide patient education as appropriate  Outcome: Progressing  Goal: Maintains/Returns to pre admission functional level  Description: INTERVENTIONS:  - Perform BMAT or MOVE assessment daily    - Set and communicate daily mobility goal to care team and patient/family/caregiver     - Collaborate with rehabilitation services on mobility goals if consulted  - Reposition patient every 2 hours  - Record patient progress and toleration of activity level   Outcome: Progressing     Problem: Potential for Falls  Goal: Patient will remain free of falls  Description: INTERVENTIONS:  - Educate patient/family on patient safety including physical limitations  - Instruct patient to call for assistance with activity   - Consult OT/PT to assist with strengthening/mobility   - Keep Call bell within reach  - Keep bed low and locked with side rails adjusted as appropriate  - Keep care items and personal belongings within reach  - Initiate and maintain comfort rounds  - Make Fall Risk Sign visible to staff  - Offer Toileting every 2 Hours, in advance of need  - Initiate/Maintain bed alarm  - Apply yellow socks and bracelet for high fall risk patients  - Consider moving patient to room near nurses station  Outcome: Progressing     Problem: Prexisting or High Potential for Compromised Skin Integrity  Goal: Skin integrity is maintained or improved  Description: INTERVENTIONS:  - Identify patients at risk for skin breakdown  - Assess and monitor skin integrity  - Assess and monitor nutrition and hydration status  - Monitor labs   - Assess for incontinence   - Turn and reposition patient  - Assist with mobility/ambulation  - Relieve pressure over bony prominences  - Avoid friction and shearing  - Provide appropriate hygiene as needed including keeping skin clean and dry  - Evaluate need for skin moisturizer/barrier cream  - Collaborate with interdisciplinary team   - Patient/family teaching  - Consider wound care consult   Outcome: Progressing     Problem: Nutrition/Hydration-ADULT  Goal: Nutrient/Hydration intake appropriate for improving, restoring or maintaining nutritional needs  Description: Monitor and assess patient's nutrition/hydration status for malnutrition  Collaborate with interdisciplinary team and initiate plan and interventions as ordered  Monitor patient's weight and dietary intake as ordered or per policy  Utilize nutrition screening tool and intervene as necessary   Determine patient's food preferences and provide high-protein, high-caloric foods as appropriate       INTERVENTIONS:  - Monitor oral intake, urinary output, labs, and treatment plans  - Assess nutrition and hydration status and recommend course of action  - Evaluate amount of meals eaten  - Assist patient with eating if necessary   - Allow adequate time for meals  - Recommend/ encourage appropriate diets, oral nutritional supplements, and vitamin/mineral supplements  - Order, calculate, and assess calorie counts as needed  - Recommend, monitor, and adjust tube feedings and TPN/PPN based on assessed needs  - Assess need for intravenous fluids  - Provide specific nutrition/hydration education as appropriate  - Include patient/family/caregiver in decisions related to nutrition  Outcome: Progressing

## 2021-08-06 NOTE — OCCUPATIONAL THERAPY NOTE
OT TREATMENT     08/06/21 1121   Note Type   Note Type Treatment   Restrictions/Precautions   Other Precautions Chair Alarm; Bed Alarm;O2;Fall Risk  (high-flow O2)   Pain Assessment   Pain Assessment Tool Pain Assessment not indicated - pt denies pain   ADL   Eating Assistance 5  Supervision/Setup   Eating Deficit Beverage management; Increased time to complete   Eating Comments verbal cues pt with increased time to bring cup to mouth    Grooming Assistance 5  Supervision/Setup   Grooming Deficit Wash/dry face   Grooming Comments in bed with head of bed raised    ROM- Right Upper Extremities   R Shoulder AROM; Flexion   R Elbow AROM;Elbow flexion;Elbow extension   R Hand AROM; Index finger; Thumb; Long finger;Ring finger;Little finger   R Weight/Reps/Sets 10 times each supine with head of bed raised with rest breaks    ROM - Left Upper Extremities    L Shoulder AROM; Flexion   L Elbow AROM;Elbow flexion;Elbow extension   L Hand AROM; Index finger; Thumb; Long finger;Ring finger;Little finger   L Weight/Reps/Sets 10 times each supine with head of bed raised with rest breaks    Assessment   Assessment Patient is slow to process at times  Limited by lethargy and fatigue  Patient is generally weak and deconditioned  Nurse aware  [place] thick water given with nurse approval   Patient will benefit from continued OT services to maximize functional performance with ADLS  The patient's raw score on the -PAC Daily Activity inpatient short form is 13, standardized score is 33 39, less than 39 4  Patients at this level are likely to benefit from DC to post-acute rehabilitation services  Please refer to the recommendation of the Occupational Therapist for safe DC planning  Plan   Treatment Interventions ADL retraining;UE strengthening/ROM; Endurance training; Activityengagement   OT Frequency 3-5x/wk   Recommendation   OT Discharge Recommendation Post acute rehabilitation services   AM-[facility] Daily Activity Inpatient   Lower Body Dressing 2   Bathing 2   Toileting 2   Upper Body Dressing 2   Grooming 3   Eating 3   Daily Activity Raw Score 14   Daily Activity Standardized Score (Calc for Raw Score >=11) 33 39   AM-PAC Applied Cognition Inpatient   Following a Speech/Presentation 3   Understanding Ordinary Conversation 3   Taking Medications 3   Remembering Where Things Are Placed or Put Away 3   Remembering List of 4-5 Errands 3   Taking Care of Complicated Tasks 3   Applied Cognition Raw Score 18   Applied Cognition Standardized Score 71 79   Licensure   [place] License Number  Ashley Silva Pineda Ky[address] 87 OTR/L [id number]

## 2021-08-06 NOTE — PHYSICAL THERAPY NOTE
PT TREATMENT     08/06/21 0920   Note Type   Note Type Treatment   Pain Assessment   Pain Assessment Tool Pain Assessment not indicated - pt denies pain   Restrictions/Precautions   Other Precautions Chair Alarm; Bed Alarm; Fall Risk;O2;Multiple lines  (Seen in ICU on high-flow oxygen)   General   Chart Reviewed Yes   Family/Caregiver Present No   Cognition   Overall Cognitive Status WFL   Arousal/Participation Cooperative  (Sleepy slow to respond at times)   Attention Attends with cues to redirect   Following Commands Follows multistep commands with increased time or repetition   Comments Patient is sleepy at times with eyes closing   Subjective   Subjective Patient reports always feeling tired   Bed Mobility   Supine to Sit 3  Moderate assistance   Additional items Assist x 1   Sit to Supine 3  Moderate assistance   Additional items Assist x 1   Transfers   Sit to Stand   (Deferred due to fatigue and shortness of breath/ on high-robert)   Exercises   Hamstring Stretch Supine;5 reps;PROM; Bilateral   Hamstring Sets Supine;10 reps;Bilateral   Quad Sets Supine;10 reps;Bilateral   Glute Sets Supine;10 reps   Hip Abduction Supine;10 reps;Bilateral   Knee AROM Short Arc Quad Supine;10 reps;Bilateral   Knee AROM Long Arc Quad Supine;10 reps;Bilateral   Ankle Pumps Supine;10 reps;Bilateral   Bridging Supine;5 reps;Bilateral   Assessment   Assessment Patient cooperative and motivated although with limited endurance requiring rest periods due to shortness of breath  Patient remains in ICU on high-flow oxygen and will benefit from continued physical therapy as tolerated with progression  The patient's AM-PAC Basic Mobility Inpatient Short Form Raw Score is 10, Standardized Score is 30 25  A standardized score less than 42 9 suggests the patient may benefit from discharge to post-acute rehabilitation services  Please also refer to the recommendation of the Physical Therapist for safe discharge planning           Plan Treatment/Interventions ADL retraining;Functional transfer training;LE strengthening/ROM; Therapeutic exercise; Endurance training;Patient/family training;Equipment eval/education; Bed mobility;Gait training; Compensatory technique education   PT Frequency 5x/wk   Recommendation   PT Discharge Recommendation Post acute rehabilitation services   AM-PAC Basic Mobility Inpatient   Turning in Bed Without Bedrails 2   Lying on Back to Sitting on Edge of Flat Bed 2   Moving Bed to Chair 2   Standing Up From Chair 2   Walk in Room 1   Climb 3-5 Stairs 1   Basic Mobility Inpatient Raw Score 10   Turning Head Towards Sound 3   Follow Simple Instructions 3   Low Function Basic Mobility Raw Score 16   Low Function Basic Mobility Standardized Score 40 52   Licensure   [place] License Number  Melissa Valenzuela PT [id number]

## 2021-08-06 NOTE — PLAN OF CARE
Problem: OCCUPATIONAL THERAPY ADULT  Goal: Performs self-care activities at highest level of function for planned discharge setting  See evaluation for individualized goals  Description: Treatment Interventions: ADL retraining, Functional transfer training, UE strengthening/ROM, Endurance training, Patient/family training, Equipment evaluation/education, Activityengagement, Compensatory technique education          See flowsheet documentation for full assessment, interventions and recommendations  Outcome: Progressing  Note: Limitation: Decreased ADL status, Decreased UE strength, Decreased Safe judgement during ADL, Decreased endurance, Decreased self-care trans, Decreased high-level ADLs (decreased balance and mobility )  Prognosis: Good  Assessment: Patient is slow to process at times  Limited by lethargy and fatigue  Patient is generally weak and deconditioned  Nurse aware  [place] thick water given with nurse approval   Patient will benefit from continued OT services to maximize functional performance with ADLS       OT Discharge Recommendation: Post acute rehabilitation services

## 2021-08-06 NOTE — ASSESSMENT & PLAN NOTE
· Presented with multiple days of fatigue, dry cough and shortness of breath, hypoxic to 88% on room air on presentation  Placed on 4 L NC with improvement in spo2 but persistent increased WOB requiring BIPAP without significant improvement  · Likely 2/2 pneumonia, possible component of CHF exacerbation  · BNP 4649  · CXR dense consolidation concerning for PNA vs CHF  · Procalcitonin elevated  · low grade temp POA  Tmax 100 1   · Continue ceftraixone/azithromycin to cover for CAP, day 5  · Continue Flagyl, day 3  · Patient found with multiple empty alcohol bottles at home   Had episode of choking drinking water 8/3 overnight  · Continue solumedrol 40mg q8H, Duonebs Q4H, mucomyst q8H, chest PT  · BIPAP 10/6 70%, HFNC during day   · Wean HFNC as tolerarated  · Continue diuresis for goal negative fluid balance  · Trend WBC/fever curve, procal  · Pulmonary hygiene

## 2021-08-06 NOTE — SPEECH THERAPY NOTE
Speech/Language Pathology Progress Note    Patient Name: Fernando Eid  VMSNK'[id number] Date: 8/6/2021     Problem List  Principal Problem:    Acute respiratory failure with hypoxia [facility])  Active Problems:    Chronic atrial fibrillation ([facility] )    Mixed hyperlipidemia    Essential hypertension    Cardiomyopathy ([facility] )    Anxiety    Depression, recurrent (HCC)    Severe obesity (BMI 35 0-39  9) with comorbidity ([facility] )    Sepsis ([facility] )    Alcohol use    Pneumonia    Subjective:  "I really want regular water "    Objective:  Pt was seen for diagnostic dysphagia therapy to ensure tolerance of least restrictive diet  Pt was alert and positioned upright  Pt was assessed with puree, rudi crackers and water  Mastication was timely and effective  Bolus formation and transfer were effective  Swallow initiation appeared prompt  Laryngeal rise was good by palpation  No coughing, throat clearing, c/o stasis, multiple swallows, change in vocal quality noted c po intake today  Assessment:  Pt tolerated food and thin liquid c no s/s dysphagia or aspiration  Plan/Recommendations:  Please consider upgrade to thin liquid  Plan f/u briefly to ensure diet tolerance over time       Amarilys Pang [facility]   Inpatient Speech Pathologist  [place] License [id number]  [place] License [id number]  Available via [facility] Text

## 2021-08-06 NOTE — PROGRESS NOTES
Progress Note - Cardiology   [facility]     Pita Elizalde 77 y o  male MRN: [id number]  : 1955  Unit/Bed#: ICU 12 Encounter: [id number]    Assessment and Plan:   1  Acute respiratory failure with hypoxia secondary to multilobar pneumonia:  Concern for aspiration pneumonia    -  continue high-flow oxygen and titrate as patient needs    -  continue antibiotics and steroids per primary team     2  Chronic atrial fibrillation with rapid ventricular response:  Patient's heart rate slowly improving as his pneumonia improves    -  continue Lopressor 100 mg b i d     -  patient on heparin subQ q 8 hours per primary team     -  CHADS2 Vasc score = 4 or 4 8% risk of stroke per year    -  patient has refused Coumadin therapy in the past a novel agents were too expensive for him     -  will continue to monitor     3  Cardiomyopathy:  EF of 40%     -  sodium slightly elevated today, diuretics held  Will continue monitor and adjust medications as needed during hospitalization    -  continue Lopressor 100 mg b i d     -  I&O, daily weights and monitor labs    -  low-sodium diet     4  Acute kidney injury on chronic kidney disease:   slowly improving  Will continue to monitor      5  Hypertension:  Blood pressure is slowly elevating as patient condition is improving  Discussed with resident about resuming patient's home dose of Norvasc      6  Dyslipidemia:  Continue Pravachol 40 mg daily     7  Anxiety/panic attack:  Continue Risperdal, Wellbutrin and Prozac     8  Alcohol use:  Monitor for DTs   On folic acid and Thiamine    Subjective / Objective:   Patient seen  He is resting comfortably in bed  Down to high-flow oxygen at 70%  Heart rate slowly improving as his oxygenation and pneumonia seems to be improving  Will continue to monitor  Discussed with resident    Vitals: Blood pressure 155/86, pulse (!) 106, temperature 98 3 °F (36 8 °C), temperature source Tympanic, resp   rate (!) 25, height 5' 10" (1 778 m), weight 102 kg (224 lb 6 9 oz), SpO2 93 %  Vitals:    08/05/21 0400 08/06/21 0600   Weight: 101 kg (223 lb 12 3 oz) 102 kg (224 lb 6 9 oz)     Body mass index is 32 2 kg/m²  BP Readings from Last 3 Encounters:   08/06/21 155/86   07/09/21 120/72   04/27/21 130/80     Orthostatic Blood Pressures      Most Recent Value   Blood Pressure  155/86 filed at 08/06/2021 0900   Patient Position - Orthostatic VS  Lying filed at 08/06/2021 0800        I/O       08/04 0701 - 08/05 0700 08/05 0701 - 08/06 0700 08/06 0701 - 08/07 0700    P  O  120 1410     I V  (mL/kg) 60 (0 6)      IV Piggyback 600 600     Total Intake(mL/kg) 780 (7 7) 2010 (19 7)     Urine (mL/kg/hr) 3280 (1 4) 3900 (1 6)     Total Output 3280 3900     Net -2500 -1890                Invasive Devices     Peripheral Intravenous Line            Peripheral IV 08/05/21 Distal;Right;Upper;Ventral (anterior) Arm 1 day    Peripheral IV 08/05/21 Right;Dorsal (posterior) Hand 1 day          Drain            Urethral Catheter Straight-tip 16 Fr  4 days                  Intake/Output Summary (Last 24 hours) at 8/6/2021 1051  Last data filed at 8/6/2021 6137  Gross per 24 hour   Intake 1510 ml   Output 3750 ml   Net -2240 ml         Physical Exam:   Physical Exam  Vitals and nursing note reviewed  Constitutional:       Appearance: Normal appearance  He is well-developed  He is obese  He is ill-appearing  HENT:      Head: Normocephalic  Right Ear: External ear normal       Left Ear: External ear normal       Nose: Nose normal    Eyes:      General: No scleral icterus  Right eye: No discharge  Left eye: No discharge  Neck:      Thyroid: No thyromegaly  Cardiovascular:      Rate and Rhythm: Tachycardia present  Rhythm irregular  Pulses: Normal pulses  Heart sounds: Heart sounds are distant  Pulmonary:      Effort: No accessory muscle usage or respiratory distress        Breath sounds: Examination of the right-lower field reveals decreased breath sounds  Examination of the left-lower field reveals decreased breath sounds  Decreased breath sounds, wheezing, rhonchi and rales present  Comments: Aeration slowly improving  Abdominal:      General: Bowel sounds are normal  There is no distension  Palpations: Abdomen is soft  Musculoskeletal:      Cervical back: Normal range of motion and neck supple  Right lower leg: No edema  Left lower leg: No edema  Skin:     General: Skin is warm and dry  Capillary Refill: Capillary refill takes less than 2 seconds  Neurological:      General: No focal deficit present  Mental Status: He is alert  Mental status is at baseline                     Medications/ Allergies:     Current Facility-Administered Medications   Medication Dose Route Frequency Provider Last Rate    acetaminophen  650 mg Oral Q6H PRN Allyrory River PA-C      acetylcysteine  3 mL Nebulization Q8H Lorri Álvarez PA-C      ALPRAZolam  0 5 mg Oral TID PRN Angel Lennon PA-C      azithromycin  500 mg Intravenous Q24H Lorri Álvarez PA-C Stopped (08/05/21 1337)    buPROPion  150 mg Oral Daily Ally River PA-C      cefTRIAXone  2,000 mg Intravenous Q24H Naila Vergara MD Stopped (08/05/21 1215)    FLUoxetine  20 mg Oral Daily Ally River PA-C      folic acid  1 mg Oral Daily Ally River PA-C      heparin (porcine)  5,000 Units Subcutaneous [facility] Michelle Carrillo      insulin lispro  1-6 Units Subcutaneous TID [facility] Naila Vergara MD      ipratropium-albuterol  3 mL Nebulization Q4H Naila Vergara MD      methocarbamol  750 mg Oral HS PRN Ally River PA-C      methylPREDNISolone sodium succinate  40 mg Intravenous [facility] Naila Vergara MD      metoprolol  5 mg Intravenous Q6H PRN Ally River PA-C      metoprolol tartrate  100 mg Oral Q12H [facility] Matthew Jurado MD      metroNIDAZOLE  500 mg Intravenous Q8H Edmonia Baumgarten, PA-C Stopped (08/06/21 6243)    risperiDONE  2 mg Oral Daily Jessenia Monet PA-C      thiamine  100 mg Oral Daily Jessenia Monet PA-C       acetaminophen, 650 mg, Q6H PRN  ALPRAZolam, 0 5 mg, TID PRN  methocarbamol, 750 mg, HS PRN  metoprolol, 5 mg, Q6H PRN      No Known Allergies    VTE Pharmacologic Prophylaxis:   Sequential compression device (Venodyne)     Labs:   Troponins:  Results from last 7 days   Lab Units 08/01/21  2158 08/01/21  1935 08/01/21  1602 08/01/21  1601   CK TOTAL U/L  --   --   --  1,565*   TROPONIN I ng/mL <0 02 <0 02 <0 02  --    CK MB INDEX %  --   --   --  <1 0     CBC with diff:  Results from last 7 days   Lab Units 08/06/21  0526 08/05/21  0619 08/04/21  0530 08/03/21  0502 08/02/21  0545 08/01/21  1602   WBC Thousand/uL 9 58 12 79* 12 72* 11 23* 13 21* 14 14*   HEMOGLOBIN g/dL 11 0* 11 0* 10 7* 10 5* 11 3* 12 5   HEMATOCRIT % 33 8* 33 3* 32 4* 32 5* 35 2* 37 7   MCV fL 103* 102* 104* 105* 105* 104*   PLATELETS Thousands/uL 351 299 251 209 235 209   MCH pg 33 4 33 7 34 2 33 8 33 6 34 6*   MCHC g/dL 32 5 33 0 33 0 32 3 32 1 33 2   RDW % 14 6 14 5 14 4 14 2 13 9 13 5   MPV fL 9 9 10 1 9 6 9 4 9 5 10 3   NRBC AUTO /100 WBCs 1 0  --  0  --  0     CMP:  Results from last 7 days   Lab Units 08/06/21  0526 08/05/21  0619 08/04/21  0530 08/03/21  0502 08/02/21  0545 08/01/21  1601   SODIUM mmol/L 148* 149* 143 142 142 140   POTASSIUM mmol/L 3 6 3 6 3 8 4 0 4 7 4 2   CHLORIDE mmol/L 110* 111* 107 105 105 104   CO2 mmol/L 31 30 26 25 24 24   ANION GAP mmol/L 7 8 10 12 13 12   BUN mg/dL 31* 30* 28* 27* 24 21   CREATININE mg/dL 0 99 0 98 1 00 1 19 1 49* 1 46*   CALCIUM mg/dL 9 8 10 1 9 8 9 4 9 4 9 5   AST U/L  --  277*  --   --  83* 59*   ALT U/L  --  192*  --   --  57 59   ALK PHOS U/L  --  179*  --   --  187* 226*   TOTAL PROTEIN g/dL  --  7 1  --   --  7 9 7 2   ALBUMIN g/dL  --  2 0*  --   --  2 8* 2 9*   TOTAL BILIRUBIN mg/dL  --  1 64*  --   --  2 04* 1 65*   EGFR ml/min/1 73sq m 79 80 78 63 48 49 Magnesium:  Results from last 7 days   Lab Units 08/06/21  0526 08/05/21  0619 08/04/21  0530 08/03/21  0502 08/02/21  0545   MAGNESIUM mg/dL 2 7* 2 8* 2 9* 2 4 2 2     Coags:  Results from last 7 days   Lab Units 08/01/21  1637   PTT seconds 49*   INR  1 52*     TSH:  Results from last 7 days   Lab Units 08/01/21  1601   TSH 3RD GENERATON uIU/mL 1 197       Imaging & Testing   I have personally reviewed pertinent reports  XR chest portable    Result Date: 8/4/2021  Narrative: CHEST INDICATION:  Hypoxia  COMPARISON:  8/2/2021 8/1/2021 EXAM PERFORMED/VIEWS:  XR CHEST PORTABLE FINDINGS: Cardiomediastinal silhouette appears unremarkable  Bilateral airspace disease, right greater than left, slightly worsening particularly on the left  No pneumothorax or pleural effusion  Osseous structures appear within normal limits for patient age  Impression: Slight worsening of bilateral airspace disease, favored to represent multifocal pneumonia  Correlate with clinical scenario  Workstation performed: [id number]     XR chest 1 view portable    Result Date: 8/2/2021  Narrative: CHEST INDICATION:   sob  COMPARISON:  None EXAM PERFORMED/VIEWS:  XR CHEST PORTABLE  AP semierect Images:  1 FINDINGS: Cardiomediastinal silhouette appears unremarkable  Dense consolidation in the peripheral right lower lobe noted with masslike consolidation in the right upper lobe also identified  Left lung clear  No pleural effusion or pneumothorax  Osseous structures appear within normal limits for patient age  Impression: Right upper and lower lobe consolidation concerning for multifocal pneumonia  Workstation performed: [id number]     CT head without contrast    Result Date: 8/1/2021  Narrative: CT BRAIN - WITHOUT CONTRAST INDICATION:   ams  COMPARISON:  None  TECHNIQUE:  CT examination of the brain was performed  In addition to axial images, sagittal and coronal 2D reformatted images were created and submitted for interpretation  Radiation dose length product (DLP) for this visit:  1104 39 mGy-cm   This examination, like all CT scans performed in the [facility], was performed utilizing techniques to minimize radiation dose exposure, including the use of iterative reconstruction and automated exposure control  IMAGE QUALITY:  Diagnostic  FINDINGS: PARENCHYMA: Decreased attenuation is noted in periventricular and subcortical white matter demonstrating an appearance that is statistically most likely to represent mild microangiopathic change  No CT signs of acute infarction  No intracranial mass, mass effect or midline shift  No acute parenchymal hemorrhage  VENTRICLES AND EXTRA-AXIAL SPACES:  Normal for the patient's age  VISUALIZED ORBITS AND PARANASAL SINUSES:  Unremarkable  CALVARIUM AND EXTRACRANIAL SOFT TISSUES:  Normal      Impression: No acute intracranial abnormality  Workstation performed: [id number]     XR chest portable ICU    Result Date: 8/6/2021  Narrative: CHEST INDICATION:   hypoxia  COMPARISON:  8/4/2021 EXAM PERFORMED/VIEWS:  XR CHEST PORTABLE ICU FINDINGS: Cardiomediastinal silhouette appears unremarkable  There is bilateral patchy opacity improved from the prior study  No pneumothorax or pleural effusion  Osseous structures appear within normal limits for patient age  Impression: Improved airspace disease  Workstation performed: [id number]     XR chest portable ICU    Result Date: 8/5/2021  Narrative: CHEST INDICATION:   hypoxia  COMPARISON:  Chest x-ray performed the previous day  EXAM PERFORMED/VIEWS:  XR CHEST PORTABLE ICU FINDINGS: Heart shadow is obscured by adjacent opacity  Extensive bilateral airspace disease appears worse and more dense  No pneumothorax or pleural effusion  Osseous structures appear within normal limits for patient age  Impression: Interval worsening of bilateral airspace disease   Workstation performed: [id number]     XR chest portable ICU    Result Date: 2021  Narrative: CHEST INDICATION:   Hypoxia, concern for CHF exacerbation  COMPARISON:  2021 EXAM PERFORMED/VIEWS:  XR CHEST PORTABLE ICU FINDINGS: Cardiomediastinal silhouette appears unremarkable  Patchy consolidation in the right upper and lower lobes, unchanged  Faint patchy opacities in the left lower lobe  No evidence of pleural effusion or pneumothorax  Osseous structures appear within normal limits for patient age  Impression: Areas of consolidation in the right upper and lower lobes and, probably, the left lower lobe, either asymmetric pulmonary edema or multifocal pneumonia  Workstation performed: [id number]        EKG / Monitor: Personally reviewed  Atrial fibrillation, rate slowly improving    Cardiac testing:   Results for orders placed during the hospital encounter of 21    Echo complete with contrast if indicated    Narrative  [address]  [address] [facility] [place] [address]  [phone]    Transthoracic Echocardiogram  2D, M-mode, Doppler, and Color Doppler    Study date:  02-Aug-2021    Patient: Asia Ogden  MR number: [id number]  Account number: [de-identified]  : 1955  Age: 77 years  Gender: Male  Status: Inpatient  Location: Bedside  Height: 70 in  Weight: 242 4 lb  BP: 106/ 59 mmHg    Indications: Acute Respiratory failure  Diagnoses: J96 91 - Respiratory failure, unspecified with hypoxia    Sonographer:  BILL El  Referring Physician:  Jeet Hodges PA-C  Group:  [id number]  Interpreting Physician:  Prince Hathaway MD    SUMMARY    PROCEDURE INFORMATION:  The heart rate was 119 bpm, at the start of the study  Patient was in atrial fibrillation with rapid ventricular rate    LEFT VENTRICLE:  Systolic function was moderately reduced  Ejection fraction was estimated to be around 40 %   Hard to assess set correctly due to irregular and fast heartbeat  There was mild diffuse hypokinesis  Wall thickness was at the upper limits of normal   There was mild borderline concentric hypertrophy  LEFT ATRIUM:  The atrium was mildly to moderately dilated  RIGHT ATRIUM:  The atrium was mildly dilated  MITRAL VALVE:  There was mild regurgitation  TRICUSPID VALVE:  There was mild regurgitation  Estimated peak PA pressure was 30 mmHg  IVC, HEPATIC VEINS:  The inferior vena cava was mildly dilated  The respirophasic change in diameter was more than 50%  HISTORY: PRIOR HISTORY: Cardiomyopathy,A Fib ,HTN,CAD,Acute kidney injury,Hyperlipidemia,Hypertriglyceridemia  PROCEDURE: The procedure was performed at the bedside  This was a routine study  The transthoracic approach was used  The study included complete 2D imaging, M-mode, complete spectral Doppler, and color Doppler  The heart rate was 119 bpm,  at the start of the study  Patient was in atrial fibrillation with rapid ventricular rate Images were obtained from the parasternal, apical, subcostal, and suprasternal notch acoustic windows  Echocardiographic views were limited due to  patient on mechanical ventilator  Image quality was adequate  LEFT VENTRICLE: Size was normal  Systolic function was moderately reduced  Ejection fraction was estimated to be around 40 %  Hard to assess set correctly due to irregular and fast heartbeat There was mild diffuse hypokinesis  Wall  thickness was at the upper limits of normal  There was mild borderline concentric hypertrophy  DOPPLER: The study was not technically sufficient to allow evaluation of LV diastolic function due to atrial fibrillation  RIGHT VENTRICLE: The size was normal  Systolic function was normal  Wall thickness was normal     LEFT ATRIUM: The atrium was mildly to moderately dilated  RIGHT ATRIUM: The atrium was mildly dilated  MITRAL VALVE: There was normal leaflet separation  DOPPLER: The transmitral velocity was within the normal range   There was no evidence for stenosis  There was mild regurgitation  AORTIC VALVE: The valve was trileaflet  Leaflets exhibited mildly increased thickness and normal cuspal separation  DOPPLER: Transaortic velocity was within the normal range  There was no evidence for stenosis  There was no regurgitation  TRICUSPID VALVE: DOPPLER: There was mild regurgitation  Estimated peak PA pressure was 30 mmHg  PULMONIC VALVE: DOPPLER: There was no significant regurgitation  PERICARDIUM: There was no thickening or calcification  There was no pericardial effusion  AORTA: The root exhibited normal size  SYSTEMIC VEINS: IVC: The inferior vena cava was mildly dilated  The respirophasic change in diameter was more than 50%  SYSTEM MEASUREMENT TABLES    2D  EF (Teich): 51 89 %  %FS: 26 73 %  Ao Diam: 3 53 cm  EDV(Teich): 126 75 ml  ESV(Teich): 60 98 ml  IVSd: 1 13 cm  LA Area: 30 65 cm2  LA Diam: 4 34 cm  LVEDV MOD A4C: 105 83 ml  LVEF MOD A4C: 30 31 %  LVESV MOD A4C: 73 76 ml  LVIDd: 5 15 cm  LVIDs: 3 77 cm  LVLd A4C: 8 08 cm  LVLs A4C: 7 89 cm  LVOT Diam: 2 21 cm  LVPWd: 0 95 cm  RA Area: 27 2 cm2  RVIDd: 3 06 cm  SV (Teich): 65 77 ml  SV MOD A4C: 32 08 ml    CW  AV Vmax: 1 09 m/s  AV maxP 77 mmHg  TR Vmax: 2 31 m/s  TR maxP 35 mmHg    MM  TAPSE: 1 82 cm    PW  CHEMO Vmax, Pt: 3 42 cm2  E' Lat: 0 11 m/s  E' Sept: 0 09 m/s  LVOT Env  Ti: 186 49 ms  LVOT VTI: 14 57 cm  LVOT Vmax: 1 m/s  LVOT Vmax: 0 97 m/s  LVOT Vmean: 0 78 m/s  LVOT maxP 03 mmHg  LVOT maxPG: 3 79 mmHg  LVOT meanP 68 mmHg    [facility] Accredited Echocardiography [facility]    Prepared and electronically signed by    Odilia Barrios MD  Signed 02-Aug-2021 10:06:00        Joesph Gaucher, CRNP        "This note has been constructed using a voice recognition system  Therefore there may be syntax, spelling, and/or grammatical errors   Please call if you have any questions  "

## 2021-08-06 NOTE — ASSESSMENT & PLAN NOTE
· Hx cardiomyopathy echo 2017 with EF 40% without significant valvular abnormality, subsequent nuclear stress test 2017 EF reported 65%   · Troponin negative x 3  · Follows with cardiology, no follow up echos noted  · Echo 8/2: EF 40%, difficult to assess given Afib with rapid rate  Estimated PAP 30s     · At home torsemide 20 mg daily and chlorthalidone 25 mg daily, reports taking as prescribed   · CXR with evidence of volume overload, BNP elevated 4649  · Received lasix 40mg x2 on 8/4, hypernatremic following day and decreased back 40mg qd  · 8/6- Net I/O -1 8L over 24h, -7 0L since admission  · Consider repeat diuresis today pending morning labs  · Trend I&Os   · Daily weights

## 2021-08-06 NOTE — NURSING NOTE
Received the patient following report from [facility]  The patient is resting in bed in no apparent distress, offers no complaints, asking for ice water, will review patient's orders

## 2021-08-07 PROBLEM — E87.0 HYPERNATREMIA: Status: ACTIVE | Noted: 2021-08-07

## 2021-08-07 LAB
ALBUMIN SERPL BCP-MCNC: 2 G/DL (ref 3.5–5)
ALP SERPL-CCNC: 159 U/L (ref 46–116)
ALT SERPL W P-5'-P-CCNC: 132 U/L (ref 12–78)
ANION GAP SERPL CALCULATED.3IONS-SCNC: 9 MMOL/L (ref 4–13)
AST SERPL W P-5'-P-CCNC: 43 U/L (ref 5–45)
BACTERIA BLD CULT: NORMAL
BACTERIA BLD CULT: NORMAL
BILIRUB SERPL-MCNC: 0.83 MG/DL (ref 0.2–1)
BUN SERPL-MCNC: 29 MG/DL (ref 5–25)
CALCIUM ALBUM COR SERPL-MCNC: 11.1 MG/DL (ref 8.3–10.1)
CALCIUM SERPL-MCNC: 9.5 MG/DL (ref 8.3–10.1)
CHLORIDE SERPL-SCNC: 107 MMOL/L (ref 100–108)
CO2 SERPL-SCNC: 29 MMOL/L (ref 21–32)
CREAT SERPL-MCNC: 0.87 MG/DL (ref 0.6–1.3)
ERYTHROCYTE [DISTWIDTH] IN BLOOD BY AUTOMATED COUNT: 14.3 % (ref 11.6–15.1)
GFR SERPL CREATININE-BSD FRML MDRD: 90 ML/MIN/1.73SQ M
GLUCOSE SERPL-MCNC: 221 MG/DL (ref 65–140)
GLUCOSE SERPL-MCNC: 274 MG/DL (ref 65–140)
GLUCOSE SERPL-MCNC: 282 MG/DL (ref 65–140)
GLUCOSE SERPL-MCNC: 291 MG/DL (ref 65–140)
GLUCOSE SERPL-MCNC: 358 MG/DL (ref 65–140)
HCT VFR BLD AUTO: 35.7 % (ref 36.5–49.3)
HGB BLD-MCNC: 11.9 G/DL (ref 12–17)
MCH RBC QN AUTO: 34.2 PG (ref 26.8–34.3)
MCHC RBC AUTO-ENTMCNC: 33.3 G/DL (ref 31.4–37.4)
MCV RBC AUTO: 103 FL (ref 82–98)
NRBC BLD AUTO-RTO: 0 /100 WBCS
PLATELET # BLD AUTO: 388 THOUSANDS/UL (ref 149–390)
PMV BLD AUTO: 10.1 FL (ref 8.9–12.7)
POTASSIUM SERPL-SCNC: 4.1 MMOL/L (ref 3.5–5.3)
PROT SERPL-MCNC: 6.7 G/DL (ref 6.4–8.2)
RBC # BLD AUTO: 3.48 MILLION/UL (ref 3.88–5.62)
SODIUM SERPL-SCNC: 145 MMOL/L (ref 136–145)
WBC # BLD AUTO: 11.42 THOUSAND/UL (ref 4.31–10.16)

## 2021-08-07 PROCEDURE — 80053 COMPREHEN METABOLIC PANEL: CPT | Performed by: NURSE PRACTITIONER

## 2021-08-07 PROCEDURE — 94640 AIRWAY INHALATION TREATMENT: CPT

## 2021-08-07 PROCEDURE — 94660 CPAP INITIATION&MGMT: CPT

## 2021-08-07 PROCEDURE — 97110 THERAPEUTIC EXERCISES: CPT

## 2021-08-07 PROCEDURE — 94669 MECHANICAL CHEST WALL OSCILL: CPT

## 2021-08-07 PROCEDURE — 94760 N-INVAS EAR/PLS OXIMETRY 1: CPT

## 2021-08-07 PROCEDURE — 94668 MNPJ CHEST WALL SBSQ: CPT

## 2021-08-07 PROCEDURE — 82948 REAGENT STRIP/BLOOD GLUCOSE: CPT

## 2021-08-07 PROCEDURE — 99232 SBSQ HOSP IP/OBS MODERATE 35: CPT | Performed by: INTERNAL MEDICINE

## 2021-08-07 PROCEDURE — 85027 COMPLETE CBC AUTOMATED: CPT | Performed by: NURSE PRACTITIONER

## 2021-08-07 RX ORDER — AMLODIPINE BESYLATE 10 MG/1
10 TABLET ORAL DAILY
Status: DISCONTINUED | OUTPATIENT
Start: 2021-08-08 | End: 2021-08-10

## 2021-08-07 RX ORDER — AMOXICILLIN 250 MG
1 CAPSULE ORAL
Status: DISCONTINUED | OUTPATIENT
Start: 2021-08-07 | End: 2021-08-13 | Stop reason: HOSPADM

## 2021-08-07 RX ORDER — POLYETHYLENE GLYCOL 3350 17 G/17G
17 POWDER, FOR SOLUTION ORAL DAILY PRN
Status: DISCONTINUED | OUTPATIENT
Start: 2021-08-07 | End: 2021-08-13 | Stop reason: HOSPADM

## 2021-08-07 RX ADMIN — IPRATROPIUM BROMIDE AND ALBUTEROL SULFATE 3 ML: 2.5; .5 SOLUTION RESPIRATORY (INHALATION) at 15:00

## 2021-08-07 RX ADMIN — BUPROPION 150 MG: 150 TABLET, EXTENDED RELEASE ORAL at 08:48

## 2021-08-07 RX ADMIN — RISPERIDONE 2 MG: 1 TABLET ORAL at 08:49

## 2021-08-07 RX ADMIN — HEPARIN SODIUM 5000 UNITS: 5000 INJECTION INTRAVENOUS; SUBCUTANEOUS at 05:55

## 2021-08-07 RX ADMIN — METRONIDAZOLE 500 MG: 500 INJECTION, SOLUTION INTRAVENOUS at 02:15

## 2021-08-07 RX ADMIN — AMLODIPINE BESYLATE 5 MG: 5 TABLET ORAL at 08:47

## 2021-08-07 RX ADMIN — METOPROLOL TARTRATE 100 MG: 100 TABLET, FILM COATED ORAL at 21:21

## 2021-08-07 RX ADMIN — INSULIN LISPRO 4 UNITS: 100 INJECTION, SOLUTION INTRAVENOUS; SUBCUTANEOUS at 11:43

## 2021-08-07 RX ADMIN — METHYLPREDNISOLONE SODIUM SUCCINATE 40 MG: 40 INJECTION, POWDER, FOR SOLUTION INTRAMUSCULAR; INTRAVENOUS at 05:55

## 2021-08-07 RX ADMIN — CEFTRIAXONE 2000 MG: 2 INJECTION, SOLUTION INTRAVENOUS at 11:23

## 2021-08-07 RX ADMIN — INSULIN LISPRO 2 UNITS: 100 INJECTION, SOLUTION INTRAVENOUS; SUBCUTANEOUS at 16:57

## 2021-08-07 RX ADMIN — METHYLPREDNISOLONE SODIUM SUCCINATE 40 MG: 40 INJECTION, POWDER, FOR SOLUTION INTRAMUSCULAR; INTRAVENOUS at 14:07

## 2021-08-07 RX ADMIN — IPRATROPIUM BROMIDE AND ALBUTEROL SULFATE 3 ML: 2.5; .5 SOLUTION RESPIRATORY (INHALATION) at 19:56

## 2021-08-07 RX ADMIN — IPRATROPIUM BROMIDE AND ALBUTEROL SULFATE 3 ML: 2.5; .5 SOLUTION RESPIRATORY (INHALATION) at 07:37

## 2021-08-07 RX ADMIN — ACETYLCYSTEINE 600 MG: 200 SOLUTION ORAL; RESPIRATORY (INHALATION) at 07:37

## 2021-08-07 RX ADMIN — ACETAMINOPHEN 650 MG: 325 TABLET, FILM COATED ORAL at 05:54

## 2021-08-07 RX ADMIN — METOPROLOL TARTRATE 100 MG: 100 TABLET, FILM COATED ORAL at 08:46

## 2021-08-07 RX ADMIN — METRONIDAZOLE 500 MG: 500 INJECTION, SOLUTION INTRAVENOUS at 10:30

## 2021-08-07 RX ADMIN — DOCUSATE SODIUM AND SENNOSIDES 1 TABLET: 8.6; 5 TABLET, FILM COATED ORAL at 08:55

## 2021-08-07 RX ADMIN — THIAMINE HCL TAB 100 MG 100 MG: 100 TAB at 08:47

## 2021-08-07 RX ADMIN — FOLIC ACID 1 MG: 1 TABLET ORAL at 08:47

## 2021-08-07 RX ADMIN — HEPARIN SODIUM 5000 UNITS: 5000 INJECTION INTRAVENOUS; SUBCUTANEOUS at 21:22

## 2021-08-07 RX ADMIN — INSULIN LISPRO 6 UNITS: 100 INJECTION, SOLUTION INTRAVENOUS; SUBCUTANEOUS at 08:52

## 2021-08-07 RX ADMIN — METHYLPREDNISOLONE SODIUM SUCCINATE 40 MG: 40 INJECTION, POWDER, FOR SOLUTION INTRAMUSCULAR; INTRAVENOUS at 21:21

## 2021-08-07 RX ADMIN — METRONIDAZOLE 500 MG: 500 INJECTION, SOLUTION INTRAVENOUS at 17:52

## 2021-08-07 RX ADMIN — HEPARIN SODIUM 5000 UNITS: 5000 INJECTION INTRAVENOUS; SUBCUTANEOUS at 14:07

## 2021-08-07 RX ADMIN — ACETAMINOPHEN 650 MG: 325 TABLET, FILM COATED ORAL at 12:05

## 2021-08-07 RX ADMIN — IPRATROPIUM BROMIDE AND ALBUTEROL SULFATE 3 ML: 2.5; .5 SOLUTION RESPIRATORY (INHALATION) at 03:00

## 2021-08-07 RX ADMIN — IPRATROPIUM BROMIDE AND ALBUTEROL SULFATE 3 ML: 2.5; .5 SOLUTION RESPIRATORY (INHALATION) at 11:04

## 2021-08-07 RX ADMIN — FLUOXETINE 20 MG: 20 CAPSULE ORAL at 08:48

## 2021-08-07 RX ADMIN — ALPRAZOLAM 0.5 MG: 0.5 TABLET ORAL at 16:53

## 2021-08-07 RX ADMIN — ACETYLCYSTEINE 600 MG: 200 SOLUTION ORAL; RESPIRATORY (INHALATION) at 15:01

## 2021-08-07 RX ADMIN — DOCUSATE SODIUM AND SENNOSIDES 1 TABLET: 8.6; 5 TABLET, FILM COATED ORAL at 21:21

## 2021-08-07 NOTE — ASSESSMENT & PLAN NOTE
· CXR on admission with dense consolidations in right  · Pro calcitonin elevated 7 6, reflex 8 8, downtrended to 1 23 8/6  · Blood cultures NG @ 4 day  · Strep and Legionella Ag neg  · Sputum culture ordered, but not producing sputum to culture  · Antibiotics per Acute respiratory failure plan above   Coverage with Flagyl added 8/4 2/2 aspiration event while drinking water  · Trend WBC/fever curve, procal

## 2021-08-07 NOTE — ASSESSMENT & PLAN NOTE
· Serum sodium 148-149 last 2 days in setting of diuresis  AM labs pending  · Net negative 8 9L for hospitalization  Unclear baseline weight  · Consider addition of thiazide diuretic with daily lasix to help with sodium excretion vs  holding diuresis in setting of improved CXR and hypernatremia   Home meds list chlorthalidone 25mg tab

## 2021-08-07 NOTE — ASSESSMENT & PLAN NOTE
· At home takes pravastatin 40mg QD  · Last lipid panel 5 months ago: HDL 65, LDL 37,   · Hold pravastatin given elevated LFTs   AM CMP pending

## 2021-08-07 NOTE — ASSESSMENT & PLAN NOTE
· Home regimen: Amlodipine 5 mg daily, Candesartan 32 mg daily, prazosin 2 mg TID, metoprolol 100 mg daily, chlorthalidone 25 mg daily, torsemide 20mg qd  · Initially held home medications while working on rate control for atrial fibrillation with RVR  Now with metoprolol 100mg PO BID  · Patient BP elevated 8/6 AM in setting of anxiety, improved with Xanax  Restarted home amlodipine   · Consider restarting candesartan as MAX improved  · Daily diuretics as per AM labs, goal net negative fluid balance

## 2021-08-07 NOTE — PROGRESS NOTES
Mally 45  Progress Note - Elise Lowe 1955, 77 y o  male MRN: [id number]  Unit/Bed#: ICU 12 Encounter: [id number]  Primary Care Provider: Bandar Ng   Date and time admitted to hospital: 8/1/2021  1:59 PM    * Acute respiratory failure with hypoxia [facility])  Assessment & Plan  · Presented with multiple days of fatigue, dry cough and shortness of breath, hypoxic to 88% on room air on presentation  Placed on 4 L NC with improvement in spo2 but persistent increased WOB requiring BIPAP without significant improvement  · Likely 2/2 pneumonia, possible component of CHF exacerbation  · BNP 4649  · CXR dense consolidation concerning for PNA vs CHF  Now improving s/p abx and diuresis  · Procalcitonin downtrending 8 8--->5 22---> 1 23 8/6  · afebrile  · Continue Ceftriaxone D#6  S/p 5D tx with Azithromycin completed 8/6  · Continue Flagyl, D#4  · Patient found with multiple empty alcohol bottles at home  Had episode of choking drinking water 8/3 overnight  · Continue solumedrol 40mg q8H, Duonebs Q4H, mucomyst q8H, chest PT  · BIPAP 10/6 70% HS, HFNC daytime   · Wean HFNC as tolerated, currently 55% 30L   · Continue diuresis prn daily for goal negative fluid balance  · Trend WBC/fever curve, procal  · Pulmonary hygiene    Cardiomyopathy ([facility] [address] )  Assessment & Plan  · Hx cardiomyopathy echo 2017 with EF 40% without significant valvular abnormality, subsequent nuclear stress test 2017 EF reported 65%   · Troponin negative x 3  · Follows with cardiology, no follow up echos noted  · Echo 8/2: EF 40%, difficult to assess given Afib with rapid rate  Estimated PAP 30s  · At home torsemide 20 mg daily and chlorthalidone 25 mg daily, reports taking as prescribed   · CXR with evidence of volume overload, BNP elevated 4649  · Received lasix 40mg x2 on 8/4, hypernatremic following day   Has since received daily diuresis pending AM labs  · 8/7- Net I/O -1 8L over 24h, -8 9L since admission  · Trend I&Os   · Daily weights        Pneumonia  Assessment & Plan  · CXR on admission with dense consolidations in right  · Pro calcitonin elevated 7 6, reflex 8 8, downtrended to 1 23 8/6  · Blood cultures NG @ 4 day  · Strep and Legionella Ag neg  · Sputum culture ordered, but not producing sputum to culture  · Antibiotics per Acute respiratory failure plan above  Coverage with Flagyl added 8/4 2/2 aspiration event while drinking water  · Trend WBC/fever curve, procal    Sepsis ([facility] [address] )  Assessment & Plan  SIRs criteria met POA secondary to leukocytosis, tachycardia, tachypnea  · procal elevated to 7 6, reflex 8 8  Now downtrending to 1 23 8/6  No further trending  · UA with moderate bacteria, 4-10 WBC  Negative nitrites, leukocytes  Urine culture negative  · BC negative x 4 days  · Strep pneumo, legionella urine antigen negative  · Initial CXR dense consolidation in Right lower lobe concerning for PNA  · Consider CAP vs aspiration PNA  · Continue ceftiaxone D#6  S/p completion of 5D azithromycin 8/6  · Continue Flagyl day 4  · Trend WBC, fever curve    Essential hypertension  Assessment & Plan  · Home regimen: Amlodipine 5 mg daily, Candesartan 32 mg daily, prazosin 2 mg TID, metoprolol 100 mg daily, chlorthalidone 25 mg daily, torsemide 20mg qd  · Initially held home medications while working on rate control for atrial fibrillation with RVR  Now with metoprolol 100mg PO BID  · Patient BP elevated 8/6 AM in setting of anxiety, improved with Xanax  Restarted home amlodipine   · Consider restarting candesartan as MAX improved  · Daily diuretics as per AM labs, goal net negative fluid balance  Hypernatremia  Assessment & Plan  · Serum sodium 148-149 last 2 days in setting of diuresis  AM labs pending  · Net negative 8 9L for hospitalization  Unclear baseline weight  · Consider addition of thiazide diuretic with daily lasix to help with sodium excretion vs  holding diuresis in setting of improved CXR and hypernatremia  Home meds list chlorthalidone 25mg tab     Mixed hyperlipidemia  Assessment & Plan  · At home takes pravastatin 40mg QD  · Last lipid panel 5 months ago: HDL 65, LDL 37,   · Hold pravastatin given elevated LFTs  AM CMP pending    Chronic atrial fibrillation (HCC)  Assessment & Plan  · Chronic atrial fibrillation on metoprolol 100 mg daily with RVR rates 130-140s in ER   · Received 15 mg diltiazem x 1 in ER with rate improvement to 100s  · Maintained on cardizem drip 8/2-8/4  · Continue lopressor 100mg BID  · Not on chronic anticoagulation - per chart review DOAC too expensive and not interested in wafarin   · Will hold systemic AC here as AF chronic and on no [facility]       Anxiety  Assessment & Plan  · Xanax 0 5 mg TID prn    Alcohol use  Assessment & Plan  · Unclear amount of alcohol consumption  · Continue thiamine/folic acid  · Monitor for signs of alcohol withdrawal     Depression, recurrent (HCC)  Assessment & Plan  · Continue home Wellbutrin, Prozac and risperidone     Severe obesity (BMI 35 0-39  9) with comorbidity ([address] )  Assessment & Plan  · Lifestyle counseling when appropriate     ----------------------------------------------------------------------------------------  HPI/24hr events: Tolerated down-titration of HFNC yesterday to 55% and 30L  Also with notable improvement in CXR  Had some episodes of anxiety/hypertension yesterday  Restarted on home antihypertensives  No BM since admission  Will start bowel regimen today  No other acute changes overnight  No  Disposition: Continue Stepdown Level 1 level of care   Code Status: Level 3 - DNAR and DNI  ---------------------------------------------------------------------------------------  SUBJECTIVE  "I feel OK" Denies SOB currently  C/o some anxiety  Review of Systems   Constitutional: Positive for fatigue  Negative for appetite change, chills and fever  HENT: Negative  Eyes: Negative  Respiratory: Positive for cough   Negative for chest tightness, shortness of breath and wheezing  Cardiovascular: Negative for chest pain and palpitations  Gastrointestinal: Positive for constipation  Negative for abdominal pain, diarrhea, nausea and vomiting  Genitourinary: Negative  Musculoskeletal: Negative  Skin: Negative  Neurological: Negative  Psychiatric/Behavioral: The patient is nervous/anxious  All other systems reviewed and are negative  Review of systems was reviewed and negative unless stated above in HPI/24-hour events   ---------------------------------------------------------------------------------------  OBJECTIVE    Vitals   Vitals:    21 0000 21 0100 21 0256 21 0300   BP: 140/81 147/81     BP Location:       Pulse: (!) 107 100     Resp: 17 18     Temp: 97 8 °F (36 6 °C)      TempSrc: Temporal      SpO2: 99% 98% 99% 99%   Weight:       Height:         Temp (24hrs), Av 1 °F (36 7 °C), Min:97 8 °F (36 6 °C), Max:98 3 °F (36 8 °C)  Current: Temperature: 97 8 °F (36 6 °C)          Respiratory:  SpO2: SpO2: 99 %, SpO2 Activity: SpO2 Activity: At Rest, SpO2 Device: O2 Device: High flow nasal cannula  Nasal Cannula O2 Flow Rate (L/min): 30 L/min    Invasive/non-invasive ventilation settings   Respiratory    Lab Data (Last 4 hours)    None         O2/Vent Data (Last 4 hours)       0256          Non-Invasive Ventilation Mode BiPAP                   Physical Exam  Vitals and nursing note reviewed  Constitutional:       General: He is not in acute distress  Appearance: He is obese  He is ill-appearing  He is not toxic-appearing  HENT:      Head: Normocephalic and atraumatic  Nose: Nose normal       Mouth/Throat:      Mouth: Mucous membranes are moist    Eyes:      Comments: L  Eye chronic vision changes s/p childhood injury    Cardiovascular:      Rate and Rhythm: Tachycardia present  Rhythm irregular  Pulses: Normal pulses             Radial pulses are 2+ on the right side and 2+ on the left side  Heart sounds: No murmur heard  Pulmonary:      Effort: Pulmonary effort is normal  No respiratory distress  Breath sounds: Decreased breath sounds present  No rhonchi  Abdominal:      General: Bowel sounds are normal  There is no distension  Palpations: Abdomen is soft  Tenderness: There is no guarding  Genitourinary:     Comments: Urinary catheter present draining yellow urine  Skin:     General: Skin is warm and dry  Neurological:      General: No focal deficit present  Mental Status: He is alert and oriented to person, place, and time  GCS: GCS eye subscore is 4  GCS verbal subscore is 5  GCS motor subscore is 6  Psychiatric:         Mood and Affect: Affect is flat  Behavior: Behavior is cooperative           Laboratory and Diagnostics:  Results from last 7 days   Lab Units 08/06/21  0526 08/05/21  0619 08/04/21  0530 08/03/21  0502 08/02/21  0545 08/01/21  1602   WBC Thousand/uL 9 58 12 79* 12 72* 11 23* 13 21* 14 14*   HEMOGLOBIN g/dL 11 0* 11 0* 10 7* 10 5* 11 3* 12 5   HEMATOCRIT % 33 8* 33 3* 32 4* 32 5* 35 2* 37 7   PLATELETS Thousands/uL 351 299 251 209 235 209   NEUTROS PCT %  --  91*  --   --   --  93*   BANDS PCT % 3  --   --  40*  --   --    MONOS PCT %  --  4  --   --   --  4   MONO PCT % 5  --   --  1*  --   --      Results from last 7 days   Lab Units 08/06/21  0526 08/05/21  0619 08/04/21  0530 08/03/21  0502 08/02/21  0545 08/01/21  1601   SODIUM mmol/L 148* 149* 143 142 142 140   POTASSIUM mmol/L 3 6 3 6 3 8 4 0 4 7 4 2   CHLORIDE mmol/L 110* 111* 107 105 105 104   CO2 mmol/L 31 30 26 25 24 24   ANION GAP mmol/L 7 8 10 12 13 12   BUN mg/dL 31* 30* 28* 27* 24 21   CREATININE mg/dL 0 99 0 98 1 00 1 19 1 49* 1 46*   CALCIUM mg/dL 9 8 10 1 9 8 9 4 9 4 9 5   GLUCOSE RANDOM mg/dL 226* 186* 151* 105 110 112   ALT U/L  --  192*  --   --  57 59   AST U/L  --  277*  --   --  83* 59*   ALK PHOS U/L  --  179*  --   --  187* 226*   ALBUMIN g/dL  --  2 0*  --   --  2 8* 2 9*   TOTAL BILIRUBIN mg/dL  --  1 64*  --   --  2 04* 1 65*     Results from last 7 days   Lab Units 08/06/21  0526 08/05/21  0619 08/04/21  0530 08/03/21  0502 08/02/21  0545   MAGNESIUM mg/dL 2 7* 2 8* 2 9* 2 4 2 2      Results from last 7 days   Lab Units 08/01/21  1637   INR  1 52*   PTT seconds 49*      Results from last 7 days   Lab Units 08/01/21  2158 08/01/21  1935 08/01/21  1602   TROPONIN I ng/mL <0 02 <0 02 <0 02     Results from last 7 days   Lab Units 08/01/21  1601   LACTIC ACID mmol/L 1 8     ABG:  Results from last 7 days   Lab Units 08/04/21  0027   PH ART  7 388   PCO2 ART mm Hg 44 5*   PO2 ART mm Hg 57 8*   HCO3 ART mmol/L 26 2   BASE EXC ART mmol/L 0 9   ABG SOURCE  Radial, Right     VBG:  Results from last 7 days   Lab Units 08/04/21  0027   ABG SOURCE  Radial, Right     Results from last 7 days   Lab Units 08/06/21  0526 08/05/21  1041 08/04/21  0530 08/03/21  0502 08/02/21  1227   PROCALCITONIN ng/ml 1 23* 2 26* 5 22* 8 80* 7 64*       Micro  Results from last 7 days   Lab Units 08/03/21  0230 08/01/21  2028 08/01/21  1932 08/01/21  1602 08/01/21  1601   BLOOD CULTURE   --   --   --  No Growth After 5 Days  No Growth After 5 Days  URINE CULTURE   --   --  No Growth <1000 cfu/mL  --   --    MRSA CULTURE ONLY   --  No Methicillin Resistant Staphlyococcus aureus (MRSA) isolated  --   --   --    LEGIONELLA URINARY ANTIGEN  Negative  --   --   --   --    STREP PNEUMONIAE ANTIGEN, URINE  Negative  --   --   --   --        EKG: Afib on monitor, rate 100-120's  Imaging: I have personally reviewed pertinent reports  and I have personally reviewed pertinent films in PACS    Intake and Output  I/O       08/05 0701 - 08/06 0700 08/06 0701 - 08/07 0700    P  O  1410 450    IV Piggyback 600 400    Total Intake(mL/kg) 2010 (19 7) 850 (8 3)    Urine (mL/kg/hr) 3900 (1 6) 2675 (1 1)    Total Output 3900 2675    Net -1890 -1828                Height and Weights   Height: 5' 10" (177 8 cm)  IBW (Ideal Body Weight): 73 kg  Body mass index is 32 2 kg/m²  Weight (last 2 days)     Date/Time   Weight    08/06/21 0600   102 (224 43)    08/05/21 0400   101 (223 77)                Nutrition       Diet Orders   (From admission, onward)             Start     Ordered    08/06/21 1829  Diet Dysphagia/Modified Consistency; Dysphagia 3-Dental Soft; Thin Liquid; Fluid Restriction 1500 ML, Sodium 2 GM  Diet effective now     Question Answer Comment   Diet Type Dysphagia/Modified Consistency    Dysphagia/Modified Consistency Dysphagia 3-Dental Soft    Liquid Modifier Thin Liquid    Other Restriction(s): Fluid Restriction 1500 ML    Other Restriction(s): Sodium 2 GM    RD to adjust diet per protocol?  No        08/06/21 1828    08/03/21 1146  Room Service  Once     Question:  Type of Service  Answer:  Room Service-Appropriate    08/03/21 1145                  Active Medications  Scheduled Meds:  Current Facility-Administered Medications   Medication Dose Route Frequency Provider Last Rate    acetaminophen  650 mg Oral Q6H PRN Ally River PA-C      acetylcysteine  3 mL Nebulization Q8H Lorri Álvarez PA-C      ALPRAZolam  0 5 mg Oral TID PRN Angel Lennon PA-C      amLODIPine  5 mg Oral Daily Naila Vergara MD      buPROPion  150 mg Oral Daily Michelle Carrillo      cefTRIAXone  2,000 mg Intravenous Q24H Naila Vergara MD Stopped (08/06/21 1310)    FLUoxetine  20 mg Oral Daily Ally River PA-C      folic acid  1 mg Oral Daily Ally River PA-C      heparin (porcine)  5,000 Units Subcutaneous [facility] Ally River [place]      insulin lispro  1-6 Units Subcutaneous TID [facility] Naila Vergara MD      ipratropium-albuterol  3 mL Nebulization Q4H Naila Vergara MD      methocarbamol  750 mg Oral HS PRN Ally River PA-C      methylPREDNISolone sodium succinate  40 mg Intravenous [facility] Naila Vergara MD      metoprolol  5 mg Intravenous Q6H PRN Ally River ABRIL      metoprolol tartrate  100 mg Oral Q12H [facility] Micah Pleitez MD      metroNIDAZOLE  500 mg Intravenous Q8H Lorri Álvarez PA-C 500 mg (08/07/21 0215)    polyethylene glycol  17 g Oral Daily PRN TONY Link      risperiDONE  2 mg Oral Daily Clarke Ramírez, [place]      senna-docusate sodium  1 tablet Oral HS TONY Link      thiamine  100 mg Oral Daily Clarke Ramírez PA-C       Continuous Infusions:     PRN Meds:   acetaminophen, 650 mg, Q6H PRN  ALPRAZolam, 0 5 mg, TID PRN  methocarbamol, 750 mg, HS PRN  metoprolol, 5 mg, Q6H PRN  polyethylene glycol, 17 g, Daily PRN        Invasive Devices Review  Invasive Devices     Peripheral Intravenous Line            Peripheral IV 08/05/21 Distal;Right;Upper;Ventral (anterior) Arm 2 days    Peripheral IV 08/05/21 Right;Dorsal (posterior) Hand 2 days          Drain            Urethral Catheter Straight-tip 16 Fr  5 days                Rationale for remaining devices: Critical illness  ---------------------------------------------------------------------------------------  Advance Directive and Living Will:      Power of :    POLST:    ---------------------------------------------------------------------------------------  Care Time Delivered:   No Critical Care time spent       [facility]      Portions of the record may have been created with voice recognition software  Occasional wrong word or "sound a like" substitutions may have occurred due to the inherent limitations of voice recognition software    Read the chart carefully and recognize, using context, where substitutions have occurred

## 2021-08-07 NOTE — ASSESSMENT & PLAN NOTE
· Hx cardiomyopathy echo 2017 with EF 40% without significant valvular abnormality, subsequent nuclear stress test 2017 EF reported 65%   · Troponin negative x 3  · Follows with cardiology, no follow up echos noted  · Echo 8/2: EF 40%, difficult to assess given Afib with rapid rate  Estimated PAP 30s  · At home torsemide 20 mg daily and chlorthalidone 25 mg daily, reports taking as prescribed   · CXR with evidence of volume overload, BNP elevated 4649  · Received lasix 40mg x2 on 8/4, hypernatremic following day   Has since received daily diuresis pending AM labs  · 8/7- Net I/O -1 8L over 24h, -8 9L since admission  · Trend I&Os   · Daily weights

## 2021-08-07 NOTE — ASSESSMENT & PLAN NOTE
· Chronic atrial fibrillation on metoprolol 100 mg daily with RVR rates 130-140s in ER   · Received 15 mg diltiazem x 1 in ER with rate improvement to 100s  · Maintained on cardizem drip 8/2-8/4  · Continue lopressor 100mg BID  · Not on chronic anticoagulation - per chart review DOAC too expensive and not interested in wafarin   · Will hold systemic AC here as AF chronic and on no home [facility]

## 2021-08-07 NOTE — PHYSICAL THERAPY NOTE
PT TREATMENT     08/07/21 1135   Note Type   Note Type Treatment   Pain Assessment   Pain Assessment Tool 0-10   Pain Score Worst Possible Pain  (Headache)   Restrictions/Precautions   Other Precautions Chair Alarm; Bed Alarm; Fall Risk;O2;Pain;Multiple lines  (Seen in ICU)   General   Chart Reviewed Yes   Family/Caregiver Present No   Cognition   Arousal/Participation Cooperative   Subjective   Subjective Patient with complaints of dry mouth due to fluid restriction and headache   Bed Mobility   Supine to Sit 4  Minimal assistance   Additional items Assist x 1   Transfers   Sit to Stand 3  Moderate assistance   Additional items Assist x 1   Stand to Sit 3  Moderate assistance   Additional items Assist x 1   Ambulation/Elevation   Gait Assistance 3  Moderate assist   Additional items Assist x 1;Verbal cues; Tactile cues   Assistive Device Rolling walker   Distance 10 ft with shuffling gait narrow base of support  Patient out of bed in chair at end of session and nurse present   Balance   Static Sitting Fair +   Dynamic Sitting Fair   Static Standing Fair   Dynamic Standing [address],4, & 5 -   Activity Tolerance   Activity Tolerance Patient limited by fatigue;Treatment limited secondary to medical complications (Comment)  (In ICU)   Nurse Made Aware Yes   Exercises   Hamstring Stretch Supine;5 reps;PROM; Bilateral   Quad Sets Supine;10 reps;Bilateral   Heelslides Supine;10 reps;Bilateral   Glute Sets Sitting;10 reps   Hip Flexion Sitting;10 reps;Bilateral   Hip Abduction Supine;5 reps;Bilateral   Knee AROM Short Arc Quad Supine;5 reps;Bilateral   Knee AROM Long Arc Quad Sitting;10 reps;Bilateral   Ankle Pumps Sitting;10 reps;Bilateral   Balance training  Sidestepping and backward walking completed for balance and coordination   Assessment   Assessment Patient cooperative and motivated out of bed in chair at end of session    Patient will benefit continued physical therapy gait training strengthening balance training and progression as tolerated  The patient's AM-PAC Basic Mobility Inpatient Short Form Raw Score is 13, Standardized Score is 33 99  A standardized score less than 42 9 suggests the patient may benefit from discharge to post-acute rehabilitation services  Please also refer to the recommendation of the Physical Therapist for safe discharge planning  Plan   Treatment/Interventions ADL retraining;Functional transfer training;LE strengthening/ROM; Therapeutic exercise; Endurance training;Patient/family training;Equipment eval/education; Bed mobility;Gait training; Compensatory technique education   PT Frequency 5x/wk   Recommendation   PT Discharge Recommendation Post acute rehabilitation services   AM-[facility] Basic Mobility Inpatient   Turning in Bed Without Bedrails 3   Lying on Back to Sitting on Edge of Flat Bed 2   Moving Bed to Chair 2   Standing Up From Chair 3   Walk in Room 2   Climb 3-5 Stairs 1   Basic Mobility Inpatient Raw Score 13   Basic Mobility Standardized Score 89 84   Licensure   [place] License Number  Ladonna Rubio PT [id number]

## 2021-08-07 NOTE — ASSESSMENT & PLAN NOTE
· Presented with multiple days of fatigue, dry cough and shortness of breath, hypoxic to 88% on room air on presentation  Placed on 4 L NC with improvement in spo2 but persistent increased WOB requiring BIPAP without significant improvement  · Likely 2/2 pneumonia, possible component of CHF exacerbation  · BNP 4649  · CXR dense consolidation concerning for PNA vs CHF  Now improving s/p abx and diuresis  · Procalcitonin downtrending 8 8--->5 22---> 1 23 8/6  · afebrile  · Continue Ceftriaxone D#6  S/p 5D tx with Azithromycin completed 8/6  · Continue Flagyl, D#4  · Patient found with multiple empty alcohol bottles at home   Had episode of choking drinking water 8/3 overnight  · Continue solumedrol 40mg q8H, Duonebs Q4H, mucomyst q8H, chest PT  · BIPAP 10/6 70% HS, HFNC daytime   · Wean HFNC as tolerated, currently 55% 30L   · Continue diuresis prn daily for goal negative fluid balance  · Trend WBC/fever curve, procal  · Pulmonary hygiene

## 2021-08-07 NOTE — ASSESSMENT & PLAN NOTE
SIRs criteria met POA secondary to leukocytosis, tachycardia, tachypnea  · procal elevated to 7 6, reflex 8 8  Now downtrending to 1 23 8/6  No further trending  · UA with moderate bacteria, 4-10 WBC  Negative nitrites, leukocytes  Urine culture negative  · BC negative x 4 days  · Strep pneumo, legionella urine antigen negative  · Initial CXR dense consolidation in Right lower lobe concerning for PNA  · Consider CAP vs aspiration PNA  · Continue ceftiaxone D#6   S/p completion of 5D azithromycin 8/6  · Continue Flagyl day 4  · Trend WBC, fever curve

## 2021-08-08 PROBLEM — E87.0 HYPERNATREMIA: Status: RESOLVED | Noted: 2021-08-07 | Resolved: 2021-08-08

## 2021-08-08 LAB
ALBUMIN SERPL BCP-MCNC: 2.1 G/DL (ref 3.5–5)
ALP SERPL-CCNC: 146 U/L (ref 46–116)
ALT SERPL W P-5'-P-CCNC: 93 U/L (ref 12–78)
ANION GAP SERPL CALCULATED.3IONS-SCNC: 5 MMOL/L (ref 4–13)
AST SERPL W P-5'-P-CCNC: 24 U/L (ref 5–45)
BILIRUB SERPL-MCNC: 0.91 MG/DL (ref 0.2–1)
BUN SERPL-MCNC: 28 MG/DL (ref 5–25)
CALCIUM ALBUM COR SERPL-MCNC: 10.8 MG/DL (ref 8.3–10.1)
CALCIUM SERPL-MCNC: 9.3 MG/DL (ref 8.3–10.1)
CHLORIDE SERPL-SCNC: 106 MMOL/L (ref 100–108)
CO2 SERPL-SCNC: 30 MMOL/L (ref 21–32)
CREAT SERPL-MCNC: 0.92 MG/DL (ref 0.6–1.3)
ERYTHROCYTE [DISTWIDTH] IN BLOOD BY AUTOMATED COUNT: 13.8 % (ref 11.6–15.1)
GFR SERPL CREATININE-BSD FRML MDRD: 86 ML/MIN/1.73SQ M
GLUCOSE SERPL-MCNC: 276 MG/DL (ref 65–140)
GLUCOSE SERPL-MCNC: 286 MG/DL (ref 65–140)
GLUCOSE SERPL-MCNC: 299 MG/DL (ref 65–140)
GLUCOSE SERPL-MCNC: 309 MG/DL (ref 65–140)
GLUCOSE SERPL-MCNC: 351 MG/DL (ref 65–140)
HCT VFR BLD AUTO: 38.3 % (ref 36.5–49.3)
HGB BLD-MCNC: 12.8 G/DL (ref 12–17)
MAGNESIUM SERPL-MCNC: 2.9 MG/DL (ref 1.6–2.6)
MCH RBC QN AUTO: 34 PG (ref 26.8–34.3)
MCHC RBC AUTO-ENTMCNC: 33.4 G/DL (ref 31.4–37.4)
MCV RBC AUTO: 102 FL (ref 82–98)
PLATELET # BLD AUTO: 423 THOUSANDS/UL (ref 149–390)
PMV BLD AUTO: 10.2 FL (ref 8.9–12.7)
POTASSIUM SERPL-SCNC: 4.1 MMOL/L (ref 3.5–5.3)
PROT SERPL-MCNC: 6.5 G/DL (ref 6.4–8.2)
RBC # BLD AUTO: 3.77 MILLION/UL (ref 3.88–5.62)
SODIUM SERPL-SCNC: 141 MMOL/L (ref 136–145)
WBC # BLD AUTO: 11.91 THOUSAND/UL (ref 4.31–10.16)

## 2021-08-08 PROCEDURE — 94668 MNPJ CHEST WALL SBSQ: CPT

## 2021-08-08 PROCEDURE — 94760 N-INVAS EAR/PLS OXIMETRY 1: CPT

## 2021-08-08 PROCEDURE — 85027 COMPLETE CBC AUTOMATED: CPT | Performed by: NURSE PRACTITIONER

## 2021-08-08 PROCEDURE — 82948 REAGENT STRIP/BLOOD GLUCOSE: CPT

## 2021-08-08 PROCEDURE — 94669 MECHANICAL CHEST WALL OSCILL: CPT

## 2021-08-08 PROCEDURE — 94640 AIRWAY INHALATION TREATMENT: CPT

## 2021-08-08 PROCEDURE — 99232 SBSQ HOSP IP/OBS MODERATE 35: CPT | Performed by: INTERNAL MEDICINE

## 2021-08-08 PROCEDURE — 94660 CPAP INITIATION&MGMT: CPT

## 2021-08-08 PROCEDURE — 83735 ASSAY OF MAGNESIUM: CPT | Performed by: NURSE PRACTITIONER

## 2021-08-08 PROCEDURE — 80053 COMPREHEN METABOLIC PANEL: CPT | Performed by: NURSE PRACTITIONER

## 2021-08-08 PROCEDURE — 97110 THERAPEUTIC EXERCISES: CPT

## 2021-08-08 RX ORDER — PRAVASTATIN SODIUM 40 MG
40 TABLET ORAL
Status: DISCONTINUED | OUTPATIENT
Start: 2021-08-08 | End: 2021-08-13 | Stop reason: HOSPADM

## 2021-08-08 RX ORDER — METHYLPREDNISOLONE SODIUM SUCCINATE 40 MG/ML
40 INJECTION, POWDER, LYOPHILIZED, FOR SOLUTION INTRAMUSCULAR; INTRAVENOUS EVERY 12 HOURS SCHEDULED
Status: DISCONTINUED | OUTPATIENT
Start: 2021-08-08 | End: 2021-08-09

## 2021-08-08 RX ORDER — TORSEMIDE 20 MG/1
20 TABLET ORAL DAILY
Status: DISCONTINUED | OUTPATIENT
Start: 2021-08-08 | End: 2021-08-13 | Stop reason: HOSPADM

## 2021-08-08 RX ADMIN — HEPARIN SODIUM 5000 UNITS: 5000 INJECTION INTRAVENOUS; SUBCUTANEOUS at 05:44

## 2021-08-08 RX ADMIN — FLUOXETINE 20 MG: 20 CAPSULE ORAL at 08:28

## 2021-08-08 RX ADMIN — RISPERIDONE 2 MG: 1 TABLET ORAL at 08:28

## 2021-08-08 RX ADMIN — DOCUSATE SODIUM AND SENNOSIDES 1 TABLET: 8.6; 5 TABLET, FILM COATED ORAL at 21:13

## 2021-08-08 RX ADMIN — METHYLPREDNISOLONE SODIUM SUCCINATE 40 MG: 40 INJECTION, POWDER, FOR SOLUTION INTRAMUSCULAR; INTRAVENOUS at 05:45

## 2021-08-08 RX ADMIN — ALPRAZOLAM 0.5 MG: 0.5 TABLET ORAL at 01:35

## 2021-08-08 RX ADMIN — IPRATROPIUM BROMIDE AND ALBUTEROL SULFATE 3 ML: 2.5; .5 SOLUTION RESPIRATORY (INHALATION) at 14:50

## 2021-08-08 RX ADMIN — TORSEMIDE 20 MG: 20 TABLET ORAL at 08:26

## 2021-08-08 RX ADMIN — ACETYLCYSTEINE 600 MG: 200 SOLUTION ORAL; RESPIRATORY (INHALATION) at 07:17

## 2021-08-08 RX ADMIN — METRONIDAZOLE 500 MG: 500 INJECTION, SOLUTION INTRAVENOUS at 18:20

## 2021-08-08 RX ADMIN — INSULIN LISPRO 8 UNITS: 100 INJECTION, SOLUTION INTRAVENOUS; SUBCUTANEOUS at 15:50

## 2021-08-08 RX ADMIN — FOLIC ACID 1 MG: 1 TABLET ORAL at 08:27

## 2021-08-08 RX ADMIN — METRONIDAZOLE 500 MG: 500 INJECTION, SOLUTION INTRAVENOUS at 11:20

## 2021-08-08 RX ADMIN — CEFTRIAXONE 2000 MG: 2 INJECTION, SOLUTION INTRAVENOUS at 12:00

## 2021-08-08 RX ADMIN — THIAMINE HCL TAB 100 MG 100 MG: 100 TAB at 08:26

## 2021-08-08 RX ADMIN — PRAVASTATIN SODIUM 40 MG: 40 TABLET ORAL at 16:12

## 2021-08-08 RX ADMIN — INSULIN LISPRO 6 UNITS: 100 INJECTION, SOLUTION INTRAVENOUS; SUBCUTANEOUS at 21:16

## 2021-08-08 RX ADMIN — IPRATROPIUM BROMIDE AND ALBUTEROL SULFATE 3 ML: 2.5; .5 SOLUTION RESPIRATORY (INHALATION) at 19:27

## 2021-08-08 RX ADMIN — METOPROLOL TARTRATE 100 MG: 100 TABLET, FILM COATED ORAL at 08:27

## 2021-08-08 RX ADMIN — AMLODIPINE BESYLATE 10 MG: 10 TABLET ORAL at 08:27

## 2021-08-08 RX ADMIN — ACETYLCYSTEINE 600 MG: 200 SOLUTION ORAL; RESPIRATORY (INHALATION) at 00:12

## 2021-08-08 RX ADMIN — IPRATROPIUM BROMIDE AND ALBUTEROL SULFATE 3 ML: 2.5; .5 SOLUTION RESPIRATORY (INHALATION) at 11:14

## 2021-08-08 RX ADMIN — INSULIN LISPRO 10 UNITS: 100 INJECTION, SOLUTION INTRAVENOUS; SUBCUTANEOUS at 11:47

## 2021-08-08 RX ADMIN — INSULIN LISPRO 6 UNITS: 100 INJECTION, SOLUTION INTRAVENOUS; SUBCUTANEOUS at 08:26

## 2021-08-08 RX ADMIN — IPRATROPIUM BROMIDE AND ALBUTEROL SULFATE 3 ML: 2.5; .5 SOLUTION RESPIRATORY (INHALATION) at 04:18

## 2021-08-08 RX ADMIN — HEPARIN SODIUM 5000 UNITS: 5000 INJECTION INTRAVENOUS; SUBCUTANEOUS at 21:15

## 2021-08-08 RX ADMIN — HEPARIN SODIUM 5000 UNITS: 5000 INJECTION INTRAVENOUS; SUBCUTANEOUS at 14:41

## 2021-08-08 RX ADMIN — BUPROPION 150 MG: 150 TABLET, EXTENDED RELEASE ORAL at 08:28

## 2021-08-08 RX ADMIN — IPRATROPIUM BROMIDE AND ALBUTEROL SULFATE 3 ML: 2.5; .5 SOLUTION RESPIRATORY (INHALATION) at 07:17

## 2021-08-08 RX ADMIN — ACETAMINOPHEN 650 MG: 325 TABLET, FILM COATED ORAL at 05:44

## 2021-08-08 RX ADMIN — ACETYLCYSTEINE 3 ML: 200 SOLUTION ORAL; RESPIRATORY (INHALATION) at 14:51

## 2021-08-08 RX ADMIN — ALPRAZOLAM 0.5 MG: 0.5 TABLET ORAL at 11:53

## 2021-08-08 RX ADMIN — METHYLPREDNISOLONE SODIUM SUCCINATE 40 MG: 40 INJECTION, POWDER, FOR SOLUTION INTRAMUSCULAR; INTRAVENOUS at 21:15

## 2021-08-08 RX ADMIN — IPRATROPIUM BROMIDE AND ALBUTEROL SULFATE 3 ML: 2.5; .5 SOLUTION RESPIRATORY (INHALATION) at 00:12

## 2021-08-08 RX ADMIN — METOPROLOL TARTRATE 100 MG: 100 TABLET, FILM COATED ORAL at 21:13

## 2021-08-08 RX ADMIN — METRONIDAZOLE 500 MG: 500 INJECTION, SOLUTION INTRAVENOUS at 02:29

## 2021-08-08 NOTE — ASSESSMENT & PLAN NOTE
· Home regimen: Amlodipine 5 mg daily, Candesartan 32 mg daily, prazosin 2 mg TID, metoprolol 100 mg daily, chlorthalidone 25 mg daily, torsemide 20mg qd  · Initially held home medications while working on rate control for atrial fibrillation with RVR  · Now with metoprolol 100mg PO BID  · Norvasc resumed 8/6  · Home norvasc increased to 10mg on 8/7  · Consider restarting candesartan if needed as MAX improved

## 2021-08-08 NOTE — ASSESSMENT & PLAN NOTE
· Hx cardiomyopathy echo 2017 with EF 40% without significant valvular abnormality, subsequent nuclear stress test 2017 EF reported 65%   · Troponin negative x 3  · Follows with cardiology, no follow up echos noted  · Echo 8/2: EF 40%, difficult to assess given Afib with rapid rate  Estimated PAP 30s     · At home torsemide 20 mg daily and chlorthalidone 25 mg daily, reports taking as prescribed   · Initial CXR with evidence of volume overload, BNP elevated 4649  · Diuresed well with IV lasix, lasix held yesterday   · Consider resuming home PO demadex today   · Appears euvolemic at this time   · Trend I&Os   · Daily weights

## 2021-08-08 NOTE — ASSESSMENT & PLAN NOTE
Presented with multiple days of fatigue, dry cough and shortness of breath, hypoxic to 88% on room air on presentation  Placed on 4 L NC with improvement in spo2 but persistent increased WOB requiring BIPAP without significant improvement  · Likely 2/2 pneumonia, possible component of CHF exacerbation  · BNP 4649  · CXR dense consolidation concerning for PNA vs CHF  Now improving s/p abx and diuresis  · Procalcitonin downtrending 8 8--->5 22---> 1 23 8/6  · afebrile  · Ceftriaxone D#7  S/p 5D tx with Azithromycin completed 8/6  · Flagyl, D#5  · Patient found with multiple empty alcohol bottles at home  Had episode of choking drinking water 8/3 overnight  · Given overall clinical improvement, will complete antibiotics today for total of 7 day course  · Currently on solumedrol 40mg q8H D#7  · Will wean to 40mg q12 today   · Continue Duonebs Q4H, mucomyst q8H, chest PT  · BIPAP 14/8 45% HS  · Goal to wean to [place] today   Tolerated 45% 25L yesterday with no significant FREEMAN/SOB  · Trend WBC/fever curve, procal  · Pulmonary hygiene

## 2021-08-08 NOTE — ASSESSMENT & PLAN NOTE
SIRs criteria met POA secondary to leukocytosis, tachycardia, tachypnea  · procal elevated to 7 6, reflex 8 8  Now downtrending to 1 23 8/6  No further trending  · UA with moderate bacteria, 4-10 WBC  Negative nitrites, leukocytes  Urine culture negative  · BC negative x 4 days  · Strep pneumo, legionella urine antigen negative  · Initial CXR dense consolidation in Right lower lobe concerning for PNA  · Consider CAP vs aspiration PNA  · Ceftiaxone D#7   S/p completion of 5D azithromycin 8/6  · Flagyl day 5,  · Will complete antibiotic course today after 7 days of treatment, clinical improvement and downtrending procalcitonin   · Trend WBC, fever curve

## 2021-08-08 NOTE — OCCUPATIONAL THERAPY NOTE
OT TREATMENT       08/08/21 1455   OT Last Visit   OT Visit Date 08/08/21   Note Type   Note Type Treatment   Restrictions/Precautions   Other Precautions Fall Risk;O2;Multiple lines; Chair Alarm; Bed Alarm   Pain Assessment   Pain Assessment Tool Pain Assessment not indicated - pt denies pain   ROM- Right Upper Extremities   R Shoulder AROM; Flexion;ABduction;Horizontal ABduction   R Elbow AROM;Elbow flexion;Elbow extension   R Wrist AROM; Wrist flexion;Wrist extension   R Hand AROM; Thumb; Index finger; Long finger;Ring finger;Little finger   R Position Seated   Equipment Dowel   R Weight/Reps/Sets 5 reps/ 2 sets   RUE ROM Comment frequent rest breaks   ROM - Left Upper Extremities    L Shoulder AROM; Flexion;ABduction;Horizontal ABduction   L Elbow AROM;Elbow flexion;Elbow extension   L Wrist AROM; Wrist flexion;Wrist extension   L Hand AROM; Thumb; Index finger; Long finger;Ring finger;Little finger   L Position Seated   Equipment Dowel   L Weight/Reps/Sets 5 reps / 2 sets   LUE ROM Comment frequent rest breaks   Cognition   Arousal/Participation Alert; Cooperative   Attention Attends with cues to redirect   Following Commands Follows one step commands with increased time or repetition   Activity Tolerance   Activity Tolerance Patient limited by fatigue   Assessment   Assessment Pt seen at b/s for skilled intervention  Pt received OOB in b/s chair  Pt indicating that he was tired but willing to participate  Pt completed B UE ROM/Therex seated in chair with visual an verbal cues and frequent rest breaks  Pt fatigues quickly and is easily distracted from task and requires redirection  Pt benefits from breaks and visual cues  Pt with limited activity tolerance for therex activity  Pt will continue to benefit from skilled tx, will follow, recommend D/C to [address] [place] [address]   Treatment Interventions ADL retraining;Functional transfer training;UE strengthening/ROM; Endurance training;Cognitive reorientation;Patient/family training;Equipment evaluation/education; Compensatory technique education; Energy conservation; Activityengagement   Goal Expiration Date 08/16/21   OT Frequency 3-5x/wk   Recommendation   OT Discharge Recommendation Post acute rehabilitation services   [facility]   Lower Body Dressing 2   Bathing 2   Toileting 2   Upper Body Dressing 2   Grooming 3   Eating 3   Daily Activity Raw Score 14   Daily Activity Standardized Score (Calc for Raw Score >=11) 33 39   AM-Located within [facility] Applied Cognition Inpatient   Following a Speech/Presentation 3   Understanding Ordinary Conversation 3   Taking Medications 3   Remembering Where Things Are Placed or Put Away 3   Remembering List of 4-5 Errands 2   Taking Care of Complicated Tasks 2   Applied Cognition Raw Score 16   Applied Cognition Standardized Score 53 61   Licensure   [place] License Number  Cleveland Johnson 429 # [id number]     The patient's raw score on the AM-PAC Daily Activity inpatient short form is 14, standardized score is 33 39, less than 39 4  Patients at this level are likely to benefit from DC to post-acute rehabilitation services  Please refer to the recommendation of the Occupational Therapist for safe DC planning

## 2021-08-08 NOTE — PROGRESS NOTES
Mally 45  Progress Note - Fritz Zaldivar 1955, 77 y o  male MRN: [id number]  Unit/Bed#: ICU 12 Encounter: [id number]  Primary Care Provider: Bandar Devi   Date and time admitted to hospital: 8/1/2021  1:59 PM    * Acute respiratory failure with hypoxia [facility])  Assessment & Plan  Presented with multiple days of fatigue, dry cough and shortness of breath, hypoxic to 88% on room air on presentation  Placed on 4 L NC with improvement in spo2 but persistent increased WOB requiring BIPAP without significant improvement  · Likely 2/2 pneumonia, possible component of CHF exacerbation  · BNP 4649  · CXR dense consolidation concerning for PNA vs CHF  Now improving s/p abx and diuresis  · Procalcitonin downtrending 8 8--->5 22---> 1 23 8/6  · afebrile  · Ceftriaxone D#7  S/p 5D tx with Azithromycin completed 8/6  · Flagyl, D#5  · Patient found with multiple empty alcohol bottles at home  Had episode of choking drinking water 8/3 overnight  · Given overall clinical improvement, will complete antibiotics today for total of 7 day course  · Currently on solumedrol 40mg q8H D#7  · Will wean to 40mg q12 today   · Continue Duonebs Q4H, mucomyst q8H, chest PT  · BIPAP 14/8 45% HS  · Goal to wean to [place] today  Tolerated 45% 25L yesterday with no significant FREEMAN/SOB  · Trend WBC/fever curve, procal  · Pulmonary hygiene    Cardiomyopathy ([facility] [address] )  Assessment & Plan  · Hx cardiomyopathy echo 2017 with EF 40% without significant valvular abnormality, subsequent nuclear stress test 2017 EF reported 65%   · Troponin negative x 3  · Follows with cardiology, no follow up echos noted  · Echo 8/2: EF 40%, difficult to assess given Afib with rapid rate  Estimated PAP 30s     · At home torsemide 20 mg daily and chlorthalidone 25 mg daily, reports taking as prescribed   · Initial CXR with evidence of volume overload, BNP elevated 4649  · Diuresed well with IV lasix, lasix held yesterday   · Consider resuming home PO demadex today   · Appears euvolemic at this time   · Trend I&Os   · Daily weights        Pneumonia  Assessment & Plan  · CXR on admission with dense consolidations in right  · Pro calcitonin elevated 7 6, reflex 8 8, downtrended to 1 23 8/6  · Blood cultures NG @ 4 day  · Strep and Legionella Ag neg  · Sputum culture ordered, but not producing sputum to culture  · Antibiotics per Sepsis plan above  Coverage with Flagyl added 8/4 2/2 aspiration event while drinking water  · Will complete antibiotics today   · Trend WBC/fever curve, procal    Sepsis ([facility] [address] )  Assessment & Plan  SIRs criteria met POA secondary to leukocytosis, tachycardia, tachypnea  · procal elevated to 7 6, reflex 8 8  Now downtrending to 1 23 8/6  No further trending  · UA with moderate bacteria, 4-10 WBC  Negative nitrites, leukocytes  Urine culture negative  · BC negative x 4 days  · Strep pneumo, legionella urine antigen negative  · Initial CXR dense consolidation in Right lower lobe concerning for PNA  · Consider CAP vs aspiration PNA  · Ceftiaxone D#7  S/p completion of 5D azithromycin 8/6  · Flagyl day 5,  · Will complete antibiotic course today after 7 days of treatment, clinical improvement and downtrending procalcitonin   · Trend WBC, fever curve    Essential hypertension  Assessment & Plan  · Home regimen: Amlodipine 5 mg daily, Candesartan 32 mg daily, prazosin 2 mg TID, metoprolol 100 mg daily, chlorthalidone 25 mg daily, torsemide 20mg qd  · Initially held home medications while working on rate control for atrial fibrillation with RVR  · Now with metoprolol 100mg PO BID  · Norvasc resumed 8/6  · Home norvasc increased to 10mg on 8/7  · Consider restarting candesartan if needed as MAX improved         Mixed hyperlipidemia  Assessment & Plan  · At home takes pravastatin 40mg QD  · Last lipid panel 5 months ago: HDL 65, LDL 37,   · prevastatin resumed today as LFTs have improved     Hypernatremia-resolved as of 8/8/2021  Assessment & Plan  · Improved to 145 8/7 from 148 8/6  Diuresis held yesterday  · Net negative 8 9L for hospitalization  Unclear baseline weight  · Daily diuretics pending AM labs and I&O    Chronic atrial fibrillation (HCC)  Assessment & Plan  · Chronic atrial fibrillation on metoprolol 100 mg daily with RVR rates 130-140s in ER   · Received 15 mg diltiazem x 1 in ER with rate improvement to 100s  · Maintained on cardizem drip 8/2-8/4  · Continue lopressor 100mg BID  · HR now 90-110s  · Not on chronic anticoagulation - per chart review DOAC too expensive and not interested in wafarin   · Will hold systemic AC here as AF chronic and on no home [facility]   · Cardiology following      Anxiety  Assessment & Plan  · Xanax 0 5 mg TID prn    Alcohol use  Assessment & Plan  · Unclear amount of alcohol consumption  · Continue thiamine/folic acid  · Monitor for signs of alcohol withdrawal   · No current signs of alcohol withdrawal at this point, likely out of acute alcohol withdrawal window     Depression, recurrent (HCC)  Assessment & Plan  · Continue home Wellbutrin, Prozac and risperidone     Severe obesity (BMI 35 0-39  9) with comorbidity ([address] )  Assessment & Plan  · Lifestyle counseling when appropriate         ----------------------------------------------------------------------------------------  HPI/24hr events: HFNC settings continued to wean yesterday  No acute events overnight, tolerated BiPAP HS  Weaned to 8L Midflow this AM  Gudino removed yesterday  Remains hemodynamically stable  Disposition: Transfer to Med-Surg   Code Status: Level 3 - DNAR and DNI  ---------------------------------------------------------------------------------------  SUBJECTIVE  "I feel pretty good   Can I have some water?'    Review of Systems  Review of systems was reviewed and negative unless stated above in HPI/24-hour events ---------------------------------------------------------------------------------------  OBJECTIVE    Vitals   Vitals:    21 0400 21 0420 21 0500 21 0538   BP: 148/91  141/92    Pulse: 102  (!) 111    Resp: 15  17    Temp:       TempSrc:       SpO2: 97% 96% 97%    Weight:    104 kg (229 lb 4 5 oz)   Height:         Temp (24hrs), Av 9 °F (36 1 °C), Min:95 6 °F (35 3 °C), Max:98 1 °F (36 7 °C)  Current: Temperature: (!) 96 8 °F (36 °C)          Respiratory:  SpO2: SpO2: 97 %  Nasal Cannula O2 Flow Rate (L/min): 30 L/min    Invasive/non-invasive ventilation settings   Respiratory    Lab Data (Last 4 hours)    None         O2/Vent Data (Last 4 hours)      420          Non-Invasive Ventilation Mode BiPAP                   Physical Exam  Constitutional:       Appearance: He is ill-appearing  HENT:      Head: Normocephalic and atraumatic  Comments: Chronic left eye droop      Mouth/Throat:      Mouth: Mucous membranes are dry  Pharynx: Oropharynx is clear  Eyes:      Extraocular Movements: Extraocular movements intact  Pupils: Pupils are equal, round, and reactive to light  Cardiovascular:      Rate and Rhythm: Tachycardia present  Rhythm irregular  Pulses: Normal pulses  Heart sounds: Normal heart sounds  No murmur heard  Pulmonary:      Effort: No respiratory distress  Breath sounds: Normal breath sounds  No wheezing  Abdominal:      General: Abdomen is flat  Bowel sounds are normal  There is no distension  Palpations: Abdomen is soft  Tenderness: There is no abdominal tenderness  Musculoskeletal:         General: Normal range of motion  Cervical back: Normal range of motion  Right lower leg: No edema  Left lower leg: No edema  Skin:     General: Skin is warm and dry  Capillary Refill: Capillary refill takes less than 2 seconds  Neurological:      General: No focal deficit present        Mental Status: He is alert and oriented to person, place, and time           Laboratory and Diagnostics:  Results from last 7 days   Lab Units 08/08/21  0530 08/07/21  0551 08/06/21  0526 08/05/21  0619 08/04/21  0530 08/03/21  0502 08/02/21  0545 08/01/21  1602   WBC Thousand/uL 11 91* 11 42* 9 58 12 79* 12 72* 11 23* 13 21* 14 14*   HEMOGLOBIN g/dL 12 8 11 9* 11 0* 11 0* 10 7* 10 5* 11 3* 12 5   HEMATOCRIT % 38 3 35 7* 33 8* 33 3* 32 4* 32 5* 35 2* 37 7   PLATELETS Thousands/uL 423* 388 351 299 251 209 235 209   NEUTROS PCT %  --   --   --  91*  --   --   --  93*   BANDS PCT %  --   --  3  --   --  40*  --   --    MONOS PCT %  --   --   --  4  --   --   --  4   MONO PCT %  --   --  5  --   --  1*  --   --      Results from last 7 days   Lab Units 08/08/21  0530 08/07/21  0551 08/06/21  0526 08/05/21  0619 08/04/21  0530 08/03/21  0502 08/02/21  0545 08/01/21  1601   SODIUM mmol/L 141 145 148* 149* 143 142 142 140   POTASSIUM mmol/L 4 1 4 1 3 6 3 6 3 8 4 0 4 7 4 2   CHLORIDE mmol/L 106 107 110* 111* 107 105 105 104   CO2 mmol/L 30 29 31 30 26 25 24 24   ANION GAP mmol/L 5 9 7 8 10 12 13 12   BUN mg/dL 28* 29* 31* 30* 28* 27* 24 21   CREATININE mg/dL 0 92 0 87 0 99 0 98 1 00 1 19 1 49* 1 46*   CALCIUM mg/dL 9 3 9 5 9 8 10 1 9 8 9 4 9 4 9 5   GLUCOSE RANDOM mg/dL 299* 274* 226* 186* 151* 105 110 112   ALT U/L 93* 132*  --  192*  --   --  57 59   AST U/L 24 43  --  277*  --   --  83* 59*   ALK PHOS U/L 146* 159*  --  179*  --   --  187* 226*   ALBUMIN g/dL 2 1* 2 0*  --  2 0*  --   --  2 8* 2 9*   TOTAL BILIRUBIN mg/dL 0 91 0 83  --  1 64*  --   --  2 04* 1 65*     Results from last 7 days   Lab Units 08/08/21  0530 08/06/21  0526 08/05/21  0619 08/04/21  0530 08/03/21  0502 08/02/21  0545   MAGNESIUM mg/dL 2 9* 2 7* 2 8* 2 9* 2 4 2 2      Results from last 7 days   Lab Units 08/01/21  1637   INR  1 52*   PTT seconds 49*      Results from last 7 days   Lab Units 08/01/21  2158 08/01/21  1935 08/01/21  1602   TROPONIN I ng/mL <0 02 <0 02 <0 02     Results from last 7 days   Lab Units 08/01/21  1601   LACTIC ACID mmol/L 1 8     ABG:  Results from last 7 days   Lab Units 08/04/21  0027   PH ART  7 388   PCO2 ART mm Hg 44 5*   PO2 ART mm Hg 57 8*   HCO3 ART mmol/L 26 2   BASE EXC ART mmol/L 0 9   ABG SOURCE  Radial, Right     VBG:  Results from last 7 days   Lab Units 08/04/21  0027   ABG SOURCE  Radial, Right     Results from last 7 days   Lab Units 08/06/21  0526 08/05/21  1041 08/04/21  0530 08/03/21  0502 08/02/21  1227   PROCALCITONIN ng/ml 1 23* 2 26* 5 22* 8 80* 7 64*       Micro  Results from last 7 days   Lab Units 08/03/21  0230 08/01/21  2028 08/01/21  1932 08/01/21  1602 08/01/21  1601   BLOOD CULTURE   --   --   --  No Growth After 5 Days  No Growth After 5 Days  URINE CULTURE   --   --  No Growth <1000 cfu/mL  --   --    MRSA CULTURE ONLY   --  No Methicillin Resistant Staphlyococcus aureus (MRSA) isolated  --   --   --    LEGIONELLA URINARY ANTIGEN  Negative  --   --   --   --    STREP PNEUMONIAE ANTIGEN, URINE  Negative  --   --   --   --        EKG: Afib, HR 110s  Imaging: I have personally reviewed pertinent reports  and I have personally reviewed pertinent films in PACS    Intake and Output  I/O       08/06 0701 - 08/07 0700 08/07 0701 - 08/08 0700    P  O  650 780    IV Piggyback 500     Total Intake(mL/kg) 1150 (11 3) 780 (7 5)    Urine (mL/kg/hr) 3400 (1 4) 750 (0 3)    Total Output 3400 750    Net -2250 +30                Height and Weights   Height: 5' 10" (177 8 cm)  IBW (Ideal Body Weight): 73 kg  Body mass index is 32 9 kg/m²  Weight (last 2 days)     Date/Time   Weight    08/08/21 0538   104 (229 28)    08/06/21 0600   102 (224 43)                Nutrition       Diet Orders   (From admission, onward)             Start     Ordered    08/06/21 1829  Diet Dysphagia/Modified Consistency; Dysphagia 3-Dental Soft; Thin Liquid;  Fluid Restriction 1500 ML, Sodium 2 GM  Diet effective now     Question Answer Comment   Diet Type Dysphagia/Modified Consistency    Dysphagia/Modified Consistency Dysphagia 3-Dental Soft    Liquid Modifier Thin Liquid    Other Restriction(s): Fluid Restriction 1500 ML    Other Restriction(s): Sodium 2 GM    RD to adjust diet per protocol?  No        08/06/21 1828    08/03/21 1146  Room Service  Once     Question:  Type of Service  Answer:  Room Service-Appropriate    08/03/21 1145                  Active Medications  Scheduled Meds:  Current Facility-Administered Medications   Medication Dose Route Frequency Provider Last Rate    acetaminophen  650 mg Oral Q6H PRN Radha Aguilera PA-C      acetylcysteine  3 mL Nebulization Q8H Lorri Álvarez PA-C      ALPRAZolam  0 5 mg Oral TID PRN Dejuan Longoria PA-C      amLODIPine  10 mg Oral Daily TONY Sheldon      buPROPion  150 mg Oral Daily Radha [facility] [place]      cefTRIAXone  2,000 mg Intravenous Q24H TONY Sheldon 2,000 mg (08/07/21 1123)    FLUoxetine  20 mg Oral Daily Radha [facility] [place]      folic acid  1 mg Oral Daily Radha Aguilera [place]      heparin (porcine)  5,000 Units Subcutaneous [facility] Radha [facility] [place]      insulin lispro  2-12 Units Subcutaneous TID AC TONY Sun      insulin lispro  2-12 Units Subcutaneous HS TONY Sheldon      ipratropium-albuterol  3 mL Nebulization Q4H Claudia Dos Santos MD      methocarbamol  750 mg Oral HS PRN Radha Aguilera PA-C      methylPREDNISolone sodium succinate  40 mg Intravenous Q12H [facility] TONY Sun      metoprolol  5 mg Intravenous Q6H PRN Radha Aguilera PA-C      metoprolol tartrate  100 mg Oral Q12H 304 Matthew Jurado MD      metroNIDAZOLE  500 mg Intravenous H4F TONY Sun 500 mg (08/08/21 0229)    polyethylene glycol  17 g Oral Daily PRN TONY Raymond      pravastatin  40 mg Oral Daily With TONY Coronel      risperiDONE  2 mg Oral Daily Radha Aguilera PA-C      senna-docusate sodium 1 tablet Oral HS Mickeal CorningTONY      thiamine  100 mg Oral Daily Hue De La Cruz, [place]      torsemide  20 mg Oral Daily Cristin Ped, CRNP       Continuous Infusions:     PRN Meds:   acetaminophen, 650 mg, Q6H PRN  ALPRAZolam, 0 5 mg, TID PRN  methocarbamol, 750 mg, HS PRN  metoprolol, 5 mg, Q6H PRN  polyethylene glycol, 17 g, Daily PRN        Invasive Devices Review  Invasive Devices     Peripheral Intravenous Line            Peripheral IV 08/05/21 Distal;Right;Upper;Ventral (anterior) Arm 3 days                Rationale for remaining devices: NA  ---------------------------------------------------------------------------------------  Advance Directive and Living Will:      Power of :    POLST:    ---------------------------------------------------------------------------------------  Care Time Delivered:   No Critical Care time spent       Cristin Ped, CRNP      Portions of the record may have been created with voice recognition software  Occasional wrong word or "sound a like" substitutions may have occurred due to the inherent limitations of voice recognition software    Read the chart carefully and recognize, using context, where substitutions have occurred

## 2021-08-08 NOTE — ASSESSMENT & PLAN NOTE
· CXR on admission with dense consolidations in right  · Pro calcitonin elevated 7 6, reflex 8 8, downtrended to 1 23 8/6  · Blood cultures NG @ 4 day  · Strep and Legionella Ag neg  · Sputum culture ordered, but not producing sputum to culture  · Antibiotics per Sepsis plan above   Coverage with Flagyl added 8/4 2/2 aspiration event while drinking water  · Will complete antibiotics today   · Trend WBC/fever curve, procal

## 2021-08-08 NOTE — PHYSICAL THERAPY NOTE
PT TREATMENT     08/08/21 1245   Note Type   Note Type Treatment   Pain Assessment   Pain Assessment Tool Pain Assessment not indicated - pt denies pain   Restrictions/Precautions   Other Precautions Chair Alarm; Bed Alarm;Multiple lines;O2;Fall Risk   General   Chart Reviewed Yes   Family/Caregiver Present No   Cognition   Arousal/Participation Cooperative   Subjective   Subjective patient states feeling less anxious at this time having recieved anxiety meds   Transfers   Sit to Stand 4  Minimal assistance   Additional items Assist x 1;Verbal cues   Stand to Sit 4  Minimal assistance   Additional items Assist x 1;Verbal cues   Ambulation/Elevation   Gait Assistance 3  Moderate assist   Additional items Assist x 1;Verbal cues; Tactile cues   Assistive Device Rolling walker   Distance 5 feet x 3 with change in direction, verbal cuing for proper walker usage for improved balance and gait patterning  Unsteady gait at times with lateral sway and loss of blanance posteriorly   Exercises   Quad Sets Sitting;10 reps;Bilateral   Glute Sets Sitting;10 reps;Bilateral   Hip Flexion Sitting;15 reps;Bilateral   Hip Abduction Sitting;15 reps;Bilateral   Knee AROM Long Arc Quad Sitting;15 reps;Bilateral   Ankle Pumps Sitting;20 reps;Bilateral   Balance training  sidestepping and backward walking completed for balance and coordination  All LE exercise completed in alternating patterns as possible for coordination as well    Assessment   Assessment patient cooperative and motivated and will benefit from continued PT with progression as tolerated  The patient's AM-PAC Basic Mobility Inpatient Short Form Raw Score is 15, Standardized Score is 36 97  A standardized score less than 42 9 suggests the patient may benefit from discharge to post-acute rehabilitation services  Please also refer to the recommendation of the Physical Therapist for safe discharge planning           Plan   Treatment/Interventions ADL retraining;Functional transfer training;LE strengthening/ROM; Elevations; Therapeutic exercise; Endurance training;Patient/family training;Equipment eval/education; Bed mobility;Gait training; Compensatory technique education   PT Frequency 5x/wk   Recommendation   PT Discharge Recommendation Post acute rehabilitation services   AM-PAC Basic Mobility Inpatient   Turning in Bed Without Bedrails 3   Lying on Back to Sitting on Edge of Flat Bed 3   Moving Bed to Chair 3   Standing Up From Chair 3   Walk in Room 2   Climb 3-5 Stairs 1   Basic Mobility Inpatient Raw Score 15   Basic Mobility Standardized Score 05 17   Licensure   [place] License Number  Eliseo Rodriguez PT [id number]

## 2021-08-08 NOTE — ASSESSMENT & PLAN NOTE
· Improved to 145 8/7 from 148 8/6  Diuresis held yesterday  · Net negative 8 9L for hospitalization   Unclear baseline weight  · Daily diuretics pending AM labs and I&O

## 2021-08-08 NOTE — QUICK NOTE
Patient's sister updated on Filiberto's progress and that he has improved enough to be downgraded  All questions answered

## 2021-08-08 NOTE — ASSESSMENT & PLAN NOTE
· Chronic atrial fibrillation on metoprolol 100 mg daily with RVR rates 130-140s in ER   · Received 15 mg diltiazem x 1 in ER with rate improvement to 100s  · Maintained on cardizem drip 8/2-8/4  · Continue lopressor 100mg BID  · HR now 90-110s  · Not on chronic anticoagulation - per chart review DOAC too expensive and not interested in wafarin   · Will hold systemic AC here as AF chronic and on no home [facility]   · Cardiology following

## 2021-08-08 NOTE — ASSESSMENT & PLAN NOTE
· Unclear amount of alcohol consumption  · Continue thiamine/folic acid  · Monitor for signs of alcohol withdrawal   · No current signs of alcohol withdrawal at this point, likely out of acute alcohol withdrawal window

## 2021-08-08 NOTE — ASSESSMENT & PLAN NOTE
· At home takes pravastatin 40mg QD  · Last lipid panel 5 months ago: HDL 65, LDL 37,   · prevastatin resumed today as LFTs have improved

## 2021-08-09 PROBLEM — I50.23 ACUTE ON CHRONIC SYSTOLIC (CONGESTIVE) HEART FAILURE (HCC): Status: ACTIVE | Noted: 2020-08-28

## 2021-08-09 PROBLEM — A41.9 SEPSIS (HCC): Status: RESOLVED | Noted: 2021-08-03 | Resolved: 2021-08-09

## 2021-08-09 LAB
ANION GAP SERPL CALCULATED.3IONS-SCNC: 7 MMOL/L (ref 4–13)
BUN SERPL-MCNC: 25 MG/DL (ref 5–25)
CALCIUM SERPL-MCNC: 9 MG/DL (ref 8.3–10.1)
CHLORIDE SERPL-SCNC: 100 MMOL/L (ref 100–108)
CO2 SERPL-SCNC: 29 MMOL/L (ref 21–32)
CREAT SERPL-MCNC: 0.8 MG/DL (ref 0.6–1.3)
ERYTHROCYTE [DISTWIDTH] IN BLOOD BY AUTOMATED COUNT: 13.3 % (ref 11.6–15.1)
GFR SERPL CREATININE-BSD FRML MDRD: 93 ML/MIN/1.73SQ M
GLUCOSE SERPL-MCNC: 259 MG/DL (ref 65–140)
GLUCOSE SERPL-MCNC: 279 MG/DL (ref 65–140)
GLUCOSE SERPL-MCNC: 355 MG/DL (ref 65–140)
GLUCOSE SERPL-MCNC: 403 MG/DL (ref 65–140)
GLUCOSE SERPL-MCNC: 452 MG/DL (ref 65–140)
HCT VFR BLD AUTO: 37.9 % (ref 36.5–49.3)
HGB BLD-MCNC: 13 G/DL (ref 12–17)
MAGNESIUM SERPL-MCNC: 2.7 MG/DL (ref 1.6–2.6)
MCH RBC QN AUTO: 34.3 PG (ref 26.8–34.3)
MCHC RBC AUTO-ENTMCNC: 34.3 G/DL (ref 31.4–37.4)
MCV RBC AUTO: 100 FL (ref 82–98)
PLATELET # BLD AUTO: 431 THOUSANDS/UL (ref 149–390)
PMV BLD AUTO: 10 FL (ref 8.9–12.7)
POTASSIUM SERPL-SCNC: 4.1 MMOL/L (ref 3.5–5.3)
RBC # BLD AUTO: 3.79 MILLION/UL (ref 3.88–5.62)
SODIUM SERPL-SCNC: 136 MMOL/L (ref 136–145)
WBC # BLD AUTO: 13.7 THOUSAND/UL (ref 4.31–10.16)

## 2021-08-09 PROCEDURE — 94640 AIRWAY INHALATION TREATMENT: CPT

## 2021-08-09 PROCEDURE — 85027 COMPLETE CBC AUTOMATED: CPT | Performed by: NURSE PRACTITIONER

## 2021-08-09 PROCEDURE — 94760 N-INVAS EAR/PLS OXIMETRY 1: CPT

## 2021-08-09 PROCEDURE — 99233 SBSQ HOSP IP/OBS HIGH 50: CPT | Performed by: INTERNAL MEDICINE

## 2021-08-09 PROCEDURE — 80048 BASIC METABOLIC PNL TOTAL CA: CPT | Performed by: NURSE PRACTITIONER

## 2021-08-09 PROCEDURE — 82948 REAGENT STRIP/BLOOD GLUCOSE: CPT

## 2021-08-09 PROCEDURE — 99232 SBSQ HOSP IP/OBS MODERATE 35: CPT | Performed by: INTERNAL MEDICINE

## 2021-08-09 PROCEDURE — 83735 ASSAY OF MAGNESIUM: CPT | Performed by: NURSE PRACTITIONER

## 2021-08-09 PROCEDURE — 94668 MNPJ CHEST WALL SBSQ: CPT

## 2021-08-09 PROCEDURE — 97110 THERAPEUTIC EXERCISES: CPT

## 2021-08-09 RX ORDER — METHYLPREDNISOLONE SODIUM SUCCINATE 40 MG/ML
40 INJECTION, POWDER, LYOPHILIZED, FOR SOLUTION INTRAMUSCULAR; INTRAVENOUS DAILY
Status: DISCONTINUED | OUTPATIENT
Start: 2021-08-10 | End: 2021-08-10

## 2021-08-09 RX ORDER — IPRATROPIUM BROMIDE AND ALBUTEROL SULFATE 2.5; .5 MG/3ML; MG/3ML
3 SOLUTION RESPIRATORY (INHALATION)
Status: DISCONTINUED | OUTPATIENT
Start: 2021-08-10 | End: 2021-08-12

## 2021-08-09 RX ORDER — INSULIN GLARGINE 100 [IU]/ML
10 INJECTION, SOLUTION SUBCUTANEOUS
Status: DISCONTINUED | OUTPATIENT
Start: 2021-08-09 | End: 2021-08-11

## 2021-08-09 RX ADMIN — ACETYLCYSTEINE 600 MG: 200 SOLUTION ORAL; RESPIRATORY (INHALATION) at 16:27

## 2021-08-09 RX ADMIN — PRAVASTATIN SODIUM 40 MG: 40 TABLET ORAL at 16:50

## 2021-08-09 RX ADMIN — RISPERIDONE 2 MG: 1 TABLET ORAL at 08:35

## 2021-08-09 RX ADMIN — AMLODIPINE BESYLATE 10 MG: 10 TABLET ORAL at 08:35

## 2021-08-09 RX ADMIN — ACETAMINOPHEN 650 MG: 325 TABLET, FILM COATED ORAL at 21:18

## 2021-08-09 RX ADMIN — FLUOXETINE 20 MG: 20 CAPSULE ORAL at 08:35

## 2021-08-09 RX ADMIN — TORSEMIDE 20 MG: 20 TABLET ORAL at 08:35

## 2021-08-09 RX ADMIN — IPRATROPIUM BROMIDE AND ALBUTEROL SULFATE 3 ML: 2.5; .5 SOLUTION RESPIRATORY (INHALATION) at 12:51

## 2021-08-09 RX ADMIN — ACETYLCYSTEINE 600 MG: 200 SOLUTION ORAL; RESPIRATORY (INHALATION) at 00:04

## 2021-08-09 RX ADMIN — METOPROLOL TARTRATE 100 MG: 100 TABLET, FILM COATED ORAL at 21:20

## 2021-08-09 RX ADMIN — INSULIN LISPRO 12 UNITS: 100 INJECTION, SOLUTION INTRAVENOUS; SUBCUTANEOUS at 21:22

## 2021-08-09 RX ADMIN — IPRATROPIUM BROMIDE AND ALBUTEROL SULFATE 3 ML: 2.5; .5 SOLUTION RESPIRATORY (INHALATION) at 19:33

## 2021-08-09 RX ADMIN — INSULIN LISPRO 12 UNITS: 100 INJECTION, SOLUTION INTRAVENOUS; SUBCUTANEOUS at 16:49

## 2021-08-09 RX ADMIN — ALPRAZOLAM 0.5 MG: 0.5 TABLET ORAL at 21:18

## 2021-08-09 RX ADMIN — METOPROLOL TARTRATE 100 MG: 100 TABLET, FILM COATED ORAL at 08:35

## 2021-08-09 RX ADMIN — HEPARIN SODIUM 5000 UNITS: 5000 INJECTION INTRAVENOUS; SUBCUTANEOUS at 05:44

## 2021-08-09 RX ADMIN — THIAMINE HCL TAB 100 MG 100 MG: 100 TAB at 08:35

## 2021-08-09 RX ADMIN — IPRATROPIUM BROMIDE AND ALBUTEROL SULFATE 3 ML: 2.5; .5 SOLUTION RESPIRATORY (INHALATION) at 08:18

## 2021-08-09 RX ADMIN — HEPARIN SODIUM 5000 UNITS: 5000 INJECTION INTRAVENOUS; SUBCUTANEOUS at 13:18

## 2021-08-09 RX ADMIN — ALPRAZOLAM 0.5 MG: 0.5 TABLET ORAL at 08:44

## 2021-08-09 RX ADMIN — ACETYLCYSTEINE 600 MG: 200 SOLUTION ORAL; RESPIRATORY (INHALATION) at 08:18

## 2021-08-09 RX ADMIN — IPRATROPIUM BROMIDE AND ALBUTEROL SULFATE 3 ML: 2.5; .5 SOLUTION RESPIRATORY (INHALATION) at 16:27

## 2021-08-09 RX ADMIN — ACETAMINOPHEN 650 MG: 325 TABLET, FILM COATED ORAL at 15:05

## 2021-08-09 RX ADMIN — METHYLPREDNISOLONE SODIUM SUCCINATE 40 MG: 40 INJECTION, POWDER, FOR SOLUTION INTRAMUSCULAR; INTRAVENOUS at 08:36

## 2021-08-09 RX ADMIN — INSULIN LISPRO 6 UNITS: 100 INJECTION, SOLUTION INTRAVENOUS; SUBCUTANEOUS at 08:33

## 2021-08-09 RX ADMIN — INSULIN GLARGINE 10 UNITS: 100 INJECTION, SOLUTION SUBCUTANEOUS at 21:21

## 2021-08-09 RX ADMIN — IPRATROPIUM BROMIDE AND ALBUTEROL SULFATE 3 ML: 2.5; .5 SOLUTION RESPIRATORY (INHALATION) at 00:04

## 2021-08-09 RX ADMIN — INSULIN LISPRO 10 UNITS: 100 INJECTION, SOLUTION INTRAVENOUS; SUBCUTANEOUS at 11:32

## 2021-08-09 RX ADMIN — FOLIC ACID 1 MG: 1 TABLET ORAL at 08:35

## 2021-08-09 RX ADMIN — IPRATROPIUM BROMIDE AND ALBUTEROL SULFATE 3 ML: 2.5; .5 SOLUTION RESPIRATORY (INHALATION) at 04:21

## 2021-08-09 RX ADMIN — DOCUSATE SODIUM AND SENNOSIDES 1 TABLET: 8.6; 5 TABLET, FILM COATED ORAL at 21:18

## 2021-08-09 RX ADMIN — BUPROPION 150 MG: 150 TABLET, EXTENDED RELEASE ORAL at 08:35

## 2021-08-09 NOTE — PLAN OF CARE
Problem: RESPIRATORY - ADULT  Goal: Achieves optimal ventilation and oxygenation  Description: INTERVENTIONS:  - Assess for changes in respiratory status  - Assess for changes in mentation and behavior  - Position to facilitate oxygenation and minimize respiratory effort  - Oxygen administered by appropriate delivery if ordered  - Initiate smoking cessation education as indicated  - Encourage broncho-pulmonary hygiene including cough, deep breathe, Incentive Spirometry  - Assess the need for suctioning and aspirate as needed  - Assess and instruct to report SOB or any respiratory difficulty  - Respiratory Therapy support as indicated  Outcome: Progressing     Problem: MOBILITY - ADULT  Goal: Maintain or return to baseline ADL function  Description: INTERVENTIONS:  -  Assess patient's ability to carry out ADLs; assess patient's baseline for ADL function and identify physical deficits which impact ability to perform ADLs (bathing, care of mouth/teeth, toileting, grooming, dressing, etc )  - Assess/evaluate cause of self-care deficits   - Assess range of motion  - Assess patient's mobility; develop plan if impaired  - Assess patient's need for assistive devices and provide as appropriate  - Encourage maximum independence but intervene and supervise when necessary  - Involve family in performance of ADLs  - Assess for home care needs following discharge   - Consider OT consult to assist with ADL evaluation and planning for discharge  - Provide patient education as appropriate  Outcome: Progressing  Goal: Maintains/Returns to pre admission functional level  Description: INTERVENTIONS:  - Perform BMAT or MOVE assessment daily    - Set and communicate daily mobility goal to care team and patient/family/caregiver     - Collaborate with rehabilitation services on mobility goals if consulted  - Out of bed for toileting  - Record patient progress and toleration of activity level   Outcome: Progressing     Problem: Potential for Falls  Goal: Patient will remain free of falls  Description: INTERVENTIONS:  - Educate patient/family on patient safety including physical limitations  - Instruct patient to call for assistance with activity   - Consult OT/PT to assist with strengthening/mobility   - Keep Call bell within reach  - Keep bed low and locked with side rails adjusted as appropriate  - Keep care items and personal belongings within reach  - Initiate and maintain comfort rounds  - Make Fall Risk Sign visible to staff  - Apply yellow socks and bracelet for high fall risk patients  - Consider moving patient to room near nurses station  Outcome: Progressing     Problem: Prexisting or High Potential for Compromised Skin Integrity  Goal: Skin integrity is maintained or improved  Description: INTERVENTIONS:  - Identify patients at risk for skin breakdown  - Assess and monitor skin integrity  - Assess and monitor nutrition and hydration status  - Monitor labs   - Assess for incontinence   - Turn and reposition patient  - Assist with mobility/ambulation  - Relieve pressure over bony prominences  - Avoid friction and shearing  - Provide appropriate hygiene as needed including keeping skin clean and dry  - Evaluate need for skin moisturizer/barrier cream  - Collaborate with interdisciplinary team   - Patient/family teaching  - Consider wound care consult   Outcome: Progressing     Problem: Nutrition/Hydration-ADULT  Goal: Nutrient/Hydration intake appropriate for improving, restoring or maintaining nutritional needs  Description: Monitor and assess patient's nutrition/hydration status for malnutrition  Collaborate with interdisciplinary team and initiate plan and interventions as ordered  Monitor patient's weight and dietary intake as ordered or per policy  Utilize nutrition screening tool and intervene as necessary  Determine patient's food preferences and provide high-protein, high-caloric foods as appropriate       INTERVENTIONS:  - Monitor oral intake, urinary output, labs, and treatment plans  - Assess nutrition and hydration status and recommend course of action  - Evaluate amount of meals eaten  - Assist patient with eating if necessary   - Allow adequate time for meals  - Recommend/ encourage appropriate diets, oral nutritional supplements, and vitamin/mineral supplements  - Order, calculate, and assess calorie counts as needed  - Recommend, monitor, and adjust tube feedings and TPN/PPN based on assessed needs  - Assess need for intravenous fluids  - Provide specific nutrition/hydration education as appropriate  - Include patient/family/caregiver in decisions related to nutrition  Outcome: Progressing

## 2021-08-09 NOTE — CASE MANAGEMENT
ALMA received from [address] [place] [address],[address] Floor at [facility] ([phone])  She was inquiring on pt's current progress with therapy and oxygen requirements  ALMA left  Awaiting callback to see if she requires any additional clinical or is able to offer a bed

## 2021-08-09 NOTE — ASSESSMENT & PLAN NOTE
Avoid got pneumonia, finished a course of antibiotics  Procalcitonin trended down to 1 23 last checked  Continue on nebulizers

## 2021-08-09 NOTE — ASSESSMENT & PLAN NOTE
2/2 PNA and CHF  Finished a course of ceftriaxone, as a throw and Flagyl  Currently improved  Patient will benefit from home oxygen protocol prior to discharge    Patient would also benefit from using BiPAP at bedtime 14/8 at 30% FiO2

## 2021-08-09 NOTE — CASE MANAGEMENT
LOS: 8     SW contacted sister Debra Shields to f/u on STR discussion  Debra Shields confirmed that she has spoken with her brother and are still planning on him to go to a STR on discharge  SW again advised that [facility] is OON and possible OOP costs  Other SNF options reviewed  Debra Shields requesting referrals to facilities in the [facility] as that is closer to her home  Referrals expanded [facility], [place], [facility]  [facility] responses pending  Debra Shields aware pt has been moved out of ICU  SW provided her with pt's room phone number as well as phone number for 3N SW/CM desk

## 2021-08-09 NOTE — PROGRESS NOTES
Mally 45  Progress Note - Elise Lowe 1955, 77 y o  male MRN: [id number]  Unit/Bed#: [facility] 658-35 Encounter: [id number]  Primary Care Provider: TONY Ng   Date and time admitted to hospital: 8/1/2021  1:59 PM    * Acute respiratory failure with hypoxia (HCC)  Assessment & Plan  2/2 PNA and CHF  Finished a course of ceftriaxone, as a throw and Flagyl  Currently improved  Patient will benefit from home oxygen protocol prior to discharge  Patient would also benefit from using BiPAP at bedtime 14/8 at 30% FiO2      Pneumonia  Assessment & Plan  Avoid got pneumonia, finished a course of antibiotics  Procalcitonin trended down to 1 23 last checked  Continue on nebulizers  Acute on chronic systolic (congestive) heart failure [facility])  Assessment & Plan  Wt Readings from Last 3 Encounters:   08/09/21 103 kg (227 lb 8 2 oz)   07/09/21 108 kg (239 lb)   04/09/21 110 kg (243 lb)     Patient's 2D echocardiogram showed EF of 40%  Continue patient on metoprolol, torsemide  Follow-up cardiology recommendations  Severe obesity (BMI 35 0-39  9) with comorbidity ([facility] [address] )  Assessment & Plan  Counseled on diet and exercise  Continue supportive care  Anxiety  Assessment & Plan  Continue patient on Wellbutrin   Chronic atrial fibrillation [facility])  Assessment & Plan  Continue patient on metoprolol,  We will start the patient on Eliquis  Subjective:   I have seen and Examined the patient at the bedside  No CP or Sob  No fevers or chills, No nausea or vomiting  Overnight events reviewed with the RN  No Other complains  Patient denies any shortness of breath or orthopnea or PND  Denies any palpitations  Tolerated PT OT  Appetite is stable  Review of System:   Denies any CP or SOB  Denies any Cough or Cold  Denies any Fevers or chills  Denies any focal tingling numbness or weakness in any extremities  Denies any abdominal pain, Nausea or vomiting  Objective:   Temp (24hrs), Av 8 °F (36 6 °C), Min:97 3 °F (36 3 °C), Max:98 3 °F (36 8 °C)    Temp:  [97 3 °F (36 3 °C)-98 3 °F (36 8 °C)] 97 7 °F (36 5 °C)  HR:  [] 102  Resp:  [16-18] 18  BP: ()/(56-84) 112/64  SpO2:  [91 %-98 %] 93 %  Body mass index is 32 65 kg/m²  Input and Output Summary (last 24 hours): Intake/Output Summary (Last 24 hours) at 2021 1916  Last data filed at 2021 1056  Gross per 24 hour   Intake 350 ml   Output 950 ml   Net -600 ml     I/O        07 -  0700  07 - 08/10 0700    P  O  200 150    I V  (mL/kg) 10 (0 1)     IV Piggyback 150     Total Intake(mL/kg) 360 (3 5) 150 (1 5)    Urine (mL/kg/hr) 3050 (1 2) 950 (0 8)    Total Output 3050 950    Net -2690 -800          Unmeasured Urine Occurrence  1 x          Physical Exam:   General : Alert, Awake and oriented x 2-3, NAD  Chronically ill-looking  Neck : Supple  Short neck  Eyes:  PRINCESS, EOMI  CVS : S1, S2, RRR    R/S : Clear to auscultate anteriorly  No rales rhonchi or wheezes heard  Abd: Soft, NT, ND  Bs+ve, obese  Extremity: Trace pedal edema noted  Skin: No acute Rash noted  CNS: No acute FND  Additional Data:     Labs & Imaging Data Reviewed :    Results from last 7 days   Lab Units 21  0532 21  0526 21  0619 21  0502   WBC Thousand/uL 13 70* 9 58 12 79*   < >   HEMOGLOBIN g/dL 13 0 11 0* 11 0*   < >   HEMATOCRIT % 37 9 33 8* 33 3*   < >   PLATELETS Thousands/uL 431* 351 299   < >   NEUTROS PCT %  --   --  91*  --    LYMPHS PCT %  --   --  3*  --    LYMPHO PCT %  --  4*  --   --    MONOS PCT %  --   --  4  --    MONO PCT %  --  5  --   --    EOS PCT %  --  0 0  --     < > = values in this interval not displayed       Results from last 7 days   Lab Units 21  0532 21  0530   POTASSIUM mmol/L 4 1 4 1   CHLORIDE mmol/L 100 106   CO2 mmol/L 29 30   BUN mg/dL 25 28*   CREATININE mg/dL 0 80 0 92   CALCIUM mg/dL 9 0 9 3   ALK PHOS U/L  -- 146*   ALT U/L  --  93*   AST U/L  --  24         No results found for: HGBA1C   XR chest portable ICU   Final Result by Yamilex Miranda MD (08/06 0932)      Improved airspace disease  Workstation performed: [id number]         XR chest portable ICU   Final Result by Lexi Ulrich MD (22/33 5195)      Interval worsening of bilateral airspace disease  Workstation performed: [id number]         XR chest portable   Final Result by Osvaldo Berry DO (08/04 1426)      Slight worsening of bilateral airspace disease, favored to represent multifocal pneumonia  Correlate with clinical scenario  Workstation performed: [id number]         XR chest portable ICU   Final Result by Ellie Ortega MD (08/04 1059)      Areas of consolidation in the right upper and lower lobes and, probably, the left lower lobe, either asymmetric pulmonary edema or multifocal pneumonia  Workstation performed: [id number]         CT head without contrast   Final Result by María Elena Connell MD (08/01 1541)      No acute intracranial abnormality  Workstation performed: [id number]         XR chest 1 view portable   Final Result by Nilam Rivas MD (08/02 0818)      Right upper and lower lobe consolidation concerning for multifocal pneumonia  Workstation performed: [id number]             Cultures:   Blood Culture:   Lab Results   Component Value Date    BLOODCX No Growth After 5 Days  08/01/2021    BLOODCX No Growth After 5 Days   08/01/2021     Urine Culture:   Lab Results   Component Value Date    URINECX No Growth <1000 cfu/mL 08/01/2021     Sputum Culture: No components found for: SPUTUMCX  Wound Culture: No results found for: WOUNDCULT    Last 24 Hours Medication List:   Current Facility-Administered Medications   Medication Dose Route Frequency Provider Last Rate    acetaminophen  650 mg Oral Q6H PRN TONY Rodriguez      acetylcysteine  3 mL Nebulization Q8H Fabián De Jesus, CRNP      ALPRAZolam  0 5 mg Oral TID PRN Garlon Listen, CRNP      amLODIPine  10 mg Oral Daily Fabián De Jesus, CRNP      apixaban  5 mg Oral BID Colt Whitt MD      buPROPion  150 mg Oral Daily Fabián De Jesus, [place]      FLUoxetine  20 mg Oral Daily Fabián De Jesus, CRNP      folic acid  1 mg Oral Daily Fabián De Jesus, CRNP      insulin lispro  2-12 Units Subcutaneous TID  Fabián De Jesus, CRNP      insulin lispro  2-12 Units Subcutaneous HS Garlon Listen, CRNP      ipratropium-albuterol  3 mL Nebulization Q4H Garlon Listen, CRNP      methocarbamol  750 mg Oral HS PRN Garlon Listen, CRNP      [START ON 8/10/2021] methylPREDNISolone sodium succinate  40 mg Intravenous Daily Colt Whitt MD      metoprolol  5 mg Intravenous Q6H PRN Garlon Listen, CRNP      metoprolol tartrate  100 mg Oral Q12H [facility] Garlon Listen, CRNP      polyethylene glycol  17 g Oral Daily PRN Garlon Listen, CRNP      pravastatin  40 mg Oral Daily With Jacqui Kelly, CRNP      risperiDONE  2 mg Oral Daily Garlon Listen, CRNP      senna-docusate sodium  1 tablet Oral HS Garlon Listen, CRNP      thiamine  100 mg Oral Daily Fabián De Jesus, CRNP      torsemide  20 mg Oral Daily Garlon Listen, CRNP         Patient is at moderate risk for morbidity and mortality due to above mentioned illness and comorbidities

## 2021-08-09 NOTE — PROGRESS NOTES
Progress Note - Cardiology   [facility]     Verba Lundborg 77 y o  male MRN: [id number]  : 1955  Unit/Bed#: [address] [place] [address] Encounter: [id number]    Assessment and Plan:   1  Acute respiratory failure with hypoxia secondary to multilobar pneumonia:  Concern for aspiration pneumonia    -  continue antibiotics and steroids per primary team     2  Chronic atrial fibrillation with rapid ventricular response:  Patient's heart rate slowly improving as his pneumonia improves    -  continue Lopressor 100 mg b i d     -  patient on heparin subQ q 8 hours per primary team     -  CHADS2 Vasc score = 4 or 4 8% risk of stroke per year    -  patient has refused Coumadin therapy in the past a novel agents were too expensive for him     -  will continue to monitor     3  Cardiomyopathy:  EF of 40%     -  sodium slightly elevated today, diuretics held  Ephriam Belling continue monitor and adjust medications as needed during hospitalization    -  continue Lopressor 100 mg b i d     -  I&O, daily weights and monitor labs    -  low-sodium diet     4  Acute kidney injury on chronic kidney disease:   slowly improving   Will continue to monitor      5  Hypertension:  Blood pressure  stable back on home dose of Norvasc      6  Dyslipidemia:  Continue Pravachol 40 mg daily     7  Anxiety/panic attack:  Continue Risperdal, Wellbutrin and Prozac     8  Alcohol use:  Monitor for DTs   On folic acid and Thiamine    Patient appears to be stable from a cardiac standpoint  Will sign off at this time  Please do not hesitate to contact if needed again during this admission  Subjective / Objective:   Patient seen and examined  His breathing has greatly improved since admission  It is now on 6 L of mid flow oxygen  Patient continues to refuse long-term anticoagulation therapy  Vitals: Blood pressure 120/77, pulse 93, temperature 97 8 °F (36 6 °C), temperature source Axillary, resp   rate 16, height 5' 10" (1 778 m), weight 103 kg (227 lb 8 2 oz), SpO2 98 %  Vitals:    08/08/21 0538 08/09/21 0546   Weight: 104 kg (229 lb 4 5 oz) 103 kg (227 lb 8 2 oz)     Body mass index is 32 65 kg/m²  BP Readings from Last 3 Encounters:   08/09/21 120/77   07/09/21 120/72   04/27/21 130/80     Orthostatic Blood Pressures      Most Recent Value   Blood Pressure  120/77 filed at 08/09/2021 0756   Patient Position - Orthostatic VS  Lying filed at 08/09/2021 0756        I/O       08/07 0701 - 08/08 0700 08/08 0701 - 08/09 0700 08/09 0701 - 08/10 0700    P  O  780 200 150    I V  (mL/kg)  10 (0 1)     IV Piggyback  150     Total Intake(mL/kg) 780 (7 5) 360 (3 5) 150 (1 5)    Urine (mL/kg/hr) 750 (0 3) 3050 (1 2) 700 (2)    Total Output 750 3050 700    Net +30 -2690 -550               Invasive Devices     Peripheral Intravenous Line            Peripheral IV 08/09/21 Left Forearm <1 day                  Intake/Output Summary (Last 24 hours) at 8/9/2021 1028  Last data filed at 8/9/2021 0701  Gross per 24 hour   Intake 510 ml   Output 3150 ml   Net -2640 ml         Physical Exam:   Physical Exam  Vitals and nursing note reviewed  Constitutional:       Appearance: Normal appearance  He is well-developed  He is morbidly obese  HENT:      Head: Normocephalic  Right Ear: External ear normal       Left Ear: External ear normal    Eyes:      General: No scleral icterus  Right eye: No discharge  Left eye: No discharge  Neck:      Thyroid: No thyromegaly  Cardiovascular:      Rate and Rhythm: Normal rate  Rhythm irregular  Pulses: Normal pulses  Heart sounds: Heart sounds are distant  Pulmonary:      Effort: Pulmonary effort is normal  No accessory muscle usage or respiratory distress  Breath sounds: Examination of the right-lower field reveals decreased breath sounds  Examination of the left-lower field reveals decreased breath sounds  Decreased breath sounds present  No wheezing        Comments: Air exchange greatly improved since admission  Abdominal:      General: Bowel sounds are normal  There is no distension  Palpations: Abdomen is soft  Musculoskeletal:      Cervical back: Normal range of motion and neck supple  Right lower leg: No edema  Left lower leg: No edema  Skin:     General: Skin is warm and dry  Capillary Refill: Capillary refill takes less than 2 seconds  Neurological:      General: No focal deficit present  Mental Status: He is alert and oriented to person, place, and time  Mental status is at baseline  Psychiatric:         Mood and Affect: Mood normal          Behavior: Behavior is cooperative                   Medications/ Allergies:     Current Facility-Administered Medications   Medication Dose Route Frequency Provider Last Rate    acetaminophen  650 mg Oral Q6H PRN TONY Parmar      acetylcysteine  3 mL Nebulization Q8H TONY Palma      ALPRAZolam  0 5 mg Oral TID PRN TONY Parmar      amLODIPine  10 mg Oral Daily Jennifer De Jesus, TONY      buPROPion  150 mg Oral Daily Jennifer De Jesus, ARNULFONP      FLUoxetine  20 mg Oral Daily Jennifer De Jesus, TONY      folic acid  1 mg Oral Daily TONY Parmar      heparin (porcine)  5,000 Units Subcutaneous [facility] TONY Palma      insulin lispro  2-12 Units Subcutaneous TID AC TONY Palma      insulin lispro  2-12 Units Subcutaneous HS TONY Parmar      ipratropium-albuterol  3 mL Nebulization Q4H TONY Parmar      methocarbamol  750 mg Oral HS PRN TONY Parmar      methylPREDNISolone sodium succinate  40 mg Intravenous Q12H [facility] TONY Palma      metoprolol  5 mg Intravenous Q6H PRN TONY Parmar      metoprolol tartrate  100 mg Oral Q12H [facility] TONY Parmar      polyethylene glycol  17 g Oral Daily PRN TONY Parmar      pravastatin  40 mg Oral Daily With Dinner Kaykay Samayoa, CRNP      risperiDONE  2 mg Oral Daily Kaykay Samayoa, CRNP      senna-docusate sodium  1 tablet Oral HS Jennifer De Jesus, CRNP      thiamine  100 mg Oral Daily Jennifer De Jesus, CRNP      torsemide  20 mg Oral Daily Kaykay Samayoa, CRNP       acetaminophen, 650 mg, Q6H PRN  ALPRAZolam, 0 5 mg, TID PRN  methocarbamol, 750 mg, HS PRN  metoprolol, 5 mg, Q6H PRN  polyethylene glycol, 17 g, Daily PRN      No Known Allergies    VTE Pharmacologic Prophylaxis:   Sequential compression device (Venodyne)     Labs:   Troponins:      CBC with diff:  Results from last 7 days   Lab Units 08/09/21  0532 08/08/21  0530 08/07/21  0551 08/06/21  0526 08/05/21  0619 08/04/21  0530 08/03/21  0502   WBC Thousand/uL 13 70* 11 91* 11 42* 9 58 12 79* 12 72* 11 23*   HEMOGLOBIN g/dL 13 0 12 8 11 9* 11 0* 11 0* 10 7* 10 5*   HEMATOCRIT % 37 9 38 3 35 7* 33 8* 33 3* 32 4* 32 5*   MCV fL 100* 102* 103* 103* 102* 104* 105*   PLATELETS Thousands/uL 431* 423* 388 351 299 251 209   MCH pg 34 3 34 0 34 2 33 4 33 7 34 2 33 8   MCHC g/dL 34 3 33 4 33 3 32 5 33 0 33 0 32 3   RDW % 13 3 13 8 14 3 14 6 14 5 14 4 14 2   MPV fL 10 0 10 2 10 1 9 9 10 1 9 6 9 4   NRBC AUTO /100 WBCs  --   --  0 1 0  --  0     CMP:  Results from last 7 days   Lab Units 08/09/21  0532 08/08/21  0530 08/07/21  0551 08/06/21  0526 08/05/21  0619 08/04/21  0530 08/03/21  0502   SODIUM mmol/L 136 141 145 148* 149* 143 142   POTASSIUM mmol/L 4 1 4 1 4 1 3 6 3 6 3 8 4 0   CHLORIDE mmol/L 100 106 107 110* 111* 107 105   CO2 mmol/L 29 30 29 31 30 26 25   ANION GAP mmol/L 7 5 9 7 8 10 12   BUN mg/dL 25 28* 29* 31* 30* 28* 27*   CREATININE mg/dL 0 80 0 92 0 87 0 99 0 98 1 00 1 19   CALCIUM mg/dL 9 0 9 3 9 5 9 8 10 1 9 8 9 4   AST U/L  --  24 43  --  277*  --   --    ALT U/L  --  93* 132*  --  192*  --   --    ALK PHOS U/L  --  146* 159*  --  179*  --   --    TOTAL PROTEIN g/dL  --  6 5 6 7  --  7 1  --   --    ALBUMIN g/dL  --  2 1* 2 0*  -- 2 0*  --   --    TOTAL BILIRUBIN mg/dL  --  0 91 0 83  --  1 64*  --   --    EGFR ml/min/1 73sq m 93 86 90 79 80 78 63     Magnesium:  Results from last 7 days   Lab Units 08/09/21  0532 08/08/21  0530 08/06/21  0526 08/05/21  0619 08/04/21  0530 08/03/21  0502   MAGNESIUM mg/dL 2 7* 2 9* 2 7* 2 8* 2 9* 2 4     Imaging & Testing   I have personally reviewed pertinent reports  XR chest portable    Result Date: 8/4/2021  Narrative: CHEST INDICATION:  Hypoxia  COMPARISON:  8/2/2021 8/1/2021 EXAM PERFORMED/VIEWS:  XR CHEST PORTABLE FINDINGS: Cardiomediastinal silhouette appears unremarkable  Bilateral airspace disease, right greater than left, slightly worsening particularly on the left  No pneumothorax or pleural effusion  Osseous structures appear within normal limits for patient age  Impression: Slight worsening of bilateral airspace disease, favored to represent multifocal pneumonia  Correlate with clinical scenario  Workstation performed: [id number]     XR chest 1 view portable    Result Date: 8/2/2021  Narrative: CHEST INDICATION:   sob  COMPARISON:  None EXAM PERFORMED/VIEWS:  XR CHEST PORTABLE  AP semierect Images:  1 FINDINGS: Cardiomediastinal silhouette appears unremarkable  Dense consolidation in the peripheral right lower lobe noted with masslike consolidation in the right upper lobe also identified  Left lung clear  No pleural effusion or pneumothorax  Osseous structures appear within normal limits for patient age  Impression: Right upper and lower lobe consolidation concerning for multifocal pneumonia  Workstation performed: [id number]     CT head without contrast    Result Date: 8/1/2021  Narrative: CT BRAIN - WITHOUT CONTRAST INDICATION:   ams  COMPARISON:  None  TECHNIQUE:  CT examination of the brain was performed  In addition to axial images, sagittal and coronal 2D reformatted images were created and submitted for interpretation   Radiation dose length product (DLP) for this visit: 1104 39 mGy-cm   This examination, like all CT scans performed in the [facility], was performed utilizing techniques to minimize radiation dose exposure, including the use of iterative reconstruction and automated exposure control  IMAGE QUALITY:  Diagnostic  FINDINGS: PARENCHYMA: Decreased attenuation is noted in periventricular and subcortical white matter demonstrating an appearance that is statistically most likely to represent mild microangiopathic change  No CT signs of acute infarction  No intracranial mass, mass effect or midline shift  No acute parenchymal hemorrhage  VENTRICLES AND EXTRA-AXIAL SPACES:  Normal for the patient's age  VISUALIZED ORBITS AND PARANASAL SINUSES:  Unremarkable  CALVARIUM AND EXTRACRANIAL SOFT TISSUES:  Normal      Impression: No acute intracranial abnormality  Workstation performed: [id number]     XR chest portable ICU    Result Date: 8/6/2021  Narrative: CHEST INDICATION:   hypoxia  COMPARISON:  8/4/2021 EXAM PERFORMED/VIEWS:  XR CHEST PORTABLE ICU FINDINGS: Cardiomediastinal silhouette appears unremarkable  There is bilateral patchy opacity improved from the prior study  No pneumothorax or pleural effusion  Osseous structures appear within normal limits for patient age  Impression: Improved airspace disease  Workstation performed: [id number]     XR chest portable ICU    Result Date: 8/5/2021  Narrative: CHEST INDICATION:   hypoxia  COMPARISON:  Chest x-ray performed the previous day  EXAM PERFORMED/VIEWS:  XR CHEST PORTABLE ICU FINDINGS: Heart shadow is obscured by adjacent opacity  Extensive bilateral airspace disease appears worse and more dense  No pneumothorax or pleural effusion  Osseous structures appear within normal limits for patient age  Impression: Interval worsening of bilateral airspace disease   Workstation performed: [id number]     XR chest portable ICU    Result Date: 8/4/2021  Narrative: CHEST INDICATION:   Hypoxia, concern for CHF exacerbation  COMPARISON:  2021 EXAM PERFORMED/VIEWS:  XR CHEST PORTABLE ICU FINDINGS: Cardiomediastinal silhouette appears unremarkable  Patchy consolidation in the right upper and lower lobes, unchanged  Faint patchy opacities in the left lower lobe  No evidence of pleural effusion or pneumothorax  Osseous structures appear within normal limits for patient age  Impression: Areas of consolidation in the right upper and lower lobes and, probably, the left lower lobe, either asymmetric pulmonary edema or multifocal pneumonia  Workstation performed: [id number]        EKG / Monitor: Personally reviewed  Chronic atrial fibrillation    Cardiac testing:   Results for orders placed during the hospital encounter of 21    Echo complete with contrast if indicated    Narrative  [address]  [address] [facility] [address]  [phone]    Transthoracic Echocardiogram  2D, M-mode, Doppler, and Color Doppler    Study date:  02-Aug-2021    Patient: Santhosh Sánchez  MR number: [id number]  Account number: [de-identified]  : 1955  Age: 77 years  Gender: Male  Status: Inpatient  Location: Bedside  Height: 70 in  Weight: 242 4 lb  BP: 106/ 59 mmHg    Indications: Acute Respiratory failure  Diagnoses: J96 91 - Respiratory failure, unspecified with hypoxia    Sonographer:  BILL Baig  Referring Physician:  Mike Ferguson PA-C  Group:  [facility]  Interpreting Physician:  Jonathon Vilchis MD    SUMMARY    PROCEDURE INFORMATION:  The heart rate was 119 bpm, at the start of the study  Patient was in atrial fibrillation with rapid ventricular rate    LEFT VENTRICLE:  Systolic function was moderately reduced  Ejection fraction was estimated to be around 40 %  Hard to assess set correctly due to irregular and fast heartbeat  There was mild diffuse hypokinesis    Wall thickness was at the upper limits of normal   There was mild borderline concentric hypertrophy  LEFT ATRIUM:  The atrium was mildly to moderately dilated  RIGHT ATRIUM:  The atrium was mildly dilated  MITRAL VALVE:  There was mild regurgitation  TRICUSPID VALVE:  There was mild regurgitation  Estimated peak PA pressure was 30 mmHg  IVC, HEPATIC VEINS:  The inferior vena cava was mildly dilated  The respirophasic change in diameter was more than 50%  HISTORY: PRIOR HISTORY: Cardiomyopathy,A Fib ,HTN,CAD,Acute kidney injury,Hyperlipidemia,Hypertriglyceridemia  PROCEDURE: The procedure was performed at the bedside  This was a routine study  The transthoracic approach was used  The study included complete 2D imaging, M-mode, complete spectral Doppler, and color Doppler  The heart rate was 119 bpm,  at the start of the study  Patient was in atrial fibrillation with rapid ventricular rate Images were obtained from the parasternal, apical, subcostal, and suprasternal notch acoustic windows  Echocardiographic views were limited due to  patient on mechanical ventilator  Image quality was adequate  LEFT VENTRICLE: Size was normal  Systolic function was moderately reduced  Ejection fraction was estimated to be around 40 %  Hard to assess set correctly due to irregular and fast heartbeat There was mild diffuse hypokinesis  Wall  thickness was at the upper limits of normal  There was mild borderline concentric hypertrophy  DOPPLER: The study was not technically sufficient to allow evaluation of LV diastolic function due to atrial fibrillation  RIGHT VENTRICLE: The size was normal  Systolic function was normal  Wall thickness was normal     LEFT ATRIUM: The atrium was mildly to moderately dilated  RIGHT ATRIUM: The atrium was mildly dilated  MITRAL VALVE: There was normal leaflet separation  DOPPLER: The transmitral velocity was within the normal range  There was no evidence for stenosis  There was mild regurgitation  AORTIC VALVE: The valve was trileaflet  Leaflets exhibited mildly increased thickness and normal cuspal separation  DOPPLER: Transaortic velocity was within the normal range  There was no evidence for stenosis  There was no regurgitation  TRICUSPID VALVE: DOPPLER: There was mild regurgitation  Estimated peak PA pressure was 30 mmHg  PULMONIC VALVE: DOPPLER: There was no significant regurgitation  PERICARDIUM: There was no thickening or calcification  There was no pericardial effusion  AORTA: The root exhibited normal size  SYSTEMIC VEINS: IVC: The inferior vena cava was mildly dilated  The respirophasic change in diameter was more than 50%  SYSTEM MEASUREMENT TABLES    2D  EF (Teich): 51 89 %  %FS: 26 73 %  Ao Diam: 3 53 cm  EDV(Teich): 126 75 ml  ESV(Teich): 60 98 ml  IVSd: 1 13 cm  LA Area: 30 65 cm2  LA Diam: 4 34 cm  LVEDV MOD A4C: 105 83 ml  LVEF MOD A4C: 30 31 %  LVESV MOD A4C: 73 76 ml  LVIDd: 5 15 cm  LVIDs: 3 77 cm  LVLd A4C: 8 08 cm  LVLs A4C: 7 89 cm  LVOT Diam: 2 21 cm  LVPWd: 0 95 cm  RA Area: 27 2 cm2  RVIDd: 3 06 cm  SV (Teich): 65 77 ml  SV MOD A4C: 32 08 ml    CW  AV Vmax: 1 09 m/s  AV maxP 77 mmHg  TR Vmax: 2 31 m/s  TR maxP 35 mmHg    MM  TAPSE: 1 82 cm    PW  CHEMO Vmax, Pt: 3 42 cm2  E' Lat: 0 11 m/s  E' Sept: 0 09 m/s  LVOT Env  Ti: 186 49 ms  LVOT VTI: 14 57 cm  LVOT Vmax: 1 m/s  LVOT Vmax: 0 97 m/s  LVOT Vmean: 0 78 m/s  LVOT maxP 03 mmHg  LVOT maxPG: 3 79 mmHg  LVOT meanP 68 mmHg    [employer] Accredited Echocardiography Laboratory    Prepared and electronically signed by    Cresencio Sullivan MD  Signed 02-Aug-2021 10:06:00      TONY Meyer        "This note has been constructed using a voice recognition system  Therefore there may be syntax, spelling, and/or grammatical errors   Please call if you have any questions  "

## 2021-08-09 NOTE — ASSESSMENT & PLAN NOTE
Wt Readings from Last 3 Encounters:   08/09/21 103 kg (227 lb 8 2 oz)   07/09/21 108 kg (239 lb)   04/09/21 110 kg (243 lb)     Patient's 2D echocardiogram showed EF of 40%  Continue patient on metoprolol, torsemide  Follow-up cardiology recommendations

## 2021-08-09 NOTE — PHYSICAL THERAPY NOTE
PT TREATMENT     08/09/21 1445   Note Type   Note Type Treatment   Pain Assessment   Pain Assessment Tool Pain Assessment not indicated - pt denies pain   Restrictions/Precautions   Other Precautions Chair Alarm; Bed Alarm; Fall Risk   General   Chart Reviewed Yes   Family/Caregiver Present No   Cognition   Arousal/Participation Cooperative   Subjective   Subjective patient states feeling stronger today and no pain   Bed Mobility   Additional Comments patient OOB in chair upon arrival by therapist   Transfers   Sit to Stand 4  Minimal assistance   Additional items Assist x 1;Verbal cues   Stand to Sit 4  Minimal assistance   Additional items Assist x 1;Verbal cues   Additional Comments patient demonstrated improved sitting technigue without "plopping" back into chair   Ambulation/Elevation   Gait Assistance 4  Minimal assist   Additional items Assist x 1;Verbal cues; Tactile cues   Assistive Device Rolling walker   Distance 30 feet x 2 with improving stride length and widened base of support   Exercises   Hamstring Stretch Sitting;5 reps;PROM; Bilateral   Quad Sets Sitting;10 reps;Bilateral   Glute Sets Sitting;10 reps;Bilateral   Hip Flexion Sitting;10 reps;Bilateral   Hip Abduction Sitting;10 reps;Bilateral   Knee AROM Short Arc Quad Sitting;10 reps;Bilateral   Knee AROM Long Arc Quad Sitting;10 reps;Bilateral   Ankle Pumps Sitting;10 reps;Bilateral   Balance training  sidestepping and backward walking completed for balance and coordination training   Assessment   Assessment patient cooperative and motivated and demonstrating improving gait balance and endurance and will benefit from continued PT with progression as tolerated  The patient's AM-PAC Basic Mobility Inpatient Short Form Raw Score is 17, Standardized Score is 39 67  A standardized score less than 42 9 suggests the patient may benefit from discharge to post-acute rehabilitation services   Please also refer to the recommendation of the Physical Therapist for safe discharge planning  Plan   Treatment/Interventions ADL retraining;Functional transfer training;LE strengthening/ROM; Elevations; Therapeutic exercise; Endurance training;Patient/family training;Equipment eval/education; Bed mobility;Gait training; Compensatory technique education   PT Frequency 5x/wk   Recommendation   PT Discharge Recommendation Post acute rehabilitation services   AM-PAC Basic Mobility Inpatient   Turning in Bed Without Bedrails 3   Lying on Back to Sitting on Edge of Flat Bed 3   Moving Bed to Chair 3   Standing Up From Chair 3   Walk in Room 3   Climb 3-5 Stairs 2   Basic Mobility Inpatient Raw Score 17   Basic Mobility Standardized Score 64 72   Licensure   [place] License Number  Kelly Martinez PT [id number]

## 2021-08-10 PROBLEM — J18.9 PNEUMONIA: Status: RESOLVED | Noted: 2021-08-04 | Resolved: 2021-08-10

## 2021-08-10 PROBLEM — E11.9 TYPE 2 DIABETES MELLITUS, WITHOUT LONG-TERM CURRENT USE OF INSULIN (HCC): Status: ACTIVE | Noted: 2021-08-10

## 2021-08-10 PROBLEM — J44.1 CHRONIC OBSTRUCTIVE PULMONARY DISEASE WITH ACUTE EXACERBATION (HCC): Status: ACTIVE | Noted: 2021-08-10

## 2021-08-10 PROBLEM — J96.02 ACUTE RESPIRATORY FAILURE WITH HYPOXIA AND HYPERCAPNIA (HCC): Status: ACTIVE | Noted: 2021-08-01

## 2021-08-10 LAB
ANION GAP SERPL CALCULATED.3IONS-SCNC: 6 MMOL/L (ref 4–13)
BASOPHILS # BLD AUTO: 0.01 THOUSANDS/ΜL (ref 0–0.1)
BASOPHILS NFR BLD AUTO: 0 % (ref 0–1)
BUN SERPL-MCNC: 25 MG/DL (ref 5–25)
CALCIUM SERPL-MCNC: 8.8 MG/DL (ref 8.3–10.1)
CHLORIDE SERPL-SCNC: 99 MMOL/L (ref 100–108)
CO2 SERPL-SCNC: 30 MMOL/L (ref 21–32)
CREAT SERPL-MCNC: 0.77 MG/DL (ref 0.6–1.3)
EOSINOPHIL # BLD AUTO: 0.01 THOUSAND/ΜL (ref 0–0.61)
EOSINOPHIL NFR BLD AUTO: 0 % (ref 0–6)
ERYTHROCYTE [DISTWIDTH] IN BLOOD BY AUTOMATED COUNT: 13.1 % (ref 11.6–15.1)
GFR SERPL CREATININE-BSD FRML MDRD: 95 ML/MIN/1.73SQ M
GLUCOSE SERPL-MCNC: 194 MG/DL (ref 65–140)
GLUCOSE SERPL-MCNC: 224 MG/DL (ref 65–140)
GLUCOSE SERPL-MCNC: 240 MG/DL (ref 65–140)
GLUCOSE SERPL-MCNC: 260 MG/DL (ref 65–140)
GLUCOSE SERPL-MCNC: 268 MG/DL (ref 65–140)
HCT VFR BLD AUTO: 39.6 % (ref 36.5–49.3)
HGB BLD-MCNC: 13.4 G/DL (ref 12–17)
IMM GRANULOCYTES # BLD AUTO: 0.18 THOUSAND/UL (ref 0–0.2)
IMM GRANULOCYTES NFR BLD AUTO: 1 % (ref 0–2)
LYMPHOCYTES # BLD AUTO: 0.66 THOUSANDS/ΜL (ref 0.6–4.47)
LYMPHOCYTES NFR BLD AUTO: 4 % (ref 14–44)
MCH RBC QN AUTO: 33.8 PG (ref 26.8–34.3)
MCHC RBC AUTO-ENTMCNC: 33.8 G/DL (ref 31.4–37.4)
MCV RBC AUTO: 100 FL (ref 82–98)
MONOCYTES # BLD AUTO: 0.93 THOUSAND/ΜL (ref 0.17–1.22)
MONOCYTES NFR BLD AUTO: 6 % (ref 4–12)
NEUTROPHILS # BLD AUTO: 13.45 THOUSANDS/ΜL (ref 1.85–7.62)
NEUTS SEG NFR BLD AUTO: 89 % (ref 43–75)
NRBC BLD AUTO-RTO: 0 /100 WBCS
PLATELET # BLD AUTO: 454 THOUSANDS/UL (ref 149–390)
PMV BLD AUTO: 10.2 FL (ref 8.9–12.7)
POTASSIUM SERPL-SCNC: 3.5 MMOL/L (ref 3.5–5.3)
RBC # BLD AUTO: 3.96 MILLION/UL (ref 3.88–5.62)
SODIUM SERPL-SCNC: 135 MMOL/L (ref 136–145)
WBC # BLD AUTO: 15.24 THOUSAND/UL (ref 4.31–10.16)

## 2021-08-10 PROCEDURE — 80048 BASIC METABOLIC PNL TOTAL CA: CPT | Performed by: INTERNAL MEDICINE

## 2021-08-10 PROCEDURE — 92526 ORAL FUNCTION THERAPY: CPT

## 2021-08-10 PROCEDURE — 94664 DEMO&/EVAL PT USE INHALER: CPT

## 2021-08-10 PROCEDURE — 94760 N-INVAS EAR/PLS OXIMETRY 1: CPT

## 2021-08-10 PROCEDURE — 99232 SBSQ HOSP IP/OBS MODERATE 35: CPT | Performed by: INTERNAL MEDICINE

## 2021-08-10 PROCEDURE — 82948 REAGENT STRIP/BLOOD GLUCOSE: CPT

## 2021-08-10 PROCEDURE — 85025 COMPLETE CBC W/AUTO DIFF WBC: CPT | Performed by: INTERNAL MEDICINE

## 2021-08-10 PROCEDURE — 94660 CPAP INITIATION&MGMT: CPT

## 2021-08-10 PROCEDURE — 94640 AIRWAY INHALATION TREATMENT: CPT

## 2021-08-10 PROCEDURE — 94668 MNPJ CHEST WALL SBSQ: CPT

## 2021-08-10 RX ORDER — AMLODIPINE BESYLATE 10 MG/1
10 TABLET ORAL DAILY
Status: DISCONTINUED | OUTPATIENT
Start: 2021-08-11 | End: 2021-08-13

## 2021-08-10 RX ORDER — PREDNISONE 20 MG/1
20 TABLET ORAL DAILY
Status: DISCONTINUED | OUTPATIENT
Start: 2021-08-13 | End: 2021-08-13 | Stop reason: HOSPADM

## 2021-08-10 RX ORDER — PREDNISONE 10 MG/1
10 TABLET ORAL DAILY
Status: DISCONTINUED | OUTPATIENT
Start: 2021-08-15 | End: 2021-08-13 | Stop reason: HOSPADM

## 2021-08-10 RX ADMIN — ACETAMINOPHEN 650 MG: 325 TABLET, FILM COATED ORAL at 16:21

## 2021-08-10 RX ADMIN — METHYLPREDNISOLONE SODIUM SUCCINATE 40 MG: 40 INJECTION, POWDER, FOR SOLUTION INTRAMUSCULAR; INTRAVENOUS at 08:26

## 2021-08-10 RX ADMIN — INSULIN LISPRO 5 UNITS: 100 INJECTION, SOLUTION INTRAVENOUS; SUBCUTANEOUS at 16:09

## 2021-08-10 RX ADMIN — DOCUSATE SODIUM AND SENNOSIDES 1 TABLET: 8.6; 5 TABLET, FILM COATED ORAL at 21:10

## 2021-08-10 RX ADMIN — BUPROPION 150 MG: 150 TABLET, EXTENDED RELEASE ORAL at 08:22

## 2021-08-10 RX ADMIN — INSULIN LISPRO 5 UNITS: 100 INJECTION, SOLUTION INTRAVENOUS; SUBCUTANEOUS at 11:46

## 2021-08-10 RX ADMIN — IPRATROPIUM BROMIDE AND ALBUTEROL SULFATE 3 ML: 2.5; .5 SOLUTION RESPIRATORY (INHALATION) at 20:19

## 2021-08-10 RX ADMIN — FOLIC ACID 1 MG: 1 TABLET ORAL at 08:22

## 2021-08-10 RX ADMIN — ALPRAZOLAM 0.5 MG: 0.5 TABLET ORAL at 08:30

## 2021-08-10 RX ADMIN — PRAVASTATIN SODIUM 40 MG: 40 TABLET ORAL at 16:12

## 2021-08-10 RX ADMIN — METOPROLOL TARTRATE 100 MG: 100 TABLET, FILM COATED ORAL at 08:22

## 2021-08-10 RX ADMIN — INSULIN LISPRO 6 UNITS: 100 INJECTION, SOLUTION INTRAVENOUS; SUBCUTANEOUS at 16:11

## 2021-08-10 RX ADMIN — FLUOXETINE 20 MG: 20 CAPSULE ORAL at 08:22

## 2021-08-10 RX ADMIN — AMLODIPINE BESYLATE 10 MG: 10 TABLET ORAL at 08:22

## 2021-08-10 RX ADMIN — APIXABAN 5 MG: 5 TABLET, FILM COATED ORAL at 17:36

## 2021-08-10 RX ADMIN — INSULIN LISPRO 5 UNITS: 100 INJECTION, SOLUTION INTRAVENOUS; SUBCUTANEOUS at 08:23

## 2021-08-10 RX ADMIN — INSULIN LISPRO 2 UNITS: 100 INJECTION, SOLUTION INTRAVENOUS; SUBCUTANEOUS at 08:22

## 2021-08-10 RX ADMIN — ALPRAZOLAM 0.5 MG: 0.5 TABLET ORAL at 21:18

## 2021-08-10 RX ADMIN — INSULIN GLARGINE 10 UNITS: 100 INJECTION, SOLUTION SUBCUTANEOUS at 21:10

## 2021-08-10 RX ADMIN — THIAMINE HCL TAB 100 MG 100 MG: 100 TAB at 08:22

## 2021-08-10 RX ADMIN — TORSEMIDE 20 MG: 20 TABLET ORAL at 08:22

## 2021-08-10 RX ADMIN — APIXABAN 5 MG: 5 TABLET, FILM COATED ORAL at 08:26

## 2021-08-10 RX ADMIN — RISPERIDONE 2 MG: 1 TABLET ORAL at 08:22

## 2021-08-10 RX ADMIN — INSULIN LISPRO 4 UNITS: 100 INJECTION, SOLUTION INTRAVENOUS; SUBCUTANEOUS at 21:10

## 2021-08-10 RX ADMIN — IPRATROPIUM BROMIDE AND ALBUTEROL SULFATE 3 ML: 2.5; .5 SOLUTION RESPIRATORY (INHALATION) at 08:34

## 2021-08-10 RX ADMIN — INSULIN LISPRO 6 UNITS: 100 INJECTION, SOLUTION INTRAVENOUS; SUBCUTANEOUS at 11:46

## 2021-08-10 RX ADMIN — IPRATROPIUM BROMIDE AND ALBUTEROL SULFATE 3 ML: 2.5; .5 SOLUTION RESPIRATORY (INHALATION) at 14:20

## 2021-08-10 NOTE — SPEECH THERAPY NOTE
Speech/Language Pathology Progress Note    Patient Name: Trena Wells  IEWQD'F Date: 8/10/2021       Subjective:  "Hey  Where have you been" stated upon my arrival     Objective:  Pt was seen for diagnostic dysphagia therapy to assess readiness for oral diet vs ensure tolerance of current diet (sfot food, thin liquid ) and to assess potential for safe diet upgrade  Pt was alert, upright at edge of bed and fed self  Pt now on RA (no O2 support)  Pt was assessed with fish, vegetables, hard cookie, pretzels and water  Mastication was timely and effective  Bolus formation and transfer were effective  Swallow initiation appeared prompt  Laryngeal rise was good by palpation  No coughing, throat clearing, c/o stasis, multiple swallows, change in vocal quality noted c po intake today  Assessment:  Pt presented with no s/s dysphagia  Plan/Recommendations:  Please upgrade food texture to regular (d/c dysphagia 3 restriction), continue thin liquid  No additional SLP f/u indicated at this time       Pelon Sutton [address] [phone]

## 2021-08-10 NOTE — ASSESSMENT & PLAN NOTE
Lab Results   Component Value Date    HGBA1C 6 3 (H) 08/11/2021       Recent Labs     08/12/21  1611 08/12/21 2050 08/13/21  0746 08/13/21  1119   POCGLU 164* 190* 155* 174*       Blood Sugar Average: Last 72 hrs:  (P) 218 5     Noted to a hyperglycemia, pronounced with steroid use  Now improving with tapering of steroids  Continue sliding scale  , added metformin on discharge  Patient was educated about diet and lifestyle modification

## 2021-08-10 NOTE — PROGRESS NOTES
[facility] Progress Note  Patient: Susanne Quick 77 y o  male   MRN: [id number]  PCP: TONY Devi  Unit/Bed#: [address] [place] [address] Encounter: [id number]  Date Of Visit: 08/10/21    Problem List:    Principal Problem:    Acute respiratory failure with hypoxia and hypercapnia (HCC)  Active Problems:    Acute on chronic systolic (congestive) heart failure (HCC)    Chronic atrial fibrillation ([facility] )    Type 2 diabetes mellitus, without long-term current use of insulin ([facility] )    Suspected Chronic obstructive pulmonary disease with acute exacerbation (HCC)    Mixed hyperlipidemia    Essential hypertension    Anxiety    Depression, recurrent (HCC)    Alcohol use    Severe obesity (BMI 35 0-39  9) with comorbidity [facility])      Assessment & Plan:    Acute on chronic systolic (congestive) heart failure (HCC)  Assessment & Plan  Wt Readings from Last 3 Encounters:   08/10/21 103 kg (227 lb)   07/09/21 108 kg (239 lb)   04/09/21 110 kg (243 lb)     2D echocardiogram showed EF of 40%  Currently optimize, appears euvolemic  Continue metoprolol,  Continue patient on metoprolol, torsemide  Follow-up cardiology recommendations  * Acute respiratory failure with hypoxia and hypercapnia (HCC)  Assessment & Plan  Secondary to CHF and COPD exacerbation, with possible pneumonia  Appears to have improved, currently saturating adequately on room air  On BiPAP q h s  But reluctant to continue   · Will get overnight sleep screen  · Hold off BiPAP tonight, ABG in a m   · Monitor respiratory status    Pneumonia-resolved as of 8/10/2021  Assessment & Plan  Avoid got pneumonia, finished a course of antibiotics  Procalcitonin trended down to 1 23 last checked  Continue on nebulizers      Suspected Chronic obstructive pulmonary disease with acute exacerbation ([facility] )  Assessment & Plan  Appears to be improving  No wheezing on exam today  Continue bronchodilators, transition to prednisone taper  Pulmonary follow-up after discharge    Type 2 diabetes mellitus, without long-term current use of insulin [facility])  Assessment & Plan  No results found for: HGBA1C    Recent Labs     08/10/21  0717 08/10/21  1058 08/10/21  1604 08/10/21  2105   POCGLU 194* 260* 268* 240*       Blood Sugar Average: Last 72 hrs:  (P) 301 5625     Noted to a hyperglycemia, pronounced with steroid use  Check hemoglobin A1c  Continue Lantus and lispro  Patient was educated about diet and lifestyle modification    Chronic atrial fibrillation (HCC)  Assessment & Plan  Continue  on metoprolol,  Continue Eliquis      Depression, recurrent (HCC)  Assessment & Plan  Continue home meds    Anxiety  Assessment & Plan  Continue patient on Wellbutrin   Essential hypertension  Assessment & Plan  Stable monitor on metoprolol, amlodipine with holding parameter    Mixed hyperlipidemia  Assessment & Plan  On statin    Severe obesity (BMI 35 0-39  9) with comorbidity ([facility] )  Assessment & Plan  Counseled on diet and exercise  Continue supportive care  VTE Pharmacologic Prophylaxis:   Pharmacologic: Apixaban (Eliquis)  Mechanical VTE Prophylaxis in Place: Yes    Patient Centered Rounds: I have performed bedside rounds with nursing staff today  Discussions with Specialists or Other Care Team Provider:  Cardiology    Education and Discussions with Family / Patient:  Discussed with sister over the phone    Time Spent for Care: 45 minutes  More than 50% of total time spent on counseling and coordination of care as described above      Current Length of Stay: 9 day(s)    Current Patient Status: Inpatient   Certification Statement: The patient will continue to require additional inpatient hospital stay due to CHF diabetes optimization    Discharge Plan:  Anticipate rehab in next 24-48 hours    Code Status: Level 3 - DNAR and DNI      Subjective:   Noted we comfortably lying in bed  Reports that his breathing is improved  Denies any chest pain or cough  Denies any  or GI symptoms    Discussed regarding anticoagulation options, agrees to continue Eliquis  Use BiPAP last night but reports that it is difficult to tolerate    Objective:     Vitals:   Temp (24hrs), Av 3 °F (36 8 °C), Min:97 7 °F (36 5 °C), Max:98 8 °F (37 1 °C)    Temp:  [97 7 °F (36 5 °C)-98 8 °F (37 1 °C)] 98 6 °F (37 °C)  HR:  [] 93  Resp:  [16-18] 18  BP: ()/(58-89) 96/62  SpO2:  [90 %-95 %] 94 % on room air  Body mass index is 32 57 kg/m²  Input and Output Summary (last 24 hours): Intake/Output Summary (Last 24 hours) at 8/10/2021 1653  Last data filed at 8/10/2021 1059  Gross per 24 hour   Intake 200 ml   Output 2175 ml   Net -1975 ml       Physical Exam:     Physical Exam  Constitutional:       General: He is not in acute distress  HENT:      Head: Normocephalic and atraumatic  Cardiovascular:      Rate and Rhythm: Normal rate  Pulmonary:      Effort: Pulmonary effort is normal  No respiratory distress  Breath sounds: No wheezing, rhonchi or rales  Chest:      Chest wall: No tenderness  Abdominal:      General: Bowel sounds are normal  There is no distension  Palpations: Abdomen is soft  Tenderness: There is no abdominal tenderness  There is no guarding or rebound  Musculoskeletal:      Right lower leg: No edema  Left lower leg: No edema  Skin:     General: Skin is warm and dry  Findings: No rash  Neurological:      Mental Status: Mental status is at baseline  Cranial Nerves: No cranial nerve deficit           Additional Data:     Labs:    Results from last 7 days   Lab Units 08/10/21  0453   WBC Thousand/uL 15 24*   HEMOGLOBIN g/dL 13 4   HEMATOCRIT % 39 6   PLATELETS Thousands/uL 454*   NEUTROS PCT % 89*   LYMPHS PCT % 4*   MONOS PCT % 6   EOS PCT % 0     Results from last 7 days   Lab Units 08/10/21  0453 21  0530   POTASSIUM mmol/L 3 5 4 1   CHLORIDE mmol/L 99* 106   CO2 mmol/L 30 30   BUN mg/dL 25 28*   CREATININE mg/dL 0 77 0 92 CALCIUM mg/dL 8 8 9 3   ALK PHOS U/L  --  146*   ALT U/L  --  93*   AST U/L  --  24           * I Have Reviewed All Lab Data Listed Above  * Additional Pertinent Lab Tests Reviewed:  All Labs Within Last 24 Hours Reviewed      Imaging:  Imaging Reports Reviewed Today Include: CXR, CT    Recent Cultures (last 7 days):           Last 24 Hours Medication List:   Current Facility-Administered Medications   Medication Dose Route Frequency Provider Last Rate    acetaminophen  650 mg Oral Q6H PRN TONY Sanders      ALPRAZolam  0 5 mg Oral TID PRN TONY Sanders      amLODIPine  10 mg Oral Daily TONY Sanders      apixaban  5 mg Oral BID Padmini Montoya MD      buPROPion  150 mg Oral Daily Carly Ayala Mol, TONY      FLUoxetine  20 mg Oral Daily Carly Ayala Mol, TONY      folic acid  1 mg Oral Daily Carly Ayala MolTONY      insulin glargine  10 Units Subcutaneous HS Padmini Montoya MD      insulin lispro  2-12 Units Subcutaneous TID AC Carly AyalaTONY Marti      insulin lispro  2-12 Units Subcutaneous HS TONY Sanders      insulin lispro  5 Units Subcutaneous TID With Meals Padmini Montoya MD      ipratropium-albuterol  3 mL Nebulization Q6H Padmini Montoya MD      methocarbamol  750 mg Oral HS PRN TONY Sanders      metoprolol  5 mg Intravenous Q6H PRN OTNY Sanders      metoprolol tartrate  100 mg Oral Q12H [address] TONY Sanders      polyethylene glycol  17 g Oral Daily PRN TONY Sanders      pravastatin  40 mg Oral Daily With [address] TONY Kirby      [START ON 8/11/2021] predniSONE  30 mg Oral Daily Madai Kramer MD      Followed by   Brian Thomas ON 8/13/2021] predniSONE  20 mg Oral Daily Madai Kramer MD      Followed by   Brian Thomas ON 8/15/2021] predniSONE  10 mg Oral Daily Madai Kramer MD      risperiDONE  2 mg Oral Daily TONY Sanders      senna-docusate sodium  1 tablet Oral HS TONY Vegas      thiamine  100 mg Oral Daily Geoffrey Cruz TONY De Jesus      torsemide  20 mg Oral Daily TONY Vegas            Today, Patient Was Seen By: Hank Gentile MD    ** Please Note: "This note has been constructed using a voice recognition system  Therefore there may be syntax, spelling, and/or grammatical errors   Please call if you have any questions  "**

## 2021-08-10 NOTE — ASSESSMENT & PLAN NOTE
Continue  on metoprolol,  Continue Eliquis  Patient reported that previously could not afford Eliquis and he does not want to use Coumadin    Agreeable to continue Eliquis for now

## 2021-08-10 NOTE — PROGRESS NOTES
Progress Note - Cardiology   [facility]     Flores Augustin 77 y o  male MRN: [id number]  : 1955  Unit/Bed#: [address] [place] [address] Encounter: [id number]    ASSESSMENT:   S/p acute respiratory failure with hypoxia  Secondary to multilobar pneumonia      Chronic atrial fibrillation,  [id number] score is 4  Patient refuses  Long-termanticoagulation with Coumadin and  states that NOACs are too expensive for him   currently on Eliquis 5 mg b i d  as inpatient      Cardiomyopathy, EF  40%   Probably secondary to chronic Afib and alcohol abuse      MAX,   resolved      Hypertension,  Well controlled      Dyslipidemia,   on statin      History of alcohol abuse      History of anxiety/ panic attacks        RECOMMENDATIONS:   From cardiac standpoint continue metoprolol, pravastatin, Eliquis, amlodipine and diuretic     Encourage long-term anticoagulation               Subjective / Objective:     Review of Systems    denies any new or acute cardiac symptoms   Denies chest pain, palpitations, syncope or dyspnea at rest  Continues to refuse long-term anticoagulation  Vitals: Blood pressure 133/89, pulse 84, temperature 98 8 °F (37 1 °C), temperature source Oral, resp  rate 16, height 5' 10" (1 778 m), weight 103 kg (227 lb), SpO2 92 %  Vitals:    21 0546 08/10/21 0600   Weight: 103 kg (227 lb 8 2 oz) 103 kg (227 lb)     Body mass index is 32 57 kg/m²  BP Readings from Last 3 Encounters:   08/10/21 133/89   21 120/72   21 130/80     Orthostatic Blood Pressures      Most Recent Value   Blood Pressure  133/89 filed at 08/10/2021 0713   Patient Position - Orthostatic VS  Lying filed at 08/10/2021 0713        I/O        07 -  0700  07 - 08/10 0700 08/10 07 -  0700    P  O  200 350     I V  (mL/kg) 10 (0 1)      IV Piggyback 150      Total Intake(mL/kg) 360 (3 5) 350 (3 4)     Urine (mL/kg/hr) 3050 (1 2) 2275 (0 9)     Stool  0     Total Output 3050 2275     Net -0898 -0390            Unmeasured Urine Occurrence  2 x     Unmeasured Stool Occurrence  1 x         Invasive Devices     Peripheral Intravenous Line            Peripheral IV 08/09/21 Left Forearm 1 day                  Intake/Output Summary (Last 24 hours) at 8/10/2021 1026  Last data filed at 8/10/2021 0600  Gross per 24 hour   Intake 200 ml   Output 1575 ml   Net -1375 ml         Physical Exam:   Physical Exam    Neurologic:  Alert & oriented x 3,  no focal deficits noted   Constitutional:  obese distress  Eyes:  PERRL, conjunctiva normal   HENT:  Atraumatic, external ears normal, nose normal,   NECK: Normal range of motion, no tenderness, neck is supple , No JVP  Respiratory:  Bilateral air entry, mostly clear to auscultation  Cardiovascular: irregularly irregular rhythmS1-S2 with a I/VI ejection systolic murmur  No pericardial rub or gallop audible  GI:  Soft, nondistended, audible bowel sounds, nontender, no hepatosplenomegaly appreciated  Musculoskeletal:  No tenderness, no deformities  Extremities:  No appreciable pitting edema   Distal pulses are present  Psychiatric:  Speech and behavior appropriate             Medications/ Allergies:     Current Facility-Administered Medications   Medication Dose Route Frequency Provider Last Rate    acetaminophen  650 mg Oral Q6H PRN TONY Negrete      ALPRAZolam  0 5 mg Oral TID PRN TONY Negrete      amLODIPine  10 mg Oral Daily TONY Negrete      apixaban  5 mg Oral BID Chase Benites MD      buPROPion  150 mg Oral Daily TONY Hicks      FLUoxetine  20 mg Oral Daily TONY Hicks      folic acid  1 mg Oral Daily TONY Hicks      insulin glargine  10 Units Subcutaneous HS Chase Benites MD      insulin lispro  2-12 Units Subcutaneous TID AC TONY Hicks      insulin lispro  2-12 Units Subcutaneous  TONY Negrete      insulin lispro  5 Units Subcutaneous TID With Meals Bhakti Sesay MD      ipratropium-albuterol  3 mL Nebulization Q6H Bhakti Sesay MD      methocarbamol  750 mg Oral HS PRN Kaykay Samayoa, TONY      methylPREDNISolone sodium succinate  40 mg Intravenous Daily Bhakti Sesay MD      metoprolol  5 mg Intravenous Q6H PRN Kaykayantwon Samayoa, TONY      metoprolol tartrate  100 mg Oral Q12H [address] Jennifer De Jesus, CRNP      polyethylene glycol  17 g Oral Daily PRN Kaykay Samayoa, TONY      pravastatin  40 mg Oral Daily With Janet Hunting, CRNP      risperiDONE  2 mg Oral Daily Jennifer De Jesus, CRNP      senna-docusate sodium  1 tablet Oral HS Kaykay Samayoa, ARNULFONP      thiamine  100 mg Oral Daily Jennifer De Jesus, CRNP      torsemide  20 mg Oral Daily Kaykay Samayoa, ARNULFONP       acetaminophen, 650 mg, Q6H PRN  ALPRAZolam, 0 5 mg, TID PRN  methocarbamol, 750 mg, HS PRN  metoprolol, 5 mg, Q6H PRN  polyethylene glycol, 17 g, Daily PRN      No Known Allergies        Labs:   Troponins:      CBC with diff:  Results from last 7 days   Lab Units 08/10/21  0453 08/09/21  0532 08/08/21  0530 08/07/21  0551 08/06/21  0526 08/05/21  0619 08/04/21  0530   WBC Thousand/uL 15 24* 13 70* 11 91* 11 42* 9 58 12 79* 12 72*   HEMOGLOBIN g/dL 13 4 13 0 12 8 11 9* 11 0* 11 0* 10 7*   HEMATOCRIT % 39 6 37 9 38 3 35 7* 33 8* 33 3* 32 4*   MCV fL 100* 100* 102* 103* 103* 102* 104*   PLATELETS Thousands/uL 454* 431* 423* 388 351 299 251   MCH pg 33 8 34 3 34 0 34 2 33 4 33 7 34 2   MCHC g/dL 33 8 34 3 33 4 33 3 32 5 33 0 33 0   RDW % 13 1 13 3 13 8 14 3 14 6 14 5 14 4   MPV fL 10 2 10 0 10 2 10 1 9 9 10 1 9 6   NRBC AUTO /100 WBCs 0  --   --  0 1 0  --        CMP:  Results from last 7 days   Lab Units 08/10/21  0453 08/09/21  0532 08/08/21  0530 08/07/21  0551 08/06/21  0526 08/05/21  0619 08/04/21  0530   SODIUM mmol/L 135* 136 141 145 148* 149* 143   POTASSIUM mmol/L 3 5 4 1 4 1 4 1 3 6 3 6 3 8   CHLORIDE mmol/L 99* 100 106 107 110* 111* 107   CO2 mmol/L 30 29 30 29 31 30 26   ANION GAP mmol/L 6 7 5 9 7 8 10   BUN mg/dL 25 25 28* 29* 31* 30* 28*   CREATININE mg/dL 0 77 0 80 0 92 0 87 0 99 0 98 1 00   CALCIUM mg/dL 8 8 9 0 9 3 9 5 9 8 10 1 9 8   AST U/L  --   --  24 43  --  277*  --    ALT U/L  --   --  93* 132*  --  192*  --    ALK PHOS U/L  --   --  146* 159*  --  179*  --    TOTAL PROTEIN g/dL  --   --  6 5 6 7  --  7 1  --    ALBUMIN g/dL  --   --  2 1* 2 0*  --  2 0*  --    TOTAL BILIRUBIN mg/dL  --   --  0 91 0 83  --  1 64*  --    EGFR ml/min/1 73sq m 95 93 86 90 79 80 78       Magnesium:  Results from last 7 days   Lab Units 21  0532 21  0530 21  0526 21  0619 21  0530   MAGNESIUM mg/dL 2 7* 2 9* 2 7* 2 8* 2 9*     Coags:    TSH:    No components found for: TSH3  Lipid Profile:    Hgb A1c:    NT-proBNP: No results for input(s): NTBNP in the last 72 hours  Imaging & Testing   I have personally reviewed pertinent reports  Echo complete with contrast if indicated    Result Date: 2021  Narrative: [address] [facility] 6 [phone] Transthoracic Echocardiogram 2D, M-mode, Doppler, and Color Doppler Study date:  02-Aug-2021 Patient: Cally Little MR number: [id number] Account number: [de-identified] : 1955 Age: 77 years Gender: Male Status: Inpatient Location: Bedside Height: 70 in Weight: 242 4 lb BP: 106/ 59 mmHg Indications: Acute Respiratory failure  Diagnoses: J96 91 - Respiratory failure, unspecified with hypoxia Sonographer:  BILL Gaston Referring Physician:  Prerna Zheng PA-C Group:  [facility] Interpreting Physician:  Nick Vargas MD SUMMARY PROCEDURE INFORMATION: The heart rate was 119 bpm, at the start of the study  Patient was in atrial fibrillation with rapid ventricular rate LEFT VENTRICLE: Systolic function was moderately reduced  Ejection fraction was estimated to be around 40 %   Hard to assess set correctly due to irregular and fast heartbeat There was mild diffuse hypokinesis  Wall thickness was at the upper limits of normal  There was mild borderline concentric hypertrophy  LEFT ATRIUM: The atrium was mildly to moderately dilated  RIGHT ATRIUM: The atrium was mildly dilated  MITRAL VALVE: There was mild regurgitation  TRICUSPID VALVE: There was mild regurgitation  Estimated peak PA pressure was 30 mmHg  IVC, HEPATIC VEINS: The inferior vena cava was mildly dilated  The respirophasic change in diameter was more than 50%  HISTORY: PRIOR HISTORY: Cardiomyopathy,A Fib ,HTN,CAD,Acute kidney injury,Hyperlipidemia,Hypertriglyceridemia  PROCEDURE: The procedure was performed at the bedside  This was a routine study  The transthoracic approach was used  The study included complete 2D imaging, M-mode, complete spectral Doppler, and color Doppler  The heart rate was 119 bpm, at the start of the study  Patient was in atrial fibrillation with rapid ventricular rate Images were obtained from the parasternal, apical, subcostal, and suprasternal notch acoustic windows  Echocardiographic views were limited due to patient on mechanical ventilator  Image quality was adequate  LEFT VENTRICLE: Size was normal  Systolic function was moderately reduced  Ejection fraction was estimated to be around 40 %  Hard to assess set correctly due to irregular and fast heartbeat There was mild diffuse hypokinesis  Wall thickness was at the upper limits of normal  There was mild borderline concentric hypertrophy  DOPPLER: The study was not technically sufficient to allow evaluation of LV diastolic function due to atrial fibrillation  RIGHT VENTRICLE: The size was normal  Systolic function was normal  Wall thickness was normal  LEFT ATRIUM: The atrium was mildly to moderately dilated  RIGHT ATRIUM: The atrium was mildly dilated  MITRAL VALVE: There was normal leaflet separation   DOPPLER: The transmitral velocity was within the normal range  There was no evidence for stenosis  There was mild regurgitation  AORTIC VALVE: The valve was trileaflet  Leaflets exhibited mildly increased thickness and normal cuspal separation  DOPPLER: Transaortic velocity was within the normal range  There was no evidence for stenosis  There was no regurgitation  TRICUSPID VALVE: DOPPLER: There was mild regurgitation  Estimated peak PA pressure was 30 mmHg  PULMONIC VALVE: DOPPLER: There was no significant regurgitation  PERICARDIUM: There was no thickening or calcification  There was no pericardial effusion  AORTA: The root exhibited normal size  SYSTEMIC VEINS: IVC: The inferior vena cava was mildly dilated  The respirophasic change in diameter was more than 50%  SYSTEM MEASUREMENT TABLES 2D EF (Teich): 51 89 % %FS: 26 73 % Ao Diam: 3 53 cm EDV(Teich): 126 75 ml ESV(Teich): 60 98 ml IVSd: 1 13 cm LA Area: 30 65 cm2 LA Diam: 4 34 cm LVEDV MOD A4C: 105 83 ml LVEF MOD A4C: 30 31 % LVESV MOD A4C: 73 76 ml LVIDd: 5 15 cm LVIDs: 3 77 cm LVLd A4C: 8 08 cm LVLs A4C: 7 89 cm LVOT Diam: 2 21 cm LVPWd: 0 95 cm RA Area: 27 2 cm2 RVIDd: 3 06 cm SV (Teich): 65 77 ml SV MOD A4C: 32 08 ml CW AV Vmax: 1 09 m/s AV maxP 77 mmHg TR Vmax: 2 31 m/s TR maxP 35 mmHg MM TAPSE: 1 82 cm PW CHEMO Vmax, Pt: 3 42 cm2 E' Lat: 0 11 m/s E' Sept: 0 09 m/s LVOT Env  Ti: 186 49 ms LVOT VTI: 14 57 cm LVOT Vmax: 1 m/s LVOT Vmax: 0 97 m/s LVOT Vmean: 0 78 m/s LVOT maxP 03 mmHg LVOT maxPG: 3 79 mmHg LVOT meanP 68 mmHg [employer] Commission Accredited Echocardiography Laboratory Prepared and electronically signed by Odilia Barrios MD Signed 02-Aug-2021 10:06:00     XR chest portable    Result Date: 2021  Narrative: CHEST INDICATION:  Hypoxia  COMPARISON:  2021 EXAM PERFORMED/VIEWS:  XR CHEST PORTABLE FINDINGS: Cardiomediastinal silhouette appears unremarkable  Bilateral airspace disease, right greater than left, slightly worsening particularly on the left   No pneumothorax or pleural effusion  Osseous structures appear within normal limits for patient age  Impression: Slight worsening of bilateral airspace disease, favored to represent multifocal pneumonia  Correlate with clinical scenario  Workstation performed: [id number]     XR chest 1 view portable    Result Date: 8/2/2021  Narrative: CHEST INDICATION:   sob  COMPARISON:  None EXAM PERFORMED/VIEWS:  XR CHEST PORTABLE  AP semierect Images:  1 FINDINGS: Cardiomediastinal silhouette appears unremarkable  Dense consolidation in the peripheral right lower lobe noted with masslike consolidation in the right upper lobe also identified  Left lung clear  No pleural effusion or pneumothorax  Osseous structures appear within normal limits for patient age  Impression: Right upper and lower lobe consolidation concerning for multifocal pneumonia  Workstation performed: [id number]     CT head without contrast    Result Date: 8/1/2021  Narrative: CT BRAIN - WITHOUT CONTRAST INDICATION:   ams  COMPARISON:  None  TECHNIQUE:  CT examination of the brain was performed  In addition to axial images, sagittal and coronal 2D reformatted images were created and submitted for interpretation  Radiation dose length product (DLP) for this visit:  1104 39 mGy-cm   This examination, like all CT scans performed in the [facility], was performed utilizing techniques to minimize radiation dose exposure, including the use of iterative reconstruction and automated exposure control  IMAGE QUALITY:  Diagnostic  FINDINGS: PARENCHYMA: Decreased attenuation is noted in periventricular and subcortical white matter demonstrating an appearance that is statistically most likely to represent mild microangiopathic change  No CT signs of acute infarction  No intracranial mass, mass effect or midline shift  No acute parenchymal hemorrhage  VENTRICLES AND EXTRA-AXIAL SPACES:  Normal for the patient's age   VISUALIZED ORBITS AND PARANASAL SINUSES:  Unremarkable  CALVARIUM AND EXTRACRANIAL SOFT TISSUES:  Normal      Impression: No acute intracranial abnormality  Workstation performed: [id number]     XR chest portable ICU    Result Date: 8/6/2021  Narrative: CHEST INDICATION:   hypoxia  COMPARISON:  8/4/2021 EXAM PERFORMED/VIEWS:  XR CHEST PORTABLE ICU FINDINGS: Cardiomediastinal silhouette appears unremarkable  There is bilateral patchy opacity improved from the prior study  No pneumothorax or pleural effusion  Osseous structures appear within normal limits for patient age  Impression: Improved airspace disease  Workstation performed: [id number]     XR chest portable ICU    Result Date: 8/5/2021  Narrative: CHEST INDICATION:   hypoxia  COMPARISON:  Chest x-ray performed the previous day  EXAM PERFORMED/VIEWS:  XR CHEST PORTABLE ICU FINDINGS: Heart shadow is obscured by adjacent opacity  Extensive bilateral airspace disease appears worse and more dense  No pneumothorax or pleural effusion  Osseous structures appear within normal limits for patient age  Impression: Interval worsening of bilateral airspace disease  Workstation performed: [id number]     XR chest portable ICU    Result Date: 8/4/2021  Narrative: CHEST INDICATION:   Hypoxia, concern for CHF exacerbation  COMPARISON:  August 1, 2021 EXAM PERFORMED/VIEWS:  XR CHEST PORTABLE ICU FINDINGS: Cardiomediastinal silhouette appears unremarkable  Patchy consolidation in the right upper and lower lobes, unchanged  Faint patchy opacities in the left lower lobe  No evidence of pleural effusion or pneumothorax  Osseous structures appear within normal limits for patient age  Impression: Areas of consolidation in the right upper and lower lobes and, probably, the left lower lobe, either asymmetric pulmonary edema or multifocal pneumonia   Workstation performed: [id number]        Cardiac testing:   Results for orders placed during the hospital encounter of 08/01/21    Echo complete with contrast if indicated    Narrative  [address]  [address] [facility]  [phone]    Transthoracic Echocardiogram  2D, M-mode, Doppler, and Color Doppler    Study date:  02-Aug-2021    Patient: Shari Garcia  MR number: [id number]  Account number: [de-identified]  : 1955  Age: 77 years  Gender: Male  Status: Inpatient  Location: Bedside  Height: 70 in  Weight: 242 4 lb  BP: 106/ 59 mmHg    Indications: Acute Respiratory failure  Diagnoses: J96 91 - Respiratory failure, unspecified with hypoxia    Sonographer:  Liza Dancer, RCS  Referring Physician:  Tripp Fyr PA-C  Group:  [id number]  Interpreting Physician:  Dea Escalera MD    SUMMARY    PROCEDURE INFORMATION:  The heart rate was 119 bpm, at the start of the study  Patient was in atrial fibrillation with rapid ventricular rate    LEFT VENTRICLE:  Systolic function was moderately reduced  Ejection fraction was estimated to be around 40 %  Hard to assess set correctly due to irregular and fast heartbeat  There was mild diffuse hypokinesis  Wall thickness was at the upper limits of normal   There was mild borderline concentric hypertrophy  LEFT ATRIUM:  The atrium was mildly to moderately dilated  RIGHT ATRIUM:  The atrium was mildly dilated  MITRAL VALVE:  There was mild regurgitation  TRICUSPID VALVE:  There was mild regurgitation  Estimated peak PA pressure was 30 mmHg  IVC, HEPATIC VEINS:  The inferior vena cava was mildly dilated  The respirophasic change in diameter was more than 50%  HISTORY: PRIOR HISTORY: Cardiomyopathy,A Fib ,HTN,CAD,Acute kidney injury,Hyperlipidemia,Hypertriglyceridemia  PROCEDURE: The procedure was performed at the bedside  This was a routine study  The transthoracic approach was used  The study included complete 2D imaging, M-mode, complete spectral Doppler, and color Doppler   The heart rate was 119 bpm,  at the start of the study  Patient was in atrial fibrillation with rapid ventricular rate Images were obtained from the parasternal, apical, subcostal, and suprasternal notch acoustic windows  Echocardiographic views were limited due to  patient on mechanical ventilator  Image quality was adequate  LEFT VENTRICLE: Size was normal  Systolic function was moderately reduced  Ejection fraction was estimated to be around 40 %  Hard to assess set correctly due to irregular and fast heartbeat There was mild diffuse hypokinesis  Wall  thickness was at the upper limits of normal  There was mild borderline concentric hypertrophy  DOPPLER: The study was not technically sufficient to allow evaluation of LV diastolic function due to atrial fibrillation  RIGHT VENTRICLE: The size was normal  Systolic function was normal  Wall thickness was normal     LEFT ATRIUM: The atrium was mildly to moderately dilated  RIGHT ATRIUM: The atrium was mildly dilated  MITRAL VALVE: There was normal leaflet separation  DOPPLER: The transmitral velocity was within the normal range  There was no evidence for stenosis  There was mild regurgitation  AORTIC VALVE: The valve was trileaflet  Leaflets exhibited mildly increased thickness and normal cuspal separation  DOPPLER: Transaortic velocity was within the normal range  There was no evidence for stenosis  There was no regurgitation  TRICUSPID VALVE: DOPPLER: There was mild regurgitation  Estimated peak PA pressure was 30 mmHg  PULMONIC VALVE: DOPPLER: There was no significant regurgitation  PERICARDIUM: There was no thickening or calcification  There was no pericardial effusion  AORTA: The root exhibited normal size  SYSTEMIC VEINS: IVC: The inferior vena cava was mildly dilated  The respirophasic change in diameter was more than 50%      SYSTEM MEASUREMENT TABLES    2D  EF (Teich): 51 89 %  %FS: 26 73 %  Ao Diam: 3 53 cm  EDV(Teich): 126 75 ml  ESV(Teich): 60 98 ml  IVSd: 1 13 cm  LA Area: 30 65 cm2  LA Diam: 4 34 cm  LVEDV MOD A4C: 105 83 ml  LVEF MOD A4C: 30 31 %  LVESV MOD A4C: 73 76 ml  LVIDd: 5 15 cm  LVIDs: 3 77 cm  LVLd A4C: 8 08 cm  LVLs A4C: 7 89 cm  LVOT Diam: 2 21 cm  LVPWd: 0 95 cm  RA Area: 27 2 cm2  RVIDd: 3 06 cm  SV (Teich): 65 77 ml  SV MOD A4C: 32 08 ml    CW  AV Vmax: 1 09 m/s  AV maxP 77 mmHg  TR Vmax: 2 31 m/s  TR maxP 35 mmHg    MM  TAPSE: 1 82 cm    PW  CHEMO Vmax, Pt: 3 42 cm2  E' Lat: 0 11 m/s  E' Sept: 0 09 m/s  LVOT Env  Ti: 186 49 ms  LVOT VTI: 14 57 cm  LVOT Vmax: 1 m/s  LVOT Vmax: 0 97 m/s  LVOT Vmean: 0 78 m/s  LVOT maxP 03 mmHg  LVOT maxPG: 3 79 mmHg  LVOT meanP 68 mmHg    [facility]tersLif[facility] Hospitaletal Commission Accredited Echocardiography Laboratory    Prepared and electronically signed by    Tk Devi MD  Signed 02-Aug-2021 10:06          Dr Tyler Ruiz MD, [facility]      "This note has been constructed using a voice recognition system  Therefore there may be syntax, spelling, and/or grammatical errors   Please call if you have any questions  "

## 2021-08-10 NOTE — ASSESSMENT & PLAN NOTE
Continue chronic meds  Alprazolam was decreased to b i d  P r n , Ambien was decreased 5 mg q h augie Khalil

## 2021-08-10 NOTE — ASSESSMENT & PLAN NOTE
Wt Readings from Last 3 Encounters:   08/13/21 98 3 kg (216 lb 11 4 oz)   07/09/21 108 kg (239 lb)   04/09/21 110 kg (243 lb)     2D echocardiogram showed EF of 40%    Currently optimized, appears euvolemic  Continue metoprolol, torsemide  Cardiology follow-up discharge  Follow-up BMP, daily weight

## 2021-08-11 ENCOUNTER — APPOINTMENT (INPATIENT)
Dept: RADIOLOGY | Facility: HOSPITAL | Age: 66
DRG: 871 | End: 2021-08-11
Payer: COMMERCIAL

## 2021-08-11 LAB
ANION GAP SERPL CALCULATED.3IONS-SCNC: 7 MMOL/L (ref 4–13)
BASE EXCESS BLDA CALC-SCNC: 5.4 MMOL/L
BODY TEMPERATURE: 98.7 DEGREES FEHRENHEIT
BUN SERPL-MCNC: 20 MG/DL (ref 5–25)
CALCIUM SERPL-MCNC: 8.6 MG/DL (ref 8.3–10.1)
CHLORIDE SERPL-SCNC: 101 MMOL/L (ref 100–108)
CO2 SERPL-SCNC: 28 MMOL/L (ref 21–32)
CREAT SERPL-MCNC: 0.73 MG/DL (ref 0.6–1.3)
ERYTHROCYTE [DISTWIDTH] IN BLOOD BY AUTOMATED COUNT: 13.2 % (ref 11.6–15.1)
EST. AVERAGE GLUCOSE BLD GHB EST-MCNC: 134 MG/DL
GFR SERPL CREATININE-BSD FRML MDRD: 97 ML/MIN/1.73SQ M
GLUCOSE SERPL-MCNC: 163 MG/DL (ref 65–140)
GLUCOSE SERPL-MCNC: 178 MG/DL (ref 65–140)
GLUCOSE SERPL-MCNC: 220 MG/DL (ref 65–140)
GLUCOSE SERPL-MCNC: 232 MG/DL (ref 65–140)
GLUCOSE SERPL-MCNC: 247 MG/DL (ref 65–140)
HBA1C MFR BLD: 6.3 %
HCO3 BLDA-SCNC: 27.4 MMOL/L (ref 22–28)
HCT VFR BLD AUTO: 40.3 % (ref 36.5–49.3)
HGB BLD-MCNC: 13.7 G/DL (ref 12–17)
MCH RBC QN AUTO: 33.7 PG (ref 26.8–34.3)
MCHC RBC AUTO-ENTMCNC: 34 G/DL (ref 31.4–37.4)
MCV RBC AUTO: 99 FL (ref 82–98)
NON VENT ROOM AIR: 21 %
O2 CT BLDA-SCNC: 18.2 ML/DL (ref 16–23)
OXYHGB MFR BLDA: 93.2 % (ref 94–97)
PCO2 BLDA: 32.2 MM HG (ref 36–44)
PCO2 TEMP ADJ BLDA: 32.3 MM HG (ref 36–44)
PH BLD: 7.55 [PH] (ref 7.35–7.45)
PH BLDA: 7.55 [PH] (ref 7.35–7.45)
PLATELET # BLD AUTO: 474 THOUSANDS/UL (ref 149–390)
PMV BLD AUTO: 10.3 FL (ref 8.9–12.7)
PO2 BLD: 66.4 MM HG (ref 75–129)
PO2 BLDA: 65.9 MM HG (ref 75–129)
POTASSIUM SERPL-SCNC: 3.3 MMOL/L (ref 3.5–5.3)
RBC # BLD AUTO: 4.06 MILLION/UL (ref 3.88–5.62)
SODIUM SERPL-SCNC: 136 MMOL/L (ref 136–145)
SPECIMEN SOURCE: ABNORMAL
WBC # BLD AUTO: 17.97 THOUSAND/UL (ref 4.31–10.16)

## 2021-08-11 PROCEDURE — 85027 COMPLETE CBC AUTOMATED: CPT | Performed by: INTERNAL MEDICINE

## 2021-08-11 PROCEDURE — 82805 BLOOD GASES W/O2 SATURATION: CPT | Performed by: INTERNAL MEDICINE

## 2021-08-11 PROCEDURE — 94640 AIRWAY INHALATION TREATMENT: CPT

## 2021-08-11 PROCEDURE — 83036 HEMOGLOBIN GLYCOSYLATED A1C: CPT | Performed by: INTERNAL MEDICINE

## 2021-08-11 PROCEDURE — 36600 WITHDRAWAL OF ARTERIAL BLOOD: CPT

## 2021-08-11 PROCEDURE — 94668 MNPJ CHEST WALL SBSQ: CPT

## 2021-08-11 PROCEDURE — 82948 REAGENT STRIP/BLOOD GLUCOSE: CPT

## 2021-08-11 PROCEDURE — 99232 SBSQ HOSP IP/OBS MODERATE 35: CPT | Performed by: INTERNAL MEDICINE

## 2021-08-11 PROCEDURE — 94760 N-INVAS EAR/PLS OXIMETRY 1: CPT

## 2021-08-11 PROCEDURE — 80048 BASIC METABOLIC PNL TOTAL CA: CPT | Performed by: INTERNAL MEDICINE

## 2021-08-11 PROCEDURE — 71045 X-RAY EXAM CHEST 1 VIEW: CPT

## 2021-08-11 PROCEDURE — 94664 DEMO&/EVAL PT USE INHALER: CPT

## 2021-08-11 PROCEDURE — 94660 CPAP INITIATION&MGMT: CPT

## 2021-08-11 PROCEDURE — 94762 N-INVAS EAR/PLS OXIMTRY CONT: CPT

## 2021-08-11 PROCEDURE — 3044F HG A1C LEVEL LT 7.0%: CPT | Performed by: PHYSICIAN ASSISTANT

## 2021-08-11 PROCEDURE — 97110 THERAPEUTIC EXERCISES: CPT

## 2021-08-11 RX ORDER — ALPRAZOLAM 0.5 MG/1
0.5 TABLET ORAL 2 TIMES DAILY PRN
Status: DISCONTINUED | OUTPATIENT
Start: 2021-08-11 | End: 2021-08-11

## 2021-08-11 RX ORDER — LANOLIN ALCOHOL/MO/W.PET/CERES
3 CREAM (GRAM) TOPICAL
Status: DISCONTINUED | OUTPATIENT
Start: 2021-08-11 | End: 2021-08-13 | Stop reason: HOSPADM

## 2021-08-11 RX ORDER — POTASSIUM CHLORIDE 20 MEQ/1
40 TABLET, EXTENDED RELEASE ORAL ONCE
Status: COMPLETED | OUTPATIENT
Start: 2021-08-11 | End: 2021-08-11

## 2021-08-11 RX ORDER — ZOLPIDEM TARTRATE 5 MG/1
5 TABLET ORAL
Status: DISCONTINUED | OUTPATIENT
Start: 2021-08-11 | End: 2021-08-13 | Stop reason: HOSPADM

## 2021-08-11 RX ORDER — INSULIN GLARGINE 100 [IU]/ML
10 INJECTION, SOLUTION SUBCUTANEOUS
Status: DISCONTINUED | OUTPATIENT
Start: 2021-08-11 | End: 2021-08-13 | Stop reason: HOSPADM

## 2021-08-11 RX ORDER — ALPRAZOLAM 0.5 MG/1
0.5 TABLET ORAL 2 TIMES DAILY PRN
Status: DISCONTINUED | OUTPATIENT
Start: 2021-08-11 | End: 2021-08-13 | Stop reason: HOSPADM

## 2021-08-11 RX ADMIN — DOCUSATE SODIUM AND SENNOSIDES 1 TABLET: 8.6; 5 TABLET, FILM COATED ORAL at 21:55

## 2021-08-11 RX ADMIN — APIXABAN 5 MG: 5 TABLET, FILM COATED ORAL at 17:15

## 2021-08-11 RX ADMIN — FOLIC ACID 1 MG: 1 TABLET ORAL at 08:28

## 2021-08-11 RX ADMIN — INSULIN LISPRO 5 UNITS: 100 INJECTION, SOLUTION INTRAVENOUS; SUBCUTANEOUS at 08:29

## 2021-08-11 RX ADMIN — IPRATROPIUM BROMIDE AND ALBUTEROL SULFATE 3 ML: 2.5; .5 SOLUTION RESPIRATORY (INHALATION) at 14:00

## 2021-08-11 RX ADMIN — IPRATROPIUM BROMIDE AND ALBUTEROL SULFATE 3 ML: 2.5; .5 SOLUTION RESPIRATORY (INHALATION) at 20:11

## 2021-08-11 RX ADMIN — INSULIN LISPRO 5 UNITS: 100 INJECTION, SOLUTION INTRAVENOUS; SUBCUTANEOUS at 11:34

## 2021-08-11 RX ADMIN — METOPROLOL TARTRATE 100 MG: 100 TABLET, FILM COATED ORAL at 08:27

## 2021-08-11 RX ADMIN — INSULIN LISPRO 4 UNITS: 100 INJECTION, SOLUTION INTRAVENOUS; SUBCUTANEOUS at 11:33

## 2021-08-11 RX ADMIN — INSULIN GLARGINE 10 UNITS: 100 INJECTION, SOLUTION SUBCUTANEOUS at 22:01

## 2021-08-11 RX ADMIN — INSULIN LISPRO 2 UNITS: 100 INJECTION, SOLUTION INTRAVENOUS; SUBCUTANEOUS at 08:29

## 2021-08-11 RX ADMIN — ACETAMINOPHEN 650 MG: 325 TABLET, FILM COATED ORAL at 05:31

## 2021-08-11 RX ADMIN — POTASSIUM CHLORIDE 40 MEQ: 1500 TABLET, EXTENDED RELEASE ORAL at 10:32

## 2021-08-11 RX ADMIN — PREDNISONE 30 MG: 20 TABLET ORAL at 08:27

## 2021-08-11 RX ADMIN — TORSEMIDE 20 MG: 20 TABLET ORAL at 08:27

## 2021-08-11 RX ADMIN — INSULIN LISPRO 4 UNITS: 100 INJECTION, SOLUTION INTRAVENOUS; SUBCUTANEOUS at 22:06

## 2021-08-11 RX ADMIN — APIXABAN 5 MG: 5 TABLET, FILM COATED ORAL at 08:28

## 2021-08-11 RX ADMIN — PRAVASTATIN SODIUM 40 MG: 40 TABLET ORAL at 17:15

## 2021-08-11 RX ADMIN — IPRATROPIUM BROMIDE AND ALBUTEROL SULFATE 3 ML: 2.5; .5 SOLUTION RESPIRATORY (INHALATION) at 07:58

## 2021-08-11 RX ADMIN — INSULIN LISPRO 4 UNITS: 100 INJECTION, SOLUTION INTRAVENOUS; SUBCUTANEOUS at 17:15

## 2021-08-11 RX ADMIN — BUPROPION 150 MG: 150 TABLET, EXTENDED RELEASE ORAL at 08:27

## 2021-08-11 RX ADMIN — ALPRAZOLAM 0.5 MG: 0.5 TABLET ORAL at 08:27

## 2021-08-11 RX ADMIN — ZOLPIDEM TARTRATE 5 MG: 5 TABLET, COATED ORAL at 21:56

## 2021-08-11 RX ADMIN — FLUOXETINE 20 MG: 20 CAPSULE ORAL at 08:28

## 2021-08-11 RX ADMIN — THIAMINE HCL TAB 100 MG 100 MG: 100 TAB at 08:28

## 2021-08-11 RX ADMIN — ALPRAZOLAM 0.5 MG: 0.5 TABLET ORAL at 21:55

## 2021-08-11 RX ADMIN — POLYETHYLENE GLYCOL 3350 17 G: 17 POWDER, FOR SOLUTION ORAL at 08:37

## 2021-08-11 RX ADMIN — RISPERIDONE 2 MG: 1 TABLET ORAL at 08:27

## 2021-08-11 RX ADMIN — INSULIN LISPRO 5 UNITS: 100 INJECTION, SOLUTION INTRAVENOUS; SUBCUTANEOUS at 17:15

## 2021-08-11 NOTE — PROGRESS NOTES
Progress Note - Pulmonary   Alexandria Margo 77 y o  male MRN: [id number]  Unit/Bed#: [address] [place] [address] Encounter: [id number]  Code Status: Level 3 - DNAR and DNI    Lori De La Cruz is a 77 y o  Past Medical History:   Diagnosis Date    Anxiety     Atrial fibrillation ([facility] )     Depression     Hypertension     Insomnia        Assessment/Plan:     78 y/o M ADM to ICU for respiratory failure 2/2 decompensated CHF and Afib w/ RVR  Also has been diuresed extensively this hospitalization and now -15 4 L  Has Severe MECHE on in hospital sleep screen w/ AHI of 71  ABG non hypercapneic  Now off of 02  Severe MECHE  -likely to need OP sleep study   -will attempt to order CPAP for OP order to f/u in OP setting   Acute Hypoxemic Respiratory Failure   -resolved  -currently on RA   Resolved Aspiration PNA:  -completed course of ABx on  for full 6 days abx  MAX:  -resolved  Results from last 7 days   Lab Units 21  0539 08/10/21  0453 21  0532 21  0530 21  0551 21  0526 21  0619   BUN mg/dL 20 25 25 28* 29* 31* 30*   CREATININE mg/dL 0 73 0 77 0 80 0 92 0 87 0 99 0 98       Estimated Creatinine Clearance: 119 7 mL/min (by C-G formula based on SCr of 0 73 mg/dL)  Obesity:  -recommended weight loss  Acute on Chronic Systolic and Diastolic CHF  -remains on torsemide 20 mg /day   _________________________________________________    Subjective: Pt seen and examined at bedside  No complaints  Breathing well  Claustrophobic w/ BIPAP  Asks for d/c  Discussed alternate interfaces w/ CPAP  Willing to try in OP setting  Chief Complaint: "I just got my lunch "      Smoking history:   Social History     Tobacco Use   Smoking Status Current Some Day Smoker    Types: Cigarettes    Last attempt to quit: 2015    Years since quittin 9   Smokeless Tobacco Never Used     Echo 21 EF 40 / Mild conc hypertroph / LA dilatation mild / PAP 30  Labs Reviewed: yes   Imaging Reviewed:  Aug 4 to Aug 6 comparison    Collaborative Discussion: d/w IM Team Dr Javi Vanessa / plan for OP split night study and OP f/u  Pt ammenaable to trying nasal pillow interface  Tele Events:     Vitals:   Vitals:    21 0753 21 0758 21 1401 21 1536   BP: 110/76   101/59   BP Location: Left arm   Right arm   Pulse: 95   92   Resp: 19   19   Temp: 98 1 °F (36 7 °C)   97 8 °F (36 6 °C)   TempSrc: Axillary   Oral   SpO2: 95% 96% 95% 95%   Weight:       Height:         Weight (last 2 days)     Date/Time   Weight    21 0600   103 (226 63)    08/10/21 0600   103 (227)    21 0546   103 (227 52)            Temp (24hrs), Av 3 °F (36 8 °C), Min:97 8 °F (36 6 °C), Max:98 7 °F (37 1 °C)  Current: Temperature: 97 8 °F (36 6 °C)          IV Infusions:       Nutrition:        Diet Orders   (From admission, onward)             Start     Ordered    08/10/21 1620  Diet Cardiovascular; Cardiac; Fluid Restriction 1500 ML, Consistent Carbohydrate Diet Level 2 (5 carb servings/75 grams CHO/meal)  Diet effective now     Question Answer Comment   Diet Type Cardiovascular    Cardiac Cardiac    Other Restriction(s): Fluid Restriction 1500 ML    Other Restriction(s): Consistent Carbohydrate Diet Level 2 (5 carb servings/75 grams CHO/meal)    RD to adjust diet per protocol? No        08/10/21 1619    21 1146  Room Service  Once     Question:  Type of Service  Answer:  Room Service-Appropriate    21 1145                Ins/Outs:   I/O        07 - 08/10 0700 08/10 07 -  0700  07 -  0700    P  O  350  480    I V  (mL/kg)       IV Piggyback       Total Intake(mL/kg) 350 (3 4)  480 (4 7)    Urine (mL/kg/hr) 2275 (0 9) 1750 (0 7) 300 (0 3)    Stool 0      Total Output 2275 1750 300    Net -1925 -1750 +180           Unmeasured Urine Occurrence 2 x      Unmeasured Stool Occurrence 1 x            Lines/Drains:  Invasive Devices     Peripheral Intravenous Line            Peripheral IV 21 Left Forearm 2 days                 Active medications:  Scheduled Meds:  Current Facility-Administered Medications   Medication Dose Route Frequency Provider Last Rate    acetaminophen  650 mg Oral Q6H PRN Angelita Murray, TONY      ALPRAZolam  0 5 mg Oral BID PRN Breck Mohs, MD      amLODIPine  10 mg Oral Daily Breck Mohs, MD      apixaban  5 mg Oral BID Geraldo Foley MD      buPROPion  150 mg Oral Daily Angelita Rustyry, TONY      FLUoxetine  20 mg Oral Daily Angelita Terri, CRNP      folic acid  1 mg Oral Daily Joanette [place] Mol, CRNP      insulin lispro  2-12 Units Subcutaneous TID AC Joanette [place] Mol, CRNP      insulin lispro  2-12 Units Subcutaneous HS Angelita Terri, CRJACQUELINE      insulin lispro  5 Units Subcutaneous TID With Meals Geraldo Foley MD      ipratropium-albuterol  3 mL Nebulization Q6H Geraldo Foley MD      melatonin  3 mg Oral HS Breck Mohs, MD      metoprolol tartrate  100 mg Oral Q12H [address] Angelita Terri, ARNULFONP      polyethylene glycol  17 g Oral Daily PRN Angelita Murray, TONY      pravastatin  40 mg Oral Daily With Jerry Natarajan, TONY      predniSONE  30 mg Oral Daily Breck Mohs, MD      Followed by   [facility] ON 8/13/2021] predniSONE  20 mg Oral Daily Breck Mohs, MD      Followed by   [facility] ON 8/15/2021] predniSONE  10 mg Oral Daily Breck Mohs, MD      risperiDONE  2 mg Oral Daily Angelita Murray, TONY      senna-docusate sodium  1 tablet Oral HS Angelita Murray, TONY      thiamine  100 mg Oral Daily Angelita Rustyry, TONY      torsemide  20 mg Oral Daily Joanette [place] Mol, CRNP      zolpidem  5 mg Oral HS PRN Breck Mohs, MD       PRN Meds:acetaminophen, 650 mg, Q6H PRN  ALPRAZolam, 0 5 mg, BID PRN  polyethylene glycol, 17 g, Daily PRN  zolpidem, 5 mg, HS PRN      ____________________________________________________________________    Physical Exam  Constitutional:       Appearance: He is well-developed  HENT:      Head: Normocephalic and atraumatic  Eyes:      Conjunctiva/sclera: Conjunctivae normal       Pupils: Pupils are equal, round, and reactive to light  Cardiovascular:      Rate and Rhythm: Normal rate and regular rhythm  Heart sounds: Normal heart sounds  No murmur heard  No friction rub  No gallop  Pulmonary:      Effort: Pulmonary effort is normal  No respiratory distress  Breath sounds: Normal breath sounds  No wheezing or rales  Chest:      Chest wall: No tenderness  Abdominal:      General: Bowel sounds are normal       Palpations: Abdomen is soft  Musculoskeletal:         General: Normal range of motion  Cervical back: Normal range of motion and neck supple  Skin:     General: Skin is warm and dry  Neurological:      Mental Status: He is alert and oriented to person, place, and time         ____________________________________________________________________    Invasive/non-invasive ventilation settings   Respiratory    Lab Data (Last 4 hours)    None         O2/Vent Data (Last 4 hours)    None                Laboratory and Diagnostics:  Results from last 7 days   Lab Units 08/11/21  0539 08/10/21  0453 08/09/21  0532 08/08/21  0530 08/07/21  0551 08/06/21  0526 08/05/21  0619   WBC Thousand/uL 17 97* 15 24* 13 70* 11 91* 11 42* 9 58 12 79*   HEMOGLOBIN g/dL 13 7 13 4 13 0 12 8 11 9* 11 0* 11 0*   HEMATOCRIT % 40 3 39 6 37 9 38 3 35 7* 33 8* 33 3*   PLATELETS Thousands/uL 474* 454* 431* 423* 388 351 299   NEUTROS PCT %  --  89*  --   --   --   --  91*   BANDS PCT %  --   --   --   --   --  3  --    MONOS PCT %  --  6  --   --   --   --  4   MONO PCT %  --   --   --   --   --  5  --      Results from last 7 days   Lab Units 08/11/21  0539 08/10/21  0453 08/09/21  0532 08/08/21  0530 08/07/21  0551 08/06/21  0526 08/05/21  0619   SODIUM mmol/L 136 135* 136 141 145 148* 149*   POTASSIUM mmol/L 3 3* 3 5 4 1 4 1 4 1 3 6 3 6   CHLORIDE mmol/L 101 99* 100 106 107 110* 111*   CO2 mmol/L 28 30 29 30 29 31 30   ANION GAP mmol/L 7 6 7 5 9 7 8   BUN mg/dL 20 25 25 28* 29* 31* 30*   CREATININE mg/dL 0 73 0 77 0 80 0 92 0 87 0 99 0 98   CALCIUM mg/dL 8 6 8 8 9 0 9 3 9 5 9 8 10 1   GLUCOSE RANDOM mg/dL 163* 224* 259* 299* 274* 226* 186*   ALT U/L  --   --   --  93* 132*  --  192*   AST U/L  --   --   --  24 43  --  277*   ALK PHOS U/L  --   --   --  146* 159*  --  179*   ALBUMIN g/dL  --   --   --  2 1* 2 0*  --  2 0*   TOTAL BILIRUBIN mg/dL  --   --   --  0 91 0 83  --  1 64*     Results from last 7 days   Lab Units 08/09/21  0532 08/08/21  0530 08/06/21  0526 08/05/21  0619   MAGNESIUM mg/dL 2 7* 2 9* 2 7* 2 8*                   ABG:  Results from last 7 days   Lab Units 08/11/21  0622   PH ART  7 548*   PCO2 ART mm Hg 32 2*   PO2 ART mm Hg 65 9*   HCO3 ART mmol/L 27 4   BASE EXC ART mmol/L 5 4   ABG SOURCE  Radial, Right     VBG:  Results from last 7 days   Lab Units 08/11/21  0622   ABG SOURCE  Radial, Right     Results from last 7 days   Lab Units 08/06/21  0526 08/05/21  1041   PROCALCITONIN ng/ml 1 23* 2 26*       Micro        Imaging:   XR chest portable ICU   Final Result by Shailesh Curiel MD (08/06 0932)      Improved airspace disease  Workstation performed: [id number]         XR chest portable ICU   Final Result by Fortino Dimas MD (99/81 0504)      Interval worsening of bilateral airspace disease  Workstation performed: [id number]         XR chest portable   Final Result by Bette Peralta DO (08/04 1426)      Slight worsening of bilateral airspace disease, favored to represent multifocal pneumonia  Correlate with clinical scenario  Workstation performed: [id number]         XR chest portable ICU   Final Result by Joey Camacho MD (08/04 1059)      Areas of consolidation in the right upper and lower lobes and, probably, the left lower lobe, either asymmetric pulmonary edema or multifocal pneumonia  Workstation performed: [id number]         CT head without contrast   Final Result by Rickie Denis MD (08/01 1541)      No acute intracranial abnormality  Workstation performed: [id number]         XR chest 1 view portable   Final Result by Krystle Sanchez MD (08/02 0818)      Right upper and lower lobe consolidation concerning for multifocal pneumonia  Workstation performed: [id number]             Micro:   Lab Results   Component Value Date    BLOODCX No Growth After 5 Days  08/01/2021    BLOODCX No Growth After 5 Days   08/01/2021    URINECX No Growth <1000 cfu/mL 08/01/2021    MRSACULTURE  08/01/2021     No Methicillin Resistant Staphlyococcus aureus (MRSA) isolated            Invalid input(s): Sherita Litter

## 2021-08-11 NOTE — PROGRESS NOTES
Pastoral Care Progress Note    2021  Patient: Michael Montiel : 1955  Admission Date & Time: 2021 1359  MRN: [id number] [facility]: [id number]                     Chaplaincy Interventions Utilized:   Relationship Building: Cultivated a relationship of care and support  Patient said that he is getting stir-crazy being in the hospital   As he feels better physically, he is feeling worse just laying around in the hospital   He also says that he didn't like wearing the bypap at night  He said that he struggles from anxiety at times and the bypap can make him anxious         21 1000   Clinical Encounter Type   Visited With Patient   Routine Visit Introduction   Referral To   (census/rounds)

## 2021-08-11 NOTE — PHYSICAL THERAPY NOTE
PT TREATMENT     08/11/21 0955   Note Type   Note Type Treatment   Pain Assessment   Pain Assessment Tool Pain Assessment not indicated - pt denies pain   Restrictions/Precautions   Other Precautions Chair Alarm; Bed Alarm; Fall Risk   General   Chart Reviewed Yes   Family/Caregiver Present No   Cognition   Arousal/Participation Cooperative   Subjective   Subjective patient states feeling stronger this session   Bed Mobility   Supine to Sit 5  Supervision   Transfers   Sit to Stand 5  Supervision   Stand to Sit 4  Minimal assistance   Additional items Assist x 1;Verbal cues   Ambulation/Elevation   Gait Assistance 4  Minimal assist   Additional items Assist x 1;Verbal cues; Tactile cues   Assistive Device Rolling walker   Distance 40 feet x 2 with numerous changes in direction with roller walker, 20 feet without assistive device with min to mod assist of 1 at times with narrow base of support and lateral balance loss of balance    Exercises   Quad Sets Sitting;20 reps;Bilateral   Glute Sets Sitting;20 reps   Hip Flexion Sitting;20 reps;Bilateral   Hip Abduction Sitting;20 reps;Bilateral   Knee AROM Long Arc Quad Sitting;20 reps;Bilateral   Ankle Pumps Sitting;20 reps;Bilateral   Balance training  sidestepping and backward walking for balance and coordination   Assessment   Assessment patient cooperative and demonstrating improved balance and endurance with gait and will benefit from continued PT with progression to STR for regain of independence  The patient's AM-[facility] Basic Mobility Inpatient Short Form Raw Score is 19, Standardized Score is 42 48  A standardized score less than 42 9 suggests the patient may benefit from discharge to post-acute rehabilitation services  Please also refer to the recommendation of the Physical Therapist for safe discharge planning  Plan   Treatment/Interventions ADL retraining;Functional transfer training;LE strengthening/ROM; Elevations; Therapeutic exercise; Endurance training;Patient/family training;Equipment eval/education; Bed mobility;Gait training; Compensatory technique education   PT Frequency 5x/wk   Recommendation   PT Discharge Recommendation Post acute rehabilitation services   AM-PAC Basic Mobility Inpatient   Turning in Bed Without Bedrails 4   Lying on Back to Sitting on Edge of Flat Bed 4   Moving Bed to Chair 3   Standing Up From Chair 3   Walk in Room 3   Climb 3-5 Stairs 2   Basic Mobility Inpatient Raw Score 19   Basic Mobility Standardized Score 68 77   Licensure   [place] License Number  Paul Denny PT [id number]

## 2021-08-11 NOTE — ASSESSMENT & PLAN NOTE
Appears to be improving  No wheezing on exam today  Continue bronchodilators, transition to prednisone taper  Pulmonary follow-up after discharge

## 2021-08-11 NOTE — PROGRESS NOTES
[facility] Internal Medicine Progress Note  Patient: Madelin Mcclain 77 y o  male   MRN: [id number]  PCP: TONY Faust  Unit/Bed#: [address] [place] [address] Encounter: [id number]  Date Of Visit: 08/11/21    Problem List:    Principal Problem:    Acute respiratory failure with hypoxia and hypercapnia (HCC)  Active Problems:    Acute on chronic systolic (congestive) heart failure (HCC)    Chronic atrial fibrillation ([facility] )    Type 2 diabetes mellitus, without long-term current use of insulin ([facility] )    Suspected Chronic obstructive pulmonary disease with acute exacerbation (HCC)    Mixed hyperlipidemia    Essential hypertension    Anxiety    Depression, recurrent (HCC)    Alcohol use    Severe obesity (BMI 35 0-39  9) with comorbidity ([place] [address] )    Leukocytosis      Assessment & Plan:    Acute on chronic systolic (congestive) heart failure (HCC)  Assessment & Plan  Wt Readings from Last 3 Encounters:   08/11/21 103 kg (226 lb 10 1 oz)   07/09/21 108 kg (239 lb)   04/09/21 110 kg (243 lb)     2D echocardiogram showed EF of 40%  Currently optimized, appears euvolemic  Continue metoprolol, torsemide  Cardiology follow-up discharge  Follow-up BMP, daily weight        * Acute respiratory failure with hypoxia and hypercapnia (HCC)  Assessment & Plan  Secondary to CHF and COPD exacerbation, with pneumonia  Appears to have improved, currently saturating adequately on room air  On BiPAP q h s  But reluctant to continue due to claustrophobia  ABG noted off BiPAP without any hypercarbia, sleep screen noted with significantly elevated AHI  Pulmonary follow-up appreciated  · Discussed with patient regarding outpatient sleep study, patient is agreeable  · Monitor respiratory status  · Pulmonary follow-up after discharge  · Can continue CPAP q h s  as tolerated  · Follow-up chest x-ray    Pneumonia-resolved as of 8/10/2021  Assessment & Plan  Avoid got pneumonia, finished a course of antibiotics    Procalcitonin trended down to 1 23 last checked  Continue on nebulizers  Suspected Chronic obstructive pulmonary disease with acute exacerbation ([facility] )  Assessment & Plan  Appears to be improving  No wheezing on exam today  Continue bronchodilators, transition to prednisone taper  Pulmonary follow-up after discharge    Type 2 diabetes mellitus, without long-term current use of insulin [facility])  Assessment & Plan  Lab Results   Component Value Date    HGBA1C 6 3 (H) 08/11/2021       Recent Labs     08/11/21  0740 08/11/21  1133 08/11/21  1612 08/11/21  2100   POCGLU 178* 232* 247* 220*       Blood Sugar Average: Last 72 hrs:  (P) 301 5625     Noted to a hyperglycemia, pronounced with steroid use  Continue Lantus and lispro, will add metformin on discharge  Patient was educated about diet and lifestyle modification    Chronic atrial fibrillation (HCC)  Assessment & Plan  Continue  on metoprolol,  Continue Eliquis      Depression, recurrent (HCC)  Assessment & Plan  Continue home meds    Anxiety  Assessment & Plan  Continue chronic meds  Alprazolam was changed to b i d  P r n , Ambien was started at 5 mg q h s        Essential hypertension  Assessment & Plan  Stable monitor on metoprolol, amlodipine with holding parameter    Mixed hyperlipidemia  Assessment & Plan  On statin    Leukocytosis  Assessment & Plan  Leukocytosis noted to be uptrending, unclear etiology  Possibly related to prednisone  No evidence of infection clinically  Monitor    Severe obesity (BMI 35 0-39  9) with comorbidity ([facility] )  Assessment & Plan  Counseled on diet and exercise  Continue supportive care  VTE Pharmacologic Prophylaxis:   Pharmacologic: Apixaban (Eliquis)  Mechanical VTE Prophylaxis in Place: Yes    Patient Centered Rounds: I have performed bedside rounds with nursing staff today      Discussions with Specialists or Other Care Team Provider:  Cardiology, Pulmonary    Education and Discussions with Family / Patient:  Discussed with sister    Time Spent for Care: 39 minutes  More than 50% of total time spent on counseling and coordination of care as described above  Current Length of Stay: 10 day(s)    Current Patient Status: Inpatient   Certification Statement: The patient will continue to require additional inpatient hospital stay due to CHF diabetes optimization    Discharge Plan:  Anticipate rehab in next 24-48 hours    Code Status: Level 3 - DNAR and DNI      Subjective:   Feels well  Feeling stronger every day  Breathing is better, minimal cough  Denies any chest pain  Denies any  or GI complaints  Reports history of insomnia has been taking Ambien for long time    Sleep screen discussed with high AHI, patient is reluctant to use mask due to claustrophobia      Objective:     Vitals:   Temp (24hrs), Av 5 °F (36 9 °C), Min:98 1 °F (36 7 °C), Max:98 7 °F (37 1 °C)    Temp:  [98 1 °F (36 7 °C)-98 7 °F (37 1 °C)] 98 1 °F (36 7 °C)  HR:  [80-95] 95  Resp:  [17-19] 19  BP: ()/(58-76) 110/76  SpO2:  [93 %-96 %] 96 % on room air  Body mass index is 32 52 kg/m²  Input and Output Summary (last 24 hours): Intake/Output Summary (Last 24 hours) at 2021 1344  Last data filed at 2021 1151  Gross per 24 hour   Intake 480 ml   Output 1200 ml   Net -720 ml       Physical Exam:     Physical Exam  Constitutional:       General: He is not in acute distress  Appearance: He is obese  HENT:      Head: Normocephalic and atraumatic  Cardiovascular:      Rate and Rhythm: Normal rate  Pulmonary:      Effort: Pulmonary effort is normal  No respiratory distress  Breath sounds: No wheezing, rhonchi or rales  Comments: Diminished, clear  Chest:      Chest wall: No tenderness  Abdominal:      General: Bowel sounds are normal  There is no distension  Palpations: Abdomen is soft  Tenderness: There is no abdominal tenderness  There is no guarding or rebound  Musculoskeletal:      Right lower leg: No edema  Left lower leg: No edema  Skin:     General: Skin is warm and dry  Findings: No rash  Neurological:      Mental Status: He is alert and oriented to person, place, and time  Mental status is at baseline  Cranial Nerves: No cranial nerve deficit  Additional Data:     Labs:    Results from last 7 days   Lab Units 08/11/21  0539 08/10/21  0453   WBC Thousand/uL 17 97* 15 24*   HEMOGLOBIN g/dL 13 7 13 4   HEMATOCRIT % 40 3 39 6   PLATELETS Thousands/uL 474* 454*   NEUTROS PCT %  --  89*   LYMPHS PCT %  --  4*   MONOS PCT %  --  6   EOS PCT %  --  0     Results from last 7 days   Lab Units 08/11/21  0539 08/08/21  0530   POTASSIUM mmol/L 3 3* 4 1   CHLORIDE mmol/L 101 106   CO2 mmol/L 28 30   BUN mg/dL 20 28*   CREATININE mg/dL 0 73 0 92   CALCIUM mg/dL 8 6 9 3   ALK PHOS U/L  --  146*   ALT U/L  --  93*   AST U/L  --  24           * I Have Reviewed All Lab Data Listed Above  * Additional Pertinent Lab Tests Reviewed:  All Labs Within Last 24 Hours Reviewed      Imaging:  Imaging Reports Reviewed Today Include: CXR, CT    Recent Cultures (last 7 days):           Last 24 Hours Medication List:   Current Facility-Administered Medications   Medication Dose Route Frequency Provider Last Rate    acetaminophen  650 mg Oral Q6H PRN TONY Barahona      ALPRAZolam  0 5 mg Oral TID PRN TONY Barahona      amLODIPine  10 mg Oral Daily Siddharth Dunaway MD      apixaban  5 mg Oral BID Heaven Lyles MD      buPROPion  150 mg Oral Daily TONY Barahona      FLUoxetine  20 mg Oral Daily Barbara Sober Mol, CRJACQUELINE      folic acid  1 mg Oral Daily [place] [facility] Mol, CRJACQUELINE      insulin lispro  2-12 Units Subcutaneous TID AC [place] [facility] Mol, CRJACQUELINE      insulin lispro  2-12 Units Subcutaneous HS TONY Barahona      insulin lispro  5 Units Subcutaneous TID With Meals Heaven Lyles MD      ipratropium-albuterol  3 mL Nebulization Q6H Heaven Lyles MD      melatonin  3 mg Oral HS Siddharth Dunaway, MD      methocarbamol  750 mg Oral HS PRN TONY Dc      metoprolol  5 mg Intravenous Q6H PRN TONY Dc      metoprolol tartrate  100 mg Oral Q12H [address] P[place]o [place]use Mol, CRJACQUELINE      polyethylene glycol  17 g Oral Daily PRN TONY Dc      pravastatin  40 mg Oral Daily With [employer], TONY      predniSONE  30 mg Oral Daily Heladio Canales MD      Followed by   Alo Rodriguez ON 8/13/2021] predniSONE  20 mg Oral Daily Heladio Canales MD      Followed by   Alo Rodriguez ON 8/15/2021] predniSONE  10 mg Oral Daily Heladio Canales MD      risperiDONE  2 mg Oral Daily TONY Dc      senna-docusate sodium  1 tablet Oral HS TONY Dc      thiamine  100 mg Oral Daily TONY Dc      torsemide  20 mg Oral Daily TONY Dc            Today, Patient Was Seen By: Heladio Canales MD    ** Please Note: "This note has been constructed using a voice recognition system  Therefore there may be syntax, spelling, and/or grammatical errors   Please call if you have any questions  "**

## 2021-08-12 PROBLEM — D72.829 LEUKOCYTOSIS: Status: ACTIVE | Noted: 2021-08-12

## 2021-08-12 LAB
ANION GAP SERPL CALCULATED.3IONS-SCNC: 8 MMOL/L (ref 4–13)
BUN SERPL-MCNC: 17 MG/DL (ref 5–25)
CALCIUM SERPL-MCNC: 8.4 MG/DL (ref 8.3–10.1)
CHLORIDE SERPL-SCNC: 103 MMOL/L (ref 100–108)
CO2 SERPL-SCNC: 24 MMOL/L (ref 21–32)
CREAT SERPL-MCNC: 0.79 MG/DL (ref 0.6–1.3)
ERYTHROCYTE [DISTWIDTH] IN BLOOD BY AUTOMATED COUNT: 13.8 % (ref 11.6–15.1)
GFR SERPL CREATININE-BSD FRML MDRD: 94 ML/MIN/1.73SQ M
GLUCOSE SERPL-MCNC: 146 MG/DL (ref 65–140)
GLUCOSE SERPL-MCNC: 164 MG/DL (ref 65–140)
GLUCOSE SERPL-MCNC: 171 MG/DL (ref 65–140)
GLUCOSE SERPL-MCNC: 190 MG/DL (ref 65–140)
GLUCOSE SERPL-MCNC: 258 MG/DL (ref 65–140)
HCT VFR BLD AUTO: 39.4 % (ref 36.5–49.3)
HGB BLD-MCNC: 13.5 G/DL (ref 12–17)
MCH RBC QN AUTO: 34.6 PG (ref 26.8–34.3)
MCHC RBC AUTO-ENTMCNC: 34.3 G/DL (ref 31.4–37.4)
MCV RBC AUTO: 101 FL (ref 82–98)
PLATELET # BLD AUTO: 451 THOUSANDS/UL (ref 149–390)
PMV BLD AUTO: 10.4 FL (ref 8.9–12.7)
POTASSIUM SERPL-SCNC: 3.9 MMOL/L (ref 3.5–5.3)
RBC # BLD AUTO: 3.9 MILLION/UL (ref 3.88–5.62)
SODIUM SERPL-SCNC: 135 MMOL/L (ref 136–145)
WBC # BLD AUTO: 16.26 THOUSAND/UL (ref 4.31–10.16)

## 2021-08-12 PROCEDURE — 80048 BASIC METABOLIC PNL TOTAL CA: CPT | Performed by: INTERNAL MEDICINE

## 2021-08-12 PROCEDURE — 99232 SBSQ HOSP IP/OBS MODERATE 35: CPT | Performed by: INTERNAL MEDICINE

## 2021-08-12 PROCEDURE — 94640 AIRWAY INHALATION TREATMENT: CPT

## 2021-08-12 PROCEDURE — 85027 COMPLETE CBC AUTOMATED: CPT | Performed by: INTERNAL MEDICINE

## 2021-08-12 PROCEDURE — 82948 REAGENT STRIP/BLOOD GLUCOSE: CPT

## 2021-08-12 PROCEDURE — 94760 N-INVAS EAR/PLS OXIMETRY 1: CPT

## 2021-08-12 PROCEDURE — 94668 MNPJ CHEST WALL SBSQ: CPT

## 2021-08-12 PROCEDURE — 97535 SELF CARE MNGMENT TRAINING: CPT

## 2021-08-12 RX ORDER — IPRATROPIUM BROMIDE AND ALBUTEROL SULFATE 2.5; .5 MG/3ML; MG/3ML
3 SOLUTION RESPIRATORY (INHALATION) EVERY 6 HOURS PRN
Status: DISCONTINUED | OUTPATIENT
Start: 2021-08-12 | End: 2021-08-13 | Stop reason: HOSPADM

## 2021-08-12 RX ORDER — METOPROLOL TARTRATE 100 MG/1
100 TABLET ORAL EVERY 12 HOURS SCHEDULED
Refills: 0
Start: 2021-08-12

## 2021-08-12 RX ADMIN — INSULIN LISPRO 5 UNITS: 100 INJECTION, SOLUTION INTRAVENOUS; SUBCUTANEOUS at 08:56

## 2021-08-12 RX ADMIN — FOLIC ACID 1 MG: 1 TABLET ORAL at 08:53

## 2021-08-12 RX ADMIN — INSULIN LISPRO 5 UNITS: 100 INJECTION, SOLUTION INTRAVENOUS; SUBCUTANEOUS at 11:58

## 2021-08-12 RX ADMIN — IPRATROPIUM BROMIDE AND ALBUTEROL SULFATE 3 ML: 2.5; .5 SOLUTION RESPIRATORY (INHALATION) at 01:53

## 2021-08-12 RX ADMIN — APIXABAN 5 MG: 5 TABLET, FILM COATED ORAL at 17:28

## 2021-08-12 RX ADMIN — ACETAMINOPHEN 650 MG: 325 TABLET, FILM COATED ORAL at 13:06

## 2021-08-12 RX ADMIN — BUPROPION 150 MG: 150 TABLET, EXTENDED RELEASE ORAL at 08:52

## 2021-08-12 RX ADMIN — DOCUSATE SODIUM AND SENNOSIDES 1 TABLET: 8.6; 5 TABLET, FILM COATED ORAL at 21:36

## 2021-08-12 RX ADMIN — FLUOXETINE 20 MG: 20 CAPSULE ORAL at 08:52

## 2021-08-12 RX ADMIN — THIAMINE HCL TAB 100 MG 100 MG: 100 TAB at 08:53

## 2021-08-12 RX ADMIN — INSULIN LISPRO 5 UNITS: 100 INJECTION, SOLUTION INTRAVENOUS; SUBCUTANEOUS at 16:50

## 2021-08-12 RX ADMIN — APIXABAN 5 MG: 5 TABLET, FILM COATED ORAL at 08:52

## 2021-08-12 RX ADMIN — PREDNISONE 30 MG: 20 TABLET ORAL at 08:52

## 2021-08-12 RX ADMIN — INSULIN GLARGINE 10 UNITS: 100 INJECTION, SOLUTION SUBCUTANEOUS at 21:35

## 2021-08-12 RX ADMIN — ZOLPIDEM TARTRATE 5 MG: 5 TABLET, COATED ORAL at 21:41

## 2021-08-12 RX ADMIN — INSULIN LISPRO 2 UNITS: 100 INJECTION, SOLUTION INTRAVENOUS; SUBCUTANEOUS at 16:49

## 2021-08-12 RX ADMIN — RISPERIDONE 2 MG: 1 TABLET ORAL at 08:52

## 2021-08-12 RX ADMIN — INSULIN LISPRO 2 UNITS: 100 INJECTION, SOLUTION INTRAVENOUS; SUBCUTANEOUS at 08:56

## 2021-08-12 RX ADMIN — ALPRAZOLAM 0.5 MG: 0.5 TABLET ORAL at 08:52

## 2021-08-12 RX ADMIN — INSULIN LISPRO 2 UNITS: 100 INJECTION, SOLUTION INTRAVENOUS; SUBCUTANEOUS at 21:35

## 2021-08-12 RX ADMIN — ALPRAZOLAM 0.5 MG: 0.5 TABLET ORAL at 15:31

## 2021-08-12 RX ADMIN — POLYETHYLENE GLYCOL 3350 17 G: 17 POWDER, FOR SOLUTION ORAL at 08:51

## 2021-08-12 RX ADMIN — PRAVASTATIN SODIUM 40 MG: 40 TABLET ORAL at 16:50

## 2021-08-12 RX ADMIN — IPRATROPIUM BROMIDE AND ALBUTEROL SULFATE 3 ML: 2.5; .5 SOLUTION RESPIRATORY (INHALATION) at 07:22

## 2021-08-12 RX ADMIN — INSULIN LISPRO 6 UNITS: 100 INJECTION, SOLUTION INTRAVENOUS; SUBCUTANEOUS at 11:58

## 2021-08-12 NOTE — PLAN OF CARE
Problem: RESPIRATORY - ADULT  Goal: Achieves optimal ventilation and oxygenation  Description: INTERVENTIONS:  - Assess for changes in respiratory status  - Assess for changes in mentation and behavior  - Position to facilitate oxygenation and minimize respiratory effort  - Oxygen administered by appropriate delivery if ordered  - Initiate smoking cessation education as indicated  - Encourage broncho-pulmonary hygiene including cough, deep breathe, Incentive Spirometry  - Assess the need for suctioning and aspirate as needed  - Assess and instruct to report SOB or any respiratory difficulty  - Respiratory Therapy support as indicated  Outcome: Progressing     Problem: MOBILITY - ADULT  Goal: Maintain or return to baseline ADL function  Description: INTERVENTIONS:  -  Assess patient's ability to carry out ADLs; assess patient's baseline for ADL function and identify physical deficits which impact ability to perform ADLs (bathing, care of mouth/teeth, toileting, grooming, dressing, etc )  - Assess/evaluate cause of self-care deficits   - Assess range of motion  - Assess patient's mobility; develop plan if impaired  - Assess patient's need for assistive devices and provide as appropriate  - Encourage maximum independence but intervene and supervise when necessary  - Involve family in performance of ADLs  - Assess for home care needs following discharge   - Consider OT consult to assist with ADL evaluation and planning for discharge  - Provide patient education as appropriate  Outcome: Progressing  Goal: Maintains/Returns to pre admission functional level  Description: INTERVENTIONS:  - Perform BMAT or MOVE assessment daily    - Set and communicate daily mobility goal to care team and patient/family/caregiver  - Collaborate with rehabilitation services on mobility goals if consulted  - Perform Range of Motion 2 times a day  - Reposition patient every 2 hours    - Dangle patient 2 times a day  - Stand patient 2 times a day  - Ambulate patient 2 times a day  - Out of bed to chair 2 times a day   - Out of bed for meals 2 times a day  - Out of bed for toileting  - Record patient progress and toleration of activity level   Outcome: Progressing     Problem: Potential for Falls  Goal: Patient will remain free of falls  Description: INTERVENTIONS:  - Educate patient/family on patient safety including physical limitations  - Instruct patient to call for assistance with activity   - Consult OT/PT to assist with strengthening/mobility   - Keep Call bell within reach  - Keep bed low and locked with side rails adjusted as appropriate  - Keep care items and personal belongings within reach  - Initiate and maintain comfort rounds  - Make Fall Risk Sign visible to staff  - Offer Toileting every 2 Hours, in advance of need  - Initiate/Maintain bed alarm  - Obtain necessary fall risk management equipment: fall risk   - Apply yellow socks and bracelet for high fall risk patients  - Consider moving patient to room near nurses station  Outcome: Progressing     Problem: Prexisting or High Potential for Compromised Skin Integrity  Goal: Skin integrity is maintained or improved  Description: INTERVENTIONS:  - Identify patients at risk for skin breakdown  - Assess and monitor skin integrity  - Assess and monitor nutrition and hydration status  - Monitor labs   - Assess for incontinence   - Turn and reposition patient  - Assist with mobility/ambulation  - Relieve pressure over bony prominences  - Avoid friction and shearing  - Provide appropriate hygiene as needed including keeping skin clean and dry  - Evaluate need for skin moisturizer/barrier cream  - Collaborate with interdisciplinary team   - Patient/family teaching  - Consider wound care consult   Outcome: Progressing     Problem: Nutrition/Hydration-ADULT  Goal: Nutrient/Hydration intake appropriate for improving, restoring or maintaining nutritional needs  Description: Monitor and assess patient's nutrition/hydration status for malnutrition  Collaborate with interdisciplinary team and initiate plan and interventions as ordered  Monitor patient's weight and dietary intake as ordered or per policy  Utilize nutrition screening tool and intervene as necessary  Determine patient's food preferences and provide high-protein, high-caloric foods as appropriate       INTERVENTIONS:  - Monitor oral intake, urinary output, labs, and treatment plans  - Assess nutrition and hydration status and recommend course of action  - Evaluate amount of meals eaten  - Assist patient with eating if necessary   - Allow adequate time for meals  - Recommend/ encourage appropriate diets, oral nutritional supplements, and vitamin/mineral supplements  - Order, calculate, and assess calorie counts as needed  - Recommend, monitor, and adjust tube feedings and TPN/PPN based on assessed needs  - Assess need for intravenous fluids  - Provide specific nutrition/hydration education as appropriate  - Include patient/family/caregiver in decisions related to nutrition  Outcome: Progressing

## 2021-08-12 NOTE — ASSESSMENT & PLAN NOTE
Possibly related to prednisone  Downtrending spontaneously  No evidence of infection clinically  Monitor

## 2021-08-12 NOTE — PLAN OF CARE
Problem: OCCUPATIONAL THERAPY ADULT  Goal: Performs self-care activities at highest level of function for planned discharge setting  See evaluation for individualized goals  Description: Treatment Interventions: ADL retraining, Functional transfer training, UE strengthening/ROM, Endurance training, Patient/family training, Equipment evaluation/education, Activityengagement, Compensatory technique education          See flowsheet documentation for full assessment, interventions and recommendations  Outcome: Progressing  Note: Limitation: Decreased ADL status, Decreased UE strength, Decreased Safe judgement during ADL, Decreased endurance, Decreased self-care trans, Decreased high-level ADLs (decreased balance and mobility )  Prognosis: Good  Assessment: Patient seen for OT treatment this AM  Patient pleasant and cooperative and motivated to participate in therapy session  Patient stating "I feel I'm getting better" in regards to his functional performance  The patient's raw score on the AM-PAC Daily Activity inpatient short form is 17, standardized score is 37 26, less than 39 4  Patients at this level are likely to benefit from discharge to post-acute rehabilitation services  Please refer to the recommendation of the Occupational Therapist for safe discharge planning  At end of session patient seated in recliner chair with all needs met, chair alarm set  Continue OT per POC        OT Discharge Recommendation: Post acute rehabilitation services

## 2021-08-12 NOTE — PROGRESS NOTES
Progress Note - Pulmonary   Rosalind Pedroza 77 y o  male MRN: [id number]  Unit/Bed#: [address] [place] [address] Encounter: [id number]  Code Status: Level 3 - DNAR and DNI    John Hollins is a 77 y o  Past Medical History:   Diagnosis Date    Anxiety     Atrial fibrillation ([facility] [address] )     Depression     Hypertension     Insomnia        Assessment/Plan:     76 y/o M ADM to ICU for respiratory failure 2/2 decompensated CHF and Afib w/ RVR  Also has been diuresed extensively this hospitalization and now -15 4 L  Has Severe MECHE on in hospital sleep screen w/ AHI of 71  ABG non hypercapneic  Now off of 02  Severe MECHE  -likely to need OP sleep study   -ordered split study given CHF contributor  Acute Hypoxemic Respiratory Failure   -resolved  -currently on RA   Resolved Aspiration PNA:  -completed course of ABx on  for full 6 days abx  MAX:  -resolved  Results from last 7 days   Lab Units 21  0615 21  0539 08/10/21  0453 21  0532 21  0530 21  0551 21  0526   BUN mg/dL 17 20 25 25 28* 29* 31*   CREATININE mg/dL 0 79 0 73 0 77 0 80 0 92 0 87 0 99       Estimated Creatinine Clearance: 108 2 mL/min (by C-G formula based on SCr of 0 79 mg/dL)  Obesity:  -recommended weight loss  Acute on Chronic Systolic and Diastolic CHF  -remains on torsemide 20 mg /day   _________________________________________________    Subjective: Pt seen and examined at bedside  No complaints  Breathing well  Ready to go home today  Smoking history:   Social History     Tobacco Use   Smoking Status Current Some Day Smoker    Packs/day: 1 00    Years: 40 00    Pack years: 40 00    Types: Cigarettes    Last attempt to quit: 2015    Years since quittin 9   Smokeless Tobacco Never Used     Echo 21 EF 40 / Mild conc hypertroph / LA dilatation mild / PAP 30  Labs Reviewed: yes   Imaging Reviewed:  Aug 4 to Aug 6 comparison    Collaborative Discussion: stable for d/c   Tele Events:     Vitals: Vitals:    21 0030 21 0155 21 0600 21 0722   BP: 112/74   108/56   BP Location:       Pulse: 84 90  97   Resp:    Temp: 97 7 °F (36 5 °C)   98 °F (36 7 °C)   TempSrc: Oral   Oral   SpO2: 94% 94%  95%   Weight:   98 4 kg (216 lb 14 9 oz)    Height:         Weight (last 2 days)     Date/Time   Weight    21 0600   98 4 (216 93)    21 0600   103 (226 63)    08/10/21 0600   103 (227)            Temp (24hrs), Av 8 °F (36 6 °C), Min:97 7 °F (36 5 °C), Max:98 °F (36 7 °C)  Current: Temperature: 98 °F (36 7 °C)          IV Infusions:       Nutrition:        Diet Orders   (From admission, onward)             Start     Ordered    08/10/21 1620  Diet Cardiovascular; Cardiac; Fluid Restriction 1500 ML, Consistent Carbohydrate Diet Level 2 (5 carb servings/75 grams CHO/meal)  Diet effective now     Question Answer Comment   Diet Type Cardiovascular    Cardiac Cardiac    Other Restriction(s): Fluid Restriction 1500 ML    Other Restriction(s): Consistent Carbohydrate Diet Level 2 (5 carb servings/75 grams CHO/meal)    RD to adjust diet per protocol? No        08/10/21 1619    21 1146  Room Service  Once     Question:  Type of Service  Answer:  Room Service-Appropriate    21 1145                Ins/Outs:   I/O        07 - 08/10 0700 08/10 07 -  0700  07 -  0700    P  O  350  480    I V  (mL/kg)       IV Piggyback       Total Intake(mL/kg) 350 (3 4)  480 (4 7)    Urine (mL/kg/hr) 2275 (0 9) 1750 (0 7) 300 (0 3)    Stool 0      Total Output 2275 1750 300    Net -1925 -1750 +180           Unmeasured Urine Occurrence 2 x      Unmeasured Stool Occurrence 1 x            Lines/Drains:  Invasive Devices     Peripheral Intravenous Line            Peripheral IV 21 Left Forearm 3 days                 Active medications:  Scheduled Meds:  Current Facility-Administered Medications   Medication Dose Route Frequency Provider Last Rate    acetaminophen 650 mg Oral Q6H PRN Brian Gavia, CRNP      ALPRAZolam  0 5 mg Oral BID PRN Katherin Fonseca MD      amLODIPine  10 mg Oral Daily Katherin Fonseca MD      apixaban  5 mg Oral BID Howard Oliva MD      buPROPion  150 mg Oral Daily Brian Gavia, CRNP      FLUoxetine  20 mg Oral Daily Lum Natalie Mol, CRNP      folic acid  1 mg Oral Daily Brian Gavia, CRNP      insulin glargine  10 Units Subcutaneous HS Katherin Fonseca MD      insulin lispro  2-12 Units Subcutaneous TID AC Lum Natalie Mol, CRNP      insulin lispro  2-12 Units Subcutaneous HS [facility] Gavia, CRNP      insulin lispro  5 Units Subcutaneous TID With Meals Howard Oliva MD      ipratropium-albuterol  3 mL Nebulization Q6H Howard Oliva MD      melatonin  3 mg Oral HS Katherin Fonseca MD      metoprolol tartrate  100 mg Oral Q12H [address] Brian Gavia, CRNP      polyethylene glycol  17 g Oral Daily PRN Brian Gavia, CRNP      pravastatin  40 mg Oral Daily With [address], CRNP      [START ON 8/13/2021] predniSONE  20 mg Oral Daily Katherin Fonseca MD      Followed by   Julianna Virk ON 8/15/2021] predniSONE  10 mg Oral Daily Katherin Fonseca MD      risperiDONE  2 mg Oral Daily Brian Gavia, CRNP      senna-docusate sodium  1 tablet Oral HS Brian Gavia, CRNP      thiamine  100 mg Oral Daily Brian Gavia, CRNP      torsemide  20 mg Oral Daily Lum Natalie Mol, CRNP      zolpidem  5 mg Oral HS PRN Katherin Fonseca MD       PRN Meds:acetaminophen, 650 mg, Q6H PRN  ALPRAZolam, 0 5 mg, BID PRN  polyethylene glycol, 17 g, Daily PRN  zolpidem, 5 mg, HS PRN      ____________________________________________________________________    Physical Exam  Constitutional:       Appearance: He is well-developed  HENT:      Head: Normocephalic and atraumatic  Eyes:      Conjunctiva/sclera: Conjunctivae normal       Pupils: Pupils are equal, round, and reactive to light  Cardiovascular:      Rate and Rhythm: Normal rate and regular rhythm  Heart sounds: Normal heart sounds  No murmur heard  No friction rub  No gallop  Pulmonary:      Effort: Pulmonary effort is normal  No respiratory distress  Breath sounds: Normal breath sounds  No wheezing or rales  Chest:      Chest wall: No tenderness  Abdominal:      General: Bowel sounds are normal       Palpations: Abdomen is soft  Musculoskeletal:         General: Normal range of motion  Cervical back: Normal range of motion and neck supple  Skin:     General: Skin is warm and dry  Neurological:      Mental Status: He is alert and oriented to person, place, and time         ____________________________________________________________________    Invasive/non-invasive ventilation settings   Respiratory    Lab Data (Last 4 hours)    None         O2/Vent Data (Last 4 hours)    None                Laboratory and Diagnostics:  Results from last 7 days   Lab Units 08/12/21  0615 08/11/21  0539 08/10/21  0453 08/09/21  0532 08/08/21  0530 08/07/21  0551 08/06/21  0526   WBC Thousand/uL 16 26* 17 97* 15 24* 13 70* 11 91* 11 42* 9 58   HEMOGLOBIN g/dL 13 5 13 7 13 4 13 0 12 8 11 9* 11 0*   HEMATOCRIT % 39 4 40 3 39 6 37 9 38 3 35 7* 33 8*   PLATELETS Thousands/uL 451* 474* 454* 431* 423* 388 351   NEUTROS PCT %  --   --  89*  --   --   --   --    BANDS PCT %  --   --   --   --   --   --  3   MONOS PCT %  --   --  6  --   --   --   --    MONO PCT %  --   --   --   --   --   --  5     Results from last 7 days   Lab Units 08/12/21  0615 08/11/21  0539 08/10/21  0453 08/09/21  0532 08/08/21  0530 08/07/21  0551 08/06/21  0526   SODIUM mmol/L 135* 136 135* 136 141 145 148*   POTASSIUM mmol/L 3 9 3 3* 3 5 4 1 4 1 4 1 3 6   CHLORIDE mmol/L 103 101 99* 100 106 107 110*   CO2 mmol/L 24 28 30 29 30 29 31   ANION GAP mmol/L 8 7 6 7 5 9 7   BUN mg/dL 17 20 25 25 28* 29* 31*   CREATININE mg/dL 0 79 0 73 0 77 0 80 0 92 0 87 0 99 CALCIUM mg/dL 8 4 8 6 8 8 9 0 9 3 9 5 9 8   GLUCOSE RANDOM mg/dL 146* 163* 224* 259* 299* 274* 226*   ALT U/L  --   --   --   --  93* 132*  --    AST U/L  --   --   --   --  24 43  --    ALK PHOS U/L  --   --   --   --  146* 159*  --    ALBUMIN g/dL  --   --   --   --  2 1* 2 0*  --    TOTAL BILIRUBIN mg/dL  --   --   --   --  0 91 0 83  --      Results from last 7 days   Lab Units 08/09/21  0532 08/08/21  0530 08/06/21  0526   MAGNESIUM mg/dL 2 7* 2 9* 2 7*                   ABG:  Results from last 7 days   Lab Units 08/11/21  0622   PH ART  7 548*   PCO2 ART mm Hg 32 2*   PO2 ART mm Hg 65 9*   HCO3 ART mmol/L 27 4   BASE EXC ART mmol/L 5 4   ABG SOURCE  Radial, Right     VBG:  Results from last 7 days   Lab Units 08/11/21  0622   ABG SOURCE  Radial, Right     Results from last 7 days   Lab Units 08/06/21  0526 08/05/21  1041   PROCALCITONIN ng/ml 1 23* 2 26*       Micro        Imaging:   XR chest portable ICU   Final Result by Xuan Greenberg MD (08/06 0932)      Improved airspace disease  Workstation performed: [id number]         XR chest portable ICU   Final Result by Pete Moore MD (59/75 0128)      Interval worsening of bilateral airspace disease  Workstation performed: [id number]         XR chest portable   Final Result by Liza Coronado DO (08/04 1426)      Slight worsening of bilateral airspace disease, favored to represent multifocal pneumonia  Correlate with clinical scenario  Workstation performed: [id number]         XR chest portable ICU   Final Result by Stiven Rick MD (08/04 1059)      Areas of consolidation in the right upper and lower lobes and, probably, the left lower lobe, either asymmetric pulmonary edema or multifocal pneumonia  Workstation performed: [id number]         CT head without contrast   Final Result by Dottie Sage MD (08/01 1541)      No acute intracranial abnormality                    Workstation performed: [id number]         XR chest 1 view portable   Final Result by Tammy Boateng MD (08/02 9826)      Right upper and lower lobe consolidation concerning for multifocal pneumonia  Workstation performed: [id number]         XR chest portable    (Results Pending)       Micro:   Lab Results   Component Value Date    BLOODCX No Growth After 5 Days  08/01/2021    BLOODCX No Growth After 5 Days   08/01/2021    URINECX No Growth <1000 cfu/mL 08/01/2021    MRSACULTURE  08/01/2021     No Methicillin Resistant Staphlyococcus aureus (MRSA) isolated            Invalid input(s): Fallon De Guzman

## 2021-08-12 NOTE — OCCUPATIONAL THERAPY NOTE
Occupational Therapy Treatment Note       08/12/21 1130   Note Type   Note Type Treatment   Restrictions/Precautions   Other Precautions Chair Alarm; Bed Alarm; Fall Risk   Pain Assessment   Pain Assessment Tool 0-10   Pain Score No Pain   ADL   Eating Assistance 7  Independent   Grooming Assistance 5  Supervision/Setup   Grooming Comments Hand hygiene standing to sink unsupported   UB Bathing Assistance 4  Minimal Assistance   LB Bathing Assistance 3  Moderate Assistance   UB Dressing Assistance 4  Minimal Assistance   LB Dressing Assistance 3  Moderate Assistance   LB Dressing Comments Don scrub pants with overall mod assist: min assist to thread BLEs, and min assist to stand and pull up over hips   Toileting Assistance  4  Minimal Assistance   Bed Mobility   Supine to Sit 5  Supervision   Transfers   Sit to Stand 4  Minimal assistance   Additional items Assist x 1   Stand to Sit 4  Minimal assistance   Additional items Assist x 1   Additional Comments STS x several trials throughout session, min assist each trial   Functional Mobility   Functional Mobility 4  Minimal assistance   Additional Comments Patient ambulated short household distance to/from bathroom with RW and min assist; ambulated short household distance in room with RW; on RA, no SOB, SpO2 WFL   Toilet Transfers   Toilet Transfer Type To and from   Toilet Transfer to [employer] Transfers Minimal assistance   Toilet Transfers Comments with RW, min VCs for safe transfer technique   Cognition   Overall Cognitive Status [facility]   Arousal/Participation Alert; Cooperative   Attention Within functional limits   Orientation Level Oriented X4   Following Commands Follows multistep commands with increased time or repetition   Activity Tolerance   Activity Tolerance Patient limited by fatigue   Assessment   Assessment Patient seen for OT treatment this AM  Patient pleasant and cooperative and motivated to participate in therapy session  Patient stating "I feel I'm getting better" in regards to his functional performance  The patient's raw score on the AM-PAC Daily Activity inpatient short form is 17, standardized score is 37 26, less than 39 4  Patients at this level are likely to benefit from discharge to post-acute rehabilitation services  Please refer to the recommendation of the Occupational Therapist for safe discharge planning  At end of session patient seated in recliner chair with all needs met, chair alarm set  Continue OT per POC      Plan   OT Frequency 3-5x/wk   Recommendation   OT Discharge Recommendation Post acute rehabilitation services   AM-PAC Daily Activity Inpatient   Lower Body Dressing 2   Bathing 2   Toileting 3   Upper Body Dressing 3   Grooming 3   Eating 4   Daily Activity Raw Score 17   Daily Activity Standardized Score (Calc for Raw Score >=11) 37 26   AM-PAC Applied Cognition Inpatient   Following a Speech/Presentation 4   Understanding Ordinary Conversation 4   Taking Medications 4   Remembering Where Things Are Placed or Put Away 4   Remembering List of 4-5 Errands 3   Taking Care of Complicated Tasks 3   Applied Cognition Raw Score 22   Applied Cognition Standardized Score 22 53   Licensure   [place] License Number  Agatha Ayala OTR/L [id number]

## 2021-08-12 NOTE — CASE MANAGEMENT
snf auth request submittted via availity for [facility] npi: [id number] Dr: Jj Clark Her npi: [id number]      [facility] [id number]  Gigi Conrad pending  ECIN task complete  Clinicals attached in availity

## 2021-08-12 NOTE — CASE MANAGEMENT
SW met with pt's sister to review discharge plan  Patient is medically cleared for discharge today  [facility] and [facility] today  [facility]  Courtney Rogersadryan confirmed they will choose to pursue admission to [facility] today  SW advised that [place] will need to be obtained first  IMM reviewed with patient's caregiver, patient's caregiver agrees with discharge determination  SW to f/u with insurance auth through [place]  Attending made aware

## 2021-08-13 VITALS
HEIGHT: 70 IN | OXYGEN SATURATION: 98 % | WEIGHT: 216.71 LBS | DIASTOLIC BLOOD PRESSURE: 53 MMHG | BODY MASS INDEX: 31.03 KG/M2 | HEART RATE: 56 BPM | SYSTOLIC BLOOD PRESSURE: 97 MMHG | TEMPERATURE: 97.9 F | RESPIRATION RATE: 18 BRPM

## 2021-08-13 PROBLEM — J96.02 ACUTE RESPIRATORY FAILURE WITH HYPOXIA AND HYPERCAPNIA (HCC): Status: RESOLVED | Noted: 2021-08-01 | Resolved: 2021-08-13

## 2021-08-13 PROBLEM — J96.01 ACUTE RESPIRATORY FAILURE WITH HYPOXIA AND HYPERCAPNIA (HCC): Status: RESOLVED | Noted: 2021-08-01 | Resolved: 2021-08-13

## 2021-08-13 LAB
GLUCOSE SERPL-MCNC: 155 MG/DL (ref 65–140)
GLUCOSE SERPL-MCNC: 174 MG/DL (ref 65–140)

## 2021-08-13 PROCEDURE — 82948 REAGENT STRIP/BLOOD GLUCOSE: CPT

## 2021-08-13 PROCEDURE — 94760 N-INVAS EAR/PLS OXIMETRY 1: CPT

## 2021-08-13 PROCEDURE — 99239 HOSP IP/OBS DSCHRG MGMT >30: CPT | Performed by: INTERNAL MEDICINE

## 2021-08-13 RX ORDER — ALPRAZOLAM 0.5 MG/1
0.5 TABLET ORAL 2 TIMES DAILY PRN
Qty: 6 TABLET | Refills: 0 | Status: SHIPPED | OUTPATIENT
Start: 2021-08-13 | End: 2022-05-05 | Stop reason: DRUGHIGH

## 2021-08-13 RX ORDER — PREDNISONE 10 MG/1
TABLET ORAL
Qty: 30 TABLET | Refills: 0
Start: 2021-08-13 | End: 2021-11-05 | Stop reason: ALTCHOICE

## 2021-08-13 RX ORDER — ZOLPIDEM TARTRATE 5 MG/1
5 TABLET ORAL
Qty: 3 TABLET | Refills: 0 | Status: SHIPPED | OUTPATIENT
Start: 2021-08-13 | End: 2022-05-05 | Stop reason: DRUGHIGH

## 2021-08-13 RX ORDER — LANOLIN ALCOHOL/MO/W.PET/CERES
100 CREAM (GRAM) TOPICAL DAILY
Refills: 0
Start: 2021-08-14 | End: 2021-11-05 | Stop reason: HOSPADM

## 2021-08-13 RX ORDER — FOLIC ACID 1 MG/1
1 TABLET ORAL DAILY
Refills: 0
Start: 2021-08-14 | End: 2021-11-05 | Stop reason: HOSPADM

## 2021-08-13 RX ORDER — AMOXICILLIN 250 MG
1 CAPSULE ORAL
Refills: 0
Start: 2021-08-13 | End: 2021-11-05 | Stop reason: HOSPADM

## 2021-08-13 RX ORDER — IPRATROPIUM BROMIDE AND ALBUTEROL SULFATE 2.5; .5 MG/3ML; MG/3ML
3 SOLUTION RESPIRATORY (INHALATION) EVERY 6 HOURS PRN
Refills: 0
Start: 2021-08-13 | End: 2021-11-05 | Stop reason: ALTCHOICE

## 2021-08-13 RX ORDER — POLYETHYLENE GLYCOL 3350 17 G/17G
17 POWDER, FOR SOLUTION ORAL DAILY PRN
Refills: 0
Start: 2021-08-13 | End: 2021-11-05 | Stop reason: HOSPADM

## 2021-08-13 RX ADMIN — INSULIN LISPRO 2 UNITS: 100 INJECTION, SOLUTION INTRAVENOUS; SUBCUTANEOUS at 12:12

## 2021-08-13 RX ADMIN — ACETAMINOPHEN 650 MG: 325 TABLET, FILM COATED ORAL at 15:59

## 2021-08-13 RX ADMIN — ALPRAZOLAM 0.5 MG: 0.5 TABLET ORAL at 14:17

## 2021-08-13 RX ADMIN — THIAMINE HCL TAB 100 MG 100 MG: 100 TAB at 09:09

## 2021-08-13 RX ADMIN — INSULIN LISPRO 5 UNITS: 100 INJECTION, SOLUTION INTRAVENOUS; SUBCUTANEOUS at 12:13

## 2021-08-13 RX ADMIN — INSULIN LISPRO 5 UNITS: 100 INJECTION, SOLUTION INTRAVENOUS; SUBCUTANEOUS at 09:10

## 2021-08-13 RX ADMIN — ALPRAZOLAM 0.5 MG: 0.5 TABLET ORAL at 06:34

## 2021-08-13 RX ADMIN — FLUOXETINE 20 MG: 20 CAPSULE ORAL at 09:09

## 2021-08-13 RX ADMIN — APIXABAN 5 MG: 5 TABLET, FILM COATED ORAL at 09:09

## 2021-08-13 RX ADMIN — POLYETHYLENE GLYCOL 3350 17 G: 17 POWDER, FOR SOLUTION ORAL at 09:07

## 2021-08-13 RX ADMIN — PREDNISONE 20 MG: 20 TABLET ORAL at 09:09

## 2021-08-13 RX ADMIN — RISPERIDONE 2 MG: 1 TABLET ORAL at 09:09

## 2021-08-13 RX ADMIN — BUPROPION 150 MG: 150 TABLET, EXTENDED RELEASE ORAL at 09:09

## 2021-08-13 RX ADMIN — FOLIC ACID 1 MG: 1 TABLET ORAL at 09:09

## 2021-08-13 RX ADMIN — INSULIN LISPRO 2 UNITS: 100 INJECTION, SOLUTION INTRAVENOUS; SUBCUTANEOUS at 09:10

## 2021-08-13 NOTE — CASE MANAGEMENT
Per Areli Maker approved  Auth# [id number]  Prabhu Foster # [id number]  Valid 8/12/21 to 8/15/21  NRD 8/16/21  Reviewer: Timmy Urrutia  Fax clinicals to [phone]

## 2021-08-13 NOTE — CASE MANAGEMENT
LOS: 12    SNF auth received today  Facility provided with information  SW discussed with unit nurse  Pt is not BLS appropriate and will need to transport via w/c van  Transport request submitted to [employer] via [facility]  Pickup time pending  Contact information for report/faxing AVS provided to nursing  Once pickup time confirmed, LEATHA will call sister Darren Galvin to advise

## 2021-08-13 NOTE — ASSESSMENT & PLAN NOTE
As evidenced by leukocytosis tachycardia, tachypnea  Procalcitonin was elevated  Likely secondary to pneumonia, improved after IV antibiotics treatment  Remains afebrile hemodynamically stable    Persistent leukocytosis secondary to steroids   Resolved

## 2021-08-13 NOTE — PROGRESS NOTES
[facility] Progress Note  Patient: Michalene Kanner 77 y o  male   MRN: [id number]  PCP: TONY Lowe  Unit/Bed#: [address] [place] [address] Encounter: [id number]  Date Of Visit: 08/12/21    Problem List:    Principal Problem:    Acute respiratory failure with hypoxia and hypercapnia (HCC)  Active Problems:    Acute on chronic systolic (congestive) heart failure (HCC)    Chronic atrial fibrillation ([facility] )    Type 2 diabetes mellitus, without long-term current use of insulin ([facility] )    Suspected Chronic obstructive pulmonary disease with acute exacerbation (HCC)    Mixed hyperlipidemia    Essential hypertension    Anxiety    Depression, recurrent ([facility])    Alcohol use    Severe obesity (BMI 35 0-39  9) with comorbidity ([place] [id number] )    Leukocytosis      Assessment & Plan:    Acute on chronic systolic (congestive) heart failure (HCC)  Assessment & Plan  Wt Readings from Last 3 Encounters:   08/12/21 98 4 kg (216 lb 14 9 oz)   07/09/21 108 kg (239 lb)   04/09/21 110 kg (243 lb)     2D echocardiogram showed EF of 40%  Currently optimized, appears euvolemic  Continue metoprolol, torsemide  Cardiology follow-up discharge  Follow-up BMP, daily weight        * Acute respiratory failure with hypoxia and hypercapnia (HCC)  Assessment & Plan  Secondary to CHF and COPD exacerbation, with pneumonia  Appears to have improved, currently saturating adequately on room air  On BiPAP q h s  But reluctant to continue due to claustrophobia  ABG noted off BiPAP without any hypercarbia, sleep screen noted with significantly elevated AHI  Pulmonary follow-up appreciated  · Discussed with patient regarding outpatient sleep study, patient is agreeable  · Monitor respiratory status  · Pulmonary follow-up after discharge  · Can continue CPAP q h s  as tolerated, discussed with patient    Reluctant to use at present  · Follow-up repeat chest x-ray    Pneumonia-resolved as of 8/10/2021  Assessment & Plan  Avoid got pneumonia, finished a course of antibiotics  Procalcitonin trended down to 1 23 last checked  Continue on nebulizers  Sepsis (HCC)-resolved as of 8/9/2021  Assessment & Plan  As evidenced by leukocytosis tachycardia, tachypnea  Procalcitonin was elevated  Likely secondary to pneumonia, improved after IV antibiotics treatment  Remains afebrile hemodynamically stable  Persistent leukocytosis secondary to steroids   Resolved    Suspected Chronic obstructive pulmonary disease with acute exacerbation (HCC)  Assessment & Plan  Appears to be improving  No wheezing on exam today  Continue bronchodilators, transition to prednisone taper  Pulmonary follow-up after discharge    Type 2 diabetes mellitus, without long-term current use of insulin [facility])  Assessment & Plan  Lab Results   Component Value Date    HGBA1C 6 3 (H) 08/11/2021       Recent Labs     08/12/21  0739 08/12/21  1140 08/12/21  1611 08/12/21 2050   POCGLU 171* 258* 164* 190*       Blood Sugar Average: Last 72 hrs:  (P) 218 5     Noted to a hyperglycemia, pronounced with steroid use  Continue Lantus and lispro, will add metformin on discharge  Patient was educated about diet and lifestyle modification    Chronic atrial fibrillation (HCC)  Assessment & Plan  Continue  on metoprolol,  Continue Eliquis      Alcohol use  Assessment & Plan  In remission    Depression, recurrent (HCC)  Assessment & Plan  Continue home meds    Anxiety  Assessment & Plan  Continue chronic meds  Alprazolam was changed to b i d  P r n , Ambien was started at 5 mg q h s        Essential hypertension  Assessment & Plan  Stable monitor on metoprolol, amlodipine with holding parameter    Mixed hyperlipidemia  Assessment & Plan  On statin    Leukocytosis  Assessment & Plan  Leukocytosis noted to be uptrending, unclear etiology  Possibly related to prednisone  Downtrending spontaneously  No evidence of infection clinically  Monitor    Severe obesity (BMI 35 0-39  9) with comorbidity [facility])  Assessment & Plan  Counseled on diet and exercise  Continue supportive care  VTE Pharmacologic Prophylaxis:   Pharmacologic: Apixaban (Eliquis)  Mechanical VTE Prophylaxis in Place: Yes    Patient Centered Rounds: I have performed bedside rounds with nursing staff today  Discussions with Specialists or Other Care Team Provider:  Pulmonary, case management    Education and Discussions with Family / Patient:  Discussed with sister    Time Spent for Care: 45 minutes  More than 50% of total time spent on counseling and coordination of care as described above  Current Length of Stay: 11 day(s)    Current Patient Status: Inpatient   Certification Statement: The patient will continue to require additional inpatient hospital stay due to CHF diabetes optimization    Discharge Plan:  Anticipate rehab once authorization is obtained    Code Status: Level 3 - DNAR and DNI      Subjective:   Continues to feel well  Denies any chest pain shortness of breath, any  or GI symptoms  Able to sleep well last night with Ambien but does not want to use CPAP/BiPAP    Denies cough        Objective:     Vitals:   Temp (24hrs), Av 8 °F (36 6 °C), Min:97 7 °F (36 5 °C), Max:98 °F (36 7 °C)    Temp:  [97 7 °F (36 5 °C)-98 °F (36 7 °C)] 97 8 °F (36 6 °C)  HR:  [84-97] 86  Resp:  [19-20] 20  BP: (108-112)/(56-74) 111/67  SpO2:  [94 %-98 %] 98 % on room air  Body mass index is 31 13 kg/m²  Input and Output Summary (last 24 hours): Intake/Output Summary (Last 24 hours) at 2021  Last data filed at 2021 1101  Gross per 24 hour   Intake 200 ml   Output 1450 ml   Net -1250 ml       Physical Exam:     Physical Exam  Constitutional:       General: He is not in acute distress  HENT:      Head: Normocephalic and atraumatic  Cardiovascular:      Rate and Rhythm: Normal rate  Pulmonary:      Effort: Pulmonary effort is normal  No respiratory distress  Breath sounds: No wheezing, rhonchi or rales        Comments: Diminished, clear  Chest:      Chest wall: No tenderness  Abdominal:      General: Bowel sounds are normal  There is no distension  Palpations: Abdomen is soft  Tenderness: There is no abdominal tenderness  There is no guarding or rebound  Musculoskeletal:      Cervical back: Normal range of motion and neck supple  Skin:     General: Skin is warm and dry  Findings: No rash  Neurological:      Mental Status: He is alert  Mental status is at baseline  Cranial Nerves: No cranial nerve deficit  Additional Data:     Labs:    Results from last 7 days   Lab Units 08/12/21  0615 08/10/21  0453   WBC Thousand/uL 16 26* 15 24*   HEMOGLOBIN g/dL 13 5 13 4   HEMATOCRIT % 39 4 39 6   PLATELETS Thousands/uL 451* 454*   NEUTROS PCT %  --  89*   LYMPHS PCT %  --  4*   MONOS PCT %  --  6   EOS PCT %  --  0     Results from last 7 days   Lab Units 08/12/21  0615 08/08/21  0530   POTASSIUM mmol/L 3 9 4 1   CHLORIDE mmol/L 103 106   CO2 mmol/L 24 30   BUN mg/dL 17 28*   CREATININE mg/dL 0 79 0 92   CALCIUM mg/dL 8 4 9 3   ALK PHOS U/L  --  146*   ALT U/L  --  93*   AST U/L  --  24           * I Have Reviewed All Lab Data Listed Above  * Additional Pertinent Lab Tests Reviewed:  All Labs Within Last 24 Hours Reviewed      Imaging:  Imaging Reports Reviewed Today Include: CXR, CT    Recent Cultures (last 7 days):           Last 24 Hours Medication List:   Current Facility-Administered Medications   Medication Dose Route Frequency Provider Last Rate    acetaminophen  650 mg Oral Q6H PRN TONY Key      ALPRAZolam  0 5 mg Oral BID PRN Thomas Mcdowell MD      amLODIPine  10 mg Oral Daily Thomas Mcdowell MD      apixaban  5 mg Oral BID Colt Whitt MD      buPROPion  150 mg Oral Daily TONY Key      FLUoxetine  20 mg Oral Daily TONY Barrios      folic acid  1 mg Oral Daily TONY Key      insulin glargine  10 Units Subcutaneous HS [facility] Mitra Conteh MD      insulin lispro  2-12 Units Subcutaneous TID AC Robbie De Jesus, [address]      insulin lispro  2-12 Units Subcutaneous HS Robbie De Jesus, TONY      insulin lispro  5 Units Subcutaneous TID With Meals Albert Thakkar MD      ipratropium-albuterol  3 mL Nebulization Q6H PRN Ann Cortez MD      melatonin  3 mg Oral HS Ann Cortez MD      metoprolol tartrate  100 mg Oral Q12H [facility] Giselle [place], CRJACQUELINE      polyethylene glycol  17 g Oral Daily PRN Giselle [place], CRJACQUELINE      pravastatin  40 mg Oral Daily With [address], CRNP      [START ON 8/13/2021] predniSONE  20 mg Oral Daily Ann Cortez MD      Followed by   Carver Claude ON 8/15/2021] predniSONE  10 mg Oral Daily Ann Cortez MD      risperiDONE  2 mg Oral Daily Giselle [place], CRJACQUELINE      senna-docusate sodium  1 tablet Oral HS Giselle [place], CRJACQUELINE      thiamine  100 mg Oral Daily TONY Burk      torsemide  20 mg Oral Daily TONY Burk      zolpidem  5 mg Oral HS PRN Ann Cortez MD            Today, Patient Was Seen By: Ann Cortez MD    ** Please Note: "This note has been constructed using a voice recognition system  Therefore there may be syntax, spelling, and/or grammatical errors   Please call if you have any questions  "**

## 2021-08-13 NOTE — NURSING NOTE
[facility]  Patient sent all belongings with transporter and IV access taken out  No prescriptions sent  Gave report to Vahe Yang RN at [facility]  All questions and concerns answered at this time

## 2021-08-13 NOTE — DISCHARGE SUMMARY
Discharge Summary - [facility]    Patient Information: Neno Zaidi 77 y o  male MRN: [id number]  Unit/Bed#: Darryl Saeed 578-67 Encounter: [id number]    Discharging Physician / Practitioner: Kai Padgett MD  PCP: Bandar Beck  Admission Date: 8/1/2021  Discharge Date: 08/13/21    Reason for Admission: Weakness - Generalized (feeling "crappy" for a few days, bought cold medicine at the [employer] store  Known drinker, states he hasn't drank in several days  Patient fell due to being weak  )      Discharge Diagnoses:     Principal Problem (Resolved):    Acute respiratory failure with hypoxia and hypercapnia (HCC)  Active Problems:    Acute on chronic systolic (congestive) heart failure (HCC)    Chronic atrial fibrillation (HCC)    Type 2 diabetes mellitus, without long-term current use of insulin (HCC)    Suspected Chronic obstructive pulmonary disease with acute exacerbation (HCC)    Mixed hyperlipidemia    Essential hypertension    Anxiety    Depression, recurrent ([facility])    Alcohol use    Severe obesity (BMI 35 0-39  9) with comorbidity ([facility] )    Leukocytosis  Resolved Problems:    Sepsis ([facility] )    Pneumonia    MAX (acute kidney injury) ([facility] )    Hypernatremia        Acute on chronic systolic (congestive) heart failure (HCC)  Assessment & Plan  Wt Readings from Last 3 Encounters:   08/13/21 98 3 kg (216 lb 11 4 oz)   07/09/21 108 kg (239 lb)   04/09/21 110 kg (243 lb)     2D echocardiogram showed EF of 40%  Currently optimized, appears euvolemic  Continue metoprolol, torsemide  Cardiology follow-up discharge  Follow-up BMP, daily weight        Pneumonia-resolved as of 8/10/2021  Assessment & Plan  Avoid got pneumonia, finished a course of antibiotics  Procalcitonin trended down to 1 23 last checked  Continue on nebulizers  Sepsis (HCC)-resolved as of 8/9/2021  Assessment & Plan  As evidenced by leukocytosis tachycardia, tachypnea    Procalcitonin was elevated  Likely secondary to pneumonia, improved after IV antibiotics treatment  Remains afebrile hemodynamically stable  Persistent leukocytosis secondary to steroids   Resolved    * Acute respiratory failure with hypoxia and hypercapnia (HCC)-resolved as of 8/13/2021  Assessment & Plan  Secondary to CHF and COPD exacerbation, with pneumonia  Appears to have improved, currently saturating adequately on room air  On BiPAP q h s  But reluctant to continue due to claustrophobia  ABG noted off BiPAP without any hypercarbia, sleep screen noted with significantly elevated AHI  Pulmonary follow-up appreciated  · Discussed with patient regarding outpatient sleep study, patient is agreeable  · Monitor respiratory status  · Pulmonary follow-up after discharge  · Can continue CPAP q h s  as tolerated, discussed with patient  Reluctant to use at present but will follow-up for the outpatient sleep study    Suspected Chronic obstructive pulmonary disease with acute exacerbation ([facility] )  Assessment & Plan  Appears to be improving  No wheezing on exam today  Continue bronchodilators, transition to prednisone taper  Pulmonary follow-up after discharge    Type 2 diabetes mellitus, without long-term current use of insulin [facility])  Assessment & Plan  Lab Results   Component Value Date    HGBA1C 6 3 (H) 08/11/2021       Recent Labs     08/12/21  1611 08/12/21  2050 08/13/21  0746 08/13/21  1119   POCGLU 164* 190* 155* 174*       Blood Sugar Average: Last 72 hrs:  (P) 218 5     Noted to a hyperglycemia, pronounced with steroid use  Now improving with tapering of steroids  Continue sliding scale  , added metformin on discharge  Patient was educated about diet and lifestyle modification    Chronic atrial fibrillation ([facility] )  Assessment & Plan  Continue  on metoprolol,  Continue Eliquis  Patient reported that previously could not afford Eliquis and he does not want to use Coumadin    Agreeable to continue Eliquis for now      Alcohol use  Assessment & Plan  Continue thiamine, folic acid    Depression, recurrent (HCC)  Assessment & Plan  Continue home meds    Anxiety  Assessment & Plan  Continue chronic meds  Alprazolam was decreased to b i d  P r n , Ambien was decreased 5 mg q h s        Essential hypertension  Assessment & Plan  Continue metoprolol  Amlodipine was discontinued    Mixed hyperlipidemia  Assessment & Plan  On statin    Leukocytosis  Assessment & Plan  Possibly related to prednisone  Downtrending spontaneously  No evidence of infection clinically  Monitor    Severe obesity (BMI 35 0-39  9) with comorbidity ([address] )  Assessment & Plan  Counseled on diet and exercise  Continue supportive care  Consultations During Hospital Stay:  IP CONSULT TO CASE MANAGEMENT  IP CONSULT TO CARDIOLOGY    Procedures Performed:     · Echocardiogram  · Overnight sleep screen    Significant Findings:     · Refer to hospital course and above listed diagnosis related plan for details    Imaging while in hospital:    Echo complete with contrast if indicated    Result Date: 2021  Narrative: [address] [facility] [phone] Transthoracic Echocardiogram 2D, M-mode, Doppler, and Color Doppler Study date:  02-Aug-2021 Patient: Sonia Ma MR number: [id number] Account number: [de-identified] : 1955 Age: 77 years Gender: Male Status: Inpatient Location: Bedside Height: 70 in Weight: 242 4 lb BP: 106/ 59 mmHg Indications: Acute Respiratory failure  Diagnoses: J96 91 - Respiratory failure, unspecified with hypoxia Sonographer:  BILL Groves Referring Physician:  Bharath Bonilla PA-C Group:  [facility] Interpreting Physician:  Alise Lopez MD SUMMARY PROCEDURE INFORMATION: The heart rate was 119 bpm, at the start of the study  Patient was in atrial fibrillation with rapid ventricular rate LEFT VENTRICLE: Systolic function was moderately reduced  Ejection fraction was estimated to be around 40 %   Hard to assess set correctly due to irregular and fast heartbeat There was mild diffuse hypokinesis  Wall thickness was at the upper limits of normal  There was mild borderline concentric hypertrophy  LEFT ATRIUM: The atrium was mildly to moderately dilated  RIGHT ATRIUM: The atrium was mildly dilated  MITRAL VALVE: There was mild regurgitation  TRICUSPID VALVE: There was mild regurgitation  Estimated peak PA pressure was 30 mmHg  IVC, HEPATIC VEINS: The inferior vena cava was mildly dilated  The respirophasic change in diameter was more than 50%  HISTORY: PRIOR HISTORY: Cardiomyopathy,A Fib ,HTN,CAD,Acute kidney injury,Hyperlipidemia,Hypertriglyceridemia  PROCEDURE: The procedure was performed at the bedside  This was a routine study  The transthoracic approach was used  The study included complete 2D imaging, M-mode, complete spectral Doppler, and color Doppler  The heart rate was 119 bpm, at the start of the study  Patient was in atrial fibrillation with rapid ventricular rate Images were obtained from the parasternal, apical, subcostal, and suprasternal notch acoustic windows  Echocardiographic views were limited due to patient on mechanical ventilator  Image quality was adequate  LEFT VENTRICLE: Size was normal  Systolic function was moderately reduced  Ejection fraction was estimated to be around 40 %  Hard to assess set correctly due to irregular and fast heartbeat There was mild diffuse hypokinesis  Wall thickness was at the upper limits of normal  There was mild borderline concentric hypertrophy  DOPPLER: The study was not technically sufficient to allow evaluation of LV diastolic function due to atrial fibrillation  RIGHT VENTRICLE: The size was normal  Systolic function was normal  Wall thickness was normal  LEFT ATRIUM: The atrium was mildly to moderately dilated  RIGHT ATRIUM: The atrium was mildly dilated  MITRAL VALVE: There was normal leaflet separation   DOPPLER: The transmitral velocity was within the normal range  There was no evidence for stenosis  There was mild regurgitation  AORTIC VALVE: The valve was trileaflet  Leaflets exhibited mildly increased thickness and normal cuspal separation  DOPPLER: Transaortic velocity was within the normal range  There was no evidence for stenosis  There was no regurgitation  TRICUSPID VALVE: DOPPLER: There was mild regurgitation  Estimated peak PA pressure was 30 mmHg  PULMONIC VALVE: DOPPLER: There was no significant regurgitation  PERICARDIUM: There was no thickening or calcification  There was no pericardial effusion  AORTA: The root exhibited normal size  SYSTEMIC VEINS: IVC: The inferior vena cava was mildly dilated  The respirophasic change in diameter was more than 50%  SYSTEM MEASUREMENT TABLES 2D EF (Teich): 51 89 % %FS: 26 73 % Ao Diam: 3 53 cm EDV(Teich): 126 75 ml ESV(Teich): 60 98 ml IVSd: 1 13 cm LA Area: 30 65 cm2 LA Diam: 4 34 cm LVEDV MOD A4C: 105 83 ml LVEF MOD A4C: 30 31 % LVESV MOD A4C: 73 76 ml LVIDd: 5 15 cm LVIDs: 3 77 cm LVLd A4C: 8 08 cm LVLs A4C: 7 89 cm LVOT Diam: 2 21 cm LVPWd: 0 95 cm RA Area: 27 2 cm2 RVIDd: 3 06 cm SV (Teich): 65 77 ml SV MOD A4C: 32 08 ml CW AV Vmax: 1 09 m/s AV maxP 77 mmHg TR Vmax: 2 31 m/s TR maxP 35 mmHg MM TAPSE: 1 82 cm PW CHEMO Vmax, Pt: 3 42 cm2 E' Lat: 0 11 m/s E' Sept: 0 09 m/s LVOT Env  Ti: 186 49 ms LVOT VTI: 14 57 cm LVOT Vmax: 1 m/s LVOT Vmax: 0 97 m/s LVOT Vmean: 0 78 m/s LVOT maxP 03 mmHg LVOT maxPG: 3 79 mmHg LVOT meanP 68 mmHg [employer] Accredited Echocardiography Laboratory Prepared and electronically signed by Goran Park MD Signed 02-Aug-2021 10:06:00     XR chest portable    Result Date: 2021  Narrative: CHEST INDICATION:   pulmonary infiltrates  COMPARISON:  2021 EXAM PERFORMED/VIEWS:  XR CHEST PORTABLE FINDINGS: Cardiomediastinal silhouette appears unremarkable  Partial clearing of bilateral pulmonary opacities since 2021  No new consolidation    No evidence of pleural effusion or pneumothorax  Osseous structures appear within normal limits for patient age  Impression: Partial clearing of bilateral pulmonary opacities, most likely multifocal pneumonia, since 8/6/2021  Workstation performed: [id number]     XR chest portable    Result Date: 8/4/2021  Narrative: CHEST INDICATION:  Hypoxia  COMPARISON:  8/2/2021 8/1/2021 EXAM PERFORMED/VIEWS:  XR CHEST PORTABLE FINDINGS: Cardiomediastinal silhouette appears unremarkable  Bilateral airspace disease, right greater than left, slightly worsening particularly on the left  No pneumothorax or pleural effusion  Osseous structures appear within normal limits for patient age  Impression: Slight worsening of bilateral airspace disease, favored to represent multifocal pneumonia  Correlate with clinical scenario  Workstation performed: [id number]     XR chest 1 view portable    Result Date: 8/2/2021  Narrative: CHEST INDICATION:   sob  COMPARISON:  None EXAM PERFORMED/VIEWS:  XR CHEST PORTABLE  AP semierect Images:  1 FINDINGS: Cardiomediastinal silhouette appears unremarkable  Dense consolidation in the peripheral right lower lobe noted with masslike consolidation in the right upper lobe also identified  Left lung clear  No pleural effusion or pneumothorax  Osseous structures appear within normal limits for patient age  Impression: Right upper and lower lobe consolidation concerning for multifocal pneumonia  Workstation performed: [id number]     CT head without contrast    Result Date: 8/1/2021  Narrative: CT BRAIN - WITHOUT CONTRAST INDICATION:   ams  COMPARISON:  None  TECHNIQUE:  CT examination of the brain was performed  In addition to axial images, sagittal and coronal 2D reformatted images were created and submitted for interpretation  Radiation dose length product (DLP) for this visit:  1104 39 mGy-cm     This examination, like all CT scans performed in the [facility], was performed utilizing techniques to minimize radiation dose exposure, including the use of iterative reconstruction and automated exposure control  IMAGE QUALITY:  Diagnostic  FINDINGS: PARENCHYMA: Decreased attenuation is noted in periventricular and subcortical white matter demonstrating an appearance that is statistically most likely to represent mild microangiopathic change  No CT signs of acute infarction  No intracranial mass, mass effect or midline shift  No acute parenchymal hemorrhage  VENTRICLES AND EXTRA-AXIAL SPACES:  Normal for the patient's age  VISUALIZED ORBITS AND PARANASAL SINUSES:  Unremarkable  CALVARIUM AND EXTRACRANIAL SOFT TISSUES:  Normal      Impression: No acute intracranial abnormality  Workstation performed: [id number]     XR chest portable ICU    Result Date: 8/6/2021  Narrative: CHEST INDICATION:   hypoxia  COMPARISON:  8/4/2021 EXAM PERFORMED/VIEWS:  XR CHEST PORTABLE ICU FINDINGS: Cardiomediastinal silhouette appears unremarkable  There is bilateral patchy opacity improved from the prior study  No pneumothorax or pleural effusion  Osseous structures appear within normal limits for patient age  Impression: Improved airspace disease  Workstation performed: [id number]     XR chest portable ICU    Result Date: 8/5/2021  Narrative: CHEST INDICATION:   hypoxia  COMPARISON:  Chest x-ray performed the previous day  EXAM PERFORMED/VIEWS:  XR CHEST PORTABLE ICU FINDINGS: Heart shadow is obscured by adjacent opacity  Extensive bilateral airspace disease appears worse and more dense  No pneumothorax or pleural effusion  Osseous structures appear within normal limits for patient age  Impression: Interval worsening of bilateral airspace disease  Workstation performed: [id number]     XR chest portable ICU    Result Date: 8/4/2021  Narrative: CHEST INDICATION:   Hypoxia, concern for CHF exacerbation   COMPARISON:  August 1, 2021 EXAM PERFORMED/VIEWS:  XR CHEST PORTABLE ICU FINDINGS: Cardiomediastinal silhouette appears unremarkable  Patchy consolidation in the right upper and lower lobes, unchanged  Faint patchy opacities in the left lower lobe  No evidence of pleural effusion or pneumothorax  Osseous structures appear within normal limits for patient age  Impression: Areas of consolidation in the right upper and lower lobes and, probably, the left lower lobe, either asymmetric pulmonary edema or multifocal pneumonia  Workstation performed: [id number]       Incidental Findings:   · None    Test Results Pending at Discharge (will require follow up):   · As per After Visit Summary     Outpatient Tests Requested:  · CBC, BMP  · Sleep study    Complications:  Refer to hospital course and above listed diagnosis related plan, if any    Hospital Course: As per HPI  Michael Montiel is a 77 y o  male patient with past medical history of cardiomyopathy, chronic AFib not on anticoagulation, hypertension, dyslipidemia, anxiety/depression, poor compliance who originally presented to the hospital on 8/1/2021 due to complaints of dry cough, generalized weakness and shortness of breath since Friday before the admission patient also reported low-grade fever  Haylee Mort He also had a fall secondary to weakness and had to crawl to the phone after  Presented with EMS to the ER, spo2 was 88% on room air and improved with 4 L NC O2 but had persistent increased WOB and was placed on BIPAP  Work-up concerning for CHF exacerbation and was given lasix 40 mg x 1 in ER  Was also in AF RVR with rates in 140s, improved with diltiazem 15 mg x 1      Per EMS there were empty alcohol bottles around apartment  Patient reports drinking a martini a few times a week  Sister states patient does drink typically 3x/wk and will drink beer and vodka when he does drink  She reports no history of alcohol withdrawal in past        Please see above list of diagnoses and related plan for additional information         Condition at Discharge: stable     Discharge Day Visit / Exam:     Subjective:  Noted to be comfortably sitting in chair  Denies any chest pain shortness of breath or abdominal pain  Tolerating diet well  Denies any fever chills or cough    Vitals: Blood Pressure: 97/53 (08/13/21 0815)  Pulse: 56 (08/13/21 0815)  Temperature: 97 9 °F (36 6 °C) (08/13/21 0815)  Temp Source: Oral (08/13/21 0815)  Respirations: 18 (08/13/21 0815)  Height: 5' 10" (177 8 cm) (08/01/21 1410)  Weight - Scale: 98 3 kg (216 lb 11 4 oz) (08/13/21 0554)  SpO2: 98 % (08/13/21 0815)  Exam:   Physical Exam  Constitutional:       General: He is not in acute distress  Appearance: He is obese  HENT:      Head: Normocephalic and atraumatic  Cardiovascular:      Rate and Rhythm: Normal rate  Pulmonary:      Effort: Pulmonary effort is normal  No respiratory distress  Breath sounds: No wheezing, rhonchi or rales  Comments: Diminished, clear  Chest:      Chest wall: No tenderness  Abdominal:      General: Bowel sounds are normal  There is no distension  Palpations: Abdomen is soft  Tenderness: There is no abdominal tenderness  There is no guarding or rebound  Musculoskeletal:      Cervical back: Neck supple  Right lower leg: No edema  Left lower leg: No edema  Skin:     General: Skin is warm and dry  Findings: No rash  Neurological:      Mental Status: He is alert  Mental status is at baseline  Cranial Nerves: No cranial nerve deficit  Discharge instructions/Information to patient and family:(Discharge Medications and Follow up):   See after visit summary for information provided to patient and family  Provisions for Follow-Up Care:  See after visit summary for information related to follow-up care and any pertinent home health orders  Disposition: Skilled nursing facility at [facility]     Planned Readmission:  No     Discharge Statement:  I spent 45 minutes discharging the patient  This time was spent on the day of discharge  I had direct contact with the patient on the day of discharge  Greater than 50% of the total time was spent examining patient, answering all patient questions, arranging and discussing plan of care with patient as well as directly providing post-discharge instructions  Additional time then spent on discharge activities  Discussed with patient  discussed with discharge and follow-up plan with sister yesterday  Coordinated with Cardiology and Pulmonary  Discharge Medications:  See after visit summary for reconciled discharge medications provided to patient and family  ** Please Note: "This note has been constructed using a voice recognition system  Therefore there may be syntax, spelling, and/or grammatical errors   Please call if you have any questions  "**

## 2021-08-13 NOTE — PLAN OF CARE
Problem: RESPIRATORY - ADULT  Goal: Achieves optimal ventilation and oxygenation  Description: INTERVENTIONS:  - Assess for changes in respiratory status  - Assess for changes in mentation and behavior  - Position to facilitate oxygenation and minimize respiratory effort  - Oxygen administered by appropriate delivery if ordered  - Initiate smoking cessation education as indicated  - Encourage broncho-pulmonary hygiene including cough, deep breathe, Incentive Spirometry  - Assess the need for suctioning and aspirate as needed  - Assess and instruct to report SOB or any respiratory difficulty  - Respiratory Therapy support as indicated  Outcome: Progressing     Problem: Potential for Falls  Goal: Patient will remain free of falls  Description: INTERVENTIONS:  - Educate patient/family on patient safety including physical limitations  - Instruct patient to call for assistance with activity   - Consult OT/PT to assist with strengthening/mobility   - Keep Call bell within reach  - Keep bed low and locked with side rails adjusted as appropriate  - Keep care items and personal belongings within reach  - Initiate and maintain comfort rounds  - Make Fall Risk Sign visible to staff  - Offer Toileting every 2 Hours, in advance of need  - Initiate/Maintain bed/chair alarm  - Obtain necessary fall risk management equipment: fall risk sign  - Apply yellow socks and bracelet for high fall risk patients  - Consider moving patient to room near nurses station  Outcome: Progressing     Problem: MOBILITY - ADULT  Goal: Maintain or return to baseline ADL function  Description: INTERVENTIONS:  -  Assess patient's ability to carry out ADLs; assess patient's baseline for ADL function and identify physical deficits which impact ability to perform ADLs (bathing, care of mouth/teeth, toileting, grooming, dressing, etc )  - Assess/evaluate cause of self-care deficits   - Assess range of motion  - Assess patient's mobility; develop plan if impaired  - Assess patient's need for assistive devices and provide as appropriate  - Encourage maximum independence but intervene and supervise when necessary  - Involve family in performance of ADLs  - Assess for home care needs following discharge   - Consider OT consult to assist with ADL evaluation and planning for discharge  - Provide patient education as appropriate  Outcome: Progressing

## 2021-08-13 NOTE — [place] UNIVERSAL TRANSFER FORM
[place] UN[place] FORM  (ALL ITEMS MUST BE COMPLETED)    1  TRANSFER FROM: [address] [facility]    2  DATE OF TRANSFER: 8/13/2021                        TIME OF TRANSFER: 16:00    3  PATIENT NAME: Lorie Olivier,        YOB: 1955                             GENDER: male    4  LANGUAGE:   English    5  PHYSICIAN NAME:  Nelsy Bourgeois MD                   PHONE: [phone]    6  CODE STATUS: Level 3 - DNAR and DNI        Out of Hospital DNR Attached: Yes    7  :                                      :  Extended Emergency Contact Information  Primary Emergency Contact: Beulah Landers  Mobile Phone: [phone]  Relation: Sister  Secondary Emergency Contact: Neal Night  Mobile Phone: [phone]  Relation: [id number] Representative/Proxy:  Yes           Legal Guardian:  No             NAME OF:           HEALTH CARE REPRESENTATIVE/PROXY:                                         OR           LEGAL GUARDIAN, IF NOT :                                               PHONE:  (Day)           (Night)                        (Cell)    8  REASON FOR TRANSFER: (Must include brief medical history and recent changes in physical function or cognition ) STR            V/S: BP 97/53   Pulse 56   Temp 97 9 °F (36 6 °C) (Oral)   Resp 18   Ht 5' 10" (1 778 m)   Wt 98 3 kg (216 lb 11 4 oz)   SpO2 98%   BMI 31 09 kg/m²           PAIN: None    9  PRIMARY DIAGNOSIS: Acute respiratory failure with hypoxia and hypercapnia (HCC)      Secondary Diagnosis:         Pacemaker: No      Internal Defib: No          Mental Health Diagnosis (if Applicable):    10  RESTRAINTS: No     11  RESPIRATORY NEEDS: None    12  ISOLATION/PRECAUTION: None    13  ALLERGY: Patient has no known allergies  14  SENSORY:       Vision Poor, Hearing Good  and Speech Clear    15   SKIN CONDITION: No Wounds    16  DIET: Special (describe)diabetic     17  IV ACCESS: None    18  PERSONAL ITEMS SENT WITH PATIENT: Otherbelongings     23  ATTACHED DOCUMENTS: MUST ATTACH CURRENT MEDICATION INFORMATION Face Sheet, MAR, Medication Reconciliation, Diagnostic Studies, Labs, Code Status, Discharge Summary, PT Note and OT Note    20  AT RISK ALERTS:Falls        HARM TO: N/A    21  WEIGHT BEARING STATUS:         Left Leg: Full        Right Leg: Full    22  MENTAL STATUS:Alert and Oriented    23  FUNCTION:        Walk: With Help        Transfer: With Help        Toilet: With Help        Feed: Self    24  IMMUNIZATIONS/SCREENING:     Immunization History   Administered Date(s) Administered    Influenza, high dose seasonal 0 7 mL 09/17/2020    Pneumococcal Conjugate 13-Valent 09/17/2020    SARS-CoV-2 / COVID-19 mRNA IM (Moderna) 03/03/2021, 04/01/2021       25  BOWEL: Continent    26  BLADDER: Continent    27   SENDING FACILITY CONTACT: [facility]                  Title[facility] Hospital        [facility]         Phone: [phone] [address] (if known):        Title:        Unit:         Phone:         FORM PREFILLED BY (if applicable)       Title:       Unit:        Phone:         Leslie Peraza RN      Title: [facility]      Phone: [phone]

## 2021-08-13 NOTE — CASE MANAGEMENT
[facility] to pickup at 1600  Attending, nurse, patient, SNF, and sister Daniela Cosme made aware

## 2021-08-18 ENCOUNTER — PATIENT OUTREACH (OUTPATIENT)
Dept: FAMILY MEDICINE CLINIC | Facility: CLINIC | Age: 66
End: 2021-08-18

## 2021-08-18 NOTE — PROGRESS NOTES
SW received inbasket from [employer] stating pt needs sleep study after he leaves [facility] and to follow up with pulmonary  SW sent message to pt's PCP, Bharathi Nelson will follow up with pt

## 2021-08-19 ENCOUNTER — PATIENT OUTREACH (OUTPATIENT)
Dept: FAMILY MEDICINE CLINIC | Facility: CLINIC | Age: 66
End: 2021-08-19

## 2021-08-19 NOTE — PROGRESS NOTES
Pt discussed with IP [facility] [employer]  I informed Theodora Lucero I touched base with pt's PCP  Theodora Lucero said pt has pulmonologist Mauri Juarez  I sent an inbasket to Yousif Martinez, as I see pt has appt scheduled with him on 8/26, and asked Yousif Martinez if he would be setting up pt with sleep study, just to make sure all pt needs are met  Received response from [facility] stating this will be set up for pt  SW sent inbasket to [facility]

## 2021-08-26 ENCOUNTER — OFFICE VISIT (OUTPATIENT)
Dept: PULMONOLOGY | Facility: MEDICAL CENTER | Age: 66
End: 2021-08-26
Payer: COMMERCIAL

## 2021-08-26 VITALS
SYSTOLIC BLOOD PRESSURE: 100 MMHG | HEART RATE: 88 BPM | DIASTOLIC BLOOD PRESSURE: 60 MMHG | RESPIRATION RATE: 12 BRPM | TEMPERATURE: 97.8 F | BODY MASS INDEX: 31.92 KG/M2 | OXYGEN SATURATION: 96 % | WEIGHT: 223 LBS | HEIGHT: 70 IN

## 2021-08-26 DIAGNOSIS — G47.30 SLEEP APNEA, UNSPECIFIED TYPE: ICD-10-CM

## 2021-08-26 DIAGNOSIS — R93.89 ABNORMAL CHEST X-RAY: Primary | ICD-10-CM

## 2021-08-26 DIAGNOSIS — J18.9 PNEUMONIA DUE TO INFECTIOUS ORGANISM, UNSPECIFIED LATERALITY, UNSPECIFIED PART OF LUNG: ICD-10-CM

## 2021-08-26 PROCEDURE — 3008F BODY MASS INDEX DOCD: CPT | Performed by: PHYSICIAN ASSISTANT

## 2021-08-26 PROCEDURE — 99214 OFFICE O/P EST MOD 30 MIN: CPT | Performed by: PHYSICIAN ASSISTANT

## 2021-08-26 PROCEDURE — 3074F SYST BP LT 130 MM HG: CPT | Performed by: PHYSICIAN ASSISTANT

## 2021-08-26 PROCEDURE — 3078F DIAST BP <80 MM HG: CPT | Performed by: PHYSICIAN ASSISTANT

## 2021-08-26 PROCEDURE — 1160F RVW MEDS BY RX/DR IN RCRD: CPT | Performed by: PHYSICIAN ASSISTANT

## 2021-08-26 RX ORDER — BACLOFEN 20 MG/1
TABLET ORAL
COMMUNITY
Start: 2021-06-10 | End: 2021-11-05 | Stop reason: SDUPTHER

## 2021-08-26 RX ORDER — ALPRAZOLAM 1 MG/1
TABLET ORAL 3 TIMES DAILY PRN
COMMUNITY
Start: 2021-08-25

## 2021-08-26 RX ORDER — CANDESARTAN 32 MG/1
32 TABLET ORAL
COMMUNITY
Start: 2021-03-09

## 2021-08-26 RX ORDER — POTASSIUM CHLORIDE 20 MEQ/1
TABLET, EXTENDED RELEASE ORAL
COMMUNITY
Start: 2021-06-30

## 2021-08-26 NOTE — PATIENT INSTRUCTIONS
Plan for today/next follow-up:    MECHE:  -to get OP sleep study  -  Recent Pneumonia  -clinically resolved    Abnormal Chest Xray :  -follow up CXR imaging for resolution of pulmonary opacities:     Follow up in 1-2 months after sleep study

## 2021-08-26 NOTE — PROGRESS NOTES
Assessment/Plan:    Problem List Items Addressed This Visit        Respiratory    RESOLVED: Pneumonia      Other Visit Diagnoses     Abnormal chest x-ray    -  Primary    Relevant Orders    XR chest pa & lateral    Sleep apnea, unspecified type        Relevant Orders    Split Study            Plan for today/next follow-up:    MECHE:  -to get OP sleep study  -  Recent Pneumonia  -clinically resolved    Abnormal Chest Xray :  -follow up CXR imaging for resolution of pulmonary opacities: Follow up in 1-2 months after sleep study     Return in about 2 months (around 10/26/2021)  All questions are answered to the patient's satisfaction and understanding  He verbalizes understanding  He is encouraged to call with any further questions or concerns  ______________________________________________________________________    Chief Complaint:   Chief Complaint   Patient presents with   [facility] follow up       Patient ID: Zack Cleveland is a 77 y o  y o  male has a past medical history of Anxiety, Atrial fibrillation ([address] ), Depression, Hypertension, and Insomnia  8/26/2021  Patient presents today for follow-up visit for:  Hospital Follow Up  PMHX as listed above and including:  Acute pneumonia and acute on chronic hypoxemic and hypercapnic respiratory failure /  Acute on chronic diastolic heart failure  Most recently ADM from  8/1 to 8 /13 for  Pneumonia respiratory failure       Was evaluated by Pulmonary Service that hospital admission for above-mentioned conditions and is following up today post hospitalization  He was treated with 6 full days of antibiotic therapy in the inpatient setting  He had sepsis with kidney injury which resolved prior to discharge  Was discharged home on 20  Mg torsemide daily  Items for follow-up post hospitalization are  Need for ongoing evaluation for significantly elevated AHI  Of 71 an overnight hospital sleep screen as well hypercarbic respiratory failure    This was improved by BiPAP in the hospital setting  Was discharged home though he did receive recommendation for acute inpatient rehab he has been self rehabilitating home  Recommendations also for follow-up for abnormal chest x-ray imaging for complete resolution  Imaging Reviewed:  8/11 verses 8/6comparison        Review of Systems   Constitutional: Negative for chills and fever  HENT: Negative for ear pain and sore throat  Eyes: Negative for pain and visual disturbance  Respiratory: Negative for cough and shortness of breath  Cardiovascular: Negative for chest pain and palpitations  Gastrointestinal: Negative for abdominal pain and vomiting  Genitourinary: Negative for dysuria and hematuria  Musculoskeletal: Negative for arthralgias and back pain  Skin: Negative for color change and rash  Neurological: Positive for weakness  Negative for seizures and syncope  Generalized weakness due to physical deconditioning with slow recovery post hospitalization   All other systems reviewed and are negative  The following portions of the patient's history were reviewed and updated as appropriate: allergies, current medications, past family history, past medical history, past social history, past surgical history and problem list     Smoking history: He reports that he has been smoking cigarettes  He has a 40 00 pack-year smoking history  He has never used smokeless tobacco   Social history: He reports that he has been smoking cigarettes  He has a 40 00 pack-year smoking history  He has never used smokeless tobacco  He reports current alcohol use of about 4 0 standard drinks of alcohol per week  He reports previous drug use  Drug: Cocaine  Past Medical History:   Diagnosis Date    Anxiety     Atrial fibrillation ([address] )     Depression     Hypertension     Insomnia      No past surgical history on file    Family History   Problem Relation Age of Onset    Ovarian cancer Sister     Substance Abuse Neg Hx     Mental illness Neg Hx      Immunization History   Administered Date(s) Administered    Influenza, high dose seasonal 0 7 mL 09/17/2020    Pneumococcal Conjugate 13-Valent 09/17/2020    SARS-CoV-2 / COVID-19 mRNA IM (Moderna) 03/03/2021, 04/01/2021     Current Outpatient Medications   Medication Sig Dispense Refill    ALPRAZolam (XANAX) 1 mg tablet       apixaban (ELIQUIS) 5 mg Take 1 tablet (5 mg total) by mouth 2 (two) times a day  0    baclofen 20 mg tablet TAKE 1 TABLET BY MOUTH ONCE DAILY AT BEDTIME AS NEEDED FOR MUSCLE SPASM      buPROPion (WELLBUTRIN SR) 150 mg 12 hr tablet Take 150 mg by mouth 2 (two) times a day       candesartan (ATACAND) 32 MG tablet Take 32 mg by mouth      FLUoxetine (PROzac) 20 mg capsule daily       metoprolol tartrate (LOPRESSOR) 100 mg tablet Take 1 tablet (100 mg total) by mouth every 12 (twelve) hours  0    polyethylene glycol (MIRALAX) 17 g packet Take 17 g by mouth daily as needed (Constipation)  0    potassium chloride (K-DUR,KLOR-CON) 20 mEq tablet       pravastatin (PRAVACHOL) 40 mg tablet daily       risperiDONE (RisperDAL) 3 mg tablet Take 2 mg by mouth daily       torsemide (DEMADEX) 20 mg tablet       zolpidem (AMBIEN) 5 mg tablet Take 1 tablet (5 mg total) by mouth daily at bedtime as needed for sleep for up to 3 days 3 tablet 0    ALPRAZolam (XANAX) 0 5 mg tablet Take 1 tablet (0 5 mg total) by mouth 2 (two) times a day as needed for anxiety (DAYTIME ONLY) for up to 6 doses (Patient not taking: Reported on 8/26/2021) 6 tablet 0    folic acid (FOLVITE) 1 mg tablet Take 1 tablet (1 mg total) by mouth daily (Patient not taking: Reported on 8/26/2021)  0    insulin lispro (HumaLOG) 100 units/mL injection Inject 2-12 Units under the skin 3 (three) times a day before meals (Patient not taking: Reported on 8/26/2021)  0    insulin lispro (HumaLOG) 100 units/mL injection Inject 2-12 Units under the skin daily at bedtime (Patient not taking: Reported on 8/26/2021)  0    ipratropium-albuterol (DUO-NEB) 0 5-2 5 mg/3 mL nebulizer solution Take 3 mL by nebulization every 6 (six) hours as needed for wheezing (Patient not taking: Reported on 8/26/2021)  0    metFORMIN (GLUCOPHAGE) 500 mg tablet Take 1 tablet (500 mg total) by mouth daily with breakfast (Patient not taking: Reported on 8/26/2021)  0    predniSONE 10 mg tablet Take 20mg/day for 2 days then 10mg/day for 2 days (Patient not taking: Reported on 8/26/2021) 30 tablet 0    senna-docusate sodium (SENOKOT S) 8 6-50 mg per tablet Take 1 tablet by mouth daily at bedtime (Patient not taking: Reported on 8/26/2021)  0    thiamine 100 MG tablet Take 1 tablet (100 mg total) by mouth daily (Patient not taking: Reported on 8/26/2021)  0     No current facility-administered medications for this visit  Allergies: Patient has no known allergies  Objective:  Vitals:    08/26/21 0914   BP: 100/60   BP Location: Left arm   Patient Position: Sitting   Cuff Size: Standard   Pulse: 88   Resp: 12   Temp: 97 8 °F (36 6 °C)   TempSrc: Temporal   SpO2: 96%   Weight: 101 kg (223 lb)   Height: 5' 10" (1 778 m)   Oxygen Therapy  SpO2: 96 %    Wt Readings from Last 3 Encounters:   08/26/21 101 kg (223 lb)   08/13/21 98 3 kg (216 lb 11 4 oz)   07/09/21 108 kg (239 lb)     Body mass index is 32 kg/m²  Physical Exam  Constitutional:       Appearance: He is well-developed  HENT:      Head: Normocephalic and atraumatic  Eyes:      Conjunctiva/sclera: Conjunctivae normal       Pupils: Pupils are equal, round, and reactive to light  Cardiovascular:      Rate and Rhythm: Normal rate and regular rhythm  Heart sounds: Normal heart sounds  No murmur heard  No friction rub  No gallop  Pulmonary:      Effort: Pulmonary effort is normal  No respiratory distress  Breath sounds: Normal breath sounds  No wheezing or rales  Chest:      Chest wall: No tenderness     Abdominal:      General: Bowel sounds are normal       Palpations: Abdomen is soft  Musculoskeletal:         General: Normal range of motion  Cervical back: Normal range of motion and neck supple  Skin:     General: Skin is warm and dry  Neurological:      Mental Status: He is alert and oriented to person, place, and time  Lab Review:   No results displayed because visit has over 200 results  Diagnostics:  No orders to display            ESS:    Echo complete with contrast if indicated    Result Date: 2021  Narrative: [id number] [facility]  [phone] Transthoracic Echocardiogram 2D, M-mode, Doppler, and Color Doppler Study date:  02-Aug-2021 Patient: Sha Berman MR number: [id number] Account number: [de-identified] : 1955 Age: 77 years Gender: Male Status: Inpatient Location: Bedside Height: 70 in Weight: 242 4 lb BP: 106/ 59 mmHg Indications: Acute Respiratory failure  Diagnoses: J96 91 - Respiratory failure, unspecified with hypoxia Sonographer:  BILL Rahman Referring Physician:  Cami Ambrocio PA-C Group:  [facility] Interpreting Physician:  Laura Melvin MD SUMMARY PROCEDURE INFORMATION: The heart rate was 119 bpm, at the start of the study  Patient was in atrial fibrillation with rapid ventricular rate LEFT VENTRICLE: Systolic function was moderately reduced  Ejection fraction was estimated to be around 40 %  Hard to assess set correctly due to irregular and fast heartbeat There was mild diffuse hypokinesis  Wall thickness was at the upper limits of normal  There was mild borderline concentric hypertrophy  LEFT ATRIUM: The atrium was mildly to moderately dilated  RIGHT ATRIUM: The atrium was mildly dilated  MITRAL VALVE: There was mild regurgitation  TRICUSPID VALVE: There was mild regurgitation  Estimated peak PA pressure was 30 mmHg  IVC, HEPATIC VEINS: The inferior vena cava was mildly dilated   The respirophasic change in diameter was more than 50%  HISTORY: PRIOR HISTORY: Cardiomyopathy,A Fib ,HTN,CAD,Acute kidney injury,Hyperlipidemia,Hypertriglyceridemia  PROCEDURE: The procedure was performed at the bedside  This was a routine study  The transthoracic approach was used  The study included complete 2D imaging, M-mode, complete spectral Doppler, and color Doppler  The heart rate was 119 bpm, at the start of the study  Patient was in atrial fibrillation with rapid ventricular rate Images were obtained from the parasternal, apical, subcostal, and suprasternal notch acoustic windows  Echocardiographic views were limited due to patient on mechanical ventilator  Image quality was adequate  LEFT VENTRICLE: Size was normal  Systolic function was moderately reduced  Ejection fraction was estimated to be around 40 %  Hard to assess set correctly due to irregular and fast heartbeat There was mild diffuse hypokinesis  Wall thickness was at the upper limits of normal  There was mild borderline concentric hypertrophy  DOPPLER: The study was not technically sufficient to allow evaluation of LV diastolic function due to atrial fibrillation  RIGHT VENTRICLE: The size was normal  Systolic function was normal  Wall thickness was normal  LEFT ATRIUM: The atrium was mildly to moderately dilated  RIGHT ATRIUM: The atrium was mildly dilated  MITRAL VALVE: There was normal leaflet separation  DOPPLER: The transmitral velocity was within the normal range  There was no evidence for stenosis  There was mild regurgitation  AORTIC VALVE: The valve was trileaflet  Leaflets exhibited mildly increased thickness and normal cuspal separation  DOPPLER: Transaortic velocity was within the normal range  There was no evidence for stenosis  There was no regurgitation  TRICUSPID VALVE: DOPPLER: There was mild regurgitation  Estimated peak PA pressure was 30 mmHg  PULMONIC VALVE: DOPPLER: There was no significant regurgitation   PERICARDIUM: There was no thickening or calcification  There was no pericardial effusion  AORTA: The root exhibited normal size  SYSTEMIC VEINS: IVC: The inferior vena cava was mildly dilated  The respirophasic change in diameter was more than 50%  SYSTEM MEASUREMENT TABLES 2D EF (Teich): 51 89 % %FS: 26 73 % Ao Diam: 3 53 cm EDV(Teich): 126 75 ml ESV(Teich): 60 98 ml IVSd: 1 13 cm LA Area: 30 65 cm2 LA Diam: 4 34 cm LVEDV MOD A4C: 105 83 ml LVEF MOD A4C: 30 31 % LVESV MOD A4C: 73 76 ml LVIDd: 5 15 cm LVIDs: 3 77 cm LVLd A4C: 8 08 cm LVLs A4C: 7 89 cm LVOT Diam: 2 21 cm LVPWd: 0 95 cm RA Area: 27 2 cm2 RVIDd: 3 06 cm SV (Teich): 65 77 ml SV MOD A4C: 32 08 ml CW AV Vmax: 1 09 m/s AV maxP 77 mmHg TR Vmax: 2 31 m/s TR maxP 35 mmHg MM TAPSE: 1 82 cm PW CHEMO Vmax, Pt: 3 42 cm2 E' Lat: 0 11 m/s E' Sept: 0 09 m/s LVOT Env  Ti: 186 49 ms LVOT VTI: 14 57 cm LVOT Vmax: 1 m/s LVOT Vmax: 0 97 m/s LVOT Vmean: 0 78 m/s LVOT maxP 03 mmHg LVOT maxPG: 3 79 mmHg LVOT meanP 68 [facility] Prepared and electronically signed by Laura Melvin MD Signed 02-Aug-2021 10:06:00     XR chest portable    Result Date: 2021  Narrative: CHEST INDICATION:   pulmonary infiltrates  COMPARISON:  2021 EXAM PERFORMED/VIEWS:  XR CHEST PORTABLE FINDINGS: Cardiomediastinal silhouette appears unremarkable  Partial clearing of bilateral pulmonary opacities since 2021  No new consolidation  No evidence of pleural effusion or pneumothorax  Osseous structures appear within normal limits for patient age  Impression: Partial clearing of bilateral pulmonary opacities, most likely multifocal pneumonia, since 2021  Workstation performed: [id number]     XR chest portable    Result Date: 2021  Narrative: CHEST INDICATION:  Hypoxia  COMPARISON:  2021 EXAM PERFORMED/VIEWS:  XR CHEST PORTABLE FINDINGS: Cardiomediastinal silhouette appears unremarkable   Bilateral airspace disease, right greater than left, slightly worsening particularly on the left  No pneumothorax or pleural effusion  Osseous structures appear within normal limits for patient age  Impression: Slight worsening of bilateral airspace disease, favored to represent multifocal pneumonia  Correlate with clinical scenario  Workstation performed: [id number]     XR chest 1 view portable    Result Date: 8/2/2021  Narrative: CHEST INDICATION:   sob  COMPARISON:  None EXAM PERFORMED/VIEWS:  XR CHEST PORTABLE  AP semierect Images:  1 FINDINGS: Cardiomediastinal silhouette appears unremarkable  Dense consolidation in the peripheral right lower lobe noted with masslike consolidation in the right upper lobe also identified  Left lung clear  No pleural effusion or pneumothorax  Osseous structures appear within normal limits for patient age  Impression: Right upper and lower lobe consolidation concerning for multifocal pneumonia  Workstation performed: [id number]     CT head without contrast    Result Date: 8/1/2021  Narrative: CT BRAIN - WITHOUT CONTRAST INDICATION:   ams  COMPARISON:  None  TECHNIQUE:  CT examination of the brain was performed  In addition to axial images, sagittal and coronal 2D reformatted images were created and submitted for interpretation  Radiation dose length product (DLP) for this visit:  1104 39 mGy-cm   This examination, like all CT scans performed in the [facility], was performed utilizing techniques to minimize radiation dose exposure, including the use of iterative reconstruction and automated exposure control  IMAGE QUALITY:  Diagnostic  FINDINGS: PARENCHYMA: Decreased attenuation is noted in periventricular and subcortical white matter demonstrating an appearance that is statistically most likely to represent mild microangiopathic change  No CT signs of acute infarction  No intracranial mass, mass effect or midline shift  No acute parenchymal hemorrhage   VENTRICLES AND EXTRA-AXIAL SPACES:  Normal for the patient's age  VISUALIZED ORBITS AND PARANASAL SINUSES:  Unremarkable  CALVARIUM AND EXTRACRANIAL SOFT TISSUES:  Normal      Impression: No acute intracranial abnormality  Workstation performed: [id number]     XR chest portable ICU    Result Date: 8/6/2021  Narrative: CHEST INDICATION:   hypoxia  COMPARISON:  8/4/2021 EXAM PERFORMED/VIEWS:  XR CHEST PORTABLE ICU FINDINGS: Cardiomediastinal silhouette appears unremarkable  There is bilateral patchy opacity improved from the prior study  No pneumothorax or pleural effusion  Osseous structures appear within normal limits for patient age  Impression: Improved airspace disease  Workstation performed: [id number]     XR chest portable ICU    Result Date: 8/5/2021  Narrative: CHEST INDICATION:   hypoxia  COMPARISON:  Chest x-ray performed the previous day  EXAM PERFORMED/VIEWS:  XR CHEST PORTABLE ICU FINDINGS: Heart shadow is obscured by adjacent opacity  Extensive bilateral airspace disease appears worse and more dense  No pneumothorax or pleural effusion  Osseous structures appear within normal limits for patient age  Impression: Interval worsening of bilateral airspace disease  Workstation performed: [id number]     XR chest portable ICU    Result Date: 8/4/2021  Narrative: CHEST INDICATION:   Hypoxia, concern for CHF exacerbation  COMPARISON:  August 1, 2021 EXAM PERFORMED/VIEWS:  XR CHEST PORTABLE ICU FINDINGS: Cardiomediastinal silhouette appears unremarkable  Patchy consolidation in the right upper and lower lobes, unchanged  Faint patchy opacities in the left lower lobe  No evidence of pleural effusion or pneumothorax  Osseous structures appear within normal limits for patient age  Impression: Areas of consolidation in the right upper and lower lobes and, probably, the left lower lobe, either asymmetric pulmonary edema or multifocal pneumonia   Workstation performed: [id number]           Portions of the record may have been created with voice recognition software  Occasional wrong word or "sound a like" substitutions may have occurred due to the inherent limitations of voice recognition software  Read the chart carefully and recognize, using context, where substitutions have occurred      Electronically Signed by Shiva Ordaz PA-C

## 2021-09-02 ENCOUNTER — APPOINTMENT (OUTPATIENT)
Dept: RADIOLOGY | Facility: CLINIC | Age: 66
End: 2021-09-02
Payer: COMMERCIAL

## 2021-09-02 DIAGNOSIS — R93.89 ABNORMAL CHEST X-RAY: ICD-10-CM

## 2021-09-02 PROCEDURE — 71046 X-RAY EXAM CHEST 2 VIEWS: CPT

## 2021-10-05 ENCOUNTER — RA CDI HCC (OUTPATIENT)
Dept: OTHER | Facility: HOSPITAL | Age: 66
End: 2021-10-05

## 2021-10-19 ENCOUNTER — APPOINTMENT (OUTPATIENT)
Dept: LAB | Facility: CLINIC | Age: 66
End: 2021-10-19
Payer: COMMERCIAL

## 2021-10-19 DIAGNOSIS — I50.32 CHRONIC DIASTOLIC HEART FAILURE (HCC): ICD-10-CM

## 2021-10-19 DIAGNOSIS — I48.20 CHRONIC ATRIAL FIBRILLATION (HCC): ICD-10-CM

## 2021-10-19 DIAGNOSIS — E78.2 MIXED HYPERLIPIDEMIA: ICD-10-CM

## 2021-10-19 DIAGNOSIS — I10 ESSENTIAL HYPERTENSION: ICD-10-CM

## 2021-10-19 DIAGNOSIS — E78.1 HYPERTRIGLYCERIDEMIA: ICD-10-CM

## 2021-10-19 DIAGNOSIS — I42.0 CONGESTIVE CARDIOMYOPATHY (HCC): ICD-10-CM

## 2021-10-19 LAB
ALBUMIN SERPL BCP-MCNC: 4.1 G/DL (ref 3.5–5)
ALP SERPL-CCNC: 61 U/L (ref 46–116)
ALT SERPL W P-5'-P-CCNC: 54 U/L (ref 12–78)
ANION GAP SERPL CALCULATED.3IONS-SCNC: 5 MMOL/L (ref 4–13)
AST SERPL W P-5'-P-CCNC: 26 U/L (ref 5–45)
BASOPHILS # BLD AUTO: 0.06 THOUSANDS/ΜL (ref 0–0.1)
BASOPHILS NFR BLD AUTO: 1 % (ref 0–1)
BILIRUB SERPL-MCNC: 0.52 MG/DL (ref 0.2–1)
BUN SERPL-MCNC: 15 MG/DL (ref 5–25)
CALCIUM SERPL-MCNC: 9.7 MG/DL (ref 8.3–10.1)
CHLORIDE SERPL-SCNC: 105 MMOL/L (ref 100–108)
CHOLEST SERPL-MCNC: 144 MG/DL (ref 50–200)
CO2 SERPL-SCNC: 25 MMOL/L (ref 21–32)
CREAT SERPL-MCNC: 1.09 MG/DL (ref 0.6–1.3)
EOSINOPHIL # BLD AUTO: 0.26 THOUSAND/ΜL (ref 0–0.61)
EOSINOPHIL NFR BLD AUTO: 4 % (ref 0–6)
ERYTHROCYTE [DISTWIDTH] IN BLOOD BY AUTOMATED COUNT: 12.6 % (ref 11.6–15.1)
GFR SERPL CREATININE-BSD FRML MDRD: 70 ML/MIN/1.73SQ M
GLUCOSE P FAST SERPL-MCNC: 95 MG/DL (ref 65–99)
HCT VFR BLD AUTO: 41.7 % (ref 36.5–49.3)
HDLC SERPL-MCNC: 45 MG/DL
HGB BLD-MCNC: 13.9 G/DL (ref 12–17)
IMM GRANULOCYTES # BLD AUTO: 0.01 THOUSAND/UL (ref 0–0.2)
IMM GRANULOCYTES NFR BLD AUTO: 0 % (ref 0–2)
LDLC SERPL CALC-MCNC: 77 MG/DL (ref 0–100)
LYMPHOCYTES # BLD AUTO: 1.64 THOUSANDS/ΜL (ref 0.6–4.47)
LYMPHOCYTES NFR BLD AUTO: 23 % (ref 14–44)
MCH RBC QN AUTO: 32.6 PG (ref 26.8–34.3)
MCHC RBC AUTO-ENTMCNC: 33.3 G/DL (ref 31.4–37.4)
MCV RBC AUTO: 98 FL (ref 82–98)
MONOCYTES # BLD AUTO: 0.88 THOUSAND/ΜL (ref 0.17–1.22)
MONOCYTES NFR BLD AUTO: 13 % (ref 4–12)
NEUTROPHILS # BLD AUTO: 4.16 THOUSANDS/ΜL (ref 1.85–7.62)
NEUTS SEG NFR BLD AUTO: 59 % (ref 43–75)
NONHDLC SERPL-MCNC: 99 MG/DL
NRBC BLD AUTO-RTO: 0 /100 WBCS
NT-PROBNP SERPL-MCNC: 772 PG/ML
PLATELET # BLD AUTO: 254 THOUSANDS/UL (ref 149–390)
PMV BLD AUTO: 10.1 FL (ref 8.9–12.7)
POTASSIUM SERPL-SCNC: 3.7 MMOL/L (ref 3.5–5.3)
PROT SERPL-MCNC: 7.4 G/DL (ref 6.4–8.2)
RBC # BLD AUTO: 4.26 MILLION/UL (ref 3.88–5.62)
SODIUM SERPL-SCNC: 135 MMOL/L (ref 136–145)
TRIGL SERPL-MCNC: 111 MG/DL
WBC # BLD AUTO: 7.01 THOUSAND/UL (ref 4.31–10.16)

## 2021-10-19 PROCEDURE — 85025 COMPLETE CBC W/AUTO DIFF WBC: CPT

## 2021-10-19 PROCEDURE — 36415 COLL VENOUS BLD VENIPUNCTURE: CPT

## 2021-10-19 PROCEDURE — 83880 ASSAY OF NATRIURETIC PEPTIDE: CPT

## 2021-10-19 PROCEDURE — 80061 LIPID PANEL: CPT

## 2021-10-19 PROCEDURE — 80053 COMPREHEN METABOLIC PANEL: CPT

## 2021-10-22 ENCOUNTER — HOSPITAL ENCOUNTER (OUTPATIENT)
Dept: SLEEP CENTER | Facility: CLINIC | Age: 66
Discharge: HOME | End: 2021-10-22
Payer: COMMERCIAL

## 2021-10-22 DIAGNOSIS — G47.30 SLEEP APNEA, UNSPECIFIED TYPE: ICD-10-CM

## 2021-10-22 PROCEDURE — 95811 POLYSOM 6/>YRS CPAP 4/> PARM: CPT

## 2021-10-22 PROCEDURE — 95811 POLYSOM 6/>YRS CPAP 4/> PARM: CPT | Performed by: INTERNAL MEDICINE

## 2021-10-26 ENCOUNTER — TELEPHONE (OUTPATIENT)
Dept: SLEEP CENTER | Facility: CLINIC | Age: 66
End: 2021-10-26

## 2021-10-28 ENCOUNTER — RA CDI HCC (OUTPATIENT)
Dept: OTHER | Facility: HOSPITAL | Age: 66
End: 2021-10-28

## 2021-11-02 ENCOUNTER — OFFICE VISIT (OUTPATIENT)
Dept: PULMONOLOGY | Facility: MEDICAL CENTER | Age: 66
End: 2021-11-02
Payer: COMMERCIAL

## 2021-11-02 DIAGNOSIS — E66.09 CLASS 1 OBESITY DUE TO EXCESS CALORIES WITHOUT SERIOUS COMORBIDITY WITH BODY MASS INDEX (BMI) OF 32.0 TO 32.9 IN ADULT: ICD-10-CM

## 2021-11-02 DIAGNOSIS — I48.20 CHRONIC ATRIAL FIBRILLATION (HCC): Chronic | ICD-10-CM

## 2021-11-02 DIAGNOSIS — G47.33 OBSTRUCTIVE SLEEP APNEA: Primary | ICD-10-CM

## 2021-11-02 PROBLEM — E66.811 CLASS 1 OBESITY DUE TO EXCESS CALORIES WITHOUT SERIOUS COMORBIDITY WITH BODY MASS INDEX (BMI) OF 32.0 TO 32.9 IN ADULT: Status: ACTIVE | Noted: 2021-04-01

## 2021-11-02 PROCEDURE — 99214 OFFICE O/P EST MOD 30 MIN: CPT | Performed by: INTERNAL MEDICINE

## 2021-11-02 RX ORDER — RIVAROXABAN 20 MG/1
20 TABLET, FILM COATED ORAL DAILY
Status: ON HOLD | COMMUNITY
Start: 2021-10-26 | End: 2022-07-08 | Stop reason: ALTCHOICE

## 2021-11-02 RX ORDER — NAPROXEN 500 MG/1
TABLET ORAL
COMMUNITY
Start: 2021-09-26 | End: 2021-11-05 | Stop reason: SDUPTHER

## 2021-11-02 RX ORDER — PRAVASTATIN SODIUM 80 MG/1
80 TABLET ORAL DAILY
Status: ON HOLD | COMMUNITY
Start: 2021-10-20 | End: 2022-07-08 | Stop reason: ALTCHOICE

## 2021-11-05 ENCOUNTER — DOCUMENTATION (OUTPATIENT)
Dept: PULMONOLOGY | Facility: MEDICAL CENTER | Age: 66
End: 2021-11-05

## 2021-11-05 ENCOUNTER — OFFICE VISIT (OUTPATIENT)
Dept: FAMILY MEDICINE CLINIC | Facility: CLINIC | Age: 66
End: 2021-11-05
Payer: COMMERCIAL

## 2021-11-05 VITALS
SYSTOLIC BLOOD PRESSURE: 102 MMHG | DIASTOLIC BLOOD PRESSURE: 78 MMHG | WEIGHT: 223 LBS | OXYGEN SATURATION: 95 % | HEIGHT: 70 IN | HEART RATE: 88 BPM | TEMPERATURE: 96.7 F | BODY MASS INDEX: 31.92 KG/M2 | RESPIRATION RATE: 16 BRPM

## 2021-11-05 DIAGNOSIS — I10 ESSENTIAL HYPERTENSION: Chronic | ICD-10-CM

## 2021-11-05 DIAGNOSIS — Z23 NEED FOR IMMUNIZATION AGAINST INFLUENZA: ICD-10-CM

## 2021-11-05 DIAGNOSIS — E78.2 MIXED HYPERLIPIDEMIA: Chronic | ICD-10-CM

## 2021-11-05 DIAGNOSIS — Z00.00 MEDICARE ANNUAL WELLNESS VISIT, SUBSEQUENT: ICD-10-CM

## 2021-11-05 DIAGNOSIS — M54.50 CHRONIC BILATERAL LOW BACK PAIN WITHOUT SCIATICA: ICD-10-CM

## 2021-11-05 DIAGNOSIS — I25.10 CORONARY ARTERY DISEASE INVOLVING NATIVE CORONARY ARTERY OF NATIVE HEART WITHOUT ANGINA PECTORIS: ICD-10-CM

## 2021-11-05 DIAGNOSIS — M62.830 MUSCLE SPASM OF BACK: ICD-10-CM

## 2021-11-05 DIAGNOSIS — E11.69 TYPE 2 DIABETES MELLITUS WITH OTHER SPECIFIED COMPLICATION, WITHOUT LONG-TERM CURRENT USE OF INSULIN (HCC): Primary | ICD-10-CM

## 2021-11-05 DIAGNOSIS — G89.29 CHRONIC BILATERAL LOW BACK PAIN WITHOUT SCIATICA: ICD-10-CM

## 2021-11-05 DIAGNOSIS — I48.20 CHRONIC ATRIAL FIBRILLATION (HCC): Chronic | ICD-10-CM

## 2021-11-05 PROBLEM — D72.829 LEUKOCYTOSIS: Status: RESOLVED | Noted: 2021-08-12 | Resolved: 2021-11-05

## 2021-11-05 PROCEDURE — 3078F DIAST BP <80 MM HG: CPT | Performed by: NURSE PRACTITIONER

## 2021-11-05 PROCEDURE — 99214 OFFICE O/P EST MOD 30 MIN: CPT | Performed by: NURSE PRACTITIONER

## 2021-11-05 PROCEDURE — G0439 PPPS, SUBSEQ VISIT: HCPCS | Performed by: NURSE PRACTITIONER

## 2021-11-05 PROCEDURE — 1160F RVW MEDS BY RX/DR IN RCRD: CPT | Performed by: NURSE PRACTITIONER

## 2021-11-05 PROCEDURE — 3725F SCREEN DEPRESSION PERFORMED: CPT | Performed by: NURSE PRACTITIONER

## 2021-11-05 PROCEDURE — 1125F AMNT PAIN NOTED PAIN PRSNT: CPT | Performed by: NURSE PRACTITIONER

## 2021-11-05 PROCEDURE — 1170F FXNL STATUS ASSESSED: CPT | Performed by: NURSE PRACTITIONER

## 2021-11-05 PROCEDURE — G0008 ADMIN INFLUENZA VIRUS VAC: HCPCS

## 2021-11-05 PROCEDURE — 3008F BODY MASS INDEX DOCD: CPT | Performed by: NURSE PRACTITIONER

## 2021-11-05 PROCEDURE — 90662 IIV NO PRSV INCREASED AG IM: CPT

## 2021-11-05 PROCEDURE — 3074F SYST BP LT 130 MM HG: CPT | Performed by: NURSE PRACTITIONER

## 2021-11-05 PROCEDURE — 3288F FALL RISK ASSESSMENT DOCD: CPT | Performed by: NURSE PRACTITIONER

## 2021-11-05 RX ORDER — NAPROXEN 500 MG/1
500 TABLET ORAL 2 TIMES DAILY PRN
Qty: 180 TABLET | Refills: 1 | Status: SHIPPED | OUTPATIENT
Start: 2021-11-05 | End: 2022-04-18 | Stop reason: ALTCHOICE

## 2021-11-05 RX ORDER — ARIPIPRAZOLE 5 MG/1
TABLET ORAL
COMMUNITY
Start: 2021-11-01 | End: 2022-05-05 | Stop reason: DRUGHIGH

## 2021-11-05 RX ORDER — BACLOFEN 20 MG/1
20 TABLET ORAL 2 TIMES DAILY PRN
Qty: 120 TABLET | Refills: 1 | Status: SHIPPED | OUTPATIENT
Start: 2021-11-05 | End: 2022-03-16 | Stop reason: SDUPTHER

## 2022-01-07 ENCOUNTER — TELEPHONE (OUTPATIENT)
Dept: FAMILY MEDICINE CLINIC | Facility: CLINIC | Age: 67
End: 2022-01-07

## 2022-01-07 NOTE — TELEPHONE ENCOUNTER
Kenia Mcguire called stating his back is not getting any better and is asking to be referred to an Orthopedic specialist

## 2022-01-11 ENCOUNTER — IMMUNIZATIONS (OUTPATIENT)
Dept: FAMILY MEDICINE CLINIC | Facility: HOSPITAL | Age: 67
End: 2022-01-11

## 2022-01-11 DIAGNOSIS — Z23 ENCOUNTER FOR IMMUNIZATION: Primary | ICD-10-CM

## 2022-01-11 PROCEDURE — 0064A COVID-19 MODERNA VACC 0.25 ML BOOSTER: CPT

## 2022-01-11 PROCEDURE — 91306 COVID-19 MODERNA VACC 0.25 ML BOOSTER: CPT

## 2022-01-19 ENCOUNTER — OFFICE VISIT (OUTPATIENT)
Dept: OBGYN CLINIC | Facility: CLINIC | Age: 67
End: 2022-01-19
Payer: COMMERCIAL

## 2022-01-19 VITALS
TEMPERATURE: 97 F | WEIGHT: 223 LBS | HEART RATE: 85 BPM | BODY MASS INDEX: 31.92 KG/M2 | SYSTOLIC BLOOD PRESSURE: 125 MMHG | DIASTOLIC BLOOD PRESSURE: 84 MMHG | HEIGHT: 70 IN

## 2022-01-19 DIAGNOSIS — G89.29 CHRONIC MIDLINE LOW BACK PAIN WITHOUT SCIATICA: Primary | ICD-10-CM

## 2022-01-19 DIAGNOSIS — M41.80 DEGENERATIVE SCOLIOSIS IN ADULT PATIENT: ICD-10-CM

## 2022-01-19 DIAGNOSIS — M54.50 CHRONIC MIDLINE LOW BACK PAIN WITHOUT SCIATICA: Primary | ICD-10-CM

## 2022-01-19 PROCEDURE — 1160F RVW MEDS BY RX/DR IN RCRD: CPT | Performed by: ORTHOPAEDIC SURGERY

## 2022-01-19 PROCEDURE — 3074F SYST BP LT 130 MM HG: CPT | Performed by: ORTHOPAEDIC SURGERY

## 2022-01-19 PROCEDURE — 3079F DIAST BP 80-89 MM HG: CPT | Performed by: ORTHOPAEDIC SURGERY

## 2022-01-19 PROCEDURE — 99203 OFFICE O/P NEW LOW 30 MIN: CPT | Performed by: ORTHOPAEDIC SURGERY

## 2022-01-19 PROCEDURE — 3008F BODY MASS INDEX DOCD: CPT | Performed by: ORTHOPAEDIC SURGERY

## 2022-01-19 RX ORDER — PRAZOSIN HYDROCHLORIDE 2 MG/1
CAPSULE ORAL
COMMUNITY
Start: 2021-12-10

## 2022-01-19 RX ORDER — CHLORTHALIDONE 25 MG/1
TABLET ORAL
COMMUNITY
Start: 2021-12-15

## 2022-01-19 NOTE — PROGRESS NOTES
Assessment/Plan:  1  Chronic midline low back pain without sciatica  Ambulatory referral to Orthopedic Surgery    Ambulatory referral to Pain Management   2  Degenerative scoliosis in adult patient  Ambulatory referral to Pain Management     Chris Wan has low back pain which seems chronic in nature  He has no signs of radiculopathy  X-rays demonstrate degenerative scoliosis which is likely contributing to his discomfort  I recommended returning to physical therapy which he declined  At his request we are going to have him see pain management consider medial branch block or other pain relieving interventions at this time  Home directed exercise program was printed for him today as well  Subjective:   Jenny Enriquez is a 79 y o  male who presents to the office for evaluation for chronic low back pain  He denies any specific injury or trauma  He has been feeling discomfort in his low back for the past year  He saw his primary care physician who obtained x-rays of his back showing arthritis  He was sent for physical therapy in April of last year and he attended 1 session  He states this is too expensive for him  He denies any numbness or tingling or radiating pain down his legs  He denies any weakness, saddle paresthesias or incontinence  He has been feeling aching throbbing pain in the midportion of his back that worsens with standing and walking and bending  It improves with rest   He has not been taking any medications for this  He does not wish to return to physical therapy  Review of Systems   Constitutional: Negative for chills, fever and unexpected weight change  HENT: Negative for hearing loss, nosebleeds and sore throat  Eyes: Negative for pain, redness and visual disturbance  Respiratory: Negative for cough, shortness of breath and wheezing  Cardiovascular: Negative for chest pain, palpitations and leg swelling     Gastrointestinal: Negative for abdominal pain, nausea and vomiting  Endocrine: Negative for polyphagia and polyuria  Genitourinary: Negative for dysuria and hematuria  Musculoskeletal:        See HPI   Skin: Negative for rash and wound  Neurological: Negative for dizziness, numbness and headaches  Psychiatric/Behavioral: Negative for decreased concentration and suicidal ideas  The patient is not nervous/anxious  Past Medical History:   Diagnosis Date    Anxiety     Atrial fibrillation ([facility] [address] )     Depression     Hypertension     Insomnia     Leukocytosis 2021       No past surgical history on file  Family History   Problem Relation Age of Onset    Ovarian cancer Sister     Substance Abuse Neg Hx     Mental illness Neg Hx        Social History     Occupational History    Not on file   Tobacco Use    Smoking status: Current Some Day Smoker     Packs/day: 1 00     Years: 40 00     Pack years: 40 00     Types: Cigarettes     Last attempt to quit: 2015     Years since quittin 4    Smokeless tobacco: Never Used   Vaping Use    Vaping Use: Never used   Substance and Sexual Activity    Alcohol use:  Yes     Alcohol/week: 4 0 standard drinks     Types: 4 Cans of beer per week    Drug use: Not Currently     Types: Cocaine    Sexual activity: Not Currently     Partners: Female         Current Outpatient Medications:     ALPRAZolam (XANAX) 1 mg tablet, 3 (three) times a day as needed , Disp: , Rfl:     ARIPiprazole (ABILIFY) 5 mg tablet, , Disp: , Rfl:     baclofen 20 mg tablet, Take 1 tablet (20 mg total) by mouth 2 (two) times a day as needed for muscle spasms, Disp: 120 tablet, Rfl: 1    buPROPion (WELLBUTRIN SR) 150 mg 12 hr tablet, Take 150 mg by mouth 2 (two) times a day , Disp: , Rfl:     candesartan (ATACAND) 32 MG tablet, Take 32 mg by mouth, Disp: , Rfl:     chlorthalidone 25 mg tablet, , Disp: , Rfl:     FLUoxetine (PROzac) 20 mg capsule, daily , Disp: , Rfl:     metFORMIN (GLUCOPHAGE) 500 mg tablet, Take 1 tablet (500 mg total) by mouth daily with breakfast, Disp: 90 tablet, Rfl: 1    metoprolol tartrate (LOPRESSOR) 100 mg tablet, Take 1 tablet (100 mg total) by mouth every 12 (twelve) hours, Disp: , Rfl: 0    naproxen (NAPROSYN) 500 mg tablet, Take 1 tablet (500 mg total) by mouth 2 (two) times a day as needed for mild pain or moderate pain, Disp: 180 tablet, Rfl: 1    potassium chloride (K-DUR,KLOR-CON) 20 mEq tablet, , Disp: , Rfl:     pravastatin (PRAVACHOL) 80 mg tablet, Take 80 mg by mouth daily , Disp: , Rfl:     prazosin (MINIPRESS) 2 mg capsule, , Disp: , Rfl:     torsemide (DEMADEX) 20 mg tablet, , Disp: , Rfl:     Xarelto 20 MG tablet, Take 20 mg by mouth daily, Disp: , Rfl:     ALPRAZolam (XANAX) 0 5 mg tablet, Take 1 tablet (0 5 mg total) by mouth 2 (two) times a day as needed for anxiety (DAYTIME ONLY) for up to 6 doses (Patient not taking: Reported on 8/26/2021), Disp: 6 tablet, Rfl: 0    zolpidem (AMBIEN) 5 mg tablet, Take 1 tablet (5 mg total) by mouth daily at bedtime as needed for sleep for up to 3 days, Disp: 3 tablet, Rfl: 0    No Known Allergies    Objective:  Vitals:    01/19/22 0910   BP: 125/84   Pulse: 85   Temp: (!) 97 °F (36 1 °C)       Back Exam     Tenderness   The patient is experiencing tenderness in the lumbar  Range of Motion   Extension:  70 abnormal   Flexion:  60 abnormal     Muscle Strength   Right Quadriceps:  5/5   Left Quadriceps:  5/5   Right Hamstrings:  5/5   Left Hamstrings:  5/5     Tests   Straight leg raise right: negative  Straight leg raise left: negative    Other   Sensation: normal  Gait: normal   Erythema: no back redness            Physical Exam  Vitals reviewed  Constitutional:       Appearance: He is well-developed  HENT:      Head: Normocephalic and atraumatic  Eyes:      Conjunctiva/sclera: Conjunctivae normal       Pupils: Pupils are equal, round, and reactive to light  Cardiovascular:      Rate and Rhythm: Normal rate     Pulmonary:      Effort: Pulmonary effort is normal  No respiratory distress  Musculoskeletal:      Cervical back: Normal range of motion and neck supple  Lumbar back: Negative right straight leg raise test and negative left straight leg raise test       Comments: As noted in HPI   Skin:     General: Skin is warm and dry  Neurological:      Mental Status: He is alert and oriented to person, place, and time     Psychiatric:         Behavior: Behavior normal          I have personally reviewed pertinent films in PACS and my interpretation is as follows:  X-rays of the lumbar spine dated 4/14/2021 demonstrates degenerative changes at multiple levels and Scoliosis

## 2022-02-02 ENCOUNTER — TELEPHONE (OUTPATIENT)
Dept: PULMONOLOGY | Facility: MEDICAL CENTER | Age: 67
End: 2022-02-02

## 2022-02-02 NOTE — TELEPHONE ENCOUNTER
LM for patient to call office back to reschedule missed appointment with Dr Rivka Moreno  No show letter mailed

## 2022-02-14 ENCOUNTER — CONSULT (OUTPATIENT)
Dept: PAIN MEDICINE | Facility: CLINIC | Age: 67
End: 2022-02-14
Payer: COMMERCIAL

## 2022-02-14 VITALS
BODY MASS INDEX: 31.92 KG/M2 | SYSTOLIC BLOOD PRESSURE: 120 MMHG | WEIGHT: 223 LBS | HEART RATE: 88 BPM | DIASTOLIC BLOOD PRESSURE: 80 MMHG | HEIGHT: 70 IN

## 2022-02-14 DIAGNOSIS — G89.4 CHRONIC PAIN SYNDROME: Primary | ICD-10-CM

## 2022-02-14 DIAGNOSIS — G89.29 CHRONIC MIDLINE LOW BACK PAIN WITHOUT SCIATICA: ICD-10-CM

## 2022-02-14 DIAGNOSIS — M54.50 CHRONIC MIDLINE LOW BACK PAIN WITHOUT SCIATICA: ICD-10-CM

## 2022-02-14 DIAGNOSIS — M47.816 LUMBAR SPONDYLOSIS: ICD-10-CM

## 2022-02-14 DIAGNOSIS — M41.80 DEGENERATIVE SCOLIOSIS IN ADULT PATIENT: ICD-10-CM

## 2022-02-14 PROCEDURE — 3008F BODY MASS INDEX DOCD: CPT | Performed by: ANESTHESIOLOGY

## 2022-02-14 PROCEDURE — 99214 OFFICE O/P EST MOD 30 MIN: CPT | Performed by: ANESTHESIOLOGY

## 2022-02-14 PROCEDURE — 3074F SYST BP LT 130 MM HG: CPT | Performed by: ANESTHESIOLOGY

## 2022-02-14 PROCEDURE — 3079F DIAST BP 80-89 MM HG: CPT | Performed by: ANESTHESIOLOGY

## 2022-02-14 NOTE — PATIENT INSTRUCTIONS
Core Strengthening Exercises   WHAT YOU NEED TO KNOW:   What do I need to know about core strengthening exercises? Core strengthening exercises help heal and strengthen muscles of your hips, back, and abdomen to prevent reinjury  They are beginning exercises to help support your spine  Ask your healthcare provider if you need to see a physical therapist for more advanced exercises  · Do the exercises on a mat or firm surface  (not on a bed) to support your spine and avoid low back pain  · Do the exercises in the same order every time  to train your muscles to work together  Your healthcare provider will show you how to perform these exercises  Do them every day, or as directed by your healthcare provider  · Move slowly and smoothly  Avoid fast or jerky motions  · Stop if you feel pain  It is normal to feel some discomfort at first  Regular exercise will help decrease your discomfort over time  How do I perform core strengthening exercises safely? Hold each exercise for 5 seconds  When you can do the exercise without pain for 5 seconds, increase your hold to 10 to 15 seconds  When you can do the exercise without pain for 10 to 15 seconds, add the next exercise  Increase the time you hold each exercise, or repeat the exercises as directed  As you do each exercise, breathe normally  Do not hold your breath  · Abdominal bracing:  Lie on your back with your knees bent and feet flat on the floor  Place your arms in a relaxed position beside your body  Pull your belly button in toward your spine  Do not flatten or arch your back  Tighten the abdominal muscles below your belly button  Hold for 5 seconds  Begin all of your exercises with abdominal bracing  You can also practice abdominal bracing throughout the day while you are sitting or standing  · Bridging:  Lie on your back with your knees bent and feet flat on the floor  Rest your arms at your side   Tighten your buttocks, and then lift your hips 1 inch off the floor  Hold for 5 seconds  When you can do this exercise without pain for 10 seconds, increase the distance you lift your hips  A good goal is to be able to lift your hips so that your shoulders, hips, and knees are in a straight line  · Curl up:  Lie on your back with your knees bent and feet flat on the floor  Place your hands, palms down, underneath the curve in your lower back  Next, with your elbows on the floor, lift your shoulders and chest 2 to 3 inches  Keep your head in line with your shoulders  Hold this position for 5 seconds  When you can do this exercise without pain for 10 to 15 seconds, you may add a rotation  While your shoulders and chest are lifted off the ground, turn slightly to the left and hold  Repeat on the other side  · Dead bug:  Lie on your back with your knees bent and feet flat on the floor  Place your arms in a relaxed position beside your body  Begin with abdominal bracing  Next, raise one leg, keeping your knee bent  Hold for 5 seconds  Repeat with the other leg  When you can do this exercise without pain for 10 to 15 seconds, you may raise one straight leg and hold  Repeat with the other leg  · Quadruped:  Place your hands and knees on the floor  Keep your wrists directly below your shoulders and your knees directly below your hips  Pull your belly button in toward your spine  Do not flatten or arch your back  Tighten your abdominal muscles below your belly button  Hold for 5 seconds  When you can do this exercise without pain for 10 to 15 seconds, you may extend one arm and hold  Repeat on the other side  · Side Bridge:      ¨ Standing side bridge:  Stand next to a wall and extend one arm toward the wall  Place your palm flat on the wall with your fingers pointing upward  Begin with abdominal bracing  Next, without moving your feet, slowly bend your arm to 90 degrees  Hold for 5 seconds  Repeat on the other side   When you can do this exercise without pain for 10 to 15 seconds, you may do the bent leg side bridge on the floor  ¨ Bent leg side bridge:  Lie on one side with your legs, hips, and shoulders in a straight line  Prop yourself up onto your forearm so your elbow is directly below your shoulder  Bend your knees back to 90 degrees  Begin with abdominal bracing  Next, lift your hips and balance yourself on your forearm and knees  Hold for 5 seconds  Repeat on the other side  When you can do this exercise without pain for 10 to 15 seconds, you may do the straight leg side bridge on the floor  ¨ Straight leg side bridge:  Lie on one side with your legs, hips, and shoulders in a straight line  Prop yourself up onto your forearm so your elbow is directly below your shoulder  Begin with abdominal bracing  Lift your hips off the floor and balance yourself on your forearm and the outside of your flexed foot  Do not let your ankle bend sideways  Hold for 5 seconds  Repeat on the other side  When you can do this exercise without pain for 10 to 15 seconds, ask your healthcare provider for more advanced exercises  When should I contact my healthcare provider? · Your pain becomes worse  · You have new pain  · You have questions or concerns about your condition, care, or exercise program   CARE AGREEMENT:   You have the right to help plan your care  Learn about your health condition and how it may be treated  Discuss treatment options with your caregivers to decide what care you want to receive  You always have the right to refuse treatment  The above information is an  only  It is not intended as medical advice for individual conditions or treatments  Talk to your doctor, nurse or pharmacist before following any medical regimen to see if it is safe and effective for you    © 2016 [address] is for End User's use only and may not be sold, redistributed or otherwise used for commercial purposes  All illustrations and images included in CareNotes® are the copyrighted property of [employer]  or Alonzo Ragland  Lower Back Exercises   WHAT YOU NEED TO KNOW:   What do I need to know about lower back exercises? Lower back exercises help heal and strengthen your back muscles to prevent another injury  Ask your healthcare provider if you need to see a physical therapist for more advanced exercises  · Do the exercises on a mat or firm surface  (not on a bed) to support your spine and prevent low back pain  · Move slowly and smoothly  Avoid fast or jerky motions  · Breathe normally  Do not hold your breath  · Stop if you feel pain  It is normal to feel some discomfort at first  Regular exercise will help decrease your discomfort over time  How do I perform lower back exercises safely? Your healthcare provider may recommend that you do back exercises 10 to 30 minutes each day  He may also recommend that you do exercises 1 to 3 times each day  Ask your healthcare provider which exercises are best for you and how often to do them  · Ankle pumps:  Lie on your back  Move your foot up (with your toes pointing toward your head)  Then, move your foot down (with your toes pointing away from you)  Repeat this exercise 10 times on each side  · Heel slides:  Lie on your back  Slowly bend one leg and then straighten it  Next, bend the other leg and then straighten it  Repeat 10 times on each side  · Pelvic tilt:  Lie on your back with your knees bent and feet flat on the floor  Place your arms in a relaxed position beside your body  Tighten the muscles of your abdomen and flatten your back against the floor  Hold for 5 seconds  Repeat 5 times  · Back stretch:  Lie on your back with your hands behind your head  Bend your knees and turn the lower half of your body to one side  Hold this position for 10 seconds   Repeat 3 times on each side          · Straight leg raises:  Lie on your back with one leg straight  Bend the other knee  Tighten your abdomen and then slowly lift the straight leg up about 6 to 12 inches off the floor  Hold for 1 to 5 seconds  Lower your leg slowly  Repeat 10 times on each leg  · Knee-to-chest:  Lie on your back with your knees bent and feet flat on the floor  Pull one of your knees toward your chest and hold it there for 5 seconds  Return your leg to the starting position  Lift the other knee toward your chest and hold for 5 seconds  Do this 5 times on each side  · Cat and camel:  Place your hands and knees on the floor  Arch your back upward toward the ceiling and lower your head  Round out your spine as much as you can  Hold for 5 seconds  Lift your head upward and push your chest downward toward the floor  Hold for 5 seconds  Do 3 sets or as directed  · Wall squats:  Stand with your back against a wall  Tighten the muscles of your abdomen  Slowly lower your body until your knees are bent at a 45 degree angle  Hold this position for 5 seconds  Slowly move back up to a standing position  Repeat 10 times  · Curl up:  Lie on your back with your knees bent and feet flat on the floor  Place your hands, palms down, underneath the curve in your lower back  Next, with your elbows on the floor, lift your shoulders and chest 2 to 3 inches  Keep your head in line with your shoulders  Hold this position for 5 seconds  When you can do this exercise without pain for 10 to 15 seconds, you may add a rotation  While your shoulders and chest are lifted off the ground, turn slightly to the left and hold  Repeat on the other side  · Bird dog:  Place your hands and knees on the floor  Keep your wrists directly below your shoulders and your knees directly below your hips  Pull your belly button in toward your spine  Do not flatten or arch your back  Tighten your abdominal muscles   Raise one arm straight out so that it is aligned with your head  Next, raise the leg opposite your arm  Hold this position for 15 seconds  Lower your arm and leg slowly and change sides  Do 5 sets  When should I seek immediate care? · You have severe pain that prevents you from moving  When should I contact my healthcare provider? · Your pain becomes worse  · You have new pain  · You have questions or concerns about your condition or care  CARE AGREEMENT:   You have the right to help plan your care  Learn about your health condition and how it may be treated  Discuss treatment options with your healthcare providers to decide what care you want to receive  You always have the right to refuse treatment  The above information is an  only  It is not intended as medical advice for individual conditions or treatments  Talk to your doctor, nurse or pharmacist before following any medical regimen to see if it is safe and effective for you  © Copyright [address] 2021 Information is for End User's use only and may not be sold, redistributed or otherwise used for commercial purposes   All illustrations and images included in CareNotes® are the copyrighted property of [employer]  or [address] [place] [address]

## 2022-02-14 NOTE — PROGRESS NOTES
Assessment:  1  Chronic pain syndrome    2  Chronic midline low back pain without sciatica    3  Degenerative scoliosis in adult patient    4  Lumbar spondylosis        Plan:  Orders Placed This Encounter   Procedures    Ambulatory referral to Physical Therapy     Standing Status:   Future     Standing Expiration Date:   2/14/2023     Referral Priority:   Routine     Referral Type:   Physical Therapy     Referral Reason:   Specialty Services Required     Requested Specialty:   Physical Therapy     Number of Visits Requested:   1     Expiration Date:   2/14/2023     My impressions and treatment recommendations were discussed in detail with the patient, who verbalized understanding and had no further questions  The patient was originally sent to me by Dr Gely Valenzuela to consider lumbar medial branch blocks, however he does not have any signs or symptoms consistent with lumbar facet syndrome  Lumbar facet loading is negative bilaterally  At this point, I think it is reasonable to have the patient undergo a course of physical therapy 2-3 times per week for 4-6 weeks  If he does not respond to physical therapy, I will obtain a MRI of the lumbar spine to evaluate for any pathology that may be contributing to his pain complaints  Follow-up is planned in 6 weeks time or sooner as warranted  Discharge instructions were provided  I personally saw and examined the patient and I agree with the above discussed plan of care  History of Present Illness:    Jose Mayer is a 79 y o  male who presents to [facility] for initial evaluation of the above stated pain complaints  The patient has a past medical and chronic pain history as outlined in the assessment section  He was referred by Dr Peg Lesch  The patient is reporting a 10 month history of low back pain  He describes his pain as severe and 10/10 on the verbal numerical pain rating scale    His pain is constant in nature without any typical pattern  He describes his pain as sharp  He does not ambulate with any assistive devices  Standing, bending, and walking decreases pain  He has not yet undergone any physical therapy for his current symptoms  He is not currently using any pain medications  Review of Systems:    Review of Systems   Constitutional: Negative for fever and unexpected weight change  HENT: Negative for trouble swallowing  Eyes: Negative for visual disturbance  Respiratory: Negative for shortness of breath and wheezing  Cardiovascular: Negative for chest pain and palpitations  Gastrointestinal: Negative for constipation, diarrhea, nausea and vomiting  Endocrine: Negative for cold intolerance, heat intolerance and polydipsia  Genitourinary: Negative for difficulty urinating and frequency  Musculoskeletal: Negative for arthralgias, gait problem, joint swelling and myalgias  Skin: Negative for rash  Neurological: Negative for dizziness, seizures, syncope, weakness and headaches  Hematological: Does not bruise/bleed easily  Psychiatric/Behavioral: Negative for dysphoric mood  Anxiety    All other systems reviewed and are negative          Patient Active Problem List   Diagnosis    Chronic atrial fibrillation ([facility] [address] )    Coronary artery disease involving native coronary artery of native heart without angina pectoris    Mixed hyperlipidemia    Essential hypertension    Acute on chronic systolic (congestive) heart failure (HCC)    Anxiety    Other insomnia    Depression, recurrent (HCC)    Hypertriglyceridemia    Dilated aortic root (HCC)    Chronic midline low back pain with bilateral sciatica    Class 1 obesity due to excess calories without serious comorbidity with body mass index (BMI) of 32 0 to 32 9 in adult    Gallbladder polyp    Alcohol use    Type 2 diabetes mellitus, without long-term current use of insulin (HCC)    Suspected Chronic obstructive pulmonary disease with acute exacerbation ([facility] )    Obstructive sleep apnea       Past Medical History:   Diagnosis Date    Anxiety     Atrial fibrillation ([facility] )     Depression     Hypertension     Insomnia     Leukocytosis 2021       History reviewed  No pertinent surgical history  Family History   Problem Relation Age of Onset    Ovarian cancer Sister     Substance Abuse Neg Hx     Mental illness Neg Hx        Social History     Occupational History    Not on file   Tobacco Use    Smoking status: Current Some Day Smoker     Packs/day: 0 50     Years: 40 00     Pack years: 20 00     Types: Cigarettes     Last attempt to quit: 2015     Years since quittin 4    Smokeless tobacco: Never Used   Vaping Use    Vaping Use: Never used   Substance and Sexual Activity    Alcohol use:  Yes     Alcohol/week: 4 0 standard drinks     Types: 4 Cans of beer per week    Drug use: Not Currently     Types: Cocaine    Sexual activity: Not Currently     Partners: Female         Current Outpatient Medications:     ALPRAZolam (XANAX) 0 5 mg tablet, Take 1 tablet (0 5 mg total) by mouth 2 (two) times a day as needed for anxiety (DAYTIME ONLY) for up to 6 doses (Patient not taking: Reported on 2021), Disp: 6 tablet, Rfl: 0    ALPRAZolam (XANAX) 1 mg tablet, 3 (three) times a day as needed , Disp: , Rfl:     ARIPiprazole (ABILIFY) 5 mg tablet, , Disp: , Rfl:     baclofen 20 mg tablet, Take 1 tablet (20 mg total) by mouth 2 (two) times a day as needed for muscle spasms, Disp: 120 tablet, Rfl: 1    buPROPion (WELLBUTRIN SR) 150 mg 12 hr tablet, Take 150 mg by mouth 2 (two) times a day , Disp: , Rfl:     candesartan (ATACAND) 32 MG tablet, Take 32 mg by mouth, Disp: , Rfl:     chlorthalidone 25 mg tablet, , Disp: , Rfl:     FLUoxetine (PROzac) 20 mg capsule, daily , Disp: , Rfl:     metFORMIN (GLUCOPHAGE) 500 mg tablet, Take 1 tablet (500 mg total) by mouth daily with breakfast, Disp: 90 tablet, Rfl: 1    metoprolol tartrate (LOPRESSOR) 100 mg tablet, Take 1 tablet (100 mg total) by mouth every 12 (twelve) hours, Disp: , Rfl: 0    naproxen (NAPROSYN) 500 mg tablet, Take 1 tablet (500 mg total) by mouth 2 (two) times a day as needed for mild pain or moderate pain, Disp: 180 tablet, Rfl: 1    potassium chloride (K-DUR,KLOR-CON) 20 mEq tablet, , Disp: , Rfl:     pravastatin (PRAVACHOL) 80 mg tablet, Take 80 mg by mouth daily , Disp: , Rfl:     prazosin (MINIPRESS) 2 mg capsule, , Disp: , Rfl:     torsemide (DEMADEX) 20 mg tablet, , Disp: , Rfl:     Xarelto 20 MG tablet, Take 20 mg by mouth daily, Disp: , Rfl:     zolpidem (AMBIEN) 5 mg tablet, Take 1 tablet (5 mg total) by mouth daily at bedtime as needed for sleep for up to 3 days, Disp: 3 tablet, Rfl: 0    No Known Allergies    Physical Exam:    /80   Pulse 88   Ht 5' 10" (1 778 m)   Wt 101 kg (223 lb)   BMI 32 00 kg/m²     Constitutional: obese  Eyes: anicteric  HEENT: grossly intact  Neck: supple, symmetric, trachea midline and no masses   Pulmonary:even and unlabored  Cardiovascular:No edema or pitting edema present  Skin:Normal without rashes or lesions and well hydrated  Psychiatric:Mood and affect appropriate  Neurologic:Cranial Nerves II-XII grossly intact  Musculoskeletal:normal     Lumbar Spine Exam    Appearance:  Normal lordosis  Palpation/Tenderness:  no tenderness or spasm  Sensory:  no sensory deficits noted  Range of Motion:  Flexion:   Moderately limited  with pain  Extension:  Moderately limited  with pain  Lateral Flexion - Left:  Moderately limited  with pain  Lateral Flexion - Right:  Moderately limited  with pain  Rotation - Left:  Moderately limited  with pain  Rotation - Right:  Moderately limited  with pain  Motor Strength:  Left hip flexion:  5/5  Left hip extension:  5/5  Right hip flexion:  5/5  Right hip extension:  5/5  Left knee flexion:  5/5  Left knee extension:  5/5  Right knee flexion:  5/5  Right knee extension:  5/5  Left foot dorsiflexion:  5/5  Left foot plantar flexion:  5/5  Right foot dorsiflexion:  5/5  Right foot plantar flexion:  5/5  Reflexes:  Left Patellar:  2+   Right Patellar:  2+   Left Achilles:  2+   Right Achilles:  2+   Special Tests:  Left Straight Leg Test:  negative  Right Straight Leg Test:  negative  Left Gamal's Maneuver:  negative  Right Gamal's Maneuver:  negative    Imaging  Narrative & Impression   LUMBAR SPINE     INDICATION:   R74 8: Abnormal levels of other serum enzymes  M54 5: Low back pain      COMPARISON:  None     VIEWS:  XR SPINE LUMBAR MINIMUM 4 VIEWS NON INJURY        FINDINGS:     There are 5 non rib bearing lumbar vertebral bodies       There is no evidence of acute fracture or destructive osseous lesion  Chronic anterior wedge deformity of L1      Mild levolumbar scoliosis       Moderate degenerative changes with disc space height loss at L2-L3 and L4-L5   Moderate lower lumbar facet osteoarthropathy      The pedicles appear intact      Soft tissues are unremarkable      IMPRESSION:     Moderate degenerative changes most prominent at L2-L3 and L4-L5         Workstation performed: [id number]

## 2022-03-16 ENCOUNTER — TELEPHONE (OUTPATIENT)
Dept: PAIN MEDICINE | Facility: MEDICAL CENTER | Age: 67
End: 2022-03-16

## 2022-03-16 DIAGNOSIS — M54.41 CHRONIC MIDLINE LOW BACK PAIN WITH BILATERAL SCIATICA: Primary | ICD-10-CM

## 2022-03-16 DIAGNOSIS — M62.830 MUSCLE SPASM OF BACK: ICD-10-CM

## 2022-03-16 DIAGNOSIS — M54.42 CHRONIC MIDLINE LOW BACK PAIN WITH BILATERAL SCIATICA: Primary | ICD-10-CM

## 2022-03-16 DIAGNOSIS — G89.29 CHRONIC MIDLINE LOW BACK PAIN WITH BILATERAL SCIATICA: Primary | ICD-10-CM

## 2022-03-16 RX ORDER — BACLOFEN 20 MG/1
20 TABLET ORAL 2 TIMES DAILY PRN
Qty: 120 TABLET | Refills: 1 | Status: SHIPPED | OUTPATIENT
Start: 2022-03-16 | End: 2022-04-18 | Stop reason: ALTCHOICE

## 2022-03-16 NOTE — TELEPHONE ENCOUNTER
Pt called stating that he s/w [facility] and they said they need the order of the MRI place so they can approve it   Pt does not need PT     Please advise if this can be done or if PT is something Vonda Velez wants pt to do

## 2022-03-30 ENCOUNTER — TELEPHONE (OUTPATIENT)
Dept: PAIN MEDICINE | Facility: CLINIC | Age: 67
End: 2022-03-30

## 2022-04-01 ENCOUNTER — TELEPHONE (OUTPATIENT)
Dept: FAMILY MEDICINE CLINIC | Facility: CLINIC | Age: 67
End: 2022-04-01

## 2022-04-01 NOTE — TELEPHONE ENCOUNTER
Spoke to patient on 4/1/22 and advised him he needed his labs done prior to his appointment on 5/5      Kandice Hidalgo

## 2022-04-09 ENCOUNTER — HOSPITAL ENCOUNTER (OUTPATIENT)
Dept: RADIOLOGY | Facility: HOSPITAL | Age: 67
Discharge: HOME | End: 2022-04-09
Attending: ANESTHESIOLOGY
Payer: COMMERCIAL

## 2022-04-09 DIAGNOSIS — M54.42 CHRONIC MIDLINE LOW BACK PAIN WITH BILATERAL SCIATICA: ICD-10-CM

## 2022-04-09 DIAGNOSIS — M54.41 CHRONIC MIDLINE LOW BACK PAIN WITH BILATERAL SCIATICA: ICD-10-CM

## 2022-04-09 DIAGNOSIS — G89.29 CHRONIC MIDLINE LOW BACK PAIN WITH BILATERAL SCIATICA: ICD-10-CM

## 2022-04-09 PROCEDURE — G1004 CDSM NDSC: HCPCS

## 2022-04-09 PROCEDURE — 72148 MRI LUMBAR SPINE W/O DYE: CPT

## 2022-04-13 ENCOUNTER — TELEPHONE (OUTPATIENT)
Dept: PAIN MEDICINE | Facility: CLINIC | Age: 67
End: 2022-04-13

## 2022-04-13 NOTE — TELEPHONE ENCOUNTER
Pt was scheduled with Dr Jasymne Cobb today but his MRI from 4/09 has not been read yet  Dr Jasmyne Cobb would like to have the Pt re scheduled after the MRI is read so he can order procedures

## 2022-04-18 ENCOUNTER — OFFICE VISIT (OUTPATIENT)
Dept: PAIN MEDICINE | Facility: CLINIC | Age: 67
End: 2022-04-18
Payer: COMMERCIAL

## 2022-04-18 ENCOUNTER — TELEPHONE (OUTPATIENT)
Dept: PAIN MEDICINE | Facility: CLINIC | Age: 67
End: 2022-04-18

## 2022-04-18 VITALS
TEMPERATURE: 97.7 F | WEIGHT: 262.2 LBS | HEART RATE: 75 BPM | HEIGHT: 71 IN | SYSTOLIC BLOOD PRESSURE: 131 MMHG | BODY MASS INDEX: 36.71 KG/M2 | DIASTOLIC BLOOD PRESSURE: 76 MMHG

## 2022-04-18 DIAGNOSIS — M62.830 SPASM OF BACK MUSCLES: ICD-10-CM

## 2022-04-18 DIAGNOSIS — M79.18 MYOFASCIAL PAIN SYNDROME: ICD-10-CM

## 2022-04-18 DIAGNOSIS — M47.816 LUMBAR SPONDYLOSIS: ICD-10-CM

## 2022-04-18 DIAGNOSIS — G89.4 CHRONIC PAIN SYNDROME: Primary | ICD-10-CM

## 2022-04-18 DIAGNOSIS — M51.16 LUMBAR DISC DISEASE WITH RADICULOPATHY: ICD-10-CM

## 2022-04-18 PROCEDURE — 3075F SYST BP GE 130 - 139MM HG: CPT | Performed by: NURSE PRACTITIONER

## 2022-04-18 PROCEDURE — 3008F BODY MASS INDEX DOCD: CPT | Performed by: NURSE PRACTITIONER

## 2022-04-18 PROCEDURE — 1160F RVW MEDS BY RX/DR IN RCRD: CPT | Performed by: NURSE PRACTITIONER

## 2022-04-18 PROCEDURE — 3078F DIAST BP <80 MM HG: CPT | Performed by: NURSE PRACTITIONER

## 2022-04-18 PROCEDURE — 99214 OFFICE O/P EST MOD 30 MIN: CPT | Performed by: NURSE PRACTITIONER

## 2022-04-18 RX ORDER — ZOLPIDEM TARTRATE 10 MG/1
TABLET ORAL
COMMUNITY
Start: 2022-02-07

## 2022-04-18 RX ORDER — ROSUVASTATIN CALCIUM 20 MG/1
TABLET, COATED ORAL
COMMUNITY
Start: 2022-04-06

## 2022-04-18 RX ORDER — ARIPIPRAZOLE 10 MG/1
TABLET ORAL
COMMUNITY
Start: 2022-04-10

## 2022-04-18 RX ORDER — METHOCARBAMOL 500 MG/1
500 TABLET, FILM COATED ORAL 3 TIMES DAILY
Qty: 120 TABLET | Refills: 0 | Status: ON HOLD | OUTPATIENT
Start: 2022-04-18 | End: 2022-06-24

## 2022-04-18 NOTE — H&P (VIEW-ONLY)
Pain Medicine Follow-Up Note    Assessment:  1  Chronic pain syndrome    2  Lumbar spondylosis    3  Lumbar disc disease with radiculopathy    4  Spasm of back muscles    5  Myofascial pain syndrome        Plan:  Orders Placed This Encounter   Procedures    Ambulatory referral to Physical Therapy     Standing Status:   Future     Standing Expiration Date:   2023     Referral Priority:   Routine     Referral Type:   Physical Therapy     Referral Reason:   Specialty Services Required     Requested Specialty:   Physical Therapy     Number of Visits Requested:   1     Expiration Date:   2023       New Medications Ordered This Visit   Medications    rosuvastatin (CRESTOR) 20 MG tablet    ARIPiprazole (ABILIFY) 10 mg tablet    zolpidem (AMBIEN) 10 mg tablet    methocarbamol (ROBAXIN) 500 mg tablet     Sig: Take 1 tablet (500 mg total) by mouth 3 (three) times a day May take 2 tablets if 1 tablet is ineffective     Dispense:  120 tablet     Refill:  0       My impressions and treatment recommendations were discussed in detail with the patient who verbalized understanding and had no further questions  Patient was seen in the office today for follow-up after having his lumbar MRI  He has chronic pain secondary to lumbar spondylosis  Given that the patient presents with bilateral axial low back pain, we will do the followin  Schedule bilateral L3-S1 diagnostic medial branch blocks with fluoroscopy guidance  If they are successful, we will move forward with radiofrequency ablation  At a thorough discussion with the patient using the spine model about this procedure and he verbalized understanding and wanted to move forward  2  Referral for physical therapy was provided to the patient  3  Start methocarbamol 500 mg up to 3 times a day as needed for muscle spasms    Side effects were reviewed with the patient and he verbalized understanding and prescription was sent to the pharmacy on file     Patient currently takes Xarelto so we will hold off prescribing any NSAIDs for him at this time  Complete risks and benefits including bleeding, infection, tissue reaction, nerve injury and allergic reaction were discussed  The approach was demonstrated using models and literature was provided  Verbal and written consent was obtained  Follow-up is planned in 8 weeks time or sooner as warranted  Discharge instructions were provided  I personally saw and examined the patient and I agree with the above discussed plan of care  History of Present Illness:    Sudhakar Tsang is a 79 y o  male who presents to [facility] for interval re-evaluation of the above stated pain complaints  The patient has a past medical and chronic pain history as outlined in the assessment section  He was last seen on 02/14/2022  He reports a pain score of 10/10 that is constant  He states that the pain is in his low back bilaterally and does not radiate down his legs but actually shoots up the middle of his spine  He describes the quality of his pain as a dull aching in his low back  Other than as stated above, the patient denies any interval changes in medications, medical condition, mental condition, symptoms, or allergies since the last office visit  Review of Systems:    Review of Systems   Constitutional: Negative for chills and fatigue  HENT: Negative for ear pain, mouth sores and sinus pressure  Eyes: Negative for pain, redness and visual disturbance  Respiratory: Negative for shortness of breath and wheezing  Cardiovascular: Negative for chest pain and palpitations  Gastrointestinal: Negative for abdominal pain and nausea  Endocrine: Negative for polyphagia  Musculoskeletal: Positive for back pain, gait problem and myalgias  Negative for arthralgias and neck pain  Skin: Negative for wound  Neurological: Negative for seizures and weakness  Psychiatric/Behavioral: Negative for dysphoric mood and sleep disturbance  Patient Active Problem List   Diagnosis    [facility] fibrillation ([facility] )    Coronary artery disease involving native coronary artery of native heart without angina pectoris    Mixed hyperlipidemia    Essential hypertension    Acute on chronic systolic (congestive) heart failure (HCC)    Anxiety    Other insomnia    Depression, recurrent (HCC)    Hypertriglyceridemia    Dilated aortic root (HCC)    Chronic midline low back pain with bilateral sciatica    Class 1 obesity due to excess calories without serious comorbidity with body mass index (BMI) of 32 0 to 32 9 in adult    Gallbladder polyp    Alcohol use    Type 2 diabetes mellitus, without long-term current use of insulin (HCC)    Suspected Chronic obstructive pulmonary disease with acute exacerbation (HCC)    Obstructive sleep apnea       Past Medical History:   Diagnosis Date    Anxiety     Atrial fibrillation ([place] [address] )     Depression     Diabetes mellitus ([place] [address] )     Heart disease     Hyperlipidemia     Hypertension     Insomnia     Leukocytosis 2021       History reviewed  No pertinent surgical history  Family History   Problem Relation Age of Onset    No Known Problems Mother     No Known Problems Father     Ovarian cancer Sister     Substance Abuse Neg Hx     Mental illness Neg Hx        Social History     Occupational History    Not on file   Tobacco Use    Smoking status: Current Some Day Smoker     Packs/day: 0 50     Years: 40 00     Pack years: 20 00     Types: Cigarettes     Last attempt to quit: 2015     Years since quittin 6    Smokeless tobacco: Never Used   Vaping Use    Vaping Use: Never used   Substance and Sexual Activity    Alcohol use:  Yes     Alcohol/week: 4 0 standard drinks     Types: 4 Cans of beer per week    Drug use: Not Currently    Sexual activity: Not Currently     Partners: Female         Current Outpatient Medications:     ALPRAZolam (XANAX) 1 mg tablet, 3 (three) times a day as needed , Disp: , Rfl:     ARIPiprazole (ABILIFY) 10 mg tablet, , Disp: , Rfl:     buPROPion (WELLBUTRIN SR) 150 mg 12 hr tablet, Take 150 mg by mouth 2 (two) times a day , Disp: , Rfl:     candesartan (ATACAND) 32 MG tablet, Take 32 mg by mouth, Disp: , Rfl:     FLUoxetine (PROzac) 20 mg capsule, daily , Disp: , Rfl:     metFORMIN (GLUCOPHAGE) 500 mg tablet, Take 1 tablet (500 mg total) by mouth daily with breakfast, Disp: 90 tablet, Rfl: 1    metoprolol tartrate (LOPRESSOR) 100 mg tablet, Take 1 tablet (100 mg total) by mouth every 12 (twelve) hours, Disp: , Rfl: 0    potassium chloride (K-DUR,KLOR-CON) 20 mEq tablet, , Disp: , Rfl:     pravastatin (PRAVACHOL) 80 mg tablet, Take 80 mg by mouth daily , Disp: , Rfl:     prazosin (MINIPRESS) 2 mg capsule, , Disp: , Rfl:     rosuvastatin (CRESTOR) 20 MG tablet, , Disp: , Rfl:     torsemide (DEMADEX) 20 mg tablet, , Disp: , Rfl:     Xarelto 20 MG tablet, Take 20 mg by mouth daily, Disp: , Rfl:     zolpidem (AMBIEN) 10 mg tablet, , Disp: , Rfl:     ALPRAZolam (XANAX) 0 5 mg tablet, Take 1 tablet (0 5 mg total) by mouth 2 (two) times a day as needed for anxiety (DAYTIME ONLY) for up to 6 doses (Patient not taking: Reported on 4/18/2022 ), Disp: 6 tablet, Rfl: 0    ARIPiprazole (ABILIFY) 5 mg tablet, , Disp: , Rfl:     chlorthalidone 25 mg tablet, , Disp: , Rfl:     methocarbamol (ROBAXIN) 500 mg tablet, Take 1 tablet (500 mg total) by mouth 3 (three) times a day May take 2 tablets if 1 tablet is ineffective, Disp: 120 tablet, Rfl: 0    zolpidem (AMBIEN) 5 mg tablet, Take 1 tablet (5 mg total) by mouth daily at bedtime as needed for sleep for up to 3 days, Disp: 3 tablet, Rfl: 0    No Known Allergies    Physical Exam:    /76   Pulse 75   Temp 97 7 °F (36 5 °C)   Ht 5' 11" (1 803 m)   Wt 119 kg (262 lb 3 2 oz)   BMI 36 57 kg/m² Constitutional:obese  Eyes:anicteric  HEENT:grossly intact  Neck:supple, symmetric, trachea midline and no masses   Pulmonary:even and unlabored  Cardiovascular:No edema or pitting edema present  Skin:Normal without rashes or lesions and well hydrated  Psychiatric:Mood and affect appropriate  Neurologic:Cranial Nerves II-XII grossly intact  Musculoskeletal:antalgic, stooped posture and Bilateral positive lumbar facet loading with the worst being on the left side  Patient was negative for straight leg testing bilaterally and negative JEFFREY  Imaging    Study Result    Narrative & Impression   MRI LUMBAR SPINE WITHOUT CONTRAST     INDICATION: M54 41: Lumbago with sciatica, right side  M54 42: Lumbago with sciatica, left side  G89 29: Other chronic pain      COMPARISON:  4/14/2021     TECHNIQUE:  Sagittal T1, sagittal T2, sagittal inversion recovery, axial T1 and axial T2, coronal T2     IMAGE QUALITY:  Diagnostic     FINDINGS:     VERTEBRAL BODIES:  There are 5 lumbar type vertebral bodies  Normal alignment of the lumbar spine  No spondylolysis or spondylolisthesis  No scoliosis  No compression fracture  Scattered degenerative endplate changes  No focally suspicious marrow   lesions  No bone marrow edema or compression abnormality      SACRUM:  Scattered degenerative endplate changes  No focally suspicious marrow lesions  No bone marrow edema or compression abnormality      DISTAL CORD AND CONUS:  Normal size and signal within the distal cord and conus  The conus medullaris terminates at the L1 level      PARASPINAL SOFT TISSUES:  Paraspinal soft tissues are unremarkable      LOWER THORACIC DISC SPACES:  Mild noncompressive lower thoracic degenerative change      LUMBAR DISC SPACES:     L1-L2:  There is loss of disc height and signal   There is bilateral facet hypertrophy  Small right neural foraminal disc protrusion  Mild right neural foraminal narrowing    Central canal and left neural foramen patent      L2-L3:  There is bilateral facet hypertrophy  There is loss of disc height and signal   There is a right neural foraminal disc protrusion  Moderate right neural foraminal narrowing  Mild central canal narrowing  Mild left neural foraminal narrowing      L3-L4:  There is bilateral facet hypertrophy  There is loss of disc height and signal   There is a right neural foraminal disc protrusion  Mild right neural foraminal narrowing  Central canal and left neural foramen patent      L4-L5:  There is loss of disc height and signal   There is a disc osteophyte complex with a superimposed left neural foraminal disc protrusion  There is bilateral facet hypertrophy  Moderate left neural foraminal narrowing  Mild central canal   narrowing  Mild to moderate right neural foraminal narrowing      L5-S1:  There is a central disc herniation, extrusion type  Herniated disc material extends cephalad along the dorsal margin of L5  There is mild central canal narrowing  There is severe left neural foraminal narrowing  There is bilateral facet   hypertrophy  Mild central canal narrowing    Moderate right neural foraminal narrowing      IMPRESSION:     Multilevel spondylosis as described most pronounced at L5-S1         Workstation performed: [id number]                No orders to display         Orders Placed This Encounter   Procedures    Ambulatory referral to Physical Therapy

## 2022-04-18 NOTE — PATIENT INSTRUCTIONS
Methocarbamol (By mouth)   Methocarbamol (meth-oh-PINKY-ba-mol)  Treats muscle pain and spasms  Brand Name(s): Robaxin   There may be other brand names for this medicine  When This Medicine Should Not Be Used: This medicine is not right for everyone  Do not use it if you had an allergic reaction to methocarbamol  How to Use This Medicine:   Tablet  · Take your medicine as directed  Your dose may need to be changed several times to find what works best for you  · Missed dose: Take a dose as soon as you remember  If it is almost time for your next dose, wait until then and take a regular dose  Do not take extra medicine to make up for a missed dose  · Store the medicine in a closed container at room temperature, away from heat, moisture, and direct light  Drugs and Foods to Avoid:   Ask your doctor or pharmacist before using any other medicine, including over-the-counter medicines, vitamins, and herbal products  · Some medicines can affect how methocarbamol works  Tell your doctor if you are using pyridostigmine  · Do not drink alcohol while you are using this medicine  · Tell your doctor if you use anything else that makes you sleepy  Some examples are allergy medicine, narcotic pain medicine, and alcohol  Warnings While Using This Medicine:   · Tell your doctor if you are pregnant or breastfeeding, or if you have kidney disease, liver disease, or myasthenia gravis  · This medicine may make you dizzy or drowsy  Do not drive or do anything that could be dangerous until you know how this medicine affects you  · Tell any doctor or dentist who treats you that you are using this medicine  This medicine may affect certain medical test results  · Your doctor will check your progress and the effects of this medicine at regular visits  Keep all appointments  · Keep all medicine out of the reach of children  Never share your medicine with anyone    Possible Side Effects While Using This Medicine:   Call your doctor right away if you notice any of these side effects:  · Allergic reaction: Itching or hives, swelling in your face or hands, swelling or tingling in your mouth or throat, chest tightness, trouble breathing  · Blurred vision, redness, pain, or swelling of the eyes  · Dark urine or pale stools, nausea, vomiting, loss of appetite, stomach pain, yellow skin or eyes  · Fever  · Lightheadedness, dizziness, fainting  · Seizures  · Slow heartbeat  · Unusual bleeding, bruising, or weakness  If you notice these less serious side effects, talk with your doctor:   · Confusion, trouble sleeping  · Headache  · Warmth or redness in your face, neck, arms, or upper chest  If you notice other side effects that you think are caused by this medicine, tell your doctor  Call your doctor for medical advice about side effects  You may report side effects to FDA at 7-538-FDA-0418    © Copyright [employer] 2022 Information is for End User's use only and may not be sold, redistributed or otherwise used for commercial purposes  The above information is an  only  It is not intended as medical advice for individual conditions or treatments  Talk to your doctor, nurse or pharmacist before following any medical regimen to see if it is safe and effective for you

## 2022-04-18 NOTE — PROGRESS NOTES
Pain Medicine Follow-Up Note    Assessment:  1  Chronic pain syndrome    2  Lumbar spondylosis    3  Lumbar disc disease with radiculopathy    4  Spasm of back muscles    5  Myofascial pain syndrome        Plan:  Orders Placed This Encounter   Procedures    Ambulatory referral to Physical Therapy     Standing Status:   Future     Standing Expiration Date:   2023     Referral Priority:   Routine     Referral Type:   Physical Therapy     Referral Reason:   Specialty Services Required     Requested Specialty:   Physical Therapy     Number of Visits Requested:   1     Expiration Date:   2023       New Medications Ordered This Visit   Medications    rosuvastatin (CRESTOR) 20 MG tablet    ARIPiprazole (ABILIFY) 10 mg tablet    zolpidem (AMBIEN) 10 mg tablet    methocarbamol (ROBAXIN) 500 mg tablet     Sig: Take 1 tablet (500 mg total) by mouth 3 (three) times a day May take 2 tablets if 1 tablet is ineffective     Dispense:  120 tablet     Refill:  0       My impressions and treatment recommendations were discussed in detail with the patient who verbalized understanding and had no further questions  Patient was seen in the office today for follow-up after having his lumbar MRI  He has chronic pain secondary to lumbar spondylosis  Given that the patient presents with bilateral axial low back pain, we will do the followin  Schedule bilateral L3-S1 diagnostic medial branch blocks with fluoroscopy guidance  If they are successful, we will move forward with radiofrequency ablation  At a thorough discussion with the patient using the spine model about this procedure and he verbalized understanding and wanted to move forward  2  Referral for physical therapy was provided to the patient  3  Start methocarbamol 500 mg up to 3 times a day as needed for muscle spasms    Side effects were reviewed with the patient and he verbalized understanding and prescription was sent to the pharmacy on file     Patient currently takes Xarelto so we will hold off prescribing any NSAIDs for him at this time  Complete risks and benefits including bleeding, infection, tissue reaction, nerve injury and allergic reaction were discussed  The approach was demonstrated using models and literature was provided  Verbal and written consent was obtained  Follow-up is planned in 8 weeks time or sooner as warranted  Discharge instructions were provided  I personally saw and examined the patient and I agree with the above discussed plan of care  History of Present Illness:    Ángel Otero is a 79 y o  male who presents to [facility] for interval re-evaluation of the above stated pain complaints  The patient has a past medical and chronic pain history as outlined in the assessment section  He was last seen on 02/14/2022  He reports a pain score of 10/10 that is constant  He states that the pain is in his low back bilaterally and does not radiate down his legs but actually shoots up the middle of his spine  He describes the quality of his pain as a dull aching in his low back  Other than as stated above, the patient denies any interval changes in medications, medical condition, mental condition, symptoms, or allergies since the last office visit  Review of Systems:    Review of Systems   Constitutional: Negative for chills and fatigue  HENT: Negative for ear pain, mouth sores and sinus pressure  Eyes: Negative for pain, redness and visual disturbance  Respiratory: Negative for shortness of breath and wheezing  Cardiovascular: Negative for chest pain and palpitations  Gastrointestinal: Negative for abdominal pain and nausea  Endocrine: Negative for polyphagia  Musculoskeletal: Positive for back pain, gait problem and myalgias  Negative for arthralgias and neck pain  Skin: Negative for wound  Neurological: Negative for seizures and weakness  Psychiatric/Behavioral: Negative for dysphoric mood and sleep disturbance  Patient Active Problem List   Diagnosis    Chronic atrial fibrillation ([facility] )    Coronary artery disease involving native coronary artery of native heart without angina pectoris    Mixed hyperlipidemia    Essential hypertension    Acute on chronic systolic (congestive) heart failure (HCC)    Anxiety    Other insomnia    Depression, recurrent (HCC)    Hypertriglyceridemia    Dilated aortic root (HCC)    Chronic midline low back pain with bilateral sciatica    Class 1 obesity due to excess calories without serious comorbidity with body mass index (BMI) of 32 0 to 32 9 in adult    Gallbladder polyp    Alcohol use    Type 2 diabetes mellitus, without long-term current use of insulin (HCC)    Suspected Chronic obstructive pulmonary disease with acute exacerbation (HCC)    Obstructive sleep apnea       Past Medical History:   Diagnosis Date    Anxiety     Atrial fibrillation ([place] [address] )     Depression     Diabetes mellitus ([place] [address] )     Heart disease     Hyperlipidemia     Hypertension     Insomnia     Leukocytosis 2021       History reviewed  No pertinent surgical history  Family History   Problem Relation Age of Onset    No Known Problems Mother     No Known Problems Father     Ovarian cancer Sister     Substance Abuse Neg Hx     Mental illness Neg Hx        Social History     Occupational History    Not on file   Tobacco Use    Smoking status: Current Some Day Smoker     Packs/day: 0 50     Years: 40 00     Pack years: 20 00     Types: Cigarettes     Last attempt to quit: 2015     Years since quittin 6    Smokeless tobacco: Never Used   Vaping Use    Vaping Use: Never used   Substance and Sexual Activity    Alcohol use:  Yes     Alcohol/week: 4 0 standard drinks     Types: 4 Cans of beer per week    Drug use: Not Currently    Sexual activity: Not Currently     Partners: Female         Current Outpatient Medications:     ALPRAZolam (XANAX) 1 mg tablet, 3 (three) times a day as needed , Disp: , Rfl:     ARIPiprazole (ABILIFY) 10 mg tablet, , Disp: , Rfl:     buPROPion (WELLBUTRIN SR) 150 mg 12 hr tablet, Take 150 mg by mouth 2 (two) times a day , Disp: , Rfl:     candesartan (ATACAND) 32 MG tablet, Take 32 mg by mouth, Disp: , Rfl:     FLUoxetine (PROzac) 20 mg capsule, daily , Disp: , Rfl:     metFORMIN (GLUCOPHAGE) 500 mg tablet, Take 1 tablet (500 mg total) by mouth daily with breakfast, Disp: 90 tablet, Rfl: 1    metoprolol tartrate (LOPRESSOR) 100 mg tablet, Take 1 tablet (100 mg total) by mouth every 12 (twelve) hours, Disp: , Rfl: 0    potassium chloride (K-DUR,KLOR-CON) 20 mEq tablet, , Disp: , Rfl:     pravastatin (PRAVACHOL) 80 mg tablet, Take 80 mg by mouth daily , Disp: , Rfl:     prazosin (MINIPRESS) 2 mg capsule, , Disp: , Rfl:     rosuvastatin (CRESTOR) 20 MG tablet, , Disp: , Rfl:     torsemide (DEMADEX) 20 mg tablet, , Disp: , Rfl:     Xarelto 20 MG tablet, Take 20 mg by mouth daily, Disp: , Rfl:     zolpidem (AMBIEN) 10 mg tablet, , Disp: , Rfl:     ALPRAZolam (XANAX) 0 5 mg tablet, Take 1 tablet (0 5 mg total) by mouth 2 (two) times a day as needed for anxiety (DAYTIME ONLY) for up to 6 doses (Patient not taking: Reported on 4/18/2022 ), Disp: 6 tablet, Rfl: 0    ARIPiprazole (ABILIFY) 5 mg tablet, , Disp: , Rfl:     chlorthalidone 25 mg tablet, , Disp: , Rfl:     methocarbamol (ROBAXIN) 500 mg tablet, Take 1 tablet (500 mg total) by mouth 3 (three) times a day May take 2 tablets if 1 tablet is ineffective, Disp: 120 tablet, Rfl: 0    zolpidem (AMBIEN) 5 mg tablet, Take 1 tablet (5 mg total) by mouth daily at bedtime as needed for sleep for up to 3 days, Disp: 3 tablet, Rfl: 0    No Known Allergies    Physical Exam:    /76   Pulse 75   Temp 97 7 °F (36 5 °C)   Ht 5' 11" (1 803 m)   Wt 119 kg (262 lb 3 2 oz)   BMI 36 57 kg/m² Constitutional:obese  Eyes:anicteric  HEENT:grossly intact  Neck:supple, symmetric, trachea midline and no masses   Pulmonary:even and unlabored  Cardiovascular:No edema or pitting edema present  Skin:Normal without rashes or lesions and well hydrated  Psychiatric:Mood and affect appropriate  Neurologic:Cranial Nerves II-XII grossly intact  Musculoskeletal:antalgic, stooped posture and Bilateral positive lumbar facet loading with the worst being on the left side  Patient was negative for straight leg testing bilaterally and negative JEFFREY  Imaging    Study Result    Narrative & Impression   MRI LUMBAR SPINE WITHOUT CONTRAST     INDICATION: M54 41: Lumbago with sciatica, right side  M54 42: Lumbago with sciatica, left side  G89 29: Other chronic pain      COMPARISON:  4/14/2021     TECHNIQUE:  Sagittal T1, sagittal T2, sagittal inversion recovery, axial T1 and axial T2, coronal T2     IMAGE QUALITY:  Diagnostic     FINDINGS:     VERTEBRAL BODIES:  There are 5 lumbar type vertebral bodies  Normal alignment of the lumbar spine  No spondylolysis or spondylolisthesis  No scoliosis  No compression fracture  Scattered degenerative endplate changes  No focally suspicious marrow   lesions  No bone marrow edema or compression abnormality      SACRUM:  Scattered degenerative endplate changes  No focally suspicious marrow lesions  No bone marrow edema or compression abnormality      DISTAL CORD AND CONUS:  Normal size and signal within the distal cord and conus  The conus medullaris terminates at the L1 level      PARASPINAL SOFT TISSUES:  Paraspinal soft tissues are unremarkable      LOWER THORACIC DISC SPACES:  Mild noncompressive lower thoracic degenerative change      LUMBAR DISC SPACES:     L1-L2:  There is loss of disc height and signal   There is bilateral facet hypertrophy  Small right neural foraminal disc protrusion  Mild right neural foraminal narrowing    Central canal and left neural foramen patent      L2-L3:  There is bilateral facet hypertrophy  There is loss of disc height and signal   There is a right neural foraminal disc protrusion  Moderate right neural foraminal narrowing  Mild central canal narrowing  Mild left neural foraminal narrowing      L3-L4:  There is bilateral facet hypertrophy  There is loss of disc height and signal   There is a right neural foraminal disc protrusion  Mild right neural foraminal narrowing  Central canal and left neural foramen patent      L4-L5:  There is loss of disc height and signal   There is a disc osteophyte complex with a superimposed left neural foraminal disc protrusion  There is bilateral facet hypertrophy  Moderate left neural foraminal narrowing  Mild central canal   narrowing  Mild to moderate right neural foraminal narrowing      L5-S1:  There is a central disc herniation, extrusion type  Herniated disc material extends cephalad along the dorsal margin of L5  There is mild central canal narrowing  There is severe left neural foraminal narrowing  There is bilateral facet   hypertrophy  Mild central canal narrowing    Moderate right neural foraminal narrowing      IMPRESSION:     Multilevel spondylosis as described most pronounced at L5-S1         Workstation performed: [id number]                No orders to display         Orders Placed This Encounter   Procedures    Ambulatory referral to Physical Therapy

## 2022-04-18 NOTE — TELEPHONE ENCOUNTER
Schedule patient for 5/13/22 and 6/3/22  No as needed pain meds for 6 hrs before and 6/8 hrs after procedure  Pain level must be 5 or greater  Need to arrange transportation  Proper clothing for procedure  If ill or placed on antibiotics please call to reschedule

## 2022-04-28 ENCOUNTER — RA CDI HCC (OUTPATIENT)
Dept: OTHER | Facility: HOSPITAL | Age: 67
End: 2022-04-28

## 2022-04-28 NOTE — PROGRESS NOTES
Nancie [facility]  coding opportunities          Chart Reviewed number of suggestions sent to Provider: 2    [phone]     Patients Insurance     Medicare Insurance: [facility] Advantage

## 2022-04-29 DIAGNOSIS — E11.69 TYPE 2 DIABETES MELLITUS WITH OTHER SPECIFIED COMPLICATION, WITHOUT LONG-TERM CURRENT USE OF INSULIN (HCC): ICD-10-CM

## 2022-05-05 ENCOUNTER — OFFICE VISIT (OUTPATIENT)
Dept: FAMILY MEDICINE CLINIC | Facility: CLINIC | Age: 67
End: 2022-05-05
Payer: COMMERCIAL

## 2022-05-05 VITALS
DIASTOLIC BLOOD PRESSURE: 88 MMHG | BODY MASS INDEX: 42.11 KG/M2 | WEIGHT: 262 LBS | SYSTOLIC BLOOD PRESSURE: 138 MMHG | TEMPERATURE: 97 F | HEART RATE: 81 BPM | OXYGEN SATURATION: 98 % | HEIGHT: 66 IN

## 2022-05-05 DIAGNOSIS — I25.10 CORONARY ARTERY DISEASE INVOLVING NATIVE CORONARY ARTERY OF NATIVE HEART WITHOUT ANGINA PECTORIS: ICD-10-CM

## 2022-05-05 DIAGNOSIS — I10 ESSENTIAL HYPERTENSION: Chronic | ICD-10-CM

## 2022-05-05 DIAGNOSIS — Z72.89 ALCOHOL USE: ICD-10-CM

## 2022-05-05 DIAGNOSIS — M54.42 CHRONIC MIDLINE LOW BACK PAIN WITH BILATERAL SCIATICA: ICD-10-CM

## 2022-05-05 DIAGNOSIS — I77.810 DILATED AORTIC ROOT (HCC): ICD-10-CM

## 2022-05-05 DIAGNOSIS — I48.20 CHRONIC ATRIAL FIBRILLATION (HCC): Chronic | ICD-10-CM

## 2022-05-05 DIAGNOSIS — E66.01 MORBID OBESITY WITH BMI OF 40.0-44.9, ADULT (HCC): ICD-10-CM

## 2022-05-05 DIAGNOSIS — E78.2 MIXED HYPERLIPIDEMIA: Chronic | ICD-10-CM

## 2022-05-05 DIAGNOSIS — E11.69 TYPE 2 DIABETES MELLITUS WITH OTHER SPECIFIED COMPLICATION, WITHOUT LONG-TERM CURRENT USE OF INSULIN (HCC): Primary | ICD-10-CM

## 2022-05-05 DIAGNOSIS — F33.9 DEPRESSION, RECURRENT (HCC): Chronic | ICD-10-CM

## 2022-05-05 DIAGNOSIS — G89.29 CHRONIC MIDLINE LOW BACK PAIN WITH BILATERAL SCIATICA: ICD-10-CM

## 2022-05-05 DIAGNOSIS — M54.41 CHRONIC MIDLINE LOW BACK PAIN WITH BILATERAL SCIATICA: ICD-10-CM

## 2022-05-05 PROBLEM — J44.1 CHRONIC OBSTRUCTIVE PULMONARY DISEASE WITH ACUTE EXACERBATION (HCC): Status: RESOLVED | Noted: 2021-08-10 | Resolved: 2022-05-05

## 2022-05-05 PROBLEM — F10.939: Status: ACTIVE | Noted: 2022-05-05

## 2022-05-05 PROBLEM — I50.23 ACUTE ON CHRONIC SYSTOLIC (CONGESTIVE) HEART FAILURE (HCC): Status: RESOLVED | Noted: 2020-08-28 | Resolved: 2022-05-05

## 2022-05-05 LAB — SL AMB POCT HEMOGLOBIN AIC: 5.4 (ref ?–6.5)

## 2022-05-05 PROCEDURE — 3008F BODY MASS INDEX DOCD: CPT | Performed by: NURSE PRACTITIONER

## 2022-05-05 PROCEDURE — 99214 OFFICE O/P EST MOD 30 MIN: CPT | Performed by: NURSE PRACTITIONER

## 2022-05-05 PROCEDURE — 3075F SYST BP GE 130 - 139MM HG: CPT | Performed by: NURSE PRACTITIONER

## 2022-05-05 PROCEDURE — 83036 HEMOGLOBIN GLYCOSYLATED A1C: CPT | Performed by: NURSE PRACTITIONER

## 2022-05-05 PROCEDURE — 3044F HG A1C LEVEL LT 7.0%: CPT | Performed by: NURSE PRACTITIONER

## 2022-05-05 PROCEDURE — 1160F RVW MEDS BY RX/DR IN RCRD: CPT | Performed by: NURSE PRACTITIONER

## 2022-05-05 PROCEDURE — 3079F DIAST BP 80-89 MM HG: CPT | Performed by: NURSE PRACTITIONER

## 2022-05-05 NOTE — PROGRESS NOTES
Assessment/Plan:  1  Type 2 diabetes mellitus with other specified complication, without long-term current use of insulin (HCC)  Controlled  Carb controlled diet  Monitor  Weight loss  Regular exercise  - POCT hemoglobin A1c  - Hemoglobin A1C; Future  - Comprehensive metabolic panel; Future  - Lipid Panel with Direct LDL reflex; Future  2  Chronic atrial fibrillation (HCC)  Stable on xarelto  Managed by cardio  3  Coronary artery disease involving native coronary artery of native heart without angina pectoris  Stable  Managed by cardio  Continue current meds  4  Dilated aortic root (HCC)  Stable  Managed by cardio  5  Depression, recurrent ([facility] )  Stable  Managed by [facility]  No change to current meds  anticipatory guidance  Denies any suicidal ideation  Contracted for safety  6  Morbid obesity with BMI of 40 0-44 9, adult ([facility] )  Weight loss is recommended to improve overall health  Dietary changes- limit carbohydrates, decrease overall caloric intake, reduce portion sizes, healthier snack choices, limit saturated fats, increase intake of fruits and vegetables, limit junk food  Increase exercise to 3-5 times per week  Consider adding strength exercises to exercise routine  7  Mixed hyperlipidemia  Heart healthy diet  Statin  - Comprehensive metabolic panel; Future  - Lipid Panel with Direct LDL reflex; Future  8  Essential hypertension  Stable  Continue blood pressure medications as ordered  Monitor blood pressure  Bring diary/log of readings to next appointment  Limit salt in diet  Comprehensive metabolic panel; Future  9  Alcohol use  Discussed risks of continue etoh  Limit  Monitor  anticipatory guidance  10  Chronic midline low back pain with bilateral sciatica  Pain management  Injections scheduled  Monitor  BMI Counseling: Body mass index is 42 29 kg/m²   The BMI is above normal  Nutrition recommendations include reducing portion sizes, decreasing overall calorie intake, moderation in carbohydrate intake, reducing intake of saturated fat and trans fat and reducing intake of cholesterol  Exercise recommendations include exercising 3-5 times per week  Patient was counseled regarding instructions for management which included: impression/diagnosis, risk/benefits of treatment options, importance of compliance with treatment, risk factor reduction, and prognosis  I have reviewed the instructions with the patient answering all questions and patient verbalized understanding  Subjective:      Patient ID: Paolo Wilson is a 79 y o  male who presents for follow up    Here for follow up on multiple chronic conditions  DM, controlled  Sees multiple specialists including pulmonary/sleep medicine, cardio, pain management, orthopedics, psych, and hematology  Generally feels well except for ongoing back pain  Chronic back pain  Getting injections with pain management  On xarelto for afib  Mental health managed by psych  Admits to drinking 3-4 beers per day  Taking meds as prescribed  No other issues or concerns  The following portions of the patient's history were reviewed and updated as appropriate: allergies, current medications, past family history, past medical history, past social history, past surgical history and problem list     Review of Systems   Constitutional: Negative for fever and unexpected weight change  HENT: Negative for congestion  Respiratory: Negative for chest tightness and shortness of breath  Cardiovascular: Negative for chest pain, palpitations and leg swelling  Gastrointestinal: Negative for abdominal pain  Endocrine: Negative for cold intolerance, heat intolerance, polydipsia, polyphagia and polyuria  Genitourinary: Negative for dysuria  Musculoskeletal: Positive for back pain  Skin: Negative for color change  Neurological: Negative for dizziness and headaches  Psychiatric/Behavioral: Positive for dysphoric mood   Negative for self-injury and suicidal ideas  Objective:  poct A1C 5 4    /88   Pulse 81   Temp (!) 97 °F (36 1 °C) (Temporal)   Ht 5' 6" (1 676 m)   Wt 119 kg (262 lb)   SpO2 98%   BMI 42 29 kg/m²          Physical Exam  Vitals reviewed  Constitutional:       General: He is not in acute distress  Appearance: He is obese  He is not ill-appearing  Neck:      Vascular: No carotid bruit  Cardiovascular:      Rate and Rhythm: Normal rate  Rhythm irregular  Pulmonary:      Effort: Pulmonary effort is normal  No respiratory distress  Breath sounds: Normal breath sounds  No wheezing or rales  Abdominal:      General: There is no distension  Palpations: Abdomen is soft  Musculoskeletal:      Cervical back: Normal range of motion  Skin:     General: Skin is warm and dry  Coloration: Skin is not jaundiced or pale  Neurological:      Mental Status: He is alert  Psychiatric:         Behavior: Behavior normal          Thought Content:  Thought content normal          Judgment: Judgment normal       Comments: Affect flat

## 2022-05-05 NOTE — PATIENT INSTRUCTIONS
10% - bad control"> 10% - bad control,Hemoglobin A1c (HbA1c) greater than 10% indicating poor diabetic control,Haemoglobin A1c greater than 10% indicating poor diabetic control">   Diabetes Mellitus Type 2 in Adults, Ambulatory Care   GENERAL INFORMATION:   Diabetes mellitus type 2  is a disease that affects how your body uses glucose (sugar)  Insulin helps move sugar out of the blood so it can be used for energy  Normally, when the blood sugar level increases, the pancreas makes more insulin  Type 2 diabetes develops because either the body cannot make enough insulin, or it cannot use the insulin correctly  After many years, your pancreas may stop making insulin  Common symptoms include the following:   · More hunger or thirst than usual     · Frequent urination     · Weight loss without trying     · Blurred vision  Seek immediate care for the following symptoms:   · Severe abdominal pain, or pain that spreads to your back  You may also be vomiting  · Trouble staying awake or focusing    · Shaking or sweating    · Blurred or double vision    · Breath has a fruity, sweet smell    · Breathing is deep and labored, or rapid and shallow    · Heartbeat is fast and weak  Treatment for diabetes mellitus type 2  includes keeping your blood sugar at a normal level  You must eat the right foods, and exercise regularly  You may also need medicine if you cannot control your blood sugar level with nutrition and exercise  Manage diabetes mellitus type 2:   · Check your blood sugar level  You will be taught how to check a small drop of blood in a glucose monitor  Ask your healthcare provider when and how often to check during the day  Ask your healthcare provider what your blood sugar levels should be when you check them  · Keep track of carbohydrates (sugar and starchy foods)  Your blood sugar level can get too high if you eat too many carbohydrates   Your dietitian will help you plan meals and snacks that have the right amount of carbohydrates  · Eat low-fat foods  Some examples are skinless chicken and low-fat milk  · Eat less sodium (salt)  Some examples of high-sodium foods to limit are soy sauce, potato chips, and soup  Do not add salt to food you cook  Limit your use of table salt  · Eat high-fiber foods  Foods that are a good source of fiber include vegetables, whole grain bread, and beans  · Limit alcohol  Alcohol affects your blood sugar level and can make it harder to manage your diabetes  Women should limit alcohol to 1 drink a day  Men should limit alcohol to 2 drinks a day  A drink of alcohol is 12 ounces of beer, 5 ounces of wine, or 1½ ounces of liquor  · Get regular exercise  Exercise can help keep your blood sugar level steady, decrease your risk of heart disease, and help you lose weight  Exercise for at least 30 minutes, 5 days a week  Include muscle strengthening activities 2 days each week  Work with your healthcare provider to create an exercise plan  · Check your feet each day  for injuries or open sores  Ask your healthcare provider for activities you can do if you have an open sore  · Quit smoking  If you smoke, it is never too late to quit  Smoking can worsen the problems that may occur with diabetes  Ask your healthcare provider for information about how to stop smoking if you are having trouble quitting  · Ask about your weight:  Ask healthcare providers if you need to lose weight, and how much to lose  Ask them to help you with a weight loss program  Even a 10 to 15 pound weight loss can help you manage your blood sugar level  · Carry medical alert identification  Wear medical alert jewelry or carry a card that says you have diabetes  Ask your healthcare provider where to get these items  · Ask about vaccines  Diabetes puts you at risk of serious illness if you get the flu, pneumonia, or hepatitis   Ask your healthcare provider if you should get a flu, pneumonia, or hepatitis B vaccine, and when to get the vaccine  Follow up with your healthcare provider as directed:  Write down your questions so you remember to ask them during your visits  CARE AGREEMENT:   You have the right to help plan your care  Learn about your health condition and how it may be treated  Discuss treatment options with your caregivers to decide what care you want to receive  You always have the right to refuse treatment  The above information is an  only  It is not intended as medical advice for individual conditions or treatments  Talk to your doctor, nurse or pharmacist before following any medical regimen to see if it is safe and effective for you  © 2014 [address] is for End User's use only and may not be sold, redistributed or otherwise used for commercial purposes  All illustrations and images included in CareNotes® are the copyrighted property of A D A M [employer] Inc  or Alonzo Ragland  Heart healthy, carbohydrate controlled diet- limit red meat, limit saturated fat, moderate salt intake, limit junk food, etc    Regular exercise  Stress management  Routine labwork and screenings as ordered  Will check all labs in 6 months  Diabetes appeared to be well controlled at this time     Limit alcohol use/intake as discussed

## 2022-05-13 ENCOUNTER — HOSPITAL ENCOUNTER (OUTPATIENT)
Facility: AMBULARY SURGERY CENTER | Age: 67
Setting detail: OUTPATIENT SURGERY
Discharge: HOME | End: 2022-05-13
Attending: ANESTHESIOLOGY | Admitting: ANESTHESIOLOGY
Payer: COMMERCIAL

## 2022-05-13 ENCOUNTER — APPOINTMENT (OUTPATIENT)
Dept: RADIOLOGY | Facility: HOSPITAL | Age: 67
End: 2022-05-13
Payer: COMMERCIAL

## 2022-05-13 VITALS
RESPIRATION RATE: 18 BRPM | TEMPERATURE: 96.3 F | SYSTOLIC BLOOD PRESSURE: 152 MMHG | DIASTOLIC BLOOD PRESSURE: 95 MMHG | OXYGEN SATURATION: 98 % | HEART RATE: 88 BPM

## 2022-05-13 DIAGNOSIS — G89.4 CHRONIC PAIN SYNDROME: ICD-10-CM

## 2022-05-13 PROCEDURE — 82948 REAGENT STRIP/BLOOD GLUCOSE: CPT

## 2022-05-13 PROCEDURE — 64494 INJ PARAVERT F JNT L/S 2 LEV: CPT | Performed by: ANESTHESIOLOGY

## 2022-05-13 PROCEDURE — 62323 NJX INTERLAMINAR LMBR/SAC: CPT

## 2022-05-13 PROCEDURE — 64493 INJ PARAVERT F JNT L/S 1 LEV: CPT | Performed by: ANESTHESIOLOGY

## 2022-05-13 RX ORDER — LIDOCAINE HYDROCHLORIDE 10 MG/ML
INJECTION, SOLUTION INFILTRATION; PERINEURAL AS NEEDED
Status: DISCONTINUED | OUTPATIENT
Start: 2022-05-13 | End: 2022-05-13 | Stop reason: HOSPADM

## 2022-05-13 RX ORDER — LIDOCAINE WITH 8.4% SOD BICARB 0.9%(10ML)
SYRINGE (ML) INJECTION AS NEEDED
Status: DISCONTINUED | OUTPATIENT
Start: 2022-05-13 | End: 2022-05-13 | Stop reason: HOSPADM

## 2022-05-13 NOTE — DISCHARGE INSTRUCTIONS

## 2022-05-13 NOTE — OP NOTE
ATTENDING PHYSICIAN:  Sandoval Dela Cruz MD     PRE-PROCEDURE DIAGNOSIS:  Lumbar facet arthropathy  POST-PROCEDURE DIAGNOSIS:  Lumbar facet arthropathy  PROCEDURE:  Bilateral lumbar diagnostic medial branch blocks at the L4, L5, and S1 levels  ESTIMATED BLOOD LOSS:  Minimal     COMPLICATIONS:  None  ANESTHESIA:  Local     LOCATION:  [address]  CONSENT: Today's procedure and its risks, benefits, as well as alternatives were explained to the patient  Risks include but are not limited to bleeding, infection, weakness, nerve irritation or damage, hematoma formation, abscess formation, failure the pain to improve, or potential worsening of the pain  The patient verbalized understanding and wished to proceed with the procedure  Written informed consent was thereby obtained  DESCRIPTION OF PROCEDURE: After written informed consent was obtained, the patient was taken to the fluoroscopy suite and placed in the prone position  Anatomical landmarks were identified with the aid of fluoroscopy in the PA and oblique views  The patient's lumbar region was prepped with antiseptic solution and draped in the usual sterile fashion  Strict aseptic technique was utilized  The skin and subcutaneous tissues at each needle entry site was infiltrated with a small amount of 1% preservative-free lidocaine using a 25-gauge 1-1/2-inch needle  A 25-gauge 3-1/2-inch needle was then incrementally advanced under fluoroscopic guidance in multiple views at each level such that the needle tip was advanced to contact os at the junction of the superior articulating process and the superomedial border of the transverse process at each of the cephalad levels as well as to contact os at the junction of the superior articulating process and the superomedial border of the sacral ala at the S1 level   After negative aspiration was confirmed, 0 5 mL of preservative-free 1% Lidocaine was injected into the cephalad needles and 1 mL of preservative-free 1% Lidocaine was injected into the needles at the S1 level  All needles were removed intact and hemostasis was maintained throughout  The patient tolerated the procedure well and no paresthesias or other complications were reported during or following this procedure  The antiseptic was wiped clean and Band-Aids were placed as appropriate  The patient was transferred to the recovery area and was observed for an appropriate period of time during which the patient remained hemodynamically stable and neurovascularly intact as the patient was prior to the procedure  The patient was subsequently discharged home in stable condition with supervision  The patient was instructed to call the office in a few days with an update  Discharge instructions were provided  I was present for and participated in all key and critical portions of this procedure      Nelsy Benson MD  5/13/2022  11:36 AM

## 2022-05-13 NOTE — INTERVAL H&P NOTE
H&P reviewed  After examining the patient I find no changes in the patients condition since the H&P had been written      Vitals:    05/13/22 1057   BP: 146/97   Pulse: 85   Resp: 16   Temp: (!) 96 3 °F (35 7 °C)   SpO2: 97%

## 2022-05-16 LAB — GLUCOSE SERPL-MCNC: 96 MG/DL (ref 65–140)

## 2022-05-17 ENCOUNTER — TELEPHONE (OUTPATIENT)
Dept: PAIN MEDICINE | Facility: CLINIC | Age: 67
End: 2022-05-17

## 2022-05-17 NOTE — TELEPHONE ENCOUNTER
S/w pt  Pt states that he had % relief for the first half hour after the procedure, but when he was walking for a few hours after the procedure , his pain relief was 0%  Pt then went home and rested for the rest of the day

## 2022-06-03 ENCOUNTER — APPOINTMENT (OUTPATIENT)
Dept: RADIOLOGY | Facility: HOSPITAL | Age: 67
End: 2022-06-03
Payer: COMMERCIAL

## 2022-06-03 ENCOUNTER — HOSPITAL ENCOUNTER (OUTPATIENT)
Facility: AMBULARY SURGERY CENTER | Age: 67
Setting detail: OUTPATIENT SURGERY
Discharge: HOME | End: 2022-06-03
Attending: ANESTHESIOLOGY | Admitting: ANESTHESIOLOGY
Payer: COMMERCIAL

## 2022-06-03 VITALS
OXYGEN SATURATION: 97 % | SYSTOLIC BLOOD PRESSURE: 131 MMHG | HEART RATE: 82 BPM | DIASTOLIC BLOOD PRESSURE: 91 MMHG | RESPIRATION RATE: 18 BRPM | TEMPERATURE: 98 F

## 2022-06-03 LAB — GLUCOSE SERPL-MCNC: 120 MG/DL (ref 65–140)

## 2022-06-03 PROCEDURE — 82948 REAGENT STRIP/BLOOD GLUCOSE: CPT

## 2022-06-03 PROCEDURE — 64494 INJ PARAVERT F JNT L/S 2 LEV: CPT | Performed by: ANESTHESIOLOGY

## 2022-06-03 PROCEDURE — 64493 INJ PARAVERT F JNT L/S 1 LEV: CPT | Performed by: ANESTHESIOLOGY

## 2022-06-03 RX ORDER — BUPIVACAINE HYDROCHLORIDE 2.5 MG/ML
INJECTION, SOLUTION EPIDURAL; INFILTRATION; INTRACAUDAL AS NEEDED
Status: DISCONTINUED | OUTPATIENT
Start: 2022-06-03 | End: 2022-06-03 | Stop reason: HOSPADM

## 2022-06-03 RX ORDER — LIDOCAINE WITH 8.4% SOD BICARB 0.9%(10ML)
SYRINGE (ML) INJECTION AS NEEDED
Status: DISCONTINUED | OUTPATIENT
Start: 2022-06-03 | End: 2022-06-03 | Stop reason: HOSPADM

## 2022-06-03 NOTE — OP NOTE
ATTENDING PHYSICIAN:  Sunil Galicia MD     PRE-PROCEDURE DIAGNOSIS:  Lumbar facet arthropathy  POST-PROCEDURE DIAGNOSIS:  Lumbar facet arthropathy  PROCEDURE:  Bilateral lumbar diagnostic medial branch blocks at the L4, L5, and S1 levels  ESTIMATED BLOOD LOSS:  Minimal     COMPLICATIONS:  None  ANESTHESIA:  Local     LOCATION:  [facility]  CONSENT: Today's procedure and its risks, benefits, as well as alternatives were explained to the patient  Risks include but are not limited to bleeding, infection, weakness, nerve irritation or damage, hematoma formation, abscess formation, failure the pain to improve, or potential worsening of the pain  The patient verbalized understanding and wished to proceed with the procedure  Written informed consent was thereby obtained  DESCRIPTION OF PROCEDURE: After written informed consent was obtained, the patient was taken to the fluoroscopy suite and placed in the prone position  Anatomical landmarks were identified with the aid of fluoroscopy in the PA and oblique views  The patient's lumbar region was prepped with antiseptic solution and draped in the usual sterile fashion  Strict aseptic technique was utilized  The skin and subcutaneous tissues at each needle entry site was infiltrated with a small amount of 1% preservative-free lidocaine using a 25-gauge 1-1/2-inch needle  A 25-gauge 3-1/2-inch needle was then incrementally advanced under fluoroscopic guidance in multiple views at each level such that the needle tip was advanced to contact os at the junction of the superior articulating process and the superomedial border of the transverse process at each of the cephalad levels as well as to contact os at the junction of the superior articulating process and the superomedial border of the sacral ala at the S1 level   After negative aspiration was confirmed, 0 5 mL of preservative-free 0 25% Bupivicaine was injected into the cephalad needles and 1 mL of preservative-free 0 25% Bupivicaine was injected into the needles at the S1 level  All needles were removed intact and hemostasis was maintained throughout  The patient tolerated the procedure well and no paresthesias or other complications were reported during or following this procedure  The antiseptic was wiped clean and Band-Aids were placed as appropriate  The patient was transferred to the recovery area and was observed for an appropriate period of time during which the patient remained hemodynamically stable and neurovascularly intact as the patient was prior to the procedure  The patient was subsequently discharged home in stable condition with supervision  The patient was instructed to call the office in a few days with an update  Discharge instructions were provided  I was present for and participated in all key and critical portions of this procedure      Sandoval Dela Cruz MD  6/3/2022  12:36 PM

## 2022-06-03 NOTE — DISCHARGE INSTRUCTIONS

## 2022-06-03 NOTE — H&P
History of Present Illness: The patient is a 79 y o  male who presents with complaints of low back pain  Patient Active Problem List   Diagnosis    Chronic atrial fibrillation ([place] [address] )    Coronary artery disease involving native coronary artery of native heart without angina pectoris    Mixed hyperlipidemia    Essential hypertension    Anxiety    Other insomnia    Depression, recurrent ([place] [address] )    Hypertriglyceridemia    [facility]ilated [facility] root ([facility])    Chronic midline low back pain with bilateral sciatica    Morbid obesity with BMI of 40 0-44 9, adult ([place] [address] )    Gallbladder polyp    Alcohol use    Type 2 diabetes mellitus, without long-term current use of insulin ([facility])    Obstructive sleep apnea    Alcohol use, unspecified with withdrawal, unspecified ([place] [address] )       Past Medical History:   Diagnosis Date    Acute on chronic systolic (congestive) heart failure ([facility]) 8/28/2020    Anxiety     Atrial fibrillation ([place] [address] )     Depression     Diabetes mellitus ([place] [address] )     Heart disease     Hyperlipidemia     Hypertension     Insomnia     Leukocytosis 8/12/2021    Suspected Chronic obstructive pulmonary disease with acute exacerbation ([place] [address] ) 8/10/2021       Past Surgical History:   Procedure Laterality Date    FL INJECTION LEFT ELBOW (NON ARTHROGRAM)  5/13/2022    NERVE BLOCK Bilateral 5/13/2022    Procedure: L4 L5 SI MEDIAL BRANCH BLOCK ([id number]); Surgeon: Nelsy Benson MD;  Location: [facility] MAIN OR;  Service: Pain Management        No current facility-administered medications for this encounter  No Known Allergies    Physical Exam: There were no vitals filed for this visit    General: Awake, Alert, Oriented x 3, Mood and affect appropriate  Respiratory: Respirations even and unlabored  Cardiovascular: Peripheral pulses intact; no edema  Musculoskeletal Exam:  Lumbar facet loading is positive    ASA Score: 2         Assessment:  Lumbar facet syndrome    Plan:  Proceed with bilateral L4-S1 medial branch blocks

## 2022-06-03 NOTE — H&P (VIEW-ONLY)
History of Present Illness: The patient is a 79 y o  male who presents with complaints of low back pain  Patient Active Problem List   Diagnosis    Chronic atrial fibrillation ([place] [address] )    Coronary artery disease involving native coronary artery of native heart without angina pectoris    Mixed hyperlipidemia    Essential hypertension    Anxiety    Other insomnia    Depression, recurrent ([place] [address] )    Hypertriglyceridemia    [facility]ilated [facility] root ([facility])    Chronic midline low back pain with bilateral sciatica    Morbid obesity with BMI of 40 0-44 9, adult ([place] [address] )    Gallbladder polyp    Alcohol use    Type 2 diabetes mellitus, without long-term current use of insulin ([facility])    Obstructive sleep apnea    Alcohol use, unspecified with withdrawal, unspecified ([place] [address] )       Past Medical History:   Diagnosis Date    Acute on chronic systolic (congestive) heart failure ([facility]) 8/28/2020    Anxiety     Atrial fibrillation ([place] [address] )     Depression     Diabetes mellitus ([place] [address] )     Heart disease     Hyperlipidemia     Hypertension     Insomnia     Leukocytosis 8/12/2021    Suspected Chronic obstructive pulmonary disease with acute exacerbation ([place] [address] ) 8/10/2021       Past Surgical History:   Procedure Laterality Date    FL INJECTION LEFT ELBOW (NON ARTHROGRAM)  5/13/2022    NERVE BLOCK Bilateral 5/13/2022    Procedure: L4 L5 SI MEDIAL BRANCH BLOCK ([id number]); Surgeon: Annalise Milian MD;  Location: [facility] MAIN OR;  Service: Pain Management        No current facility-administered medications for this encounter  No Known Allergies    Physical Exam: There were no vitals filed for this visit    General: Awake, Alert, Oriented x 3, Mood and affect appropriate  Respiratory: Respirations even and unlabored  Cardiovascular: Peripheral pulses intact; no edema  Musculoskeletal Exam:  Lumbar facet loading is positive    ASA Score: 2         Assessment:  Lumbar facet syndrome    Plan:  Proceed with bilateral L4-S1 medial branch blocks

## 2022-06-06 ENCOUNTER — TELEPHONE (OUTPATIENT)
Dept: PAIN MEDICINE | Facility: CLINIC | Age: 67
End: 2022-06-06

## 2022-06-06 NOTE — TELEPHONE ENCOUNTER
I think he just filled out the pain diary incorrectly -- but please confirm if he had >80% relief of symptoms for several hours and we can then schedule the RFA's  Please copy this into a telephone note after you speak to him

## 2022-06-07 NOTE — TELEPHONE ENCOUNTER
Spoke with Alexandra Miller  He was confused on filling out the form  He felt good after the procedure  He went to the store without pain  Around dinner time the pain came back  He had pain when he tried to sweep the floor  By the next the pain was total back      Schedule patient for RFA on 6/24/22 & 7/8/22    Proper clothing for procedure  If ill or placed on antibiotics please call to reschedule

## 2022-06-24 ENCOUNTER — TELEPHONE (OUTPATIENT)
Dept: PAIN MEDICINE | Facility: CLINIC | Age: 67
End: 2022-06-24

## 2022-06-24 ENCOUNTER — HOSPITAL ENCOUNTER (OUTPATIENT)
Facility: AMBULARY SURGERY CENTER | Age: 67
Setting detail: OUTPATIENT SURGERY
Discharge: HOME | End: 2022-06-24
Attending: ANESTHESIOLOGY | Admitting: ANESTHESIOLOGY
Payer: COMMERCIAL

## 2022-06-24 ENCOUNTER — APPOINTMENT (OUTPATIENT)
Dept: RADIOLOGY | Facility: HOSPITAL | Age: 67
End: 2022-06-24
Payer: COMMERCIAL

## 2022-06-24 VITALS
RESPIRATION RATE: 18 BRPM | HEART RATE: 98 BPM | OXYGEN SATURATION: 97 % | DIASTOLIC BLOOD PRESSURE: 99 MMHG | SYSTOLIC BLOOD PRESSURE: 140 MMHG | TEMPERATURE: 98.1 F

## 2022-06-24 DIAGNOSIS — M54.41 CHRONIC MIDLINE LOW BACK PAIN WITH BILATERAL SCIATICA: Primary | ICD-10-CM

## 2022-06-24 DIAGNOSIS — G89.29 CHRONIC MIDLINE LOW BACK PAIN WITH BILATERAL SCIATICA: Primary | ICD-10-CM

## 2022-06-24 DIAGNOSIS — M54.42 CHRONIC MIDLINE LOW BACK PAIN WITH BILATERAL SCIATICA: Primary | ICD-10-CM

## 2022-06-24 LAB — GLUCOSE SERPL-MCNC: 114 MG/DL (ref 65–140)

## 2022-06-24 PROCEDURE — 64636 DESTROY L/S FACET JNT ADDL: CPT | Performed by: ANESTHESIOLOGY

## 2022-06-24 PROCEDURE — 82948 REAGENT STRIP/BLOOD GLUCOSE: CPT

## 2022-06-24 PROCEDURE — 64635 DESTROY LUMB/SAC FACET JNT: CPT | Performed by: ANESTHESIOLOGY

## 2022-06-24 RX ORDER — LIDOCAINE WITH 8.4% SOD BICARB 0.9%(10ML)
SYRINGE (ML) INJECTION AS NEEDED
Status: DISCONTINUED | OUTPATIENT
Start: 2022-06-24 | End: 2022-06-24 | Stop reason: HOSPADM

## 2022-06-24 RX ORDER — LIDOCAINE HYDROCHLORIDE 20 MG/ML
INJECTION, SOLUTION EPIDURAL; INFILTRATION; INTRACAUDAL; PERINEURAL AS NEEDED
Status: DISCONTINUED | OUTPATIENT
Start: 2022-06-24 | End: 2022-06-24 | Stop reason: HOSPADM

## 2022-06-24 NOTE — DISCHARGE INSTRUCTIONS
Medial Branch Radiofrequency Ablation     WHAT YOU NEED TO KNOW:   Medial branch radiofrequency ablation (RFA) is a procedure used to treat facet joint pain in your neck, mid back, or lower back  Facet joints are found at the back of each vertebra  A needle electrode is used to send electrical currents to the nerves in your facet joint  The electrical currents create heat that damages the nerve so it cannot send pain signals  ACTIVITY  Do not drive or operate machinery today  No strenuous activity today - bending, lifting, etc   You may shower today, but do not sit in a tub of water  Resume normal activities tomorrow as tolerated  CARE OF THE INJECTION SITE  If you have soreness or pain, apply ice to the area today (20 minutes on/20 minutes off)  Starting tomorrow, you may use warm, moist heat or ice if needed  [facility] if you have any of the following: redness, drainage, swelling, or fever above 100°F     SPECIAL INSTRUCTIONS  Our office will call you tomorrow for a progress report and make an appointment for a follow up visit in 4 weeks  If you feel a sunburn-like sensation in the area of your procedure, call our office  MEDICATIONS  Continue to take all routine medications  Our office may have instructed you to hold some medications  As no general anesthesia was used in today's procedure, you should not experience any side effects related to anesthesia  If you have a problem specifically related to your procedure, please call our office at [phone]  Problems not related to your procedure should be directed to your primary care physician

## 2022-06-24 NOTE — OP NOTE
ATTENDING PHYSICIAN:   Noe Abbott MD      PREPROCEDURE DIAGNOSIS:  Left lumbar facet arthropathy  POSTPROCEDURE DIAGNOSIS:  Left lumbar facet arthropathy  PROCEDURE: Left L4, L5, and S1 lumbar facet nerve radiofrequency ablation under fluoroscopic guidance  ANESTHESIA:  Local     ESTIMATED BLOOD LOSS:  Minimal     COMPLICATIONS:  None  LOCATION:   [facility]  HISTORY OF PRESENTING ILLNESS:   We feel radiofrequency denervation of the L4, L5, and S1 facets is medically necessary, given the patient has disabling low back pain, which we suspect is facet mediated in the absence of nerve root compression or radicular pain  The patient has had two positive confirmatory diagnostic medial branch blocks  The patient has also failed three months of conservative therapy, including nonopioid medications, manipulation, physical therapy and home exercise program   The patient has not been treated with radiofrequency denervation at this anatomic location within the past six months  CONSENT:  Today's procedure, its potential benefits as well as its risks and potential side effects were reviewed  Discussed risks of the procedure including bleeding, infection, nerve irritation or damage, reactions to the medications, failure of the pain to improve, and potential worsening of the pain were explained in detail to the patient who verbalized understanding and who wished to proceed  Written informed consent was thereby obtained  DESCRIPTION OF THE PROCEDURE: After written informed consent was obtained, the patient was taken to the fluoroscopy suite and placed in the prone position  Anatomical landmarks were identified by way of palpation with fluoroscopy in the PA and oblique views  The patient's lumbar region was then prepped and draped in the usual sterile fashion using Chlorhexidine    The skin and subcutaneous tissues were subsequently infiltrated with approximately 1 ml of 1% preservative free Lidocaine at each of the four intended needle entry sites using a 25 gauge 1-1/2 inch needle  Via fluoroscopy in the AP and oblique views, an active tip needle was then incrementally advanced under fluoroscopic guidance at each level  At each of these levels, the needle tip was made to contact os at the superior medial border of the junction of the transverse process of the lumbar levels and to contact the os at the medial aspect of the groove formed by the sacral ala in the superior articular process of S1  After proper needle placement was confirmed with fluoroscopic guidance at each level, sensory and motor stimulation was performed at 2 Hz and 50 Hz  At all levels, there was negative extension into the lower extremities  Following this portion of the procedure, a 1 ml injectate of 2% preservative free Lidocaine was instilled at all of the levels  After a period of approximately 90 seconds, each level was lesioned at 90 degrees Celsius  Following the initial lesioning, each needle tip was repositioned x 2 under fluoroscopic guidance in a clockwise and counterclockwise fashion  Following each reposition, a total of two additional lesions at each side were performed for 90 seconds at 90 degrees Celsius  All needles were removed with the tips intact  The patient tolerated the procedure and hemostasis was maintained  There were no apparent paresthesias or complications  This skin was wiped clean and a Band-Aid was placed as appropriate  The patient was monitored for an appropriate period of time following the procedure and remained hemodynamically stable and neurovascularly intact  The patient was ultimately discharged to home with supervision in good condition and instructed to call the office to report the response  I was present for and participated in all key and critical portions of this procedure      Marijean Lesches, MD  6/24/2022  12:01 PM

## 2022-06-24 NOTE — TELEPHONE ENCOUNTER
**To be called on 6/27    Pt is s/p L L4-S1 RFA on 6/24 by [facility]  Pt is scheduled for right side on 7/8/22

## 2022-06-28 NOTE — TELEPHONE ENCOUNTER
Spoke to pt regarding S/P RFA on 6/24  Pt stated that he is still having the same pain as before the procedure  Pt denies fever, denies sunburn like sensation   Pt stated he is having pain in lower back radiating into upper back  Pt described pain as aching  Pt stated he is taking tylenol and using ice with no relief

## 2022-06-28 NOTE — TELEPHONE ENCOUNTER
Spoke to pt regarding RFA  Writing nurse advised pt that it takes 4-6 weeks for the full effect and to give it a little more time  Pt verbalized understanding of instructions

## 2022-07-01 RX ORDER — METHYLPREDNISOLONE 4 MG/1
TABLET ORAL
Qty: 21 TABLET | Refills: 0 | Status: SHIPPED | OUTPATIENT
Start: 2022-07-01

## 2022-07-01 NOTE — TELEPHONE ENCOUNTER
Pt informed that [facility] sent a 6 day supply of oral steroid to his pharmacy, instructions on how to take will be in the packet insert  Pt informed he may continue to use tylenol while taking the steroid but no more than 3000 mg in 24 hrs but no NSIDS while taking the steroid pack  Pt very appreciative

## 2022-07-01 NOTE — TELEPHONE ENCOUNTER
Methylprednisolone sent to the pharmacy  Please have the patient stop all nonsteroidal anti-inflammatory used during this time  The limits of the acetaminophen as you mentioned to the patient already  Thanks!

## 2022-07-01 NOTE — TELEPHONE ENCOUNTER
Patient called asking for pain medication- pain level is 10/10- pls use [facility] in [place]    Pt cant have NSAIDs    [phone]

## 2022-07-01 NOTE — TELEPHONE ENCOUNTER
Please send back to [facility]     Pt is s/p Lt lumbar RFA from 6/24/22 and is scheduled for Rt RFA on 7/8/22  Pt said his pain is bad  He has pain of his LB and it creeps up his back that he is bent over  He took (5) ES Tylenol this morning with breakfast and it didn't help He said he tried naproxen in the past and that didn't help either  He tried ice and heat to his back    RN explained to pt that he is not ever take (5) ES Tylenol at once  Explained to pt better to take (2) ES Tylenol Q 8 Hrs, he is not to exceed 3000 mg in 24 hrs  Explained to pt it takes 6 wks to see full effect from the ablation but I will reach out to Dr Artemio Rodriguez for recommendation for in the interim  Pain is 10/10  Uses [facility] on file

## 2022-07-06 NOTE — TELEPHONE ENCOUNTER
Pt called in he was prescribed methylPREDNISolone 4 MG tablet therapy pack     But was not given instructions on how to take it  Please be advised thank you    Pt can be reached @ [phone]

## 2022-07-08 ENCOUNTER — TELEPHONE (OUTPATIENT)
Dept: RADIOLOGY | Facility: CLINIC | Age: 67
End: 2022-07-08

## 2022-07-08 ENCOUNTER — HOSPITAL ENCOUNTER (OUTPATIENT)
Facility: AMBULARY SURGERY CENTER | Age: 67
Setting detail: OUTPATIENT SURGERY
Discharge: HOME | End: 2022-07-08
Attending: ANESTHESIOLOGY | Admitting: ANESTHESIOLOGY
Payer: COMMERCIAL

## 2022-07-08 ENCOUNTER — APPOINTMENT (OUTPATIENT)
Dept: RADIOLOGY | Facility: HOSPITAL | Age: 67
End: 2022-07-08
Payer: COMMERCIAL

## 2022-07-08 VITALS
DIASTOLIC BLOOD PRESSURE: 77 MMHG | RESPIRATION RATE: 16 BRPM | TEMPERATURE: 97.4 F | HEART RATE: 82 BPM | OXYGEN SATURATION: 95 % | SYSTOLIC BLOOD PRESSURE: 117 MMHG

## 2022-07-08 PROCEDURE — 64635 DESTROY LUMB/SAC FACET JNT: CPT | Performed by: ANESTHESIOLOGY

## 2022-07-08 PROCEDURE — 64636 DESTROY L/S FACET JNT ADDL: CPT | Performed by: ANESTHESIOLOGY

## 2022-07-08 RX ORDER — LIDOCAINE HYDROCHLORIDE 20 MG/ML
INJECTION, SOLUTION EPIDURAL; INFILTRATION; INTRACAUDAL; PERINEURAL AS NEEDED
Status: DISCONTINUED | OUTPATIENT
Start: 2022-07-08 | End: 2022-07-08 | Stop reason: HOSPADM

## 2022-07-08 RX ORDER — LIDOCAINE WITH 8.4% SOD BICARB 0.9%(10ML)
SYRINGE (ML) INJECTION AS NEEDED
Status: DISCONTINUED | OUTPATIENT
Start: 2022-07-08 | End: 2022-07-08 | Stop reason: HOSPADM

## 2022-07-08 NOTE — DISCHARGE INSTRUCTIONS
Medial Branch Radiofrequency Ablation     WHAT YOU NEED TO KNOW:   Medial branch radiofrequency ablation (RFA) is a procedure used to treat facet joint pain in your neck, mid back, or lower back  Facet joints are found at the back of each vertebra  A needle electrode is used to send electrical currents to the nerves in your facet joint  The electrical currents create heat that damages the nerve so it cannot send pain signals  ACTIVITY  Do not drive or operate machinery today  No strenuous activity today - bending, lifting, etc   You may shower today, but do not sit in a tub of water  Resume normal activities tomorrow as tolerated  CARE OF THE INJECTION SITE  If you have soreness or pain, apply ice to the area today (20 minutes on/20 minutes off)  Starting tomorrow, you may use warm, moist heat or ice if needed  [facility] the [facility] if you have any of the following: redness, drainage, swelling, or fever above 100°F     SPECIAL INSTRUCTIONS  Our office will call you tomorrow for a progress report and make an appointment for a follow up visit in 4 weeks  If you feel a sunburn-like sensation in the area of your procedure, call our office  MEDICATIONS  Continue to take all routine medications  Our office may have instructed you to hold some medications  As no general anesthesia was used in today's procedure, you should not experience any side effects related to anesthesia  If you have a problem specifically related to your procedure, please call our office at [phone]  Problems not related to your procedure should be directed to your primary care physician

## 2022-07-08 NOTE — H&P
History of Present Illness: The patient is a 79 y o  male who presents with complaints of low back pain  Patient Active Problem List   Diagnosis    Chronic atrial fibrillation ([place] [address] )    Coronary artery disease involving native coronary artery of native heart without angina pectoris    Mixed hyperlipidemia    Essential hypertension    Anxiety    Other insomnia    Depression, recurrent ([place] [address] )    Hypertriglyceridemia    Dilated aortic root ([facility])    Chronic midline low back pain with bilateral sciatica    Morbid obesity with BMI of 40 0-44 9, adult ([place] [address] )    Gallbladder polyp    Alcohol use    Type 2 diabetes mellitus, without long-term current use of insulin ([facility])    Obstructive sleep apnea    Alcohol use, unspecified with withdrawal, unspecified ([place] [address] )       Past Medical History:   Diagnosis Date    Acute on chronic systolic (congestive) heart failure ([facility]) 8/28/2020    Anxiety     Atrial fibrillation ([place] [address] )     Depression     Diabetes mellitus ([place] [address] )     Heart disease     Hyperlipidemia     Hypertension     Insomnia     Leukocytosis 8/12/2021    Suspected Chronic obstructive pulmonary disease with acute exacerbation ([place] [address] ) 8/10/2021       Past Surgical History:   Procedure Laterality Date    FL INJECTION LEFT ELBOW (NON ARTHROGRAM)  5/13/2022    FL INJECTION LEFT ELBOW (NON ARTHROGRAM)  6/3/2022    NERVE BLOCK Bilateral 5/13/2022    Procedure: L4 L5 SI MEDIAL BRANCH BLOCK ([id number]); Surgeon: Jeffrey Cardona MD;  Location: [facility] MAIN OR;  Service: Pain Management     NERVE BLOCK Bilateral 6/3/2022    Procedure: L4 L5 S1 MEDIAL BRANCH BLOCK ([id number]); Surgeon: Jeffrey Cardona MD;  Location: [facility] MAIN OR;  Service: Pain Management     RADIOFREQUENCY ABLATION Left 6/24/2022    Procedure: L4 L5 S1 RADIO FREQUENCY  ABLATION (RFA) ([id number]);   Surgeon: Jeffrey Cardona MD;  Location: [facility] MAIN OR;  Service: Pain Management        No current facility-administered medications for this encounter  No Known Allergies    Physical Exam:   Vitals:    07/08/22 1119   BP: 107/79   Pulse: 84   Resp: 18   Temp: (!) 97 4 °F (36 3 °C)   SpO2: 97%     General: Awake, Alert, Oriented x 3, Mood and affect appropriate  Respiratory: Respirations even and unlabored  Cardiovascular: Peripheral pulses intact; no edema  Musculoskeletal Exam:  Lumbar facet loading is positive    ASA Score: 2    Patient/Chart Verification  Patient ID Verified: Verbal, Armband  ID Band Applied: Yes  H&P( within 30 days) Verified: To be obtained in the Pre-Procedure area  Interval H&P(within 24 hr) Complete (required for Outpatients and Surgery Admit only):  To be obtained in the Pre-Procedure area  Beta Blocker given : N/A  Pre-op Lab/Test Results Available: N/A  Does Patient Have a Prosthetic Device/Implant: No    Assessment:  Lumbar facet syndrome    Plan:  Proceed with right L4, L5, and S1 lumbar facet radiofrequency ablation

## 2022-07-08 NOTE — OP NOTE
ATTENDING PHYSICIAN:   Danita Brooke MD      PREPROCEDURE DIAGNOSIS:  Right lumbar facet arthropathy  POSTPROCEDURE DIAGNOSIS:  Right lumbar facet arthropathy  PROCEDURE: Right L4, L5, and S1 lumbar facet nerve radiofrequency ablation under fluoroscopic guidance  ANESTHESIA:  Local     ESTIMATED BLOOD LOSS:  Minimal     COMPLICATIONS:  None  LOCATION:   [facility]  HISTORY OF PRESENTING ILLNESS:   We feel radiofrequency denervation of the L4, L5, and S1 facets is medically necessary, given the patient has disabling low back pain, which we suspect is facet mediated in the absence of nerve root compression or radicular pain  The patient has had two positive confirmatory diagnostic medial branch blocks  The patient has also failed three months of conservative therapy, including nonopioid medications, manipulation, physical therapy and home exercise program   The patient has not been treated with radiofrequency denervation at this anatomic location within the past six months  CONSENT:  Today's procedure, its potential benefits as well as its risks and potential side effects were reviewed  Discussed risks of the procedure including bleeding, infection, nerve irritation or damage, reactions to the medications, failure of the pain to improve, and potential worsening of the pain were explained in detail to the patient who verbalized understanding and who wished to proceed  Written informed consent was thereby obtained  DESCRIPTION OF THE PROCEDURE: After written informed consent was obtained, the patient was taken to the fluoroscopy suite and placed in the prone position  Anatomical landmarks were identified by way of palpation with fluoroscopy in the PA and oblique views  The patient's lumbar region was then prepped and draped in the usual sterile fashion using Chlorhexidine    The skin and subcutaneous tissues were subsequently infiltrated with approximately 1 ml of 1% preservative free Lidocaine at each of the four intended needle entry sites using a 25 gauge 1-1/2 inch needle  Via fluoroscopy in the AP and oblique views, an active tip needle was then incrementally advanced under fluoroscopic guidance at each level  At each of these levels, the needle tip was made to contact os at the superior medial border of the junction of the transverse process of the lumbar levels and to contact the os at the medial aspect of the groove formed by the sacral ala in the superior articular process of S1  After proper needle placement was confirmed with fluoroscopic guidance at each level, sensory and motor stimulation was performed at 2 Hz and 50 Hz  At all levels, there was negative extension into the lower extremities  Following this portion of the procedure, a 1 ml injectate of 2% preservative free Lidocaine was instilled at all of the levels  After a period of approximately 90 seconds, each level was lesioned at 90 degrees Celsius  Following the initial lesioning, each needle tip was repositioned x 2 under fluoroscopic guidance in a clockwise and counterclockwise fashion  Following each reposition, a total of two additional lesions at each side were performed for 90 seconds at 90 degrees Celsius  All needles were removed with the tips intact  The patient tolerated the procedure and hemostasis was maintained  There were no apparent paresthesias or complications  This skin was wiped clean and a Band-Aid was placed as appropriate  The patient was monitored for an appropriate period of time following the procedure and remained hemodynamically stable and neurovascularly intact  The patient was ultimately discharged to home with supervision in good condition and instructed to call the office to report the response  I was present for and participated in all key and critical portions of this procedure      Christian Junior MD  7/8/2022  12:26 PM

## 2022-07-11 NOTE — TELEPHONE ENCOUNTER
S/W pt  Pt stated needle sites look good, denies S&S of infection, denies fevers, has pain and denies sun burn like sensation  Advised pt if he does get pain to take his prescribed or OTC pain medications and/or use ice/heat and that it takes 4 to 6 weeks to see the full effect  Scheduled SOVS w/ pt on 8/11 at 11:30 w/ MG  Pt verbalized understanding

## 2022-08-11 ENCOUNTER — OFFICE VISIT (OUTPATIENT)
Dept: PAIN MEDICINE | Facility: CLINIC | Age: 67
End: 2022-08-11
Payer: COMMERCIAL

## 2022-08-11 ENCOUNTER — TELEPHONE (OUTPATIENT)
Dept: PAIN MEDICINE | Facility: CLINIC | Age: 67
End: 2022-08-11

## 2022-08-11 VITALS
HEIGHT: 66 IN | TEMPERATURE: 98.2 F | HEART RATE: 78 BPM | RESPIRATION RATE: 20 BRPM | SYSTOLIC BLOOD PRESSURE: 135 MMHG | DIASTOLIC BLOOD PRESSURE: 75 MMHG | BODY MASS INDEX: 42.11 KG/M2 | WEIGHT: 262 LBS

## 2022-08-11 DIAGNOSIS — G89.29 CHRONIC BILATERAL LOW BACK PAIN WITH BILATERAL SCIATICA: ICD-10-CM

## 2022-08-11 DIAGNOSIS — M54.42 CHRONIC BILATERAL LOW BACK PAIN WITH BILATERAL SCIATICA: ICD-10-CM

## 2022-08-11 DIAGNOSIS — M54.41 CHRONIC BILATERAL LOW BACK PAIN WITH BILATERAL SCIATICA: ICD-10-CM

## 2022-08-11 DIAGNOSIS — G89.4 CHRONIC PAIN SYNDROME: Primary | ICD-10-CM

## 2022-08-11 DIAGNOSIS — M54.16 LUMBAR RADICULOPATHY: ICD-10-CM

## 2022-08-11 DIAGNOSIS — M47.816 LUMBAR SPONDYLOSIS: ICD-10-CM

## 2022-08-11 PROCEDURE — 1160F RVW MEDS BY RX/DR IN RCRD: CPT

## 2022-08-11 PROCEDURE — 3075F SYST BP GE 130 - 139MM HG: CPT

## 2022-08-11 PROCEDURE — 99214 OFFICE O/P EST MOD 30 MIN: CPT

## 2022-08-11 PROCEDURE — 3078F DIAST BP <80 MM HG: CPT

## 2022-08-11 RX ORDER — DICLOFENAC SODIUM 75 MG/1
75 TABLET, DELAYED RELEASE ORAL 2 TIMES DAILY
Qty: 60 TABLET | Refills: 0 | Status: CANCELLED | OUTPATIENT
Start: 2022-08-11

## 2022-08-11 RX ORDER — GABAPENTIN 300 MG/1
CAPSULE ORAL
Qty: 90 CAPSULE | Refills: 0 | Status: SHIPPED | OUTPATIENT
Start: 2022-08-11 | End: 2022-10-12 | Stop reason: SDUPTHER

## 2022-08-11 NOTE — TELEPHONE ENCOUNTER
Scheduled patient for 9/15/22  Patient states he take Aspirin 325mg  Per the Cardiologist office he is taking Xarelto and they do not have record of the Aspirin  Faxed form to Dr Josselin Pacheco  [phone]  Phone # [phone]    Pleas call with hold instruction when order is received      Nothing to eat or drink 1 hour prior to procedure  Needs to arrange transportation  Proper clothing for procedure  No vaccines 2 weeks prior or after procedure  If ill or place on antibiotics, please call to reschedule

## 2022-08-11 NOTE — PATIENT INSTRUCTIONS
Gabapentin (By mouth)   Gabapentin (crystal-a-PEN-tin)  Treats seizures and pain caused by shingles  Brand Name(s): FusePaq Fanatrex, Neurontin   There may be other brand names for this medicine  When This Medicine Should Not Be Used: This medicine is not right for everyone  Do not use it if you had an allergic reaction to gabapentin  How to Use This Medicine:   Capsule, Liquid, Tablet  Take your medicine as directed  Your dose may need to be changed several times to find what works best for you  If you have epilepsy, do not allow more than 12 hours to pass between doses  Capsule: Swallow the capsule whole with plenty of water  Do not open, crush, or chew it  Gralise® tablet: Swallow the tablet whole   Do not crush, break, or chew it  Neurontin® tablet: If you break a tablet into 2 pieces, use the second half as your next dose  Do not use the half-tablet if the whole tablet has been cut or broken after 28 days  Oral liquid: Measure the oral liquid medicine with a marked measuring spoon, oral syringe, or medicine cup  This medicine should come with a Medication Guide  Ask your pharmacist for a copy if you do not have one  Missed dose: Take a dose as soon as you remember  If it is almost time for your next dose, wait until then and take a regular dose  Do not take extra medicine to make up for a missed dose  Store the medicine in a closed container at room temperature, away from heat, moisture, and direct light  Store the Neurontin® oral liquid in the refrigerator  Do not freeze  Drugs and Foods to Avoid:   Ask your doctor or pharmacist before using any other medicine, including over-the-counter medicines, vitamins, and herbal products  Some medicines can affect how gabapentin works  Tell your doctor if you also using hydrocodone or morphine  If you take an antacid, wait at least 2 hours before you take gabapentin  Do not drink alcohol while you are using this medicine    Tell your doctor if you use anything else that makes you sleepy  Some examples are allergy medicine, narcotic pain medicine, and alcohol  Tell your doctor if you are also using lorazepam, oxycodone, or zolpidem  Warnings While Using This Medicine:   Tell your doctor if you are pregnant or breastfeeding, or if you have kidney problems (including patients receiving dialysis) or lung problems  Tell your doctor if you have a history of depression or mental health problems  This medicine may cause the following problems:  Drug reaction with eosinophilia and systemic symptoms (DRESS) or multiorgan hypersensitivity, which may damage the liver, kidney, blood, heart, or muscles  Changes in mood or behavior, including suicidal thoughts or behavior  Respiratory depression (serious breathing problem that can be life-threatening), when used with narcotic pain medicines  Do not stop using this medicine suddenly  Your doctor will need to slowly decrease your dose before you stop it completely  This medicine may make you dizzy or drowsy  Do not drive or do anything else that could be dangerous until you know how this medicine affects you  Tell any doctor or dentist who treats you that you are using this medicine  This medicine may affect certain medical test results  Your doctor will check your progress and the effects of this medicine at regular visits  Keep all appointments  Keep all medicine out of the reach of children  Never share your medicine with anyone  Possible Side Effects While Using This Medicine:   Call your doctor right away if you notice any of these side effects:   Allergic reaction: Itching or hives, swelling in your face or hands, swelling or tingling in your mouth or throat, chest tightness, trouble breathing  Behavior problems, aggression, restlessness, trouble concentrating, moodiness (especially in children)  Blistering, peeling, red skin rash  Blue lips, fingernails, or skin, chest pain, fast heartbeat, trouble breathing  Change in how much or how often you urinate, bloody or cloudy urine  Dark urine or pale stools, nausea, vomiting, loss of appetite, stomach pain, yellow skin or eyes  Fever, chills, cough, sore throat, body aches  Problems with coordination, shakiness, unsteadiness, unusual eye movement  Rapid weight gain, swelling in your hands, ankles, or feet  Rash, swollen or tender glands in the neck, armpit, or groin  Unusual moods or behaviors, thoughts of hurting yourself, feeling depressed  If you notice these less serious side effects, talk with your doctor:   Dizziness, drowsiness, sleepiness, tiredness  If you notice other side effects that you think are caused by this medicine, tell your doctor  Call your doctor for medical advice about side effects  You may report side effects to FDA at 0-167-FDA-0781    © Copyright [employer] [facility] 2022 Information is for End User's use only and may not be sold, redistributed or otherwise used for commercial purposes  The above information is an  only  It is not intended as medical advice for individual conditions or treatments  Talk to your doctor, nurse or pharmacist before following any medical regimen to see if it is safe and effective for you

## 2022-08-11 NOTE — PROGRESS NOTES
Pain Medicine Follow-Up Note    Assessment:  1  Chronic pain syndrome    2  Lumbar radiculopathy    3  Chronic bilateral low back pain with bilateral sciatica    4  Lumbar spondylosis        Plan:  Orders Placed This Encounter   Procedures    PAT Covid Screening     Standing Status:   Future     Standing Expiration Date:   8/11/2023     Order Specific Question:   Is this test for diagnosis or screening? Answer:   Screening     Order Specific Question:   Symptomatic for COVID-19 as defined by CDC? Answer:   No     Order Specific Question:   Hospitalized for COVID-19? Answer:   No     Order Specific Question:   Admitted to ICU for COVID-19? Answer:   No     Order Specific Question:   Acknowledged by patient: Asymptomatic testing will not be billed to insurance  You will be billed $99 for this testing  Answer:   PAT screening     Order Specific Question:   Is this the first test you have had for covid-19? Answer:   No     Order Specific Question:   Does the patient currently work in a healthcare setting with direct patient contact? Answer:   Unknown     Order Specific Question:   Is this a [facility] employee? Answer:   Unknown     Order Specific Question:   Resident in a congregate care setting? Answer:   Unknown       New Medications Ordered This Visit   Medications    gabapentin (Neurontin) 300 mg capsule     Sig: Take one capsule at bedtime for 3 days, then increase to one capsule at dinner time and one at bedtime for 3 more days, then take 3 times daily  Dispense:  90 capsule     Refill:  0       My impressions and treatment recommendations were discussed in detail with the patient who verbalized understanding and had no further questions  The patient is a 80-year-old male that presents the office stating that his pain is worse with a pain score of 10/10 on the verbal numeric pain scale  Patient recently radiofrequency ablation on the L4, L5, S1    Patient states that he feels that this procedure was not beneficial   On exam patient is complaining low back and bilateral leg pain  At this time I feel the patient would benefit from an L4-L5 transforaminal epidural steroid injection  Patient to start gabapentin 300 mg 3 times daily patient educated on tapering the medication up and was told that he should not stop this medication suddenly  Patient is in agreement with this plan  Complete risks and benefits including bleeding, infection, tissue reaction, nerve injury and allergic reaction were discussed  The approach was demonstrated using models and literature was provided  Verbal and written consent was obtained  Follow-up is planned in 4 weeks after injection time or sooner as warranted  Discharge instructions were provided  I personally saw and examined the patient and I agree with the above discussed plan of care  History of Present Illness:    Mita Shaffer is a 79 y o  male who presents [facility] for interval re-evaluation of the above stated pain complaints  The patient has a past medical and chronic pain history as outlined in the assessment section  He was last seen on 7/8/2022  At today's visit the patient states that his pain is worse with a pain score of 10/10 on the verbal numeric pain scale  Patient states that his pain is worse in the morning, evening, and at night  Patient's pain is constant in nature  And the quality of his pain is throbbing  On exam the patient's the pain seems to follow the L4- L5 dermatomes  Therefore I believe that the patient should undergo an L4-L5 lumbar epidural steroid injection because it could potentially be therapeutic for the patient  I will also initiate patient on gabapentin 300 mg 3 times a day patient instructed on how to taper up the medication and instructed to not stop the medication suddenly  Patient is agreeable with this plan      Other than as stated above, the patient denies any interval changes in medications, medical condition, mental condition, symptoms, or allergies since the last office visit  Review of Systems:    Review of Systems   Constitutional: Negative for unexpected weight change  HENT: Negative for ear pain  Eyes: Negative for visual disturbance  Respiratory: Positive for shortness of breath  Negative for wheezing  Gastrointestinal: Negative for abdominal pain  Musculoskeletal: Positive for back pain and gait problem  Decreased ROM, muscle weakness back down into legs,joint stiffness -back lower    Neurological: Negative for weakness and numbness  Psychiatric/Behavioral: Positive for sleep disturbance  Negative for decreased concentration           Patient Active Problem List   Diagnosis    Chronic atrial fibrillation ([place] [address] )    Coronary artery disease involving native coronary artery of native heart without angina pectoris    Mixed hyperlipidemia    Essential hypertension    Anxiety    Other insomnia    Depression, recurrent ([place] [address] )    Hypertriglyceridemia    [facility]ilated [facility] ([facility])    Chronic midline low back pain with bilateral sciatica    Morbid obesity with BMI of 40 0-44 9, adult ([place] [address] )    Gallbladder polyp    Alcohol use    Type 2 diabetes mellitus, without long-term current use of insulin ([facility])    Obstructive sleep apnea    Alcohol use, unspecified with withdrawal, unspecified ([place] [address] )       Past Medical History:   Diagnosis Date    Acute on chronic systolic (congestive) heart failure ([facility]) 8/28/2020    Anxiety     Atrial fibrillation ([place] [address] )     Depression     Diabetes mellitus ([place] [address] )     Heart disease     Hyperlipidemia     Hypertension     Insomnia     Leukocytosis 8/12/2021    Suspected Chronic obstructive pulmonary disease with acute exacerbation ([place] [address] ) 8/10/2021       Past Surgical History:   Procedure Laterality Date    FL INJECTION LEFT ELBOW (NON ARTHROGRAM)  5/13/2022    FL INJECTION LEFT ELBOW (NON ARTHROGRAM)  6/3/2022    NERVE BLOCK Bilateral 2022    Procedure: L4 L5 SI MEDIAL BRANCH BLOCK ([id number]); Surgeon: Vincent Klinefelter, MD;  Location: [facility] MAIN OR;  Service: Pain Management     NERVE BLOCK Bilateral 6/3/2022    Procedure: L4 L5 S1 MEDIAL BRANCH BLOCK ([id number]); Surgeon: Vincent Klinefelter, MD;  Location: [facility] MAIN OR;  Service: Pain Management     RADIOFREQUENCY ABLATION Left 2022    Procedure: L4 L5 S1 RADIO FREQUENCY  ABLATION (RFA) ([id number]); Surgeon: Vincent Klinefelter, MD;  Location: [facility] MAIN OR;  Service: Pain Management     RADIOFREQUENCY ABLATION Right 2022    Procedure: L4 L5 S1 RADIO FREQUENCY ABLATION (RFA) ([id number]); Surgeon: Vincent Klinefelter, MD;  Location: [facility] MAIN OR;  Service: Pain Management        Family History   Problem Relation Age of Onset    No Known Problems Mother     No Known Problems Father     Ovarian cancer Sister     Substance Abuse Neg Hx     Mental illness Neg Hx        Social History     Occupational History    Not on file   Tobacco Use    Smoking status: Current Some Day Smoker     Packs/day: 0 25     Years: 40 00     Pack years: 10 00     Types: Cigarettes     Last attempt to quit: 2015     Years since quittin 9    Smokeless tobacco: Never Used   Vaping Use    Vaping Use: Never used   Substance and Sexual Activity    Alcohol use:  Yes     Alcohol/week: 4 0 standard drinks     Types: 4 Cans of beer per week    Drug use: Not Currently    Sexual activity: Not Currently     Partners: Female         Current Outpatient Medications:     ALPRAZolam (XANAX) 1 mg tablet, 3 (three) times a day as needed , Disp: , Rfl:     ARIPiprazole (ABILIFY) 10 mg tablet, , Disp: , Rfl:     buPROPion (WELLBUTRIN SR) 150 mg 12 hr tablet, Take 150 mg by mouth 2 (two) times a day , Disp: , Rfl:     candesartan (ATACAND) 32 MG tablet, Take 32 mg by mouth, Disp: , Rfl:     chlorthalidone 25 mg tablet, , Disp: , Rfl:     FLUoxetine (PROzac) 20 mg capsule, daily , Disp: , Rfl:     gabapentin (Neurontin) 300 mg capsule, Take one capsule at bedtime for 3 days, then increase to one capsule at dinner time and one at bedtime for 3 more days, then take 3 times daily  , Disp: 90 capsule, Rfl: 0    metFORMIN (GLUCOPHAGE) 500 mg tablet, TAKE 1 TABLET DAILY WITH BREAKFAST (Patient taking differently: 800 mg), Disp: 90 tablet, Rfl: 1    methylPREDNISolone 4 MG tablet therapy pack, Use as directed on package, Disp: 21 tablet, Rfl: 0    metoprolol tartrate (LOPRESSOR) 100 mg tablet, Take 1 tablet (100 mg total) by mouth every 12 (twelve) hours, Disp: , Rfl: 0    potassium chloride (K-DUR,KLOR-CON) 20 mEq tablet, , Disp: , Rfl:     prazosin (MINIPRESS) 2 mg capsule, , Disp: , Rfl:     rosuvastatin (CRESTOR) 20 MG tablet, , Disp: , Rfl:     torsemide (DEMADEX) 20 mg tablet, , Disp: , Rfl:     zolpidem (AMBIEN) 10 mg tablet, , Disp: , Rfl:     No Known Allergies    Physical Exam:    /75   Pulse 78   Temp 98 2 °F (36 8 °C)   Resp 20   Ht 5' 6" (1 676 m)   Wt 119 kg (262 lb)   BMI 42 29 kg/m²     Constitutional:normal, well developed, well nourished, alert, in no distress and non-toxic and no overt pain behavior   and obese  Eyes:anicteric  HEENT:grossly intact  Neck:supple, symmetric, trachea midline and no masses   Pulmonary:even and unlabored  Cardiovascular:Mild edema bilateral lower extremities left lower leg pink in color  Skin: Left lower leg pink in color  Psychiatric:Mood and affect appropriate  Neurologic:Cranial Nerves II-XII grossly intact  Musculoskeletal:antalgic, shuffling and stooped posture    Lumbar Spine Exam    Appearance:  Normal lordosis  Palpation/Tenderness:  left lumbar paraspinal tenderness  right lumbar paraspinal tenderness  Sensory:  dimished light touch sensation, location: Right lateral thigh  Range of Motion:  Flexion:  No limitation  with pain  Extension:  Moderately limited  with pain  Lateral Flexion - Left: Minimally limited  with pain  Lateral Flexion - Right:  Minimally limited  with pain  Rotation - Left:  No limitation  without pain  Rotation - Right:  No limitation  without pain  Motor Strength:  Left hip flexion:  5/5  Left hip extension:  5/5  Right hip flexion:  5/5  Right hip extension:  5/5  Left knee flexion:  5/5  Left knee extension:  5/5  Right knee flexion:  5/5  Right knee extension:  5/5  Left foot dorsiflexion:  5/5  Left foot plantar flexion:  5/5  Right foot dorsiflexion:  5/5  Reflexes:  Left Patellar:  1+   Right Patellar:  1+   Left Achilles:  absent   Right Achilles:  absent   Special Tests:  Left Straight Leg Test:  positive  Right Straight Leg Test:  positive  Left Gamal's Maneuver:  negative  Right Gamal's Maneuver:  negative   Left Facet Loading: negative  Right Facet Loading: negative        Orders Placed This Encounter   Procedures    PAT Covid Screening

## 2022-08-17 NOTE — TELEPHONE ENCOUNTER
Attempted contact w/ pt  Clari Hollis w/ c/b #, [place] and loc      Needing to provide hold instruc as listed below

## 2022-08-17 NOTE — TELEPHONE ENCOUNTER
Attempted contact w/ pt x 2  VM is full  Will attempt at later time  Need to provide hold instruc for ASA 325mg & Xarelto  Hold fax rec  and in media     Proc schd 9/15/22 w/ AS  ASA: Hold strt 9/9/22 LD 9/8/22  Xarelto: Hold strt 9/12/22 LD 9/11/22

## 2022-08-19 NOTE — TELEPHONE ENCOUNTER
Attempted to call the patient and left a detailed mom in regards to the previous hold  Encouraged the patient to CB to follow up

## 2022-08-29 NOTE — TELEPHONE ENCOUNTER
S/w pt re-advising of hold parameters as recently provided through detailed VMM  RN provided pre-proc instruct and let he pt know that he will be contacted prior to procedure w/ a procedure start time       Hold parameters:  ASA 325mg: Hold start 9/9/22 LD 9/8/22  Xarelto: Hold start 9/12/22 LD 9/11/22    RN advised pre-procedure instruct:  -nothing to eat or drink 1 hour prior to proc  -Wear comfortable clothing free of zippers/buttons/snaps  - Must have a  (Rashida Lopez and Maura are acceptable)  -Can take prescibed meds prior to proc except specified blood thinners as stated above  -If any active infection/illness please call to reschedule proc  -No vaccinations 2wks prior or post proc  -Any further quest please call office [phone]

## 2022-09-02 NOTE — TELEPHONE ENCOUNTER
Pt is calling in asking if he can be put on something until he gets his procedure on 9/15/22? He said that he can;t do anything at all without pain  His pain level is a 10       [address], [address] [facility]

## 2022-09-15 ENCOUNTER — APPOINTMENT (OUTPATIENT)
Dept: RADIOLOGY | Facility: HOSPITAL | Age: 67
End: 2022-09-15
Payer: COMMERCIAL

## 2022-09-15 ENCOUNTER — HOSPITAL ENCOUNTER (OUTPATIENT)
Facility: AMBULARY SURGERY CENTER | Age: 67
Setting detail: OUTPATIENT SURGERY
Discharge: HOME | End: 2022-09-15
Attending: ANESTHESIOLOGY | Admitting: ANESTHESIOLOGY
Payer: COMMERCIAL

## 2022-09-15 VITALS
RESPIRATION RATE: 16 BRPM | TEMPERATURE: 97.1 F | HEART RATE: 82 BPM | SYSTOLIC BLOOD PRESSURE: 134 MMHG | DIASTOLIC BLOOD PRESSURE: 89 MMHG | OXYGEN SATURATION: 98 %

## 2022-09-15 PROCEDURE — 62323 NJX INTERLAMINAR LMBR/SAC: CPT | Performed by: ANESTHESIOLOGY

## 2022-09-15 RX ORDER — METHYLPREDNISOLONE ACETATE 80 MG/ML
INJECTION, SUSPENSION INTRA-ARTICULAR; INTRALESIONAL; INTRAMUSCULAR; SOFT TISSUE AS NEEDED
Status: DISCONTINUED | OUTPATIENT
Start: 2022-09-15 | End: 2022-09-15 | Stop reason: HOSPADM

## 2022-09-15 RX ORDER — LIDOCAINE HYDROCHLORIDE 10 MG/ML
INJECTION, SOLUTION EPIDURAL; INFILTRATION; INTRACAUDAL; PERINEURAL AS NEEDED
Status: DISCONTINUED | OUTPATIENT
Start: 2022-09-15 | End: 2022-09-15 | Stop reason: HOSPADM

## 2022-09-15 NOTE — OP NOTE
ATTENDING PHYSICIAN:  Laura Toussaint MD     PROCEDURE:  Lumbar interlaminar midline epidural steroid injection with steroid and local anesthetic under fluoroscopy at the L4-L5 level  PREPROCEDURE DIAGNOSIS:  Low back pain  POSTPROCEDURE DIAGNOSIS:  Low back pain  ANESTHESIA:  Local     ESTIMATED BLOOD LOSS:  Minimal     COMPLICATIONS:  None  LOCATION:  [facility]  CONSENT:  Today's procedure, its potential benefits as well as its risks and potential side effects were reviewed  Discussed risks of the procedure including bleeding, infection, nerve irritation or damage, reactions to the medications, headache, failure of the pain to improve, and potential worsening of the pain were explained to the patient who verbalized understanding and who wished to proceed  Written informed consent was thereby obtained  DESCRIPTION OF THE PROCEDURE:  After written informed consent was obtained, the patient was taken to the fluoroscopy suite and placed in the prone position  Anatomical landmarks were identified by way of fluoroscopy in multiple views  The skin of the lumbar region was prepped and draped in the usual sterile fashion  Strict aseptic technique was utilized  The skin and subcutaneous tissues at the needle entry site were infiltrated with 3 mL of 1% preservative-free lidocaine using a 25-gauge 1-1/2-inch needle  A 20-gauge Tuohy needle was then incrementally advanced under fluoroscopy using a loss of resistance technique  Upon entering into the epidural space, a positive loss of resistance to air was noted and a characteristic "pop" was felt  Proper placement into the epidural space was confirmed with fluoroscopy in multiple views and by continued loss of resistance after injection of 1 mL of sterile preservative-free normal saline as well as the administration of contrast to delineate the epidural space  There were no paresthesias reported   After negative aspiration for CSF or heme, a 6 mL injectate consisting of 1 mL of Depo-Medrol 80 mg/mL mixed with 5 mL of preservative-free normal saline was slowly injected  The patient tolerated the procedure well and all needles were removed with the tips intact  Hemostasis was maintained  There were no apparent paresthesias or complications  The skin was wiped clean and a Band-Aid was placed as appropriate  The patient was monitored for an appropriate period of time following the procedure and remained hemodynamically stable and neurovascularly intact following the procedure  The patient was ultimately discharged to home with supervision in good condition and instructed to call the office in a few days for an update or sooner as warranted  I was present and participated in all key and critical portions of this procedure      Stu Solomon MD  9/15/2022  3:23 PM

## 2022-09-15 NOTE — H&P
History of Present Illness: The patient is a 79 y o  male who presents with complaints of low back pain  Patient Active Problem List   Diagnosis    Chronic atrial fibrillation ([place] [address] )    Coronary artery disease involving native coronary artery of native heart without angina pectoris    Mixed hyperlipidemia    Essential hypertension    Anxiety    Other insomnia    Depression, recurrent ([place] [address] )    Hypertriglyceridemia    [facility]    Chronic midline low back pain with bilateral sciatica    Morbid obesity with BMI of 40 0-44 9, adult ([place] [address] )    Gallbladder polyp    Alcohol use    Type 2 diabetes mellitus, without long-term current use of insulin ([facility])    Obstructive sleep apnea    Alcohol use, unspecified with withdrawal, unspecified ([place] [address] )       Past Medical History:   Diagnosis Date    Acute on chronic systolic (congestive) heart failure ([facility]) 8/28/2020    Anxiety     Atrial fibrillation ([place] [address] )     Depression     Diabetes mellitus ([place] [address] )     Heart disease     Hyperlipidemia     Hypertension     Insomnia     Leukocytosis 8/12/2021    Suspected Chronic obstructive pulmonary disease with acute exacerbation ([place] [address] ) 8/10/2021       Past Surgical History:   Procedure Laterality Date    FL INJECTION LEFT ELBOW (NON ARTHROGRAM)  5/13/2022    FL INJECTION LEFT ELBOW (NON ARTHROGRAM)  6/3/2022    NERVE BLOCK Bilateral 5/13/2022    Procedure: L4 L5 SI MEDIAL BRANCH BLOCK ([id number]); Surgeon: Jasper Fernandez MD;  Location: [facility] MAIN OR;  Service: Pain Management     NERVE BLOCK Bilateral 6/3/2022    Procedure: L4 L5 S1 MEDIAL BRANCH BLOCK ([id number]); Surgeon: Jasper Fernandez MD;  Location: [facility] MAIN OR;  Service: Pain Management     RADIOFREQUENCY ABLATION Left 6/24/2022    Procedure: L4 L5 S1 RADIO FREQUENCY  ABLATION (RFA) ([id number]);   Surgeon: Jasper Fernandez MD;  Location: [facility] MAIN OR;  Service: Pain Management     RADIOFREQUENCY ABLATION Right 7/8/2022    Procedure: L4 L5 S1 RADIO FREQUENCY ABLATION (RFA) ([id number]); Surgeon: Christopher Jolly MD;  Location: [facility] MAIN OR;  Service: Pain Management        No current facility-administered medications for this encounter  No Known Allergies    Physical Exam:   Vitals:    09/15/22 1427   BP: 122/80   Pulse: 81   Resp: 16   Temp: (!) 97 1 °F (36 2 °C)   SpO2: 97%     General: Awake, Alert, Oriented x 3, Mood and affect appropriate  Respiratory: Respirations even and unlabored  Cardiovascular: Peripheral pulses intact; no edema  Musculoskeletal Exam:  Tenderness in lumbar spine region    ASA Score: 2    Patient/Chart Verification  Patient ID Verified: Verbal, Armband  ID Band Applied: Yes  H&P( within 30 days) Verified: To be obtained in the Pre-Procedure area  Interval H&P(within 24 hr) Complete (required for Outpatients and Surgery Admit only): To be obtained in the Pre-Procedure area  Beta Blocker given : Yes  Pre-op Lab/Test Results Available: N/A  Does Patient Have a Prosthetic Device/Implant: No    Assessment:  Low back pain      Plan:  Proceed with L4-L5 lumbar epidural steroid injection

## 2022-09-22 ENCOUNTER — TELEPHONE (OUTPATIENT)
Dept: PAIN MEDICINE | Facility: CLINIC | Age: 67
End: 2022-09-22

## 2022-09-22 DIAGNOSIS — G89.29 CHRONIC MIDLINE LOW BACK PAIN WITH BILATERAL SCIATICA: Primary | ICD-10-CM

## 2022-09-22 DIAGNOSIS — M54.42 CHRONIC MIDLINE LOW BACK PAIN WITH BILATERAL SCIATICA: Primary | ICD-10-CM

## 2022-09-22 DIAGNOSIS — M54.41 CHRONIC MIDLINE LOW BACK PAIN WITH BILATERAL SCIATICA: Primary | ICD-10-CM

## 2022-09-22 NOTE — TELEPHONE ENCOUNTER
Patient called back stating 0% improvement and pain level 10/10.    Please advise       Patient can be reached at [phone]. ty

## 2022-09-29 DIAGNOSIS — E11.69 TYPE 2 DIABETES MELLITUS WITH OTHER SPECIFIED COMPLICATION, WITHOUT LONG-TERM CURRENT USE OF INSULIN (HCC): ICD-10-CM

## 2022-10-06 ENCOUNTER — CLINICAL SUPPORT (OUTPATIENT)
Dept: FAMILY MEDICINE CLINIC | Facility: CLINIC | Age: 67
End: 2022-10-06
Payer: COMMERCIAL

## 2022-10-06 VITALS — TEMPERATURE: 97.5 F

## 2022-10-06 DIAGNOSIS — Z23 NEED FOR INFLUENZA VACCINATION: Primary | ICD-10-CM

## 2022-10-06 DIAGNOSIS — Z23 NEED FOR PNEUMOCOCCAL VACCINATION: ICD-10-CM

## 2022-10-06 PROCEDURE — G0009 ADMIN PNEUMOCOCCAL VACCINE: HCPCS

## 2022-10-06 PROCEDURE — G0008 ADMIN INFLUENZA VIRUS VAC: HCPCS

## 2022-10-06 PROCEDURE — 90677 PCV20 VACCINE IM: CPT

## 2022-10-06 PROCEDURE — 90662 IIV NO PRSV INCREASED AG IM: CPT

## 2022-10-06 RX ORDER — NABUMETONE 500 MG/1
500 TABLET, FILM COATED ORAL 2 TIMES DAILY PRN
Qty: 60 TABLET | Refills: 0 | Status: SHIPPED | OUTPATIENT
Start: 2022-10-06 | End: 2023-03-17

## 2022-10-06 NOTE — TELEPHONE ENCOUNTER
"S/w pt. Pt had an L4-L5 LESI on 9/15 without relief. Per pt \" I am having pain across my lower back. I can't stand up straight. I can't do anything except sit.\" Pt did not receive the message about moving up his office visit to 10/5. Pt is scheduled for an ov with MG on 10/12 which is the soonest available. Pt is currently taking Gabapentin 300 mg tid and  Tylenol  without relief. Pt has tried ice with some relief but has not tried OTC options such as Lidocaine patches or creams with Lidocaine and was advised to try them. Pt also advised that if his pain becomes unbearable he can go to the ER. Pt is requesting to have something prescribed to help with his pain until he can been on 10/12.    Please advise-  "

## 2022-10-06 NOTE — TELEPHONE ENCOUNTER
Caller: Filiberto Medina    Doctor: Tyree    Reason for call: pt stated increased pain since procedure- requesting pain medication    Call back#: [phone]

## 2022-10-12 ENCOUNTER — OFFICE VISIT (OUTPATIENT)
Dept: PAIN MEDICINE | Facility: CLINIC | Age: 67
End: 2022-10-12
Payer: COMMERCIAL

## 2022-10-12 VITALS
RESPIRATION RATE: 19 BRPM | BODY MASS INDEX: 35.76 KG/M2 | DIASTOLIC BLOOD PRESSURE: 75 MMHG | SYSTOLIC BLOOD PRESSURE: 145 MMHG | WEIGHT: 264 LBS | HEART RATE: 68 BPM | HEIGHT: 72 IN | TEMPERATURE: 98.2 F

## 2022-10-12 DIAGNOSIS — G89.4 CHRONIC PAIN SYNDROME: Primary | ICD-10-CM

## 2022-10-12 DIAGNOSIS — M47.816 LUMBAR SPONDYLOSIS: ICD-10-CM

## 2022-10-12 DIAGNOSIS — M54.16 LUMBAR RADICULOPATHY: ICD-10-CM

## 2022-10-12 DIAGNOSIS — G89.29 CHRONIC BILATERAL LOW BACK PAIN WITHOUT SCIATICA: ICD-10-CM

## 2022-10-12 DIAGNOSIS — M54.50 CHRONIC BILATERAL LOW BACK PAIN WITHOUT SCIATICA: ICD-10-CM

## 2022-10-12 PROCEDURE — 99214 OFFICE O/P EST MOD 30 MIN: CPT

## 2022-10-12 RX ORDER — GABAPENTIN 300 MG/1
CAPSULE ORAL
Qty: 90 CAPSULE | Refills: 0 | Status: SHIPPED | OUTPATIENT
Start: 2022-10-12

## 2022-10-12 RX ORDER — IBUPROFEN 800 MG/1
800 TABLET ORAL EVERY 6 HOURS PRN
Qty: 60 TABLET | Refills: 0 | Status: SHIPPED | OUTPATIENT
Start: 2022-10-12

## 2022-10-12 NOTE — PATIENT INSTRUCTIONS
Core Strengthening Exercises   WHAT YOU NEED TO KNOW:   What do I need to know about core strengthening exercises? Core strengthening exercises help heal and strengthen muscles of your hips, back, and abdomen to prevent reinjury  They are beginning exercises to help support your spine  Ask your healthcare provider if you need to see a physical therapist for more advanced exercises  Do the exercises on a mat or firm surface  (not on a bed) to support your spine and avoid low back pain  Do the exercises in the same order every time  to train your muscles to work together  Your healthcare provider will show you how to perform these exercises  Do them every day, or as directed by your healthcare provider  Move slowly and smoothly  Avoid fast or jerky motions  Stop if you feel pain  It is normal to feel some discomfort at first  Regular exercise will help decrease your discomfort over time  How do I perform core strengthening exercises safely? Hold each exercise for 5 seconds  When you can do the exercise without pain for 5 seconds, increase your hold to 10 to 15 seconds  When you can do the exercise without pain for 10 to 15 seconds, add the next exercise  Increase the time you hold each exercise, or repeat the exercises as directed  As you do each exercise, breathe normally  Do not hold your breath  Abdominal bracing:  Lie on your back with your knees bent and feet flat on the floor  Place your arms in a relaxed position beside your body  Pull your belly button in toward your spine  Do not flatten or arch your back  Tighten the abdominal muscles below your belly button  Hold for 5 seconds  Begin all of your exercises with abdominal bracing  You can also practice abdominal bracing throughout the day while you are sitting or standing  Bridging:  Lie on your back with your knees bent and feet flat on the floor  Rest your arms at your side   Tighten your buttocks, and then lift your hips 1 inch off the floor  Hold for 5 seconds  When you can do this exercise without pain for 10 seconds, increase the distance you lift your hips  A good goal is to be able to lift your hips so that your shoulders, hips, and knees are in a straight line  Curl up:  Lie on your back with your knees bent and feet flat on the floor  Place your hands, palms down, underneath the curve in your lower back  Next, with your elbows on the floor, lift your shoulders and chest 2 to 3 inches  Keep your head in line with your shoulders  Hold this position for 5 seconds  When you can do this exercise without pain for 10 to 15 seconds, you may add a rotation  While your shoulders and chest are lifted off the ground, turn slightly to the left and hold  Repeat on the other side  Dead bug:  Lie on your back with your knees bent and feet flat on the floor  Place your arms in a relaxed position beside your body  Begin with abdominal bracing  Next, raise one leg, keeping your knee bent  Hold for 5 seconds  Repeat with the other leg  When you can do this exercise without pain for 10 to 15 seconds, you may raise one straight leg and hold  Repeat with the other leg  Quadruped:  Place your hands and knees on the floor  Keep your wrists directly below your shoulders and your knees directly below your hips  Pull your belly button in toward your spine  Do not flatten or arch your back  Tighten your abdominal muscles below your belly button  Hold for 5 seconds  When you can do this exercise without pain for 10 to 15 seconds, you may extend one arm and hold  Repeat on the other side  Side Bridge:      Standing side bridge:  Stand next to a wall and extend one arm toward the wall  Place your palm flat on the wall with your fingers pointing upward  Begin with abdominal bracing  Next, without moving your feet, slowly bend your arm to 90 degrees  Hold for 5 seconds  Repeat on the other side   When you can do this exercise without pain for 10 to 15 seconds, you may do the bent leg side bridge on the floor  Bent leg side bridge:  Lie on one side with your legs, hips, and shoulders in a straight line  Prop yourself up onto your forearm so your elbow is directly below your shoulder  Bend your knees back to 90 degrees  Begin with abdominal bracing  Next, lift your hips and balance yourself on your forearm and knees  Hold for 5 seconds  Repeat on the other side  When you can do this exercise without pain for 10 to 15 seconds, you may do the straight leg side bridge on the floor  Straight leg side bridge:  Lie on one side with your legs, hips, and shoulders in a straight line  Prop yourself up onto your forearm so your elbow is directly below your shoulder  Begin with abdominal bracing  Lift your hips off the floor and balance yourself on your forearm and the outside of your flexed foot  Do not let your ankle bend sideways  Hold for 5 seconds  Repeat on the other side  When you can do this exercise without pain for 10 to 15 seconds, ask your healthcare provider for more advanced exercises  When should I contact my healthcare provider? Your pain becomes worse  You have new pain  You have questions or concerns about your condition, care, or exercise program   CARE AGREEMENT:   You have the right to help plan your care  Learn about your health condition and how it may be treated  Discuss treatment options with your caregivers to decide what care you want to receive  You always have the right to refuse treatment  The above information is an  only  It is not intended as medical advice for individual conditions or treatments  Talk to your doctor, nurse or pharmacist before following any medical regimen to see if it is safe and effective for you  © 2016 [employer] is for End User's use only and may not be sold, redistributed or otherwise used for commercial purposes  All illustrations and images included in CareNotes® are the copyrighted property of A D A M [employer]  or Alonzo Ragland

## 2022-10-12 NOTE — PROGRESS NOTES
Pain Medicine Follow-Up Note    Assessment:  1  Chronic pain syndrome    2  Lumbar spondylosis    3  Lumbar radiculopathy    4  Chronic bilateral low back pain without sciatica        Plan:    New Medications Ordered This Visit   Medications   • ibuprofen (MOTRIN) 800 mg tablet     Sig: Take 1 tablet (800 mg total) by mouth every 6 (six) hours as needed for moderate pain     Dispense:  60 tablet     Refill:  0   • gabapentin (Neurontin) 300 mg capsule     Sig: Take one capsule at bedtime for 3 days, then increase to one capsule at dinner time and one at bedtime for 3 more days, then take 3 times daily  Dispense:  90 capsule     Refill:  0       My impressions and treatment recommendations were discussed in detail with the patient who verbalized understanding and had no further questions  Patient presents the office for follow-up on a L4-L5 lumbar epidural steroid injection that he received on 09/15/2022  Patient states that he had relief for about 1 day  Patient is complaining of primarily axial back pain  Patient is frustrated he is unable to do some ADLs at home due to his back pain  On 07/08/2022 patient had radiofrequency ablation of his lumbar spine which the patient did not find therapeutic either  At this time I suggested to the patient to either follow-up with a spinal surgeon or go back to physical therapy to which the patient replied surgery is not an option and therapy did not help  After further discussion the patient stated that he never trialed the gabapentin that I prescribed on the previous visit  Patient willing to trial gabapentin 300 mg t i d  as well as add ibuprofen 800 mg t i d  p r n  pain  Follow-up is planned in 4 weeks time or sooner as warranted  Discharge instructions were provided  I personally saw and examined the patient and I agree with the above discussed plan of care      History of Present Illness:    Redd Apple is a 79 y o  male who presents to [facility] [address] [facility] for interval re-evaluation of the above stated pain complaints  The patient has a past medical and chronic pain history as outlined in the assessment section  He was last seen on 9/15/2022  At today's visit the patient states that his pain symptoms are the same with a pain score of 10/10 on the verbal numeric pain scale  On 09/15/2022 patient underwent a L4-L5 lumbar epidural steroid injection which the patient did not find therapeutic  Patient states that his pain is worse in the morning, evening, and at night  The patient's pain is constant in nature  The quality of the patient's pain is described as throbbing  Patient indicates that his pain is located in his bilateral low back  Other than as stated above, the patient denies any interval changes in medications, medical condition, mental condition, symptoms, or allergies since the last office visit  Review of Systems:    Review of Systems   Constitutional: Negative for unexpected weight change  HENT: Negative for ear pain  Eyes: Negative for visual disturbance  Respiratory: Positive for shortness of breath  Negative for wheezing  Gastrointestinal: Negative for abdominal pain  Musculoskeletal: Positive for back pain and gait problem  Muscle weakness in back, joint stiffness muscle stiffness   Neurological: Positive for weakness  Negative for numbness  Psychiatric/Behavioral: Positive for dysphoric mood and sleep disturbance  Negative for decreased concentration  The patient is nervous/anxious            Patient Active Problem List   Diagnosis   • Chronic atrial fibrillation (HCC)   • Coronary artery disease involving native coronary artery of native heart without angina pectoris   • Mixed hyperlipidemia   • Essential hypertension   • Anxiety   • Other insomnia   • Depression, recurrent (HCC)   • Hypertriglyceridemia   • Dilated aortic root (HCC)   • Chronic midline low back pain with bilateral sciatica   • Morbid obesity with BMI of 40 0-44 9, adult ([facility])   • Gallbladder polyp   • Alcohol use   • Type 2 diabetes mellitus, without long-term current use of insulin ([facility])   • Obstructive sleep apnea   • Alcohol use, unspecified with withdrawal, unspecified ([place] [address] )       Past Medical History:   Diagnosis Date   • Acute on chronic systolic (congestive) heart failure ([facility]) 8/28/2020   • Anxiety    • Atrial fibrillation ([facility])    • Depression    • Diabetes mellitus ([place] [address] )    • Heart disease    • Hyperlipidemia    • Hypertension    • Insomnia    • Leukocytosis 8/12/2021   • Suspected Chronic obstructive pulmonary disease with acute exacerbation ([place] [address] ) 8/10/2021       Past Surgical History:   Procedure Laterality Date   • FL INJECTION LEFT ELBOW (NON ARTHROGRAM)  5/13/2022   • FL INJECTION LEFT ELBOW (NON ARTHROGRAM)  6/3/2022   • LUMBAR EPIDURAL INJECTION N/A 9/15/2022    Procedure: L4-L5  INJECTION LUMBAR;  Surgeon: Vern Wilkes MD;  Location: [place] [address] MAIN OR;  Service: Pain Management    • NERVE BLOCK Bilateral 5/13/2022    Procedure: L4 L5 SI MEDIAL BRANCH BLOCK (06417 [id number]); Surgeon: Vern Wilkes MD;  Location: [facility] MAIN OR;  Service: Pain Management    • NERVE BLOCK Bilateral 6/3/2022    Procedure: L4 L5 S1 MEDIAL BRANCH BLOCK ([id number]); Surgeon: Vern Wilkes MD;  Location: [facility] MAIN OR;  Service: Pain Management    • RADIOFREQUENCY ABLATION Left 6/24/2022    Procedure: L4 L5 S1 RADIO FREQUENCY  ABLATION (RFA) ([id number]); Surgeon: Vern Wilkes MD;  Location: [facility] MAIN OR;  Service: Pain Management    • RADIOFREQUENCY ABLATION Right 7/8/2022    Procedure: L4 L5 S1 RADIO FREQUENCY ABLATION (RFA) ([id number]);   Surgeon: Vern Wilkes MD;  Location: [facility] MAIN OR;  Service: Pain Management        Family History   Problem Relation Age of Onset   • No Known Problems Mother    • No Known Problems Father    • Ovarian cancer Sister    • Substance Abuse Neg Hx    • Mental illness Neg Hx Social History     Occupational History   • Not on file   Tobacco Use   • Smoking status: Current Some Day Smoker     Packs/day: 0 25     Years: 40 00     Pack years: 10 00     Types: Cigarettes     Last attempt to quit: 2015     Years since quittin 1   • Smokeless tobacco: Never Used   Vaping Use   • Vaping Use: Never used   Substance and Sexual Activity   • Alcohol use: Yes     Alcohol/week: 4 0 standard drinks     Types: 4 Cans of beer per week   • Drug use: Not Currently   • Sexual activity: Not Currently     Partners: Female         Current Outpatient Medications:   •  ALPRAZolam (XANAX) 1 mg tablet, 3 (three) times a day as needed , Disp: , Rfl:   •  ARIPiprazole (ABILIFY) 10 mg tablet, , Disp: , Rfl:   •  buPROPion (WELLBUTRIN SR) 150 mg 12 hr tablet, Take 150 mg by mouth 2 (two) times a day , Disp: , Rfl:   •  candesartan (ATACAND) 32 MG tablet, Take 32 mg by mouth, Disp: , Rfl:   •  chlorthalidone 25 mg tablet, , Disp: , Rfl:   •  gabapentin (Neurontin) 300 mg capsule, Take one capsule at bedtime for 3 days, then increase to one capsule at dinner time and one at bedtime for 3 more days, then take 3 times daily  , Disp: 90 capsule, Rfl: 0  •  ibuprofen (MOTRIN) 800 mg tablet, Take 1 tablet (800 mg total) by mouth every 6 (six) hours as needed for moderate pain, Disp: 60 tablet, Rfl: 0  •  metFORMIN (GLUCOPHAGE) 500 mg tablet, Take 1 tablet (500 mg total) by mouth daily with breakfast, Disp: 90 tablet, Rfl: 1  •  methylPREDNISolone 4 MG tablet therapy pack, Use as directed on package, Disp: 21 tablet, Rfl: 0  •  metoprolol tartrate (LOPRESSOR) 100 mg tablet, Take 1 tablet (100 mg total) by mouth every 12 (twelve) hours, Disp: , Rfl: 0  •  nabumetone (RELAFEN) 500 mg tablet, Take 1 tablet (500 mg total) by mouth 2 (two) times a day as needed (pain (with food)), Disp: 60 tablet, Rfl: 0  •  potassium chloride (K-DUR,KLOR-CON) 20 mEq tablet, , Disp: , Rfl:   •  prazosin (MINIPRESS) 2 mg capsule, , Disp: , Rfl:   •  rosuvastatin (CRESTOR) 20 MG tablet, , Disp: , Rfl:   •  torsemide (DEMADEX) 20 mg tablet, , Disp: , Rfl:   •  zolpidem (AMBIEN) 10 mg tablet, , Disp: , Rfl:     No Known Allergies    Physical Exam:    /75   Pulse 68   Temp 98 2 °F (36 8 °C)   Resp 19   Ht 6' (1 829 m)   Wt 120 kg (264 lb)   BMI 35 80 kg/m²     Constitutional:normal, well developed, well nourished, alert, in no distress and non-toxic and no overt pain behavior   and obese  Eyes:anicteric  HEENT:grossly intact  Neck:supple, symmetric, trachea midline and no masses   Pulmonary:even and unlabored  Cardiovascular:No edema or pitting edema present  Skin:Normal without rashes or lesions and well hydrated  Psychiatric:Mood and affect appropriate  Neurologic:Cranial Nerves II-XII grossly intact  Musculoskeletal:antalgic and stooped posture   Tenderness long bilateral lumbar paraspinal region  Right lumbar facet loading:  Positive   Left lumbar facet loading: Positive

## 2022-10-27 ENCOUNTER — TELEPHONE (OUTPATIENT)
Dept: FAMILY MEDICINE CLINIC | Facility: CLINIC | Age: 67
End: 2022-10-27

## 2022-10-27 DIAGNOSIS — E11.69 TYPE 2 DIABETES MELLITUS WITH OTHER SPECIFIED COMPLICATION, WITHOUT LONG-TERM CURRENT USE OF INSULIN (HCC): Primary | ICD-10-CM

## 2022-10-27 DIAGNOSIS — E78.2 MIXED HYPERLIPIDEMIA: Chronic | ICD-10-CM

## 2022-10-27 DIAGNOSIS — I10 ESSENTIAL HYPERTENSION: Chronic | ICD-10-CM

## 2022-10-27 DIAGNOSIS — I48.20 CHRONIC ATRIAL FIBRILLATION (HCC): Chronic | ICD-10-CM

## 2022-10-27 DIAGNOSIS — E78.1 HYPERTRIGLYCERIDEMIA: ICD-10-CM

## 2022-10-27 DIAGNOSIS — I25.10 CORONARY ARTERY DISEASE INVOLVING NATIVE CORONARY ARTERY OF NATIVE HEART WITHOUT ANGINA PECTORIS: ICD-10-CM

## 2022-10-27 NOTE — TELEPHONE ENCOUNTER
----- Message from Danisha Dodd, [address] sent at 10/27/2022  8:32 AM EDT -----  Regarding: labs  Pt has upcoming appt for AWV and DM fu   Needs to have labs done prior to appt  Orders in chart  Please call pt to advise  If labs are not done, appt will need to be re-scheduled   TY    Pt advised

## 2022-10-31 ENCOUNTER — RA CDI HCC (OUTPATIENT)
Dept: OTHER | Facility: HOSPITAL | Age: 67
End: 2022-10-31

## 2022-10-31 NOTE — PROGRESS NOTES
Nancie [facility] 75  coding opportunities          Chart Reviewed number of suggestions sent to Provider: 1  [id number] [phone]     Patients Insurance     Medicare Insurance: [facility] Advantage

## 2022-11-01 ENCOUNTER — TELEPHONE (OUTPATIENT)
Dept: FAMILY MEDICINE CLINIC | Facility: CLINIC | Age: 67
End: 2022-11-01

## 2022-11-02 ENCOUNTER — APPOINTMENT (OUTPATIENT)
Dept: LAB | Facility: CLINIC | Age: 67
End: 2022-11-02

## 2022-11-02 DIAGNOSIS — I25.10 CORONARY ARTERY DISEASE INVOLVING NATIVE CORONARY ARTERY OF NATIVE HEART WITHOUT ANGINA PECTORIS: ICD-10-CM

## 2022-11-02 DIAGNOSIS — E78.1 HYPERTRIGLYCERIDEMIA: ICD-10-CM

## 2022-11-02 DIAGNOSIS — I10 ESSENTIAL HYPERTENSION: Chronic | ICD-10-CM

## 2022-11-02 DIAGNOSIS — E11.69 TYPE 2 DIABETES MELLITUS WITH OTHER SPECIFIED COMPLICATION, WITHOUT LONG-TERM CURRENT USE OF INSULIN (HCC): ICD-10-CM

## 2022-11-02 DIAGNOSIS — E78.2 MIXED HYPERLIPIDEMIA: Chronic | ICD-10-CM

## 2022-11-02 DIAGNOSIS — I48.20 CHRONIC ATRIAL FIBRILLATION (HCC): Chronic | ICD-10-CM

## 2022-11-02 LAB
ALBUMIN SERPL BCP-MCNC: 3.7 G/DL (ref 3.5–5)
ALP SERPL-CCNC: 93 U/L (ref 46–116)
ALT SERPL W P-5'-P-CCNC: 40 U/L (ref 12–78)
ANION GAP SERPL CALCULATED.3IONS-SCNC: 10 MMOL/L (ref 4–13)
AST SERPL W P-5'-P-CCNC: 25 U/L (ref 5–45)
BASOPHILS # BLD AUTO: 0.09 THOUSANDS/ÂΜL (ref 0–0.1)
BASOPHILS NFR BLD AUTO: 1 % (ref 0–1)
BILIRUB SERPL-MCNC: 0.47 MG/DL (ref 0.2–1)
BUN SERPL-MCNC: 12 MG/DL (ref 5–25)
CALCIUM SERPL-MCNC: 9.7 MG/DL (ref 8.3–10.1)
CHLORIDE SERPL-SCNC: 109 MMOL/L (ref 96–108)
CHOLEST SERPL-MCNC: 112 MG/DL
CO2 SERPL-SCNC: 20 MMOL/L (ref 21–32)
CREAT SERPL-MCNC: 1.25 MG/DL (ref 0.6–1.3)
EOSINOPHIL # BLD AUTO: 0.28 THOUSAND/ÂΜL (ref 0–0.61)
EOSINOPHIL NFR BLD AUTO: 4 % (ref 0–6)
ERYTHROCYTE [DISTWIDTH] IN BLOOD BY AUTOMATED COUNT: 12.7 % (ref 11.6–15.1)
EST. AVERAGE GLUCOSE BLD GHB EST-MCNC: 114 MG/DL
GFR SERPL CREATININE-BSD FRML MDRD: 59 ML/MIN/1.73SQ M
GLUCOSE P FAST SERPL-MCNC: 84 MG/DL (ref 65–99)
HBA1C MFR BLD: 5.6 %
HCT VFR BLD AUTO: 42 % (ref 36.5–49.3)
HDLC SERPL-MCNC: 44 MG/DL
HGB BLD-MCNC: 13.7 G/DL (ref 12–17)
IMM GRANULOCYTES # BLD AUTO: 0.04 THOUSAND/UL (ref 0–0.2)
IMM GRANULOCYTES NFR BLD AUTO: 1 % (ref 0–2)
LDLC SERPL CALC-MCNC: 35 MG/DL (ref 0–100)
LYMPHOCYTES # BLD AUTO: 1.42 THOUSANDS/ÂΜL (ref 0.6–4.47)
LYMPHOCYTES NFR BLD AUTO: 19 % (ref 14–44)
MCH RBC QN AUTO: 32.6 PG (ref 26.8–34.3)
MCHC RBC AUTO-ENTMCNC: 32.6 G/DL (ref 31.4–37.4)
MCV RBC AUTO: 100 FL (ref 82–98)
MONOCYTES # BLD AUTO: 0.92 THOUSAND/ÂΜL (ref 0.17–1.22)
MONOCYTES NFR BLD AUTO: 12 % (ref 4–12)
NEUTROPHILS # BLD AUTO: 4.64 THOUSANDS/ÂΜL (ref 1.85–7.62)
NEUTS SEG NFR BLD AUTO: 63 % (ref 43–75)
NONHDLC SERPL-MCNC: 68 MG/DL
NRBC BLD AUTO-RTO: 0 /100 WBCS
PLATELET # BLD AUTO: 237 THOUSANDS/UL (ref 149–390)
PMV BLD AUTO: 10.4 FL (ref 8.9–12.7)
POTASSIUM SERPL-SCNC: 3.9 MMOL/L (ref 3.5–5.3)
PROT SERPL-MCNC: 7.5 G/DL (ref 6.4–8.4)
RBC # BLD AUTO: 4.2 MILLION/UL (ref 3.88–5.62)
SODIUM SERPL-SCNC: 139 MMOL/L (ref 135–147)
TRIGL SERPL-MCNC: 167 MG/DL
WBC # BLD AUTO: 7.39 THOUSAND/UL (ref 4.31–10.16)

## 2022-11-07 ENCOUNTER — OFFICE VISIT (OUTPATIENT)
Dept: FAMILY MEDICINE CLINIC | Facility: CLINIC | Age: 67
End: 2022-11-07

## 2022-11-07 VITALS
DIASTOLIC BLOOD PRESSURE: 82 MMHG | BODY MASS INDEX: 36.3 KG/M2 | TEMPERATURE: 97.1 F | OXYGEN SATURATION: 98 % | WEIGHT: 268 LBS | SYSTOLIC BLOOD PRESSURE: 116 MMHG | HEART RATE: 78 BPM | RESPIRATION RATE: 16 BRPM | HEIGHT: 72 IN

## 2022-11-07 DIAGNOSIS — F41.9 ANXIETY: Chronic | ICD-10-CM

## 2022-11-07 DIAGNOSIS — Z00.00 MEDICARE ANNUAL WELLNESS VISIT, SUBSEQUENT: ICD-10-CM

## 2022-11-07 DIAGNOSIS — E11.69 TYPE 2 DIABETES MELLITUS WITH OTHER SPECIFIED COMPLICATION, WITHOUT LONG-TERM CURRENT USE OF INSULIN (HCC): Primary | ICD-10-CM

## 2022-11-07 DIAGNOSIS — F10.939 ALCOHOL USE, UNSPECIFIED WITH WITHDRAWAL, UNSPECIFIED (HCC): ICD-10-CM

## 2022-11-07 DIAGNOSIS — N18.30 STAGE 3 CHRONIC KIDNEY DISEASE, UNSPECIFIED WHETHER STAGE 3A OR 3B CKD (HCC): ICD-10-CM

## 2022-11-07 DIAGNOSIS — F33.9 DEPRESSION, RECURRENT (HCC): Chronic | ICD-10-CM

## 2022-11-07 DIAGNOSIS — Z59.9 FINANCIAL DIFFICULTIES: ICD-10-CM

## 2022-11-07 DIAGNOSIS — I48.20 CHRONIC ATRIAL FIBRILLATION (HCC): Chronic | ICD-10-CM

## 2022-11-07 SDOH — ECONOMIC STABILITY - INCOME SECURITY: PROBLEM RELATED TO HOUSING AND ECONOMIC CIRCUMSTANCES, UNSPECIFIED: Z59.9

## 2022-11-07 NOTE — PATIENT INSTRUCTIONS
Medicare Preventive Visit Patient Instructions  Thank you for completing your Welcome to Medicare Visit or Medicare Annual Wellness Visit today  Your next wellness visit will be due in one year (11/8/2023)  The screening/preventive services that you may require over the next 5-10 years are detailed below  Some tests may not apply to you based off risk factors and/or age  Screening tests ordered at today's visit but not completed yet may show as past due  Also, please note that scanned in results may not display below  Preventive Screenings:  Service Recommendations Previous Testing/Comments   Colorectal Cancer Screening  · Colonoscopy    · Fecal Occult Blood Test (FOBT)/Fecal Immunochemical Test (FIT)  · Fecal DNA/Cologuard Test  · Flexible Sigmoidoscopy Age: 39-70 years old   Colonoscopy: every 10 years (May be performed more frequently if at higher risk)  OR  FOBT/FIT: every 1 year  OR  Cologuard: every 3 years  OR  Sigmoidoscopy: every 5 years  Screening may be recommended earlier than age 39 if at higher risk for colorectal cancer  Also, an individualized decision between you and your healthcare provider will decide whether screening between the ages of 74-80 would be appropriate   Colonoscopy: 09/27/2020  FOBT/FIT: Not on file  Cologuard: 09/27/2020  Sigmoidoscopy: Not on file    Screening Current     Prostate Cancer Screening Individualized decision between patient and health care provider in men between ages of 53-78   Medicare will cover every 12 months beginning on the day after your 50th birthday PSA: No results in last 5 years           Hepatitis C Screening Once for adults born between 1945 and 1965  More frequently in patients at high risk for Hepatitis C Hep C Antibody: 08/28/2020    Screening Current   Diabetes Screening 1-2 times per year if you're at risk for diabetes or have pre-diabetes Fasting glucose: 84 mg/dL (11/2/2022)  A1C: 5 6 % (11/2/2022)  Screening Not Indicated  History Diabetes Cholesterol Screening Once every 5 years if you don't have a lipid disorder  May order more often based on risk factors  Lipid panel: 11/02/2022  Screening Not Indicated  History Lipid Disorder      Other Preventive Screenings Covered by Medicare:  1  Abdominal Aortic Aneurysm (AAA) Screening: covered once if your at risk  You're considered to be at risk if you have a family history of AAA or a male between the age of 73-68 who smoking at least 100 cigarettes in your lifetime  2  Lung Cancer Screening: covers low dose CT scan once per year if you meet all of the following conditions: (1) Age 50-69; (2) No signs or symptoms of lung cancer; (3) Current smoker or have quit smoking within the last 15 years; (4) You have a tobacco smoking history of at least 20 pack years (packs per day x number of years you smoked); (5) You get a written order from a healthcare provider  3  Glaucoma Screening: covered annually if you're considered high risk: (1) You have diabetes OR (2) Family history of glaucoma OR (3)  aged 48 and older OR (3)  American aged 72 and older  3  Osteoporosis Screening: covered every 2 years if you meet one of the following conditions: (1) Have a vertebral abnormality; (2) On glucocorticoid therapy for more than 3 months; (3) Have primary hyperparathyroidism; (4) On osteoporosis medications and need to assess response to drug therapy  5  HIV Screening: covered annually if you're between the age of 12-76  Also covered annually if you are younger than 13 and older than 72 with risk factors for HIV infection  For pregnant patients, it is covered up to 3 times per pregnancy      Immunizations:  Immunization Recommendations   Influenza Vaccine Annual influenza vaccination during flu season is recommended for all persons aged >= 6 months who do not have contraindications   Pneumococcal Vaccine   * Pneumococcal conjugate vaccine = PCV13 (Prevnar 13), PCV15 (Vaxneuvance), PCV20 (Prevnar 20)  * Pneumococcal polysaccharide vaccine = PPSV23 (Pneumovax) Adults 2364 years old: 1-3 doses may be recommended based on certain risk factors  Adults 72 years old: 1-2 doses may be recommended based off what pneumonia vaccine you previously received   Hepatitis B Vaccine 3 dose series if at intermediate or high risk (ex: diabetes, end stage renal disease, liver disease)   Tetanus (Td) Vaccine - COST NOT COVERED BY MEDICARE PART B Following completion of primary series, a booster dose should be given every 10 years to maintain immunity against tetanus  Td may also be given as tetanus wound prophylaxis  Tdap Vaccine - COST NOT COVERED BY MEDICARE PART B Recommended at least once for all adults  For pregnant patients, recommended with each pregnancy  Shingles Vaccine (Shingrix) - COST NOT COVERED BY MEDICARE PART B  2 shot series recommended in those aged 48 and above     Health Maintenance Due:      Topic Date Due   • Colorectal Cancer Screening  09/27/2023   • Hepatitis C Screening  Completed     Immunizations Due:      Topic Date Due   • COVID-19 Vaccine (4 - Booster for [employer] series) 05/11/2022     Advance Directives   What are advance directives? Advance directives are legal documents that state your wishes and plans for medical care  These plans are made ahead of time in case you lose your ability to make decisions for yourself  Advance directives can apply to any medical decision, such as the treatments you want, and if you want to donate organs  What are the types of advance directives? There are many types of advance directives, and each state has rules about how to use them  You may choose a combination of any of the following:  · Living will: This is a written record of the treatment you want  You can also choose which treatments you do not want, which to limit, and which to stop at a certain time  This includes surgery, medicine, IV fluid, and tube feedings     · Durable power of  for healthcare [facility]): This is a written record that states who you want to make healthcare choices for you when you are unable to make them for yourself  This person, called a proxy, is usually a family member or a friend  You may choose more than 1 proxy  · Do not resuscitate (DNR) order:  A DNR order is used in case your heart stops beating or you stop breathing  It is a request not to have certain forms of treatment, such as CPR  A DNR order may be included in other types of advance directives  · Medical directive: This covers the care that you want if you are in a coma, near death, or unable to make decisions for yourself  You can list the treatments you want for each condition  Treatment may include pain medicine, surgery, blood transfusions, dialysis, IV or tube feedings, and a ventilator (breathing machine)  · Values history: This document has questions about your views, beliefs, and how you feel and think about life  This information can help others choose the care that you would choose  Why are advance directives important? An advance directive helps you control your care  Although spoken wishes may be used, it is better to have your wishes written down  Spoken wishes can be misunderstood, or not followed  Treatments may be given even if you do not want them  An advance directive may make it easier for your family to make difficult choices about your care  Cigarette Smoking and Your Health   Risks to your health if you smoke:  Nicotine and other chemicals found in tobacco damage every cell in your body  Even if you are a light smoker, you have an increased risk for cancer, heart disease, and lung disease  If you are pregnant or have diabetes, smoking increases your risk for complications  Benefits to your health if you stop smoking:   · You decrease respiratory symptoms such as coughing, wheezing, and shortness of breath     · You reduce your risk for cancers of the lung, mouth, throat, kidney, bladder, pancreas, stomach, and cervix  If you already have cancer, you increase the benefits of chemotherapy  You also reduce your risk for cancer returning or a second cancer from developing  · You reduce your risk for heart disease, blood clots, heart attack, and stroke  · You reduce your risk for lung infections, and diseases such as pneumonia, asthma, chronic bronchitis, and emphysema  · Your circulation improves  More oxygen can be delivered to your body  If you have diabetes, you lower your risk for complications, such as kidney, artery, and eye diseases  You also lower your risk for nerve damage  Nerve damage can lead to amputations, poor vision, and blindness  · You improve your body's ability to heal and to fight infections  For more information and support to stop smoking:   · [employer]  Phone: 6- 847 - 884-6172  Web Address: [employer]  Weight Management   Why it is important to manage your weight:  Being overweight increases your risk of health conditions such as heart disease, high blood pressure, type 2 diabetes, and certain types of cancer  It can also increase your risk for osteoarthritis, sleep apnea, and other respiratory problems  Aim for a slow, steady weight loss  Even a small amount of weight loss can lower your risk of health problems  How to lose weight safely:  A safe and healthy way to lose weight is to eat fewer calories and get regular exercise  You can lose up about 1 pound a week by decreasing the number of calories you eat by 500 calories each day  Healthy meal plan for weight management:  A healthy meal plan includes a variety of foods, contains fewer calories, and helps you stay healthy  A healthy meal plan includes the following:  · Eat whole-grain foods more often  A healthy meal plan should contain fiber  Fiber is the part of grains, fruits, and vegetables that is not broken down by your body   Whole-grain foods are healthy and provide extra fiber in your diet  Some examples of whole-grain foods are whole-wheat breads and pastas, oatmeal, brown rice, and bulgur  · Eat a variety of vegetables every day  Include dark, leafy greens such as spinach, kale, william greens, and mustard greens  Eat yellow and orange vegetables such as carrots, sweet potatoes, and winter squash  · Eat a variety of fruits every day  Choose fresh or canned fruit (canned in its own juice or light syrup) instead of juice  Fruit juice has very little or no fiber  · Eat low-fat dairy foods  Drink fat-free (skim) milk or 1% milk  Eat fat-free yogurt and low-fat cottage cheese  Try low-fat cheeses such as mozzarella and other reduced-fat cheeses  · Choose meat and other protein foods that are low in fat  Choose beans or other legumes such as split peas or lentils  Choose fish, skinless poultry (chicken or turkey), or lean cuts of red meat (beef or pork)  Before you cook meat or poultry, cut off any visible fat  · Use less fat and oil  Try baking foods instead of frying them  Add less fat, such as margarine, sour cream, regular salad dressing and mayonnaise to foods  Eat fewer high-fat foods  Some examples of high-fat foods include french fries, doughnuts, ice cream, and cakes  · Eat fewer sweets  Limit foods and drinks that are high in sugar  This includes candy, cookies, regular soda, and sweetened drinks  Exercise:  Exercise at least 30 minutes per day on most days of the week  Some examples of exercise include walking, biking, dancing, and swimming  You can also fit in more physical activity by taking the stairs instead of the elevator or parking farther away from stores  Ask your healthcare provider about the best exercise plan for you  © Copyright [address] 2018 Information is for End User's use only and may not be sold, redistributed or otherwise used for commercial purposes   All illustrations and images included in CareNotes® are the copyrighted property of [employer]  or [facility]

## 2022-11-07 NOTE — PROGRESS NOTES
Assessment and Plan:   1  Type 2 diabetes mellitus with other specified complication, without long-term current use of insulin (HCC)  Controlled  Carbohydrate controlled, heart healthy diet  Continue diabetic medications as ordered  Increase regular exercise: Consider such things as walking, biking, strength training , etc    - Hemoglobin A1C; Future  - Comprehensive metabolic panel; Future  - Ambulatory Referral to Optometry; Future  2  Chronic atrial fibrillation (HCC)  Stable  Rate controlled  Managed by cardio  No change to current meds  3  Stage 3 chronic kidney disease, unspecified whether stage 3a or 3b CKD (HCC)  Stable  Avoid nsaids  Monitor    - Comprehensive metabolic panel; Future  4  Alcohol use, unspecified with withdrawal, unspecified ([place] [address] )  Counseled regarding continued etoh use and medical risks associated with same  Encouraged to consider cessation  5  Depression, recurrent ([place] [address] )  Stable  Managed by psych  No change to current tx plan  6  Anxiety  Stress management  Activities to divert attention when possible  Conscious breathing techniques as discussed  Coping mechanisms and strategies vary from person to person so try to utilize strategies that you think may work for you (such as meditation, music, etc  )  continue counseling as discussed  Anti anxiety/depression medications as prescribed  7  Medicare annual wellness visit, subsequent  Heart healthy, carbohydrate controlled diet- limit red meat, limit saturated fat, moderate salt intake, limit junk food, etc    Regular exercise  Stress management  Routine labwork and screenings as ordered  8  Financial difficulties  - Ambulatory referral to social work care management program; Future    Problem List Items Addressed This Visit    None         Tobacco Cessation Counseling: Tobacco cessation counseling was provided   The patient is sincerely urged to quit consumption of tobacco  He is not ready to quit tobacco  Tobacco use screening or tobacco cessation counseling was not performed due to other medical reasons  Pt not interested in quitting at this time  Preventive health issues were discussed with patient, and age appropriate screening tests were ordered as noted in patient's After Visit Summary  Personalized health advice and appropriate referrals for health education or preventive services given if needed, as noted in patient's After Visit Summary  History of Present Illness:     Patient presents for a Medicare Wellness Visit    Here for AWV and chronic fu  Smoking still, about 1/3 ppd  Still drinking about 3 drinks per day  Generally feels well  Sees psych  No suicidal ideation  No recent illness  Follows with cardiology regularly, Dr Holly Gong, [facility]  Reports has never had diabetic eye exam  Willing to do so  Does report some issues with "money" and trouble paying bills  Willing to speak to , referral will be done  Patient Care Team:  Elisa Espinosa as PCP - General (Family Medicine)     Review of Systems:     Review of Systems   Constitutional: Negative for fatigue and unexpected weight change  HENT: Negative for congestion  Respiratory: Negative for chest tightness and shortness of breath  Cardiovascular: Negative for chest pain, palpitations and leg swelling  Gastrointestinal: Negative for abdominal pain  Endocrine: Negative for polydipsia and polyuria  Genitourinary: Negative for frequency and urgency  Musculoskeletal: Negative for arthralgias and myalgias  Skin: Negative for color change and pallor  Allergic/Immunologic: Negative for immunocompromised state  Neurological: Negative for dizziness and headaches  Hematological: Does not bruise/bleed easily  Psychiatric/Behavioral: Positive for dysphoric mood  Negative for self-injury and suicidal ideas  The patient is nervous/anxious           Problem List:     Patient Active Problem List   Diagnosis   • Chronic atrial fibrillation (HCC)   • Coronary artery disease involving native coronary artery of native heart without angina pectoris   • Mixed hyperlipidemia   • Essential hypertension   • Anxiety   • Other insomnia   • Depression, recurrent ([facility])   • Hypertriglyceridemia   • Dilated aortic root ([facility])   • Chronic midline low back pain with bilateral sciatica   • Morbid obesity with BMI of 40 0-44 9, adult ([facility])   • Gallbladder polyp   • Alcohol use   • Type 2 diabetes mellitus, without long-term current use of insulin ([facility])   • Obstructive sleep apnea   • Alcohol use, unspecified with withdrawal, unspecified ([place] [address] )      Past Medical and Surgical History:     Past Medical History:   Diagnosis Date   • Acute on chronic systolic (congestive) heart failure ([facility]) 8/28/2020   • Anxiety    • Atrial fibrillation ([facility])    • Depression    • Diabetes mellitus ([place] [address] )    • Heart disease    • Hyperlipidemia    • Hypertension    • Insomnia    • Leukocytosis 8/12/2021   • Suspected Chronic obstructive pulmonary disease with acute exacerbation ([place] [address] ) 8/10/2021     Past Surgical History:   Procedure Laterality Date   • FL INJECTION LEFT ELBOW (NON ARTHROGRAM)  5/13/2022   • FL INJECTION LEFT ELBOW (NON ARTHROGRAM)  6/3/2022   • LUMBAR EPIDURAL INJECTION N/A 9/15/2022    Procedure: L4-L5  INJECTION LUMBAR;  Surgeon: Alice Paige MD;  Location: [facility] MAIN OR;  Service: Pain Management    • NERVE BLOCK Bilateral 5/13/2022    Procedure: L4 L5 SI MEDIAL BRANCH BLOCK ([id number]); Surgeon: Alice Paige MD;  Location: [facility] MAIN OR;  Service: Pain Management    • NERVE BLOCK Bilateral 6/3/2022    Procedure: L4 L5 S1 MEDIAL BRANCH BLOCK ([id number]); Surgeon: Alice Paige MD;  Location: [facility] MAIN OR;  Service: Pain Management    • RADIOFREQUENCY ABLATION Left 6/24/2022    Procedure: L4 L5 S1 RADIO FREQUENCY  ABLATION (RFA) ([id number]);   Surgeon: Alice Paige MD;  Location: [facility] MAIN OR;  Service: Pain Management    • RADIOFREQUENCY ABLATION Right 2022    Procedure: L4 L5 S1 RADIO FREQUENCY ABLATION (RFA) ([id number]); Surgeon: Jacinto Parish MD;  Location: [facility] MAIN OR;  Service: Pain Management       Family History:     Family History   Problem Relation Age of Onset   • No Known Problems Mother    • No Known Problems Father    • Ovarian cancer Sister    • Substance Abuse Neg Hx    • Mental illness Neg Hx       Social History:     Social History     Socioeconomic History   • Marital status: Single     Spouse name: None   • Number of children: None   • Years of education: None   • Highest education level: None   Occupational History   • None   Tobacco Use   • Smoking status: Current Some Day Smoker     Packs/day: 0 25     Years: 40 00     Pack years: 10 00     Types: Cigarettes     Last attempt to quit: 2015     Years since quittin 2   • Smokeless tobacco: Never Used   Vaping Use   • Vaping Use: Never used   Substance and Sexual Activity   • Alcohol use:  Yes     Alcohol/week: 4 0 standard drinks     Types: 4 Cans of beer per week   • Drug use: Not Currently   • Sexual activity: Not Currently     Partners: Female   Other Topics Concern   • None   Social History Narrative   • None     Social Determinants of Health     Financial Resource Strain: Not on file   Food Insecurity: Not on file   Transportation Needs: Not on file   Physical Activity: Not on file   Stress: Not on file   Social Connections: Not on file   Intimate Partner Violence: Not on file   Housing Stability: Not on file      Medications and Allergies:     Current Outpatient Medications   Medication Sig Dispense Refill   • ALPRAZolam (XANAX) 1 mg tablet 3 (three) times a day as needed      • ARIPiprazole (ABILIFY) 10 mg tablet      • buPROPion (WELLBUTRIN SR) 150 mg 12 hr tablet Take 150 mg by mouth 2 (two) times a day      • candesartan (ATACAND) 32 MG tablet Take 32 mg by mouth     • chlorthalidone 25 mg tablet      • gabapentin (Neurontin) 300 mg capsule Take one capsule at bedtime for 3 days, then increase to one capsule at dinner time and one at bedtime for 3 more days, then take 3 times daily  90 capsule 0   • metFORMIN (GLUCOPHAGE) 500 mg tablet Take 1 tablet (500 mg total) by mouth daily with breakfast 90 tablet 1   • metoprolol tartrate (LOPRESSOR) 100 mg tablet Take 1 tablet (100 mg total) by mouth every 12 (twelve) hours  0   • nabumetone (RELAFEN) 500 mg tablet Take 1 tablet (500 mg total) by mouth 2 (two) times a day as needed (pain (with food)) 60 tablet 0   • potassium chloride (K-DUR,KLOR-CON) 20 mEq tablet      • prazosin (MINIPRESS) 2 mg capsule      • rosuvastatin (CRESTOR) 20 MG tablet      • torsemide (DEMADEX) 20 mg tablet      • zolpidem (AMBIEN) 10 mg tablet daily at bedtime as needed     • ibuprofen (MOTRIN) 800 mg tablet Take 1 tablet (800 mg total) by mouth every 6 (six) hours as needed for moderate pain (Patient not taking: Reported on 11/7/2022) 60 tablet 0   • methylPREDNISolone 4 MG tablet therapy pack Use as directed on package (Patient not taking: Reported on 11/7/2022) 21 tablet 0     No current facility-administered medications for this visit  No Known Allergies   Immunizations:     Immunization History   Administered Date(s) Administered   • COVID-19 MODERNA VACC 0 25 ML IM BOOSTER 01/11/2022   • COVID-19 MODERNA VACC 0 5 ML IM 03/03/2021, 04/01/2021   • INFLUENZA 10/06/2022   • Influenza, high dose seasonal 0 7 mL 09/17/2020, 11/05/2021, 10/06/2022   • Pneumococcal Conjugate 13-Valent 09/17/2020   • Pneumococcal Conjugate Vaccine 20-valent (Pcv20), Polysace 10/06/2022      Health Maintenance:         Topic Date Due   • Colorectal Cancer Screening  09/27/2023   • Hepatitis C Screening  Completed         Topic Date Due   • COVID-19 Vaccine (4 - Booster for Bee Rosa series) 05/11/2022      Medicare Screening Tests and Risk Assessments:     Eli Reese is here for his Subsequent Wellness visit   Last Medicare Wellness visit information reviewed, patient interviewed and updates made to the record today  Health Risk Assessment:   Patient rates overall health as fair  Patient feels that their physical health rating is same  Patient is satisfied with their life  Eyesight was rated as same  Hearing was rated as same  Patient feels that their emotional and mental health rating is same  Patients states they are never, rarely angry  Patient states they are never, rarely unusually tired/fatigued  Pain experienced in the last 7 days has been a lot  Patient's pain rating has been 10/10  Patient states that he has experienced no weight loss or gain in last 6 months  Depression Screening:   PHQ-9 Score: 7      Fall Risk Screening: In the past year, patient has experienced: no history of falling in past year      Home Safety:  Patient does not have trouble with stairs inside or outside of their home  Patient has working smoke alarms and has working carbon monoxide detector  Home safety hazards include: none  Nutrition:   Current diet is Regular  Medications:   Patient is currently taking over-the-counter supplements  OTC medications include: see medication list  Patient is able to manage medications  Activities of Daily Living (ADLs)/Instrumental Activities of Daily Living (IADLs):   Walk and transfer into and out of bed and chair?: Yes  Dress and groom yourself?: Yes    Bathe or shower yourself?: Yes    Feed yourself?  Yes  Do your laundry/housekeeping?: Yes  Manage your money, pay your bills and track your expenses?: Yes  Make your own meals?: Yes    Do your own shopping?: Yes    Previous Hospitalizations:   Any hospitalizations or ED visits within the last 12 months?: No      Advance Care Planning:   Living will: No    Durable POA for healthcare: No    Advanced directive: No    Advanced directive counseling given: Yes    Five wishes given: Yes      Comments: Healthcare proxy, Hector Montelongo, sister    Cognitive Screening: Provider or family/friend/caregiver concerned regarding cognition?: No    PREVENTIVE SCREENINGS      Cardiovascular Screening:    General: History Lipid Disorder and Risks and Benefits Discussed      Diabetes Screening:     General: History Diabetes, Risks and Benefits Discussed and Screening Current      Colorectal Cancer Screening:     General: Screening Current      Prostate Cancer Screening:    General: Patient Declines      Osteoporosis Screening:    General: Patient Declines      Abdominal Aortic Aneurysm (AAA) Screening:    Risk factors include: age between 73-67 yo and tobacco use        Lung Cancer Screening:     General: Screening Not Indicated      Hepatitis C Screening:    General: Screening Current      Preventive Screening Comments: Recommended immunizations reviewed, risks/benefits discussed  Pt verbalized understanding  Screening, Brief Intervention, and Referral to Treatment (SBIRT)    Screening  Typical number of drinks in a day: 3  Typical number of drinks in a week: 10  Interpretation: Low risk drinking behavior  Single Item Drug Screening:  How often have you used an illegal drug (including marijuana) or a prescription medication for non-medical reasons in the past year? never    Single Item Drug Screen Score: 0  Interpretation: Negative screen for possible drug use disorder    Brief Intervention  Healthy alcohol use/limits discussed  Health risks of current substance use discussed  Alcohol cessation counseling given  Other Counseling Topics:   Calcium and vitamin D intake and regular weightbearing exercise  BMI Counseling: Body mass index is 36 35 kg/m²  The BMI is above normal  Nutrition recommendations include reducing portion sizes, decreasing overall calorie intake, moderation in carbohydrate intake, reducing intake of saturated fat and trans fat and reducing intake of cholesterol  Exercise recommendations include exercising 3-5 times per week    Depression Screening Follow-up Plan: Patient's depression screening was positive with a PHQ-9 score 7  Patient assessed for underlying major depression  They have no active suicidal ideations  Brief counseling provided and recommend additional follow-up/re-evaluation next office visit  Continue regular follow-up with their psychologist/therapist/psychiatrist who is managing their mental health condition(s)  Falls Plan of Care: Balance, strength, and gait training instructions were provided  Tobacco Cessation Counseling: Tobacco cessation counseling and education was provided  The patient is sincerely urged to quit consumption of tobacco  He is not ready to quit tobacco  The numerous health risks of tobacco consumption were discussed  If he decides to quit, there are a number of helpful adjunctive aids, and he can see me to discuss nicotine replacement therapy, chantix, or bupropion anytime in the future         Physical Exam:     Recent Results (from the past 672 hour(s))   CBC and differential    Collection Time: 11/02/22 12:32 PM   Result Value Ref Range    WBC 7 39 4 31 - 10 16 Thousand/uL    RBC 4 20 3 88 - 5 62 Million/uL    Hemoglobin 13 7 12 0 - 17 0 g/dL    Hematocrit 42 0 36 5 - 49 3 %     (H) 82 - 98 fL    MCH 32 6 26 8 - 34 3 pg    MCHC 32 6 31 4 - 37 4 g/dL    RDW 12 7 11 6 - 15 1 %    MPV 10 4 8 9 - 12 7 fL    Platelets 318 433 - 513 Thousands/uL    nRBC 0 /100 WBCs    Neutrophils Relative 63 43 - 75 %    Immat GRANS % 1 0 - 2 %    Lymphocytes Relative 19 14 - 44 %    Monocytes Relative 12 4 - 12 %    Eosinophils Relative 4 0 - 6 %    Basophils Relative 1 0 - 1 %    Neutrophils Absolute 4 64 1 85 - 7 62 Thousands/µL    Immature Grans Absolute 0 04 0 00 - 0 20 Thousand/uL    Lymphocytes Absolute 1 42 0 60 - 4 47 Thousands/µL    Monocytes Absolute 0 92 0 17 - 1 22 Thousand/µL    Eosinophils Absolute 0 28 0 00 - 0 61 Thousand/µL    Basophils Absolute 0 09 0 00 - 0 10 Thousands/µL   Comprehensive metabolic panel Collection Time: 11/02/22 12:32 PM   Result Value Ref Range    Sodium 139 135 - 147 mmol/L    Potassium 3 9 3 5 - 5 3 mmol/L    Chloride 109 (H) 96 - 108 mmol/L    CO2 20 (L) 21 - 32 mmol/L    ANION GAP 10 4 - 13 mmol/L    BUN 12 5 - 25 mg/dL    Creatinine 1 25 0 60 - 1 30 mg/dL    Glucose, Fasting 84 65 - 99 mg/dL    Calcium 9 7 8 3 - 10 1 mg/dL    AST 25 5 - 45 U/L    ALT 40 12 - 78 U/L    Alkaline Phosphatase 93 46 - 116 U/L    Total Protein 7 5 6 4 - 8 4 g/dL    Albumin 3 7 3 5 - 5 0 g/dL    Total Bilirubin 0 47 0 20 - 1 00 mg/dL    eGFR 59 ml/min/1 73sq m   Hemoglobin A1C    Collection Time: 11/02/22 12:32 PM   Result Value Ref Range    Hemoglobin A1C 5 6 Normal 3 8-5 6%; PreDiabetic 5 7-6 4%; Diabetic >=6 5%; Glycemic control for adults with diabetes <7 0% %     mg/dl   Lipid panel    Collection Time: 11/02/22 12:32 PM   Result Value Ref Range    Cholesterol 112 See Comment mg/dL    Triglycerides 167 (H) See Comment mg/dL    HDL, Direct 44 >=40 mg/dL    LDL Calculated 35 0 - 100 mg/dL    Non-HDL-Chol (CHOL-HDL) 68 mg/dl   Reviewed lab/diagnostic results with pt including both normal and abnormal findings  In depth counseling and instructions given  All questions answered during visit  /82   Pulse 78   Temp (!) 97 1 °F (36 2 °C) (Temporal)   Resp 16   Ht 6' (1 829 m)   Wt 122 kg (268 lb)   SpO2 98%   BMI 36 35 kg/m²     Physical Exam  Vitals reviewed  Constitutional:       General: He is not in acute distress  Appearance: He is obese  He is not ill-appearing  Eyes:      General: No scleral icterus  Neck:      Vascular: No carotid bruit  Cardiovascular:      Rate and Rhythm: Normal rate and regular rhythm  Pulses: no weak pulses          Dorsalis pedis pulses are 2+ on the right side and 2+ on the left side  Posterior tibial pulses are 2+ on the right side and 2+ on the left side  Pulmonary:      Effort: Pulmonary effort is normal  No respiratory distress  Breath sounds: Normal breath sounds  No wheezing or rales  Abdominal:      General: There is no distension  Palpations: Abdomen is soft  Musculoskeletal:      Cervical back: Normal range of motion  Right lower leg: No edema  Left lower leg: No edema  Feet:      Right foot:      Skin integrity: No ulcer, skin breakdown, erythema, warmth, callus or dry skin  Left foot:      Skin integrity: No ulcer, skin breakdown, erythema, warmth, callus or dry skin  Skin:     General: Skin is warm and dry  Coloration: Skin is not jaundiced or pale  Neurological:      General: No focal deficit present  Mental Status: He is alert and oriented to person, place, and time  Cranial Nerves: No cranial nerve deficit  Sensory: No sensory deficit  Psychiatric:         Mood and Affect: Mood normal          Behavior: Behavior normal          Thought Content: Thought content normal          Judgment: Judgment normal       Patient's shoes and socks removed  Right Foot/Ankle   Right Foot Inspection  Skin Exam: skin normal and skin intact  No dry skin, no warmth, no callus, no erythema, no maceration, no abnormal color, no pre-ulcer, no ulcer and no callus  Toe Exam: ROM and strength within normal limits  Sensory   Monofilament testing: intact    Vascular  Capillary refills: < 3 seconds  The right DP pulse is 2+  The right PT pulse is 2+  Left Foot/Ankle  Left Foot Inspection  Skin Exam: skin normal and skin intact  No dry skin, no warmth, no erythema, no maceration, normal color, no pre-ulcer, no ulcer and no callus  Toe Exam: ROM and strength within normal limits  Sensory   Monofilament testing: intact    Vascular  Capillary refills: < 3 seconds  The left DP pulse is 2+  The left PT pulse is 2+       Assign Risk Category  No deformity present  No loss of protective sensation  No weak pulses  Risk: Sorin 172, CRNP

## 2022-11-11 ENCOUNTER — PATIENT OUTREACH (OUTPATIENT)
Dept: FAMILY MEDICINE CLINIC | Facility: CLINIC | Age: 67
End: 2022-11-11

## 2022-11-18 ENCOUNTER — PATIENT OUTREACH (OUTPATIENT)
Dept: FAMILY MEDICINE CLINIC | Facility: CLINIC | Age: 67
End: 2022-11-18

## 2022-11-18 NOTE — PROGRESS NOTES
2nd outreach    SW called pt reached voicemail and left message requesting call back    SW will mail unable to reach letter

## 2022-11-18 NOTE — LETTER
[place] [phone]    Re: social needs   11/18/2022       Dear Delgado Herring,    We tried to reach you by phone on 11/7 and 11/18 and was unfortunately unable to reach you  Should you need any assistance with things such as housing, food, utilties or transportation, please contact me at [phone]       Sincerely,       Mercedes Ohara LCSW

## 2022-11-21 ENCOUNTER — PATIENT OUTREACH (OUTPATIENT)
Dept: FAMILY MEDICINE CLINIC | Facility: CLINIC | Age: 67
End: 2022-11-21

## 2022-12-29 RX ORDER — TORSEMIDE 20 MG/1
TABLET ORAL
OUTPATIENT
Start: 2022-12-29

## 2023-03-02 ENCOUNTER — TELEPHONE (OUTPATIENT)
Dept: FAMILY MEDICINE CLINIC | Facility: CLINIC | Age: 68
End: 2023-03-02

## 2023-03-02 NOTE — TELEPHONE ENCOUNTER
----- Message from [address] [place] [address] sent at 3/2/2023 10:12 AM EST -----  Regarding: labs  Pt has upcoming appt on 3/7  Needs to have labs done prior to appt  Orders in chart  Please call pt to advise  If labs are not done, appt will need to be re-scheduled   TY     for pt to have labs done prior to appt or will need to be rescheduled

## 2023-03-06 ENCOUNTER — TELEPHONE (OUTPATIENT)
Dept: FAMILY MEDICINE CLINIC | Facility: CLINIC | Age: 68
End: 2023-03-06

## 2023-03-06 NOTE — TELEPHONE ENCOUNTER
----- Message from [facility], [address] sent at 3/6/2023  9:58 AM EST -----  Regarding: labs  Pt has upcoming appt tomorrow for DM fu  Needs to have labs done prior to appt, not done as of today  Orders in chart  Please call pt to advise  If labs are not done, appt will need to be re-scheduled  TY     for pt to have labs done prior to appt

## 2023-03-07 DIAGNOSIS — E11.69 TYPE 2 DIABETES MELLITUS WITH OTHER SPECIFIED COMPLICATION, WITHOUT LONG-TERM CURRENT USE OF INSULIN (HCC): Primary | ICD-10-CM

## 2023-03-09 ENCOUNTER — RA CDI HCC (OUTPATIENT)
Dept: OTHER | Facility: HOSPITAL | Age: 68
End: 2023-03-09

## 2023-03-09 NOTE — PROGRESS NOTES
[facility]  coding opportunities          Chart Reviewed number of suggestions sent to Provider: 2  [id number]  I13 [id number]     Patients Insurance     Medicare Insurance: [facility] [employer]

## 2023-03-10 ENCOUNTER — TELEPHONE (OUTPATIENT)
Dept: FAMILY MEDICINE CLINIC | Facility: CLINIC | Age: 68
End: 2023-03-10

## 2023-03-10 NOTE — TELEPHONE ENCOUNTER
----- Message from [address] [place] [address] Place sent at 3/10/2023  7:22 AM EST -----  Regarding: labs  Pt has upcoming appt on 3/13 for DM fu  Labs are still not done  Pt was a no show for last appt  Needs to have labs done prior to appt  Orders in chart  Please call pt to advise  If labs are not done, appt will need to be re-scheduled  TY    Lm for pt to call the office to reschedule appt due to not having labs done

## 2023-03-14 ENCOUNTER — APPOINTMENT (OUTPATIENT)
Dept: LAB | Facility: CLINIC | Age: 68
End: 2023-03-14

## 2023-03-14 DIAGNOSIS — E11.69 TYPE 2 DIABETES MELLITUS WITH OTHER SPECIFIED COMPLICATION, WITHOUT LONG-TERM CURRENT USE OF INSULIN (HCC): ICD-10-CM

## 2023-03-14 DIAGNOSIS — N18.30 STAGE 3 CHRONIC KIDNEY DISEASE, UNSPECIFIED WHETHER STAGE 3A OR 3B CKD (HCC): ICD-10-CM

## 2023-03-14 LAB
ALBUMIN SERPL BCP-MCNC: 4.2 G/DL (ref 3.5–5)
ALP SERPL-CCNC: 59 U/L (ref 46–116)
ALT SERPL W P-5'-P-CCNC: 24 U/L (ref 12–78)
ANION GAP SERPL CALCULATED.3IONS-SCNC: 4 MMOL/L (ref 4–13)
AST SERPL W P-5'-P-CCNC: 18 U/L (ref 5–45)
BILIRUB SERPL-MCNC: 0.5 MG/DL (ref 0.2–1)
BUN SERPL-MCNC: 18 MG/DL (ref 5–25)
CALCIUM SERPL-MCNC: 10 MG/DL (ref 8.3–10.1)
CHLORIDE SERPL-SCNC: 105 MMOL/L (ref 96–108)
CO2 SERPL-SCNC: 26 MMOL/L (ref 21–32)
CREAT SERPL-MCNC: 1.07 MG/DL (ref 0.6–1.3)
EST. AVERAGE GLUCOSE BLD GHB EST-MCNC: 117 MG/DL
GFR SERPL CREATININE-BSD FRML MDRD: 70 ML/MIN/1.73SQ M
GLUCOSE P FAST SERPL-MCNC: 115 MG/DL (ref 65–99)
HBA1C MFR BLD: 5.7 %
POTASSIUM SERPL-SCNC: 3.9 MMOL/L (ref 3.5–5.3)
PROT SERPL-MCNC: 7 G/DL (ref 6.4–8.4)
SODIUM SERPL-SCNC: 135 MMOL/L (ref 135–147)

## 2023-03-17 ENCOUNTER — OFFICE VISIT (OUTPATIENT)
Dept: FAMILY MEDICINE CLINIC | Facility: CLINIC | Age: 68
End: 2023-03-17

## 2023-03-17 VITALS
HEART RATE: 92 BPM | HEIGHT: 72 IN | SYSTOLIC BLOOD PRESSURE: 108 MMHG | RESPIRATION RATE: 16 BRPM | OXYGEN SATURATION: 100 % | DIASTOLIC BLOOD PRESSURE: 80 MMHG | BODY MASS INDEX: 34.95 KG/M2 | WEIGHT: 258 LBS | TEMPERATURE: 97.5 F

## 2023-03-17 DIAGNOSIS — I48.91 ATRIAL FIBRILLATION WITH RAPID VENTRICULAR RESPONSE (HCC): ICD-10-CM

## 2023-03-17 DIAGNOSIS — I25.10 CORONARY ARTERY DISEASE INVOLVING NATIVE CORONARY ARTERY OF NATIVE HEART WITHOUT ANGINA PECTORIS: ICD-10-CM

## 2023-03-17 DIAGNOSIS — E66.9 OBESITY (BMI 30.0-34.9): ICD-10-CM

## 2023-03-17 DIAGNOSIS — I77.810 DILATED AORTIC ROOT (HCC): ICD-10-CM

## 2023-03-17 DIAGNOSIS — I50.32 CHRONIC DIASTOLIC (CONGESTIVE) HEART FAILURE (HCC): Primary | ICD-10-CM

## 2023-03-17 DIAGNOSIS — I10 ESSENTIAL HYPERTENSION: Chronic | ICD-10-CM

## 2023-03-17 DIAGNOSIS — I50.9 CHF (CONGESTIVE HEART FAILURE) (HCC): ICD-10-CM

## 2023-03-17 DIAGNOSIS — E78.1 HYPERTRIGLYCERIDEMIA: ICD-10-CM

## 2023-03-17 DIAGNOSIS — E11.22 CKD STAGE 2 DUE TO TYPE 2 DIABETES MELLITUS (HCC): ICD-10-CM

## 2023-03-17 DIAGNOSIS — N18.2 CKD STAGE 2 DUE TO TYPE 2 DIABETES MELLITUS (HCC): ICD-10-CM

## 2023-03-17 DIAGNOSIS — E11.69 TYPE 2 DIABETES MELLITUS WITH OTHER SPECIFIED COMPLICATION, WITHOUT LONG-TERM CURRENT USE OF INSULIN (HCC): ICD-10-CM

## 2023-03-17 DIAGNOSIS — F33.9 DEPRESSION, RECURRENT (HCC): ICD-10-CM

## 2023-03-17 DIAGNOSIS — F10.11 HISTORY OF ALCOHOL ABUSE: ICD-10-CM

## 2023-03-17 DIAGNOSIS — I48.20 CHRONIC ATRIAL FIBRILLATION (HCC): ICD-10-CM

## 2023-03-17 DIAGNOSIS — Z23 NEED FOR SHINGLES VACCINE: ICD-10-CM

## 2023-03-17 LAB
CREAT UR-MCNC: 34.6 MG/DL
MICROALBUMIN UR-MCNC: 14.1 MG/L (ref 0–20)
MICROALBUMIN/CREAT 24H UR: 41 MG/G CREATININE (ref 0–30)

## 2023-03-17 RX ORDER — ROSUVASTATIN CALCIUM 20 MG/1
20 TABLET, COATED ORAL DAILY
Qty: 90 TABLET | Refills: 1 | Status: SHIPPED | OUTPATIENT
Start: 2023-03-17

## 2023-03-17 RX ORDER — TORSEMIDE 20 MG/1
20 TABLET ORAL DAILY
Qty: 90 TABLET | Refills: 1 | Status: SHIPPED | OUTPATIENT
Start: 2023-03-17

## 2023-03-17 RX ORDER — METOPROLOL TARTRATE 100 MG/1
100 TABLET ORAL EVERY 12 HOURS SCHEDULED
Qty: 180 TABLET | Refills: 1 | Status: SHIPPED | OUTPATIENT
Start: 2023-03-17

## 2023-03-17 RX ORDER — CANDESARTAN 32 MG/1
32 TABLET ORAL DAILY
Qty: 90 TABLET | Refills: 1 | Status: SHIPPED | OUTPATIENT
Start: 2023-03-17

## 2023-03-17 NOTE — PROGRESS NOTES
Name: Karishma Cortez      : 1955      MRN: [id number]  Encounter Provider: TONY Hood  Encounter Date: 3/17/2023   Encounter department: [address] [place] [address]  Chronic diastolic (congestive) heart failure (HCC)  Stable  No change to current meds  Monitor weight  Continue diuretic  Will give referral to local cardiology at pt request    - Ambulatory Referral to Cardiology; Future  - torsemide (DEMADEX) 20 mg tablet; Take 1 tablet (20 mg total) by mouth daily  Dispense: 90 tablet; Refill: 1  - candesartan (ATACAND) 32 MG tablet; Take 1 tablet (32 mg total) by mouth daily  Dispense: 90 tablet; Refill: 1    2  History of alcohol abuse  No etoh use since early January  anticipatory guidance  - Comprehensive metabolic panel; Future  - CBC and Platelet; Future    3  Depression, recurrent ([place] [address] )  Stable on current regime  Managed by psych    4  Dilated aortic root ([place] [address] )  Stable  No change to current tx plan  Will give referral to local cardiology at pt request  - Ambulatory Referral to Cardiology; Future    5  Chronic atrial fibrillation ([place] [address] )  xarelto d/c by prior cardiology  Monitor  Rate control  No change to current regime  Will give referral to local cardiology at pt request  - Ambulatory Referral to Cardiology; Future    6  Obesity (BMI 30 0-34  9)  Weight loss is recommended to improve overall health  Dietary changes- limit carbohydrates, decrease overall caloric intake, reduce portion sizes, healthier snack choices, limit saturated fats, increase intake of fruits and vegetables, limit junk food  exercise to 3-5 times per week  Consider adding strength exercises to exercise routine  7  CKD stage 2 due to type 2 diabetes mellitus ([place] [address] )  Labs much improved  Monitor    - Comprehensive metabolic panel; Future  - CBC and Platelet; Future    8   Type 2 diabetes mellitus with other specified complication, without long-term current use of insulin [facility])  Well controlled  Carb controlled diet  Regular exercise  Monitor    - Microalbumin / creatinine urine ratio  - Hemoglobin A1C; Future  - Comprehensive metabolic panel; Future  - CBC and Platelet; Future  - Lipid Panel with Direct LDL reflex; Future  - metFORMIN (GLUCOPHAGE) 500 mg tablet; Take 1 tablet (500 mg total) by mouth daily with breakfast  Dispense: 90 tablet; Refill: 1  - rosuvastatin (CRESTOR) 20 MG tablet; Take 1 tablet (20 mg total) by mouth daily  Dispense: 90 tablet; Refill: 1    9  Need for shingles vaccine  - Zoster Vaccine Recombinant IM    10  Hypertriglyceridemia  Heart healthy diet  Statin  - Comprehensive metabolic panel; Future  - Lipid Panel with Direct LDL reflex; Future  - rosuvastatin (CRESTOR) 20 MG tablet; Take 1 tablet (20 mg total) by mouth daily  Dispense: 90 tablet; Refill: 1    11  Coronary artery disease involving native coronary artery of native heart without angina pectoris  Stable  No change to current tx plan  Will give referral to local cardiology at pt request  - Ambulatory Referral to Cardiology; Future  - rosuvastatin (CRESTOR) 20 MG tablet; Take 1 tablet (20 mg total) by mouth daily  Dispense: 90 tablet; Refill: 1  - metoprolol tartrate (LOPRESSOR) 100 mg tablet; Take 1 tablet (100 mg total) by mouth every 12 (twelve) hours  Dispense: 180 tablet; Refill: 1    12  CHF (congestive heart failure) ([facility] )  Monitor weight  Continue current meds  Will give referral to local cardiology at pt request  - torsemide (DEMADEX) 20 mg tablet; Take 1 tablet (20 mg total) by mouth daily  Dispense: 90 tablet; Refill: 1  - candesartan (ATACAND) 32 MG tablet; Take 1 tablet (32 mg total) by mouth daily  Dispense: 90 tablet; Refill: 1  - metoprolol tartrate (LOPRESSOR) 100 mg tablet; Take 1 tablet (100 mg total) by mouth every 12 (twelve) hours  Dispense: 180 tablet; Refill: 1    13  Atrial fibrillation with rapid ventricular response (HCC)  Rate controlled  Aspirin  Monitor   Will give referral to local cardiology at pt request  - metoprolol tartrate (LOPRESSOR) 100 mg tablet; Take 1 tablet (100 mg total) by mouth every 12 (twelve) hours  Dispense: 180 tablet; Refill: 1    14  Essential hypertension  Stable  Continue blood pressure medications as ordered  Monitor blood pressure  Limit salt in diet  Patient was counseled regarding instructions for management which included: impression/diagnosis, risk/benefits of treatment options, importance of compliance with treatment, risk factor reduction, and prognosis  I have reviewed the instructions with the patient answering all questions and patient verbalized understanding  BMI Counseling: Body mass index is 34 99 kg/m²  The BMI is above normal  Nutrition recommendations include reducing portion sizes, decreasing overall calorie intake, moderation in carbohydrate intake, reducing intake of saturated fat and trans fat and reducing intake of cholesterol  Exercise recommendations include exercising 3-5 times per week  Depression Screening Follow-up Plan: Patient's depression screening was negative  Their PHQ-9 score was 0  Patient assessed for underlying major depression  They have no active suicidal ideations  Brief counseling provided and recommend additional follow-up/re-evaluation next office visit  Continue regular follow-up with their psychologist/therapist/psychiatrist who is managing their mental health condition(s)  Subjective      Here for follow up multiple chronic conditions  Extensive cardiac and psych history  DM on metformin  Does not check blood sugars  Sees psych and cardiology but wants to change to different cardiology  Psych meds recently adjusted and feels like doing better on current meds  Denies any suicidal ideation  No mood issues currently  Prior cardiology d/c xarelto and now only on aspirin  History of etoh abuse  Quit 1/1/2023  No recent illness  Generally feels well     No specific concerns or issues  Dx of sleep apnea noted in record  States never received cpap  Review of Systems   Constitutional: Negative for fatigue and unexpected weight change  HENT: Negative for congestion  Eyes: Negative for visual disturbance  Respiratory: Negative for chest tightness and shortness of breath  Cardiovascular: Negative for chest pain and palpitations  Gastrointestinal: Negative for abdominal pain  Endocrine: Negative for polydipsia  Genitourinary: Negative for dysuria  Musculoskeletal: Negative for arthralgias and myalgias  Neurological: Negative for dizziness and headaches  Psychiatric/Behavioral: Negative for dysphoric mood  The patient is not nervous/anxious  Current Outpatient Medications on File Prior to Visit   Medication Sig   • ALPRAZolam (XANAX) 1 mg tablet 3 (three) times a day as needed    • ARIPiprazole (ABILIFY) 10 mg tablet daily   • buPROPion (WELLBUTRIN SR) 150 mg 12 hr tablet Take 150 mg by mouth 2 (two) times a day    • candesartan (ATACAND) 32 MG tablet Take 32 mg by mouth   • chlorthalidone 25 mg tablet    • metFORMIN (GLUCOPHAGE) 500 mg tablet Take 1 tablet (500 mg total) by mouth daily with breakfast   • metoprolol tartrate (LOPRESSOR) 100 mg tablet Take 1 tablet (100 mg total) by mouth every 12 (twelve) hours   • prazosin (MINIPRESS) 2 mg capsule PATIENT TAKING THREE TIMES A DAY   • rosuvastatin (CRESTOR) 20 MG tablet Take by mouth daily   • torsemide (DEMADEX) 20 mg tablet Take by mouth daily   • zolpidem (AMBIEN) 10 mg tablet daily at bedtime as needed   • potassium chloride (K-DUR,KLOR-CON) 20 mEq tablet  (Patient not taking: Reported on 3/17/2023)   • [DISCONTINUED] gabapentin (Neurontin) 300 mg capsule Take one capsule at bedtime for 3 days, then increase to one capsule at dinner time and one at bedtime for 3 more days, then take 3 times daily   (Patient not taking: Reported on 3/17/2023)   • [DISCONTINUED] nabumetone (RELAFEN) 500 mg tablet Take 1 tablet (500 mg total) by mouth 2 (two) times a day as needed (pain (with food)) (Patient not taking: Reported on 3/17/2023)       Objective     Recent Results (from the past 672 hour(s))   Comprehensive metabolic panel    Collection Time: 03/14/23 11:29 AM   Result Value Ref Range    Sodium 135 135 - 147 mmol/L    Potassium 3 9 3 5 - 5 3 mmol/L    Chloride 105 96 - 108 mmol/L    CO2 26 21 - 32 mmol/L    ANION GAP 4 4 - 13 mmol/L    BUN 18 5 - 25 mg/dL    Creatinine 1 07 0 60 - 1 30 mg/dL    Glucose, Fasting 115 (H) 65 - 99 mg/dL    Calcium 10 0 8 3 - 10 1 mg/dL    AST 18 5 - 45 U/L    ALT 24 12 - 78 U/L    Alkaline Phosphatase 59 46 - 116 U/L    Total Protein 7 0 6 4 - 8 4 g/dL    Albumin 4 2 3 5 - 5 0 g/dL    Total Bilirubin 0 50 0 20 - 1 00 mg/dL    eGFR 70 ml/min/1 73sq m   Hemoglobin A1C    Collection Time: 03/14/23 11:29 AM   Result Value Ref Range    Hemoglobin A1C 5 7 (H) Normal 3 8-5 6%; PreDiabetic 5 7-6 4%; Diabetic >=6 5%; Glycemic control for adults with diabetes <7 0% %     mg/dl       /80   Pulse 92   Temp 97 5 °F (36 4 °C) (Temporal)   Resp 16   Ht 6' (1 829 m)   Wt 117 kg (258 lb)   SpO2 100%   BMI 34 99 kg/m²     Physical Exam  Vitals reviewed  Constitutional:       General: He is not in acute distress  Appearance: He is not ill-appearing  Neck:      Vascular: No carotid bruit  Cardiovascular:      Rate and Rhythm: Normal rate and regular rhythm  Pulmonary:      Effort: Pulmonary effort is normal  No respiratory distress  Breath sounds: Normal breath sounds  No wheezing or rales  Abdominal:      General: There is no distension  Palpations: Abdomen is soft  Musculoskeletal:      Cervical back: Normal range of motion  Right lower leg: No edema  Left lower leg: No edema  Skin:     General: Skin is warm and dry  Coloration: Skin is not jaundiced or pale  Neurological:      General: No focal deficit present        Mental Status: He is alert and oriented to person, place, and time  Cranial Nerves: No cranial nerve deficit  Sensory: No sensory deficit  Psychiatric:         Thought Content: Thought content normal          Judgment: Judgment normal       Comments: Affect flat  Well groomed  Dressed appropriately for the weather  Calm  Pleasant   Cooperative  Good eye contact  Converses freely and appropriately            TONY Self

## 2023-03-17 NOTE — PATIENT INSTRUCTIONS
Heart healthy, carbohydrate controlled diet- limit red meat, limit saturated fat, moderate salt intake, limit junk food, etc    Regular exercise  Stress management  Routine labwork and screenings as ordered  Continue same medications  Follow up with specialists as scheduled

## 2023-05-01 NOTE — TELEPHONE ENCOUNTER
I have never filled this  Please clarify dose, etc and send as actual rx refill request if to be filled  Is he seeing cardio ? Who was ordering this? This may not even be being filled by me

## 2023-05-02 RX ORDER — PRAZOSIN HYDROCHLORIDE 2 MG/1
CAPSULE ORAL
OUTPATIENT
Start: 2023-05-02

## 2023-08-01 DIAGNOSIS — I25.10 CORONARY ARTERY DISEASE INVOLVING NATIVE CORONARY ARTERY OF NATIVE HEART WITHOUT ANGINA PECTORIS: ICD-10-CM

## 2023-08-01 DIAGNOSIS — E11.69 TYPE 2 DIABETES MELLITUS WITH OTHER SPECIFIED COMPLICATION, WITHOUT LONG-TERM CURRENT USE OF INSULIN (HCC): ICD-10-CM

## 2023-08-01 DIAGNOSIS — E78.1 HYPERTRIGLYCERIDEMIA: ICD-10-CM

## 2023-08-01 DIAGNOSIS — I50.9 CHF (CONGESTIVE HEART FAILURE) (HCC): ICD-10-CM

## 2023-08-01 DIAGNOSIS — I48.91 ATRIAL FIBRILLATION WITH RAPID VENTRICULAR RESPONSE (HCC): ICD-10-CM

## 2023-08-01 RX ORDER — ROSUVASTATIN CALCIUM 20 MG/1
20 TABLET, COATED ORAL DAILY
Qty: 90 TABLET | Refills: 1 | Status: SHIPPED | OUTPATIENT
Start: 2023-08-01

## 2023-08-01 RX ORDER — METOPROLOL TARTRATE 100 MG/1
100 TABLET ORAL EVERY 12 HOURS SCHEDULED
Qty: 180 TABLET | Refills: 1 | Status: SHIPPED | OUTPATIENT
Start: 2023-08-01

## 2023-08-07 DIAGNOSIS — I50.32 CHRONIC DIASTOLIC (CONGESTIVE) HEART FAILURE (HCC): ICD-10-CM

## 2023-08-07 DIAGNOSIS — I50.9 CHF (CONGESTIVE HEART FAILURE) (HCC): ICD-10-CM

## 2023-08-07 RX ORDER — TORSEMIDE 20 MG/1
20 TABLET ORAL DAILY
Qty: 90 TABLET | Refills: 1 | OUTPATIENT
Start: 2023-08-07

## 2023-08-21 DIAGNOSIS — I50.9 CHF (CONGESTIVE HEART FAILURE) (HCC): ICD-10-CM

## 2023-08-21 DIAGNOSIS — I50.32 CHRONIC DIASTOLIC (CONGESTIVE) HEART FAILURE (HCC): ICD-10-CM

## 2023-08-21 RX ORDER — TORSEMIDE 20 MG/1
20 TABLET ORAL DAILY
Qty: 90 TABLET | Refills: 1 | Status: SHIPPED | OUTPATIENT
Start: 2023-08-21

## 2023-08-31 ENCOUNTER — TELEPHONE (OUTPATIENT)
Dept: FAMILY MEDICINE CLINIC | Facility: CLINIC | Age: 68
End: 2023-08-31

## 2023-08-31 NOTE — TELEPHONE ENCOUNTER
Spoke to patient regarding dm follow up blood work. Patient sated the blood work will be completed prior to the appt. Advised patient if labs are not completed the juan jose will need to be rescheduled,

## 2023-09-08 ENCOUNTER — APPOINTMENT (OUTPATIENT)
Dept: LAB | Facility: CLINIC | Age: 68
End: 2023-09-08
Payer: COMMERCIAL

## 2023-09-08 DIAGNOSIS — E11.22 CKD STAGE 2 DUE TO TYPE 2 DIABETES MELLITUS (HCC): ICD-10-CM

## 2023-09-08 DIAGNOSIS — N18.2 CKD STAGE 2 DUE TO TYPE 2 DIABETES MELLITUS (HCC): ICD-10-CM

## 2023-09-08 DIAGNOSIS — E11.69 TYPE 2 DIABETES MELLITUS WITH OTHER SPECIFIED COMPLICATION, WITHOUT LONG-TERM CURRENT USE OF INSULIN (HCC): ICD-10-CM

## 2023-09-08 DIAGNOSIS — E78.1 HYPERTRIGLYCERIDEMIA: ICD-10-CM

## 2023-09-08 DIAGNOSIS — F10.11 HISTORY OF ALCOHOL ABUSE: ICD-10-CM

## 2023-09-08 LAB
ALBUMIN SERPL BCP-MCNC: 4.4 G/DL (ref 3.5–5)
ALP SERPL-CCNC: 83 U/L (ref 34–104)
ALT SERPL W P-5'-P-CCNC: 82 U/L (ref 7–52)
ANION GAP SERPL CALCULATED.3IONS-SCNC: 12 MMOL/L
AST SERPL W P-5'-P-CCNC: 78 U/L (ref 13–39)
BILIRUB SERPL-MCNC: 0.67 MG/DL (ref 0.2–1)
BUN SERPL-MCNC: 21 MG/DL (ref 5–25)
CALCIUM SERPL-MCNC: 9.8 MG/DL (ref 8.4–10.2)
CHLORIDE SERPL-SCNC: 101 MMOL/L (ref 96–108)
CHOLEST SERPL-MCNC: 146 MG/DL
CO2 SERPL-SCNC: 26 MMOL/L (ref 21–32)
CREAT SERPL-MCNC: 1.12 MG/DL (ref 0.6–1.3)
ERYTHROCYTE [DISTWIDTH] IN BLOOD BY AUTOMATED COUNT: 13.3 % (ref 11.6–15.1)
EST. AVERAGE GLUCOSE BLD GHB EST-MCNC: 117 MG/DL
GFR SERPL CREATININE-BSD FRML MDRD: 67 ML/MIN/1.73SQ M
GLUCOSE P FAST SERPL-MCNC: 88 MG/DL (ref 65–99)
HBA1C MFR BLD: 5.7 %
HCT VFR BLD AUTO: 40.8 % (ref 36.5–49.3)
HDLC SERPL-MCNC: 54 MG/DL
HGB BLD-MCNC: 13.8 G/DL (ref 12–17)
LDLC SERPL CALC-MCNC: 16 MG/DL (ref 0–100)
MCH RBC QN AUTO: 33.9 PG (ref 26.8–34.3)
MCHC RBC AUTO-ENTMCNC: 33.8 G/DL (ref 31.4–37.4)
MCV RBC AUTO: 100 FL (ref 82–98)
PLATELET # BLD AUTO: 258 THOUSANDS/UL (ref 149–390)
PMV BLD AUTO: 10.2 FL (ref 8.9–12.7)
POTASSIUM SERPL-SCNC: 3.7 MMOL/L (ref 3.5–5.3)
PROT SERPL-MCNC: 7 G/DL (ref 6.4–8.4)
RBC # BLD AUTO: 4.07 MILLION/UL (ref 3.88–5.62)
SODIUM SERPL-SCNC: 139 MMOL/L (ref 135–147)
TRIGL SERPL-MCNC: 379 MG/DL
WBC # BLD AUTO: 9.88 THOUSAND/UL (ref 4.31–10.16)

## 2023-09-08 PROCEDURE — 80061 LIPID PANEL: CPT

## 2023-09-08 PROCEDURE — 80053 COMPREHEN METABOLIC PANEL: CPT

## 2023-09-08 PROCEDURE — 85027 COMPLETE CBC AUTOMATED: CPT

## 2023-09-08 PROCEDURE — 83036 HEMOGLOBIN GLYCOSYLATED A1C: CPT

## 2023-09-08 PROCEDURE — 36415 COLL VENOUS BLD VENIPUNCTURE: CPT

## 2023-09-15 ENCOUNTER — OFFICE VISIT (OUTPATIENT)
Dept: FAMILY MEDICINE CLINIC | Facility: CLINIC | Age: 68
End: 2023-09-15
Payer: COMMERCIAL

## 2023-09-15 VITALS
BODY MASS INDEX: 35.68 KG/M2 | OXYGEN SATURATION: 94 % | SYSTOLIC BLOOD PRESSURE: 122 MMHG | DIASTOLIC BLOOD PRESSURE: 74 MMHG | WEIGHT: 263.4 LBS | RESPIRATION RATE: 16 BRPM | TEMPERATURE: 97.9 F | HEART RATE: 102 BPM | HEIGHT: 72 IN

## 2023-09-15 DIAGNOSIS — E78.1 HYPERTRIGLYCERIDEMIA: ICD-10-CM

## 2023-09-15 DIAGNOSIS — Z12.11 SCREENING FOR MALIGNANT NEOPLASM OF COLON: ICD-10-CM

## 2023-09-15 DIAGNOSIS — I50.32 CHRONIC DIASTOLIC (CONGESTIVE) HEART FAILURE (HCC): ICD-10-CM

## 2023-09-15 DIAGNOSIS — E78.2 MIXED HYPERLIPIDEMIA: Chronic | ICD-10-CM

## 2023-09-15 DIAGNOSIS — J44.9 CHRONIC OBSTRUCTIVE PULMONARY DISEASE, UNSPECIFIED COPD TYPE (HCC): ICD-10-CM

## 2023-09-15 DIAGNOSIS — I50.9 CHF (CONGESTIVE HEART FAILURE) (HCC): ICD-10-CM

## 2023-09-15 DIAGNOSIS — F10.939 ALCOHOL USE, UNSPECIFIED WITH WITHDRAWAL, UNSPECIFIED (HCC): ICD-10-CM

## 2023-09-15 DIAGNOSIS — N18.30 STAGE 3 CHRONIC KIDNEY DISEASE, UNSPECIFIED WHETHER STAGE 3A OR 3B CKD (HCC): ICD-10-CM

## 2023-09-15 DIAGNOSIS — Z78.9 ALCOHOL USE: ICD-10-CM

## 2023-09-15 DIAGNOSIS — I10 ESSENTIAL HYPERTENSION: Chronic | ICD-10-CM

## 2023-09-15 DIAGNOSIS — E11.69 TYPE 2 DIABETES MELLITUS WITH OTHER SPECIFIED COMPLICATION, WITHOUT LONG-TERM CURRENT USE OF INSULIN (HCC): Primary | ICD-10-CM

## 2023-09-15 DIAGNOSIS — E66.01 MORBID OBESITY WITH BMI OF 40.0-44.9, ADULT (HCC): ICD-10-CM

## 2023-09-15 PROCEDURE — 99214 OFFICE O/P EST MOD 30 MIN: CPT | Performed by: NURSE PRACTITIONER

## 2023-09-15 RX ORDER — FENOFIBRATE 145 MG/1
145 TABLET, COATED ORAL DAILY
Qty: 90 TABLET | Refills: 1 | Status: SHIPPED | OUTPATIENT
Start: 2023-09-15

## 2023-09-15 RX ORDER — CANDESARTAN 32 MG/1
32 TABLET ORAL DAILY
Qty: 90 TABLET | Refills: 1 | Status: SHIPPED | OUTPATIENT
Start: 2023-09-15

## 2023-09-15 NOTE — PATIENT INSTRUCTIONS
Heart healthy, carbohydrate controlled diet- limit red meat, limit saturated fat, moderate salt intake, limit junk food, etc.   Regular exercise  Stress management  Routine labwork and screenings as ordered.    Continue all medications as prescribed  Start Fenofibrate as ordered for elevated triglycerides

## 2023-09-15 NOTE — PROGRESS NOTES
Name: Daniel He      : 1955      MRN: [id number]  Encounter Provider: TONY Farley  Encounter Date: 9/15/2023   Encounter department: [address] [place] [address]   1. Type 2 diabetes mellitus with other specified complication, without long-term current use of insulin (HCC)  Well controlled. Low carb diet. Regular exercise. Continue metformin 500mg daily.   - Lipid Panel with Direct LDL reflex; Future  - Comprehensive metabolic panel; Future  - Hemoglobin A1C; Future    2. Morbid obesity with BMI of 40.0-44.9, adult ([address])  Pt has lost some weight with current BMI 35.7  Weight loss is recommended to improve overall health. Dietary changes- limit carbohydrates, decrease overall caloric intake, reduce portion sizes, healthier snack choices, limit saturated fats, increase intake of fruits and vegetables, limit junk food. exercise to 3-5 times per week. Consider adding strength exercises to exercise routine. 3. Alcohol use, unspecified with withdrawal, unspecified ([address])  Discussed risks of continue etoh intake. Monitor. Anticipatory guidance. 4. Chronic obstructive pulmonary disease, unspecified COPD type ([address])  Stable with no recent exac. Monitor. 5. Hypertriglyceridemia  Will add fenofibrate to target TG. Discussed etoh in relationship to elevated TG and increased cardiac risk. Pt verbalized understanding. Will recheck labs in 3 months. - Lipid Panel with Direct LDL reflex; Future  - Comprehensive metabolic panel; Future  - fenofibrate (TRICOR) 145 mg tablet; Take 1 tablet (145 mg total) by mouth daily  Dispense: 90 tablet; Refill: 1    6. Alcohol use  Discussed risks of continue etoh intake. Monitor. Anticipatory guidance. - Comprehensive metabolic panel; Future    7. Chronic diastolic (congestive) heart failure (HCC)  Weight stable. Managed by cardiology. Continue diuretic. Monitor.   - candesartan (ATACAND) 32 MG tablet;  Take 1 tablet (32 mg total) by mouth daily  Dispense: 90 tablet; Refill: 1    8. CHF (congestive heart failure) (HCC)  Weight stable. Managed by cardiology. Continue diuretic. Monitor.   - candesartan (ATACAND) 32 MG tablet; Take 1 tablet (32 mg total) by mouth daily  Dispense: 90 tablet; Refill: 1    9. Screening for malignant neoplasm of colon  - Cologuard    10. Essential hypertension  Stable. Continue blood pressure medications as ordered. Monitor blood pressure. Stress management. Regular exercise  Limit salt in diet. 11. Stage 3 chronic kidney disease, unspecified whether stage 3a or 3b CKD (HCC)  Stable. Monitor labs. Maintain adequate hydration. Minimize use of nsaids. 12. Mixed hyperlipidemia  Continue statin. Add fibrate for elevated TG. Heart healthy diet. Reduce etoh intake. Patient was counseled regarding instructions for management which included: impression/diagnosis, risk/benefits of treatment options, importance of compliance with treatment, risk factor reduction, and prognosis. I have reviewed the instructions with the patient answering all questions and patient verbalized understanding. Subjective      Here for DM and chronic fu  Sees psych and cardiology  Was on minipress and states was ordered for bp by cardio and ordered by psych for nightmares but "now neither one will order" . Has been out for over a month. Admits that still drinking about 2-3 beers per day  Feels well  No recent illness  Taking all meds as prescribed. No other issues or concerns. Review of Systems   Constitutional: Negative for fatigue and unexpected weight change. HENT: Negative for congestion. Respiratory: Negative for chest tightness and shortness of breath. Cardiovascular: Negative for chest pain, palpitations and leg swelling (much better). Gastrointestinal: Negative for abdominal pain. Musculoskeletal: Negative for arthralgias and myalgias. Skin: Negative for rash and wound. Neurological: Negative for dizziness and headaches. Psychiatric/Behavioral: Positive for sleep disturbance (takes ambien prn). Negative for self-injury and suicidal ideas. Current Outpatient Medications on File Prior to Visit   Medication Sig   • ALPRAZolam (XANAX) 1 mg tablet 3 (three) times a day as needed    • ARIPiprazole (ABILIFY) 10 mg tablet daily   • buPROPion (WELLBUTRIN SR) 150 mg 12 hr tablet Take 150 mg by mouth 2 (two) times a day    • candesartan (ATACAND) 32 MG tablet Take 1 tablet (32 mg total) by mouth daily   • chlorthalidone 25 mg tablet    • metFORMIN (GLUCOPHAGE) 500 mg tablet Take 1 tablet (500 mg total) by mouth daily with breakfast   • metoprolol tartrate (LOPRESSOR) 100 mg tablet Take 1 tablet (100 mg total) by mouth every 12 (twelve) hours   • rosuvastatin (CRESTOR) 20 MG tablet Take 1 tablet (20 mg total) by mouth daily   • torsemide (DEMADEX) 20 mg tablet TAKE 1 TABLET EVERY DAY   • zolpidem (AMBIEN) 10 mg tablet daily at bedtime as needed   • prazosin (MINIPRESS) 2 mg capsule PATIENT TAKING THREE TIMES A DAY (Patient not taking: Reported on 9/15/2023)       Objective     Recent Results (from the past 672 hour(s))   Hemoglobin A1C    Collection Time: 09/08/23 12:12 PM   Result Value Ref Range    Hemoglobin A1C 5.7 (H) Normal 4.0-5.6%; PreDiabetic 5.7-6.4%;  Diabetic >=6.5%; Glycemic control for adults with diabetes <7.0% %     mg/dl   Comprehensive metabolic panel    Collection Time: 09/08/23 12:12 PM   Result Value Ref Range    Sodium 139 135 - 147 mmol/L    Potassium 3.7 3.5 - 5.3 mmol/L    Chloride 101 96 - 108 mmol/L    CO2 26 21 - 32 mmol/L    ANION GAP 12 mmol/L    BUN 21 5 - 25 mg/dL    Creatinine 1.12 0.60 - 1.30 mg/dL    Glucose, Fasting 88 65 - 99 mg/dL    Calcium 9.8 8.4 - 10.2 mg/dL    AST 78 (H) 13 - 39 U/L    ALT 82 (H) 7 - 52 U/L    Alkaline Phosphatase 83 34 - 104 U/L    Total Protein 7.0 6.4 - 8.4 g/dL    Albumin 4.4 3.5 - 5.0 g/dL    Total Bilirubin 0.67 0.20 - 1.00 mg/dL    eGFR 67 ml/min/1.73sq m   CBC and Platelet    Collection Time: 09/08/23 12:12 PM   Result Value Ref Range    WBC 9.88 4.31 - 10.16 Thousand/uL    RBC 4.07 3.88 - 5.62 Million/uL    Hemoglobin 13.8 12.0 - 17.0 g/dL    Hematocrit 40.8 36.5 - 49.3 %     (H) 82 - 98 fL    MCH 33.9 26.8 - 34.3 pg    MCHC 33.8 31.4 - 37.4 g/dL    RDW 13.3 11.6 - 15.1 %    Platelets 645 317 - 547 Thousands/uL    MPV 10.2 8.9 - 12.7 fL   Lipid Panel with Direct LDL reflex    Collection Time: 09/08/23 12:12 PM   Result Value Ref Range    Cholesterol 146 See Comment mg/dL    Triglycerides 379 (H) See Comment mg/dL    HDL, Direct 54 >=40 mg/dL    LDL Calculated 16 0 - 100 mg/dL   Reviewed lab/diagnostic results with pt including both normal and abnormal findings. In depth counseling and instructions given. All questions answered during visit. /74 (BP Location: Left arm)   Pulse 102   Temp 97.9 °F (36.6 °C)   Resp 16   Ht 6' (1.829 m)   Wt 119 kg (263 lb 6.4 oz)   SpO2 94%   BMI 35.72 kg/m²     Physical Exam  Vitals reviewed. Constitutional:       General: He is not in acute distress. Appearance: He is obese. He is not ill-appearing. Neck:      Vascular: No carotid bruit. Cardiovascular:      Rate and Rhythm: Normal rate and regular rhythm. Pulses: no weak pulses          Dorsalis pedis pulses are 2+ on the right side and 2+ on the left side. Posterior tibial pulses are 2+ on the right side and 2+ on the left side. Comments: Non pitting edema, mild, BLE  Pulmonary:      Effort: Pulmonary effort is normal. No respiratory distress. Breath sounds: Normal breath sounds. No wheezing or rales. Musculoskeletal:      Cervical back: Normal range of motion. Feet:      Right foot:      Skin integrity: No ulcer, skin breakdown, erythema, warmth, callus or dry skin. Left foot:      Skin integrity: No ulcer, skin breakdown, erythema, warmth, callus or dry skin. Skin:     General: Skin is warm and dry. Coloration: Skin is not jaundiced or pale. Neurological:      Mental Status: He is alert. Psychiatric:         Behavior: Behavior normal.         Thought Content: Thought content normal.         Judgment: Judgment normal.      Comments: Affect flat  Well groomed. Dressed appropriately for the weather. Calm. Pleasant . Cooperative. Good eye contact. Converses freely and appropriately. Patient's shoes and socks removed. Right Foot/Ankle   Right Foot Inspection  Skin Exam: skin normal and skin intact. No dry skin, no warmth, no callus, no erythema, no maceration, no abnormal color, no pre-ulcer, no ulcer and no callus. Toe Exam: ROM and strength within normal limits. Sensory   Monofilament testing: intact    Vascular  Capillary refills: < 3 seconds  The right DP pulse is 2+. The right PT pulse is 2+. Left Foot/Ankle  Left Foot Inspection  Skin Exam: skin normal and skin intact. No dry skin, no warmth, no erythema, no maceration, normal color, no pre-ulcer, no ulcer and no callus. Toe Exam: ROM and strength within normal limits. Sensory   Monofilament testing: intact    Vascular  Capillary refills: < 3 seconds  The left DP pulse is 2+. The left PT pulse is 2+.      Assign Risk Category  No deformity present  No loss of protective sensation  No weak pulses  Risk: [address], CRNP

## 2023-09-18 ENCOUNTER — TELEPHONE (OUTPATIENT)
Dept: FAMILY MEDICINE CLINIC | Facility: CLINIC | Age: 68
End: 2023-09-18

## 2023-09-18 NOTE — TELEPHONE ENCOUNTER
Patient has a nurse visit scheduled for his shingles shot on 9/19. Left a message that medicare does not cover the shingle shot at an office level but medicare will cover the shingles shot if he goes to a pharmacy. Patient had his 1st shingles shot at a pharmacy so he can go back to the pharmacy for his second. Patient will have to pay out of pocket if he wants the shot in the office which could cost 200 dollars.     Bravo Jc

## 2023-09-27 ENCOUNTER — TELEPHONE (OUTPATIENT)
Age: 68
End: 2023-09-27

## 2023-09-27 DIAGNOSIS — F33.9 DEPRESSION, RECURRENT (HCC): Primary | Chronic | ICD-10-CM

## 2023-09-27 DIAGNOSIS — F41.9 ANXIETY: Chronic | ICD-10-CM

## 2023-09-27 NOTE — TELEPHONE ENCOUNTER
Spoke to patient, patient is looking for recommendations for a psychiatrist with in [place]. Please advise.

## 2023-10-17 LAB — COLOGUARD RESULT REPORTABLE: POSITIVE

## 2023-10-19 DIAGNOSIS — R19.5 POSITIVE COLORECTAL CANCER SCREENING USING COLOGUARD TEST: Primary | ICD-10-CM

## 2023-11-30 ENCOUNTER — OFFICE VISIT (OUTPATIENT)
Dept: FAMILY MEDICINE CLINIC | Facility: CLINIC | Age: 68
End: 2023-11-30
Payer: COMMERCIAL

## 2023-11-30 VITALS
WEIGHT: 257 LBS | RESPIRATION RATE: 16 BRPM | HEART RATE: 84 BPM | SYSTOLIC BLOOD PRESSURE: 134 MMHG | HEIGHT: 72 IN | DIASTOLIC BLOOD PRESSURE: 80 MMHG | BODY MASS INDEX: 34.81 KG/M2 | OXYGEN SATURATION: 97 % | TEMPERATURE: 97 F

## 2023-11-30 DIAGNOSIS — J06.9 VIRAL URI: ICD-10-CM

## 2023-11-30 DIAGNOSIS — R05.1 ACUTE COUGH: Primary | ICD-10-CM

## 2023-11-30 DIAGNOSIS — J20.9 ACUTE BRONCHITIS, UNSPECIFIED ORGANISM: ICD-10-CM

## 2023-11-30 LAB
SARS-COV-2 AG UPPER RESP QL IA: NEGATIVE
VALID CONTROL: NORMAL

## 2023-11-30 PROCEDURE — 87811 SARS-COV-2 COVID19 W/OPTIC: CPT | Performed by: NURSE PRACTITIONER

## 2023-11-30 PROCEDURE — 99214 OFFICE O/P EST MOD 30 MIN: CPT | Performed by: NURSE PRACTITIONER

## 2023-11-30 RX ORDER — RIVAROXABAN 20 MG/1
20 TABLET, FILM COATED ORAL DAILY
COMMUNITY
Start: 2023-10-16

## 2023-11-30 RX ORDER — FLUOXETINE HYDROCHLORIDE 20 MG/1
CAPSULE ORAL
COMMUNITY
Start: 2023-09-29

## 2023-11-30 RX ORDER — BENZONATATE 200 MG/1
200 CAPSULE ORAL 3 TIMES DAILY PRN
Qty: 20 CAPSULE | Refills: 0 | Status: SHIPPED | OUTPATIENT
Start: 2023-11-30 | End: 2023-12-07 | Stop reason: ALTCHOICE

## 2023-11-30 RX ORDER — PREDNISONE 20 MG/1
20 TABLET ORAL DAILY
Qty: 5 TABLET | Refills: 0 | Status: SHIPPED | OUTPATIENT
Start: 2023-11-30 | End: 2023-12-05

## 2023-11-30 RX ORDER — AMOXICILLIN AND CLAVULANATE POTASSIUM 875; 125 MG/1; MG/1
1 TABLET, FILM COATED ORAL EVERY 12 HOURS SCHEDULED
Qty: 14 TABLET | Refills: 0 | Status: SHIPPED | OUTPATIENT
Start: 2023-11-30 | End: 2023-12-07

## 2023-11-30 NOTE — PROGRESS NOTES
Name: Shant Moreira      : 1955      MRN: [id number]  Encounter Provider: TONY Reaves  Encounter Date: 2023   Encounter department: [address] [place] [address]   1. Acute cough  - POCT Rapid Covid Ag- neg  - benzonatate (TESSALON) 200 MG capsule; Take 1 capsule (200 mg total) by mouth 3 (three) times a day as needed for cough  Dispense: 20 capsule; Refill: 0    2. Viral URI  You have been diagnosed with a viral upper respiratory infection, otherwise known as a common cold. Fluids. Rest. Nasal saline. Supportive care for symptom management. Tylenol or ibuprofen as needed for fever or discomfort. Use a cool mist humidifier at bedtime.   - POCT Rapid Covid Ag    3. Acute bronchitis, unspecified organism  Pt with multiple comorbid conditions and pulmonary symptoms, abnormal lung sounds. Will treat with abx and oral steroids. Will recheck lungs in 1 week in office  Call or return to office if symptoms worsen or if new symptoms develop. - amoxicillin-clavulanate (AUGMENTIN) 875-125 mg per tablet; Take 1 tablet by mouth every 12 (twelve) hours for 7 days  Dispense: 14 tablet; Refill: 0  - predniSONE 20 mg tablet; Take 1 tablet (20 mg total) by mouth daily for 5 days  Dispense: 5 tablet; Refill: 0       Patient was counseled regarding instructions for management which included: impression/diagnosis, risk/benefits of treatment options, importance of compliance with treatment, risk factor reduction, and prognosis. I have reviewed the instructions with the patient answering all questions and patient verbalized understanding. Subjective      Here for cold like symptoms. Runny nose, congestion, cough. Feels like congestion in chest. Wheezing at times. Chest feels tight  No fever. (+) sweats. Symptoms for 4 days. Taking nyquil. Cough  Associated symptoms include rhinorrhea, shortness of breath and wheezing.  Pertinent negatives include no chest pain, chills, fever, headaches, myalgias or sore throat. Review of Systems   Constitutional:  Positive for diaphoresis. Negative for chills, fatigue and fever. HENT:  Positive for congestion and rhinorrhea. Negative for sinus pressure, sinus pain and sore throat. Respiratory:  Positive for cough, chest tightness, shortness of breath and wheezing. Cardiovascular:  Negative for chest pain, palpitations and leg swelling. Gastrointestinal:  Negative for abdominal pain, diarrhea, nausea and vomiting. Musculoskeletal:  Negative for back pain and myalgias. Skin:  Negative for color change and pallor. Allergic/Immunologic: Negative for immunocompromised state. Neurological:  Negative for dizziness, weakness and headaches. Hematological:  Negative for adenopathy.        Current Outpatient Medications on File Prior to Visit   Medication Sig    ALPRAZolam (XANAX) 1 mg tablet 3 (three) times a day as needed     ARIPiprazole (ABILIFY) 10 mg tablet daily    buPROPion (WELLBUTRIN SR) 150 mg 12 hr tablet Take 150 mg by mouth 2 (two) times a day     candesartan (ATACAND) 32 MG tablet Take 1 tablet (32 mg total) by mouth daily    chlorthalidone 25 mg tablet     fenofibrate (TRICOR) 145 mg tablet Take 1 tablet (145 mg total) by mouth daily    FLUoxetine (PROzac) 20 mg capsule     metFORMIN (GLUCOPHAGE) 500 mg tablet Take 1 tablet (500 mg total) by mouth daily with breakfast    metoprolol tartrate (LOPRESSOR) 100 mg tablet Take 1 tablet (100 mg total) by mouth every 12 (twelve) hours    rosuvastatin (CRESTOR) 20 MG tablet Take 1 tablet (20 mg total) by mouth daily    torsemide (DEMADEX) 20 mg tablet TAKE 1 TABLET EVERY DAY    Xarelto 20 MG tablet Take 20 mg by mouth daily    zolpidem (AMBIEN) 10 mg tablet daily at bedtime as needed    prazosin (MINIPRESS) 2 mg capsule PATIENT TAKING THREE TIMES A DAY (Patient not taking: Reported on 9/15/2023)       Objective   Poct covid negative    /80 (BP Location: Right arm, Patient Position: Sitting, Cuff Size: Large)   Pulse 84   Temp (!) 97 °F (36.1 °C)   Resp 16   Ht 6' (1.829 m)   Wt 117 kg (257 lb)   SpO2 97%   BMI 34.86 kg/m²     Physical Exam  Vitals reviewed. Constitutional:       General: He is not in acute distress. Appearance: He is ill-appearing. HENT:      Right Ear: Tympanic membrane normal.      Left Ear: Tympanic membrane normal.      Nose: Congestion present. Mouth/Throat:      Mouth: Mucous membranes are moist.      Pharynx: Oropharynx is clear. Eyes:      General:         Right eye: No discharge. Left eye: No discharge. Neck:      Vascular: No carotid bruit. Cardiovascular:      Rate and Rhythm: Normal rate. Pulmonary:      Effort: Pulmonary effort is normal.      Breath sounds: Wheezing and rhonchi present. Musculoskeletal:      Cervical back: Normal range of motion. Skin:     General: Skin is warm and dry. Coloration: Skin is not jaundiced or pale. Neurological:      General: No focal deficit present. Mental Status: He is oriented to person, place, and time. Cranial Nerves: No cranial nerve deficit. Sensory: No sensory deficit. Psychiatric:         Mood and Affect: Mood normal.         Behavior: Behavior normal.         Thought Content:  Thought content normal.         Judgment: Judgment normal.       Eino [place]

## 2023-11-30 NOTE — PATIENT INSTRUCTIONS
Augmentin 875mg twice daily with food for one week  Prednisone 20mg daily with food for 5 days to decrease inflammation /wheeze in airways  Tessalon perles as needed for cough  Fluids. Rest.   Follow up in office next week to recheck lungs  Call or return to office if symptoms worsen or if new symptoms develop.

## 2023-12-07 ENCOUNTER — RA CDI HCC (OUTPATIENT)
Dept: OTHER | Facility: HOSPITAL | Age: 68
End: 2023-12-07

## 2023-12-07 ENCOUNTER — OFFICE VISIT (OUTPATIENT)
Dept: FAMILY MEDICINE CLINIC | Facility: CLINIC | Age: 68
End: 2023-12-07
Payer: COMMERCIAL

## 2023-12-07 VITALS
RESPIRATION RATE: 16 BRPM | HEIGHT: 66 IN | SYSTOLIC BLOOD PRESSURE: 100 MMHG | TEMPERATURE: 97.6 F | HEART RATE: 87 BPM | BODY MASS INDEX: 40.66 KG/M2 | WEIGHT: 253 LBS | OXYGEN SATURATION: 96 % | DIASTOLIC BLOOD PRESSURE: 60 MMHG

## 2023-12-07 DIAGNOSIS — Z00.00 MEDICARE ANNUAL WELLNESS VISIT, SUBSEQUENT: ICD-10-CM

## 2023-12-07 DIAGNOSIS — Z59.9 FINANCIAL DIFFICULTIES: ICD-10-CM

## 2023-12-07 DIAGNOSIS — Z23 ENCOUNTER FOR IMMUNIZATION: ICD-10-CM

## 2023-12-07 DIAGNOSIS — J20.9 ACUTE BRONCHITIS, UNSPECIFIED ORGANISM: Primary | ICD-10-CM

## 2023-12-07 PROCEDURE — G0439 PPPS, SUBSEQ VISIT: HCPCS | Performed by: NURSE PRACTITIONER

## 2023-12-07 PROCEDURE — 99213 OFFICE O/P EST LOW 20 MIN: CPT | Performed by: NURSE PRACTITIONER

## 2023-12-07 SDOH — ECONOMIC STABILITY - INCOME SECURITY: PROBLEM RELATED TO HOUSING AND ECONOMIC CIRCUMSTANCES, UNSPECIFIED: Z59.9

## 2023-12-07 NOTE — PROGRESS NOTES
Assessment and Plan:   1. Acute bronchitis, unspecified organism  Resolved post treatment. Monitor. 2. Encounter for immunization  - influenza vaccine, high-dose, PF 0.7 mL (FLUZONE HIGH-DOSE)    3. Medicare annual wellness visit, subsequent  Heart healthy, carbohydrate controlled diet- limit red meat, limit saturated fat, moderate salt intake, limit junk food, etc.   Regular exercise  Stress management  Routine labwork and screenings as ordered. 4. Financial difficulties  - Ambulatory referral to social work care management program; Future    Problem List Items Addressed This Visit    None  Visit Diagnoses       Acute bronchitis, unspecified organism    -  Primary    Encounter for immunization        Relevant Orders    influenza vaccine, high-dose, PF 0.7 mL (FLUZONE HIGH-DOSE)             Preventive health issues were discussed with patient, and age appropriate screening tests were ordered as noted in patient's After Visit Summary. Personalized health advice and appropriate referrals for health education or preventive services given if needed, as noted in patient's After Visit Summary. History of Present Illness:     Patient presents for a Medicare Wellness Visit    Here for follow up bronchitis and AWV  Seen last week and treated with abx and prednisone  Breathing much better. No longer coughing. No fever. Still very fatigued  Taking all meds as prescribed. Patient Care Team:  Wojciech Keepers as PCP - General (Family Medicine)     Review of Systems:     Review of Systems   Constitutional:  Positive for fatigue. Negative for fever. HENT:  Negative for congestion. Respiratory:  Negative for cough, chest tightness and shortness of breath. Cardiovascular:  Negative for chest pain and palpitations. Gastrointestinal:  Negative for abdominal pain. Neurological:  Negative for dizziness and headaches.         Problem List:     Patient Active Problem List   Diagnosis    Chronic atrial fibrillation ([address])    Coronary artery disease involving native coronary artery of native heart without angina pectoris    Mixed hyperlipidemia    Essential hypertension    Anxiety    Other insomnia    Depression, recurrent (HCC)    Hypertriglyceridemia    Dilated aortic root (HCC)    Chronic midline low back pain with bilateral sciatica    Obesity (BMI 30.0-34. 9)    Gallbladder polyp    Alcohol use    Type 2 diabetes mellitus with other specified complication, without long-term current use of insulin (HCC)    Chronic obstructive pulmonary disease, unspecified COPD type ([address])    Obstructive sleep apnea    Alcohol use, unspecified with withdrawal, unspecified ([address])    Stage 3 chronic kidney disease, unspecified whether stage 3a or 3b CKD ([address])    Chronic diastolic (congestive) heart failure ([address])    Morbid obesity with BMI of 40.0-44.9, adult [facility])      Past Medical and Surgical History:     Past Medical History:   Diagnosis Date    Acute on chronic systolic (congestive) heart failure (HCC) 8/28/2020    Anxiety     Atrial fibrillation (HCC)     Depression     Diabetes mellitus ([address])     Heart disease     Hyperlipidemia     Hypertension     Insomnia     Leukocytosis 8/12/2021    Suspected Chronic obstructive pulmonary disease with acute exacerbation ([address]) 8/10/2021     Past Surgical History:   Procedure Laterality Date    FL INJECTION LEFT ELBOW (NON ARTHROGRAM)  5/13/2022    FL INJECTION LEFT ELBOW (NON ARTHROGRAM)  6/3/2022    LUMBAR EPIDURAL INJECTION N/A 9/15/2022    Procedure: L4-L5  INJECTION LUMBAR;  Surgeon: Brian Khan MD;  Location: [address] [place] [address] MAIN OR;  Service: Pain Management     NERVE BLOCK Bilateral 5/13/2022    Procedure: L4 L5 SI MEDIAL BRANCH BLOCK (54315 [id number]); Surgeon: Brian Khan MD;  Location: [facility] MAIN OR;  Service: Pain Management     NERVE BLOCK Bilateral 6/3/2022    Procedure: L4 L5 S1 MEDIAL BRANCH BLOCK ([id number]);   Surgeon: Brian Khan MD;  Location: [facility] MAIN OR;  Service: Pain Management     RADIOFREQUENCY ABLATION Left 2022    Procedure: L4 L5 S1 RADIO FREQUENCY  ABLATION (RFA) ([id number]); Surgeon: Quyen Joseph MD;  Location: [facility] MAIN OR;  Service: Pain Management     RADIOFREQUENCY ABLATION Right 2022    Procedure: L4 L5 S1 RADIO FREQUENCY ABLATION (RFA) ([id number]); Surgeon: Quyen Joseph MD;  Location: [facility] MAIN OR;  Service: Pain Management       Family History:     Family History   Problem Relation Age of Onset    No Known Problems Mother     No Known Problems Father     Ovarian cancer Sister     Substance Abuse Neg Hx     Mental illness Neg Hx       Social History:     Social History     Socioeconomic History    Marital status: Single     Spouse name: None    Number of children: None    Years of education: None    Highest education level: None   Occupational History    None   Tobacco Use    Smoking status: Some Days     Packs/day: 0.25     Years: 40.00     Total pack years: 10.00     Types: Cigarettes     Last attempt to quit: 2015     Years since quittin.2    Smokeless tobacco: Never   Vaping Use    Vaping Use: Never used   Substance and Sexual Activity    Alcohol use: Yes     Alcohol/week: 4.0 standard drinks of alcohol     Types: 4 Cans of beer per week    Drug use: Not Currently    Sexual activity: Not Currently     Partners: Female   Other Topics Concern    None   Social History Narrative    None     Social Determinants of Health     Financial Resource Strain: Medium Risk (2022)    Overall Financial Resource Strain (CARDIA)     Difficulty of Paying Living Expenses: Somewhat hard   Food Insecurity: Not on file   Transportation Needs: No Transportation Needs (2022)    PRAPARE - Transportation     Lack of Transportation (Medical): No     Lack of Transportation (Non-Medical):  No   Physical Activity: Not on file   Stress: Not on file   Social Connections: Not on file   Intimate Partner Violence: Not on file   Housing Stability: Not on file      Medications and Allergies:     Current Outpatient Medications   Medication Sig Dispense Refill    ALPRAZolam (XANAX) 1 mg tablet 3 (three) times a day as needed       amoxicillin-clavulanate (AUGMENTIN) 875-125 mg per tablet Take 1 tablet by mouth every 12 (twelve) hours for 7 days 14 tablet 0    ARIPiprazole (ABILIFY) 10 mg tablet daily      buPROPion (WELLBUTRIN SR) 150 mg 12 hr tablet Take 150 mg by mouth 2 (two) times a day       candesartan (ATACAND) 32 MG tablet Take 1 tablet (32 mg total) by mouth daily 90 tablet 1    chlorthalidone 25 mg tablet       fenofibrate (TRICOR) 145 mg tablet Take 1 tablet (145 mg total) by mouth daily 90 tablet 1    FLUoxetine (PROzac) 20 mg capsule       metFORMIN (GLUCOPHAGE) 500 mg tablet Take 1 tablet (500 mg total) by mouth daily with breakfast 90 tablet 1    metoprolol tartrate (LOPRESSOR) 100 mg tablet Take 1 tablet (100 mg total) by mouth every 12 (twelve) hours 180 tablet 1    rosuvastatin (CRESTOR) 20 MG tablet Take 1 tablet (20 mg total) by mouth daily 90 tablet 1    torsemide (DEMADEX) 20 mg tablet TAKE 1 TABLET EVERY DAY 90 tablet 1    Xarelto 20 MG tablet Take 20 mg by mouth daily      zolpidem (AMBIEN) 10 mg tablet daily at bedtime as needed      benzonatate (TESSALON) 200 MG capsule Take 1 capsule (200 mg total) by mouth 3 (three) times a day as needed for cough (Patient not taking: Reported on 12/7/2023) 20 capsule 0     No current facility-administered medications for this visit.      No Known Allergies   Immunizations:     Immunization History   Administered Date(s) Administered    COVID-19 MODERNA VACC 0.25 ML IM BOOSTER 01/11/2022    COVID-19 MODERNA VACC 0.5 ML IM 03/03/2021, 04/01/2021    INFLUENZA 10/06/2022    Influenza, high dose seasonal 0.7 mL 09/17/2020, 11/05/2021, 10/06/2022    Pneumococcal Conjugate 13-Valent 09/17/2020    Pneumococcal Conjugate Vaccine 20-valent (Pcv20), Polysace 10/06/2022    Zoster Vaccine Recombinant 03/17/2023 Health Maintenance:         Topic Date Due    Colorectal Cancer Screening  10/10/2026    Hepatitis C Screening  Completed         Topic Date Due    Hepatitis A Vaccine (1 of 2 - Risk 2-dose series) Never done    Hepatitis B Vaccine (1 of 3 - Risk 3-dose series) Never done    COVID-19 Vaccine (4 - Moderna series) 03/08/2022    Influenza Vaccine (1) 09/01/2023      Medicare Screening Tests and Risk Assessments:     Adry Dickerson is here for his Subsequent Wellness visit. Health Risk Assessment:   Patient rates overall health as excellent. Patient feels that their physical health rating is much better. Patient is satisfied with their life. Eyesight was rated as same. Hearing was rated as same. Patient feels that their emotional and mental health rating is same. Patients states they are never, rarely angry. Patient states they are never, rarely unusually tired/fatigued. Pain experienced in the last 7 days has been none. Patient states that he has experienced no weight loss or gain in last 6 months. Depression Screening:   PHQ-9 Score: 0      Fall Risk Screening: In the past year, patient has experienced: no history of falling in past year      Home Safety:  Patient does not have trouble with stairs inside or outside of their home. Patient has working smoke alarms and has working carbon monoxide detector. Home safety hazards include: none. Nutrition:   Current diet is Diabetic and Low Carb. Medications:   Patient is currently taking over-the-counter supplements. OTC medications include: NyQuil. Patient is able to manage medications. Activities of Daily Living (ADLs)/Instrumental Activities of Daily Living (IADLs):   Walk and transfer into and out of bed and chair?: Yes  Dress and groom yourself?: Yes    Bathe or shower yourself?: Yes    Feed yourself?  Yes  Do your laundry/housekeeping?: Yes  Manage your money, pay your bills and track your expenses?: Yes  Make your own meals?: Yes    Do your own shopping?: Yes    Previous Hospitalizations:   Any hospitalizations or ED visits within the last 12 months?: No      Advance Care Planning:   Living will: No    Advanced directive: No    ACP document given: Yes      Cognitive Screening:   Provider or family/friend/caregiver concerned regarding cognition?: No    PREVENTIVE SCREENINGS      Cardiovascular Screening:    General: History Lipid Disorder and Risks and Benefits Discussed      Diabetes Screening:     General: History Diabetes, Risks and Benefits Discussed and Screening Current      Colorectal Cancer Screening:     General: Screening Current      Prostate Cancer Screening:    General: Risks and Benefits Discussed      Osteoporosis Screening:    General: Screening Not Indicated      Abdominal Aortic Aneurysm (AAA) Screening:    Risk factors include: age between 70-75 yo and tobacco use        General: Screening Not Indicated      Lung Cancer Screening:     General: Screening Not Indicated      Hepatitis C Screening:    General: Screening Current      Preventive Screening Comments: Recommended immunizations reviewed, risks/benefits discussed. Pt verbalized understanding. Screening, Brief Intervention, and Referral to Treatment (SBIRT)    Screening  Typical number of drinks in a day: 3  Typical number of drinks in a week: 21  Interpretation: Low risk drinking behavior. Single Item Drug Screening:  How often have you used an illegal drug (including marijuana) or a prescription medication for non-medical reasons in the past year? never    Single Item Drug Screen Score: 0  Interpretation: Negative screen for possible drug use disorder    BMI Counseling: Body mass index is 40.84 kg/m². The BMI is above normal. Nutrition recommendations include reducing portion sizes, decreasing overall calorie intake, moderation in carbohydrate intake, reducing intake of saturated fat and trans fat, and reducing intake of cholesterol.  Exercise recommendations include exercising 3-5 times per week. Depression Screening Follow-up Plan: Patient's depression screening was positive with a PHQ-2 score of . Their PHQ-9 score was 0. Patient assessed for underlying major depression. They have no active suicidal ideations. Brief counseling provided and recommend additional follow-up/re-evaluation next office visit. Falls Plan of Care: Balance, strength, and gait training instructions were provided. Physical Exam:     /60 (BP Location: Left arm, Patient Position: Sitting, Cuff Size: Large)   Pulse 87   Temp 97.6 °F (36.4 °C)   Resp 16   Ht 5' 6" (1.676 m)   Wt 115 kg (253 lb)   SpO2 96%   BMI 40.84 kg/m²     Physical Exam  Vitals reviewed. Constitutional:       General: He is not in acute distress. Neck:      Vascular: No carotid bruit. Cardiovascular:      Rate and Rhythm: Normal rate. Pulmonary:      Effort: Pulmonary effort is normal.      Breath sounds: Normal breath sounds. Musculoskeletal:      Cervical back: Normal range of motion. Skin:     General: Skin is warm and dry. Neurological:      Mental Status: He is alert and oriented to person, place, and time.    Psychiatric:         Mood and Affect: Mood normal.         Behavior: Behavior normal.          Michelle Maria

## 2023-12-07 NOTE — PROGRESS NOTES
[facility] [address] [facility] coding opportunities          Chart Reviewed number of suggestions sent to Provider: 2  E11.22  I13.0     Patients Insurance     Medicare Insurance: [facility] Advantage

## 2023-12-07 NOTE — PATIENT INSTRUCTIONS
Medicare Preventive Visit Patient Instructions  Thank you for completing your Welcome to Medicare Visit or Medicare Annual Wellness Visit today. Your next wellness visit will be due in one year (12/7/2024). The screening/preventive services that you may require over the next 5-10 years are detailed below. Some tests may not apply to you based off risk factors and/or age. Screening tests ordered at today's visit but not completed yet may show as past due. Also, please note that scanned in results may not display below. Preventive Screenings:  Service Recommendations Previous Testing/Comments   Colorectal Cancer Screening  Colonoscopy    Fecal Occult Blood Test (FOBT)/Fecal Immunochemical Test (FIT)  Fecal DNA/Cologuard Test  Flexible Sigmoidoscopy Age: 43-73 years old   Colonoscopy: every 10 years (May be performed more frequently if at higher risk)  OR  FOBT/FIT: every 1 year  OR  Cologuard: every 3 years  OR  Sigmoidoscopy: every 5 years  Screening may be recommended earlier than age 39 if at higher risk for colorectal cancer. Also, an individualized decision between you and your healthcare provider will decide whether screening between the ages of 77-80 would be appropriate.  Colonoscopy: 04/02/2014  FOBT/FIT: Not on file  Cologuard: 10/10/2023  Sigmoidoscopy: Not on file    Screening Current     Prostate Cancer Screening Individualized decision between patient and health care provider in men between ages of 53-66   Medicare will cover every 12 months beginning on the day after your 50th birthday PSA: No results in last 5 years           Hepatitis C Screening Once for adults born between 1945 and 1965  More frequently in patients at high risk for Hepatitis C Hep C Antibody: 08/28/2020    Screening Current   Diabetes Screening 1-2 times per year if you're at risk for diabetes or have pre-diabetes Fasting glucose: 88 mg/dL (9/8/2023)  A1C: 5.7 % (9/8/2023)  Screening Not Indicated  History Diabetes   Cholesterol Screening Once every 5 years if you don't have a lipid disorder. May order more often based on risk factors. Lipid panel: 09/08/2023  Screening Not Indicated  History Lipid Disorder      Other Preventive Screenings Covered by Medicare:  Abdominal Aortic Aneurysm (AAA) Screening: covered once if your at risk. You're considered to be at risk if you have a family history of AAA or a male between the age of 70-76 who smoking at least 100 cigarettes in your lifetime. Lung Cancer Screening: covers low dose CT scan once per year if you meet all of the following conditions: (1) Age 48-67; (2) No signs or symptoms of lung cancer; (3) Current smoker or have quit smoking within the last 15 years; (4) You have a tobacco smoking history of at least 20 pack years (packs per day x number of years you smoked); (5) You get a written order from a healthcare provider. Glaucoma Screening: covered annually if you're considered high risk: (1) You have diabetes OR (2) Family history of glaucoma OR (3)  aged 48 and older OR (3)  American aged 72 and older  Osteoporosis Screening: covered every 2 years if you meet one of the following conditions: (1) Have a vertebral abnormality; (2) On glucocorticoid therapy for more than 3 months; (3) Have primary hyperparathyroidism; (4) On osteoporosis medications and need to assess response to drug therapy. HIV Screening: covered annually if you're between the age of 14-79. Also covered annually if you are younger than 13 and older than 72 with risk factors for HIV infection. For pregnant patients, it is covered up to 3 times per pregnancy.     Immunizations:  Immunization Recommendations   Influenza Vaccine Annual influenza vaccination during flu season is recommended for all persons aged >= 6 months who do not have contraindications   Pneumococcal Vaccine   * Pneumococcal conjugate vaccine = PCV13 (Prevnar 13), PCV15 (Vaxneuvance), PCV20 (Prevnar 20)  * Pneumococcal polysaccharide vaccine = PPSV23 (Pneumovax) Adults 96-66 yo with certain risk factors or if 69+ yo  If never received any pneumonia vaccine: recommend Prevnar 20 (PCV20)  Give PCV20 if previously received 1 dose of PCV13 or PPSV23   Hepatitis B Vaccine 3 dose series if at intermediate or high risk (ex: diabetes, end stage renal disease, liver disease)   Respiratory syncytial virus (RSV) Vaccine - COVERED BY MEDICARE PART D  * RSVPreF3 (Arexvy) CDC recommends that adults 61years of age and older may receive a single dose of RSV vaccine using shared clinical decision-making (SCDM)   Tetanus (Td) Vaccine - COST NOT COVERED BY MEDICARE PART B Following completion of primary series, a booster dose should be given every 10 years to maintain immunity against tetanus. Td may also be given as tetanus wound prophylaxis. Tdap Vaccine - COST NOT COVERED BY MEDICARE PART B Recommended at least once for all adults. For pregnant patients, recommended with each pregnancy. Shingles Vaccine (Shingrix) - COST NOT COVERED BY MEDICARE PART B  2 shot series recommended in those 19 years and older who have or will have weakened immune systems or those 50 years and older     Health Maintenance Due:      Topic Date Due   • Colorectal Cancer Screening  10/10/2026   • Hepatitis C Screening  Completed     Immunizations Due:      Topic Date Due   • Hepatitis A Vaccine (1 of 2 - Risk 2-dose series) Never done   • Hepatitis B Vaccine (1 of 3 - Risk 3-dose series) Never done   • COVID-19 Vaccine (4 - Moderna series) 03/08/2022   • Influenza Vaccine (1) 09/01/2023     Advance Directives   What are advance directives? Advance directives are legal documents that state your wishes and plans for medical care. These plans are made ahead of time in case you lose your ability to make decisions for yourself. Advance directives can apply to any medical decision, such as the treatments you want, and if you want to donate organs.    What are the types of advance directives? There are many types of advance directives, and each state has rules about how to use them. You may choose a combination of any of the following:  Living will: This is a written record of the treatment you want. You can also choose which treatments you do not want, which to limit, and which to stop at a certain time. This includes surgery, medicine, IV fluid, and tube feedings. Durable power of  for [facility]): This is a written record that states who you want to make healthcare choices for you when you are unable to make them for yourself. This person, called a proxy, is usually a family member or a friend. You may choose more than 1 proxy. Do not resuscitate (DNR) order:  A DNR order is used in case your heart stops beating or you stop breathing. It is a request not to have certain forms of treatment, such as CPR. A DNR order may be included in other types of advance directives. Medical directive: This covers the care that you want if you are in a coma, near death, or unable to make decisions for yourself. You can list the treatments you want for each condition. Treatment may include pain medicine, surgery, blood transfusions, dialysis, IV or tube feedings, and a ventilator (breathing machine). Values history: This document has questions about your views, beliefs, and how you feel and think about life. This information can help others choose the care that you would choose. Why are advance directives important? An advance directive helps you control your care. Although spoken wishes may be used, it is better to have your wishes written down. Spoken wishes can be misunderstood, or not followed. Treatments may be given even if you do not want them. An advance directive may make it easier for your family to make difficult choices about your care.    Cigarette Smoking and Your Health   Risks to your health if you smoke:  Nicotine and other chemicals found in tobacco damage every cell in your body. Even if you are a light smoker, you have an increased risk for cancer, heart disease, and lung disease. If you are pregnant or have diabetes, smoking increases your risk for complications. Benefits to your health if you stop smoking: You decrease respiratory symptoms such as coughing, wheezing, and shortness of breath. You reduce your risk for cancers of the lung, mouth, throat, kidney, bladder, pancreas, stomach, and cervix. If you already have cancer, you increase the benefits of chemotherapy. You also reduce your risk for cancer returning or a second cancer from developing. You reduce your risk for heart disease, blood clots, heart attack, and stroke. You reduce your risk for lung infections, and diseases such as pneumonia, asthma, chronic bronchitis, and emphysema. Your circulation improves. More oxygen can be delivered to your body. If you have diabetes, you lower your risk for complications, such as kidney, artery, and eye diseases. You also lower your risk for nerve damage. Nerve damage can lead to amputations, poor vision, and blindness. You improve your body's ability to heal and to fight infections. For more information and support to stop smoking:   [employer]  Phone: 8- 157 - [phone]  Web Address: www.[employer]  Weight Management   Why it is important to manage your weight:  Being overweight increases your risk of health conditions such as heart disease, high blood pressure, type 2 diabetes, and certain types of cancer. It can also increase your risk for osteoarthritis, sleep apnea, and other respiratory problems. Aim for a slow, steady weight loss. Even a small amount of weight loss can lower your risk of health problems. How to lose weight safely:  A safe and healthy way to lose weight is to eat fewer calories and get regular exercise. You can lose up about 1 pound a week by decreasing the number of calories you eat by 500 calories each day.    Healthy meal plan for weight management:  A healthy meal plan includes a variety of foods, contains fewer calories, and helps you stay healthy. A healthy meal plan includes the following:  Eat whole-grain foods more often. A healthy meal plan should contain fiber. Fiber is the part of grains, fruits, and vegetables that is not broken down by your body. Whole-grain foods are healthy and provide extra fiber in your diet. Some examples of whole-grain foods are whole-wheat breads and pastas, oatmeal, brown rice, and bulgur. Eat a variety of vegetables every day. Include dark, leafy greens such as spinach, kale, william greens, and mustard greens. Eat yellow and orange vegetables such as carrots, sweet potatoes, and winter squash. Eat a variety of fruits every day. Choose fresh or canned fruit (canned in its own juice or light syrup) instead of juice. Fruit juice has very little or no fiber. Eat low-fat dairy foods. Drink fat-free (skim) milk or 1% milk. Eat fat-free yogurt and low-fat cottage cheese. Try low-fat cheeses such as mozzarella and other reduced-fat cheeses. Choose meat and other protein foods that are low in fat. Choose beans or other legumes such as split peas or lentils. Choose fish, skinless poultry (chicken or turkey), or lean cuts of red meat (beef or pork). Before you cook meat or poultry, cut off any visible fat. Use less fat and oil. Try baking foods instead of frying them. Add less fat, such as margarine, sour cream, regular salad dressing and mayonnaise to foods. Eat fewer high-fat foods. Some examples of high-fat foods include french fries, doughnuts, ice cream, and cakes. Eat fewer sweets. Limit foods and drinks that are high in sugar. This includes candy, cookies, regular soda, and sweetened drinks. Exercise:  Exercise at least 30 minutes per day on most days of the week. Some examples of exercise include walking, biking, dancing, and swimming.  You can also fit in more physical activity by taking the stairs instead of the elevator or parking farther away from stores. Ask your healthcare provider about the best exercise plan for you. © Copyright [employer] 2018 Information is for End User's use only and may not be sold, redistributed or otherwise used for commercial purposes.  All illustrations and images included in CareNotes® are the copyrighted property of [employer] Inc. or [address] [place] [address]

## 2023-12-11 ENCOUNTER — PATIENT OUTREACH (OUTPATIENT)
Dept: FAMILY MEDICINE CLINIC | Facility: CLINIC | Age: 68
End: 2023-12-11

## 2023-12-11 NOTE — PROGRESS NOTES
OPCM SW received [facility] referral for financial concerns, per chart review. OPCM SW reached out to patient regarding the above and left a voicemail.

## 2024-01-03 ENCOUNTER — PATIENT OUTREACH (OUTPATIENT)
Dept: FAMILY MEDICINE CLINIC | Facility: CLINIC | Age: 69
End: 2024-01-03

## 2024-01-03 NOTE — LETTER
[address] [place] [id number]    Re: Unable to Reach   1/3/2024       Dear Filiberto,    I am a [facility]  and wanted to be certain you had information to contact me should you desire assistance with or have questions about non-medical aspects of your care such as [but not limited to] medical insurance, housing, transportation, material needs, or emergency needs.  If I do not have an answer I will assist you in finding the appropriate agency or individual who can help.      Please feel free to contact me at [phone]. Thank You.    Sincerely,         EFREN Bustos

## 2024-01-03 NOTE — PROGRESS NOTES
OPCM SW attempted second outreach to patient regarding SDOH referral for financial concerns.  OPCM SW left a voicemail and sent UTR letter to address on file.

## 2024-01-11 DIAGNOSIS — E11.69 TYPE 2 DIABETES MELLITUS WITH OTHER SPECIFIED COMPLICATION, WITHOUT LONG-TERM CURRENT USE OF INSULIN (HCC): ICD-10-CM

## 2024-01-11 DIAGNOSIS — I48.91 ATRIAL FIBRILLATION WITH RAPID VENTRICULAR RESPONSE (HCC): ICD-10-CM

## 2024-01-11 DIAGNOSIS — E78.1 HYPERTRIGLYCERIDEMIA: ICD-10-CM

## 2024-01-11 DIAGNOSIS — I50.9 CHF (CONGESTIVE HEART FAILURE) (HCC): ICD-10-CM

## 2024-01-11 DIAGNOSIS — I25.10 CORONARY ARTERY DISEASE INVOLVING NATIVE CORONARY ARTERY OF NATIVE HEART WITHOUT ANGINA PECTORIS: ICD-10-CM

## 2024-01-11 RX ORDER — METOPROLOL TARTRATE 100 MG/1
100 TABLET ORAL EVERY 12 HOURS
Qty: 180 TABLET | Refills: 1 | Status: SHIPPED | OUTPATIENT
Start: 2024-01-11

## 2024-01-11 RX ORDER — ROSUVASTATIN CALCIUM 20 MG/1
20 TABLET, COATED ORAL DAILY
Qty: 90 TABLET | Refills: 1 | Status: SHIPPED | OUTPATIENT
Start: 2024-01-11

## 2024-03-05 ENCOUNTER — TELEPHONE (OUTPATIENT)
Dept: FAMILY MEDICINE CLINIC | Facility: CLINIC | Age: 69
End: 2024-03-05

## 2024-03-05 NOTE — TELEPHONE ENCOUNTER
LM for patient if he is unable to get labs done today to call the office to have his 3/7 appt rescheduled.    ----- Message from TONY Rosenberg sent at 3/5/2024  7:53 AM EST -----  Regarding: labs  Pt has upcoming appt on 3/7 for DM fu. Needs to have labs done prior to appt. Orders in chart.   Please call pt to advise. If labs are not done, appt will need to be re-scheduled. TY

## 2024-03-14 ENCOUNTER — TELEPHONE (OUTPATIENT)
Dept: FAMILY MEDICINE CLINIC | Facility: CLINIC | Age: 69
End: 2024-03-14

## 2024-03-14 NOTE — TELEPHONE ENCOUNTER
LMOM for patient to give us a call back to reschedule either his AWV (which requires him to get labs in chart done) or his DM followup.  Both need to be scheduled (30 minutes)

## 2024-03-24 DIAGNOSIS — I50.9 CHF (CONGESTIVE HEART FAILURE) (HCC): ICD-10-CM

## 2024-03-24 DIAGNOSIS — I50.32 CHRONIC DIASTOLIC (CONGESTIVE) HEART FAILURE (HCC): ICD-10-CM

## 2024-03-24 RX ORDER — TORSEMIDE 20 MG/1
20 TABLET ORAL DAILY
Qty: 90 TABLET | Refills: 3 | Status: SHIPPED | OUTPATIENT
Start: 2024-03-24

## 2024-06-06 DIAGNOSIS — E78.1 HYPERTRIGLYCERIDEMIA: ICD-10-CM

## 2024-06-06 DIAGNOSIS — I25.10 CORONARY ARTERY DISEASE INVOLVING NATIVE CORONARY ARTERY OF NATIVE HEART WITHOUT ANGINA PECTORIS: ICD-10-CM

## 2024-06-06 DIAGNOSIS — I50.9 CHF (CONGESTIVE HEART FAILURE) (HCC): ICD-10-CM

## 2024-06-06 DIAGNOSIS — E11.69 TYPE 2 DIABETES MELLITUS WITH OTHER SPECIFIED COMPLICATION, WITHOUT LONG-TERM CURRENT USE OF INSULIN (HCC): ICD-10-CM

## 2024-06-06 DIAGNOSIS — I48.91 ATRIAL FIBRILLATION WITH RAPID VENTRICULAR RESPONSE (HCC): ICD-10-CM

## 2024-06-06 RX ORDER — METOPROLOL TARTRATE 100 MG/1
100 TABLET ORAL EVERY 12 HOURS
Qty: 180 TABLET | Refills: 1 | Status: SHIPPED | OUTPATIENT
Start: 2024-06-06

## 2024-06-06 RX ORDER — ROSUVASTATIN CALCIUM 20 MG/1
20 TABLET, COATED ORAL DAILY
Qty: 90 TABLET | Refills: 1 | Status: SHIPPED | OUTPATIENT
Start: 2024-06-06

## 2024-08-13 ENCOUNTER — OFFICE VISIT (OUTPATIENT)
Dept: PAIN MEDICINE | Facility: CLINIC | Age: 69
End: 2024-08-13
Payer: COMMERCIAL

## 2024-08-13 ENCOUNTER — APPOINTMENT (OUTPATIENT)
Dept: RADIOLOGY | Facility: CLINIC | Age: 69
End: 2024-08-13
Payer: COMMERCIAL

## 2024-08-13 VITALS
DIASTOLIC BLOOD PRESSURE: 77 MMHG | SYSTOLIC BLOOD PRESSURE: 119 MMHG | BODY MASS INDEX: 37.93 KG/M2 | HEART RATE: 83 BPM | HEIGHT: 66 IN | WEIGHT: 236 LBS

## 2024-08-13 DIAGNOSIS — M48.062 SPINAL STENOSIS OF LUMBAR REGION WITH NEUROGENIC CLAUDICATION: ICD-10-CM

## 2024-08-13 DIAGNOSIS — M51.16 LUMBAR DISC DISEASE WITH RADICULOPATHY: ICD-10-CM

## 2024-08-13 DIAGNOSIS — M47.816 LUMBAR SPONDYLOSIS: ICD-10-CM

## 2024-08-13 DIAGNOSIS — E66.01 MORBID OBESITY WITH BMI OF 40.0-44.9, ADULT (HCC): ICD-10-CM

## 2024-08-13 DIAGNOSIS — F33.9 DEPRESSION, RECURRENT (HCC): ICD-10-CM

## 2024-08-13 DIAGNOSIS — I77.810 DILATED AORTIC ROOT (HCC): ICD-10-CM

## 2024-08-13 DIAGNOSIS — M48.062 SPINAL STENOSIS OF LUMBAR REGION WITH NEUROGENIC CLAUDICATION: Primary | ICD-10-CM

## 2024-08-13 PROCEDURE — 72110 X-RAY EXAM L-2 SPINE 4/>VWS: CPT

## 2024-08-13 PROCEDURE — 99214 OFFICE O/P EST MOD 30 MIN: CPT | Performed by: PHYSICAL MEDICINE & REHABILITATION

## 2024-08-13 RX ORDER — GABAPENTIN 100 MG/1
100 CAPSULE ORAL 2 TIMES DAILY
Qty: 60 CAPSULE | Refills: 1 | Status: SHIPPED | OUTPATIENT
Start: 2024-08-13

## 2024-08-13 NOTE — PROGRESS NOTES
Pain Medicine Follow-Up Note    Assessment:  1. Spinal stenosis of lumbar region with neurogenic claudication    2. Lumbar spondylosis    3. Lumbar disc disease with radiculopathy    4. Depression, recurrent (HCC)    5. Dilated aortic root ([facility])    6. Morbid obesity with BMI of 40.0-44.9, adult ([facility])        Plan:  Mr. Medina is a pleasant 69-year-old male presents to [facility] for follow-up and reevaluation regarding ongoing low back pain with radiating symptoms in the bilateral lower extremities.  Previously being seen by Dr. Ceron and has undergone a epidural steroid injection in 2022 which did provide some relief in the low back and radiating bilateral lower extremity pain, however, this was over 2 years ago and previous MRI of the lumbar spine did reveal mild central and mild to moderate foraminal stenosis.  Given previous MRI findings and current clinical presentation would order updated lumbar MRI without contrast, lumbar x-ray and provide the patient with neuropathic pain control in the interim.  Will start him on gabapentin 100 mg twice a day but will not be able to increase due to his underlying CKD.  Pending imaging results would consider interventional approaches including repeat epidural steroid injections but for now we will await MRI results.    History of Present Illness:    Filiberto Medina is a 69 y.o. male who presents to [facility] for interval re-evaluation of the above stated pain complaints. The patient has a past medical and chronic pain history as outlined in the assessment section.  Patient presents to [facility] pain [facility] for reevaluation previously being followed by Dr. Ceron and reports 5 months duration of relief from previous L4-L5 LESI back in September 2022.  Presents today for follow-up reevaluation and LANCE.    Other than as stated above, the patient denies any interval changes in medications, medical  condition, mental condition, symptoms, or allergies since the last office visit.         Review of Systems:    Review of Systems   Constitutional:  Positive for fatigue. Negative for unexpected weight change.   HENT:  Negative for ear pain.    Eyes:  Negative for visual disturbance.   Respiratory:  Negative for shortness of breath and wheezing.    Gastrointestinal:  Negative for abdominal pain.   Musculoskeletal:  Positive for back pain and gait problem.        Decreased ROM, joint and muscle pain   Neurological:  Positive for weakness. Negative for numbness.   Psychiatric/Behavioral:  Positive for dysphoric mood and sleep disturbance. Negative for decreased concentration. The patient is nervous/anxious.          Past Medical History:   Diagnosis Date    Acute on chronic systolic (congestive) heart failure (HCC) 8/28/2020    Anxiety     Atrial fibrillation (HCC)     Depression     Diabetes mellitus (HCC)     Heart disease     Hyperlipidemia     Hypertension     Insomnia     Leukocytosis 8/12/2021    Suspected Chronic obstructive pulmonary disease with acute exacerbation (HCC) 8/10/2021       Past Surgical History:   Procedure Laterality Date    FL INJECTION LEFT ELBOW (NON ARTHROGRAM)  5/13/2022    FL INJECTION LEFT ELBOW (NON ARTHROGRAM)  6/3/2022    LUMBAR EPIDURAL INJECTION N/A 9/15/2022    Procedure: L4-L5  INJECTION LUMBAR;  Surgeon: Mark Clements MD;  Location: [facility] MAIN OR;  Service: Pain Management     NERVE BLOCK Bilateral 5/13/2022    Procedure: L4 L5 SI MEDIAL BRANCH BLOCK ([id number]);  Surgeon: Mark Clements MD;  Location: [facility] MAIN OR;  Service: Pain Management     NERVE BLOCK Bilateral 6/3/2022    Procedure: L4 L5 S1 MEDIAL BRANCH BLOCK ([id number]);  Surgeon: Mark Clements MD;  Location: [facility] MAIN OR;  Service: Pain Management     RADIOFREQUENCY ABLATION Left 6/24/2022    Procedure: L4 L5 S1 RADIO FREQUENCY  ABLATION (RFA) ([id number]);  Surgeon: Mark Clements MD;  Location: [facility]  OR;  Service: Pain Management     RADIOFREQUENCY ABLATION Right 2022    Procedure: L4 L5 S1 RADIO FREQUENCY ABLATION (RFA) ([id number]);  Surgeon: Mark Clements MD;  Location: [facility] MAIN OR;  Service: Pain Management        Family History   Problem Relation Age of Onset    No Known Problems Mother     No Known Problems Father     Ovarian cancer Sister     Substance Abuse Neg Hx     Mental illness Neg Hx        Social History     Occupational History    Not on file   Tobacco Use    Smoking status: Some Days     Current packs/day: 0.00     Average packs/day: 0.3 packs/day for 40.0 years (10.0 ttl pk-yrs)     Types: Cigarettes     Start date: 1975     Last attempt to quit: 2015     Years since quittin.9    Smokeless tobacco: Never   Vaping Use    Vaping status: Never Used   Substance and Sexual Activity    Alcohol use: Yes     Alcohol/week: 4.0 standard drinks of alcohol     Types: 4 Cans of beer per week    Drug use: Not Currently    Sexual activity: Not Currently     Partners: Female         Current Outpatient Medications:     ALPRAZolam (XANAX) 1 mg tablet, 3 (three) times a day as needed , Disp: , Rfl:     ARIPiprazole (ABILIFY) 10 mg tablet, daily, Disp: , Rfl:     buPROPion (WELLBUTRIN SR) 150 mg 12 hr tablet, Take 150 mg by mouth 2 (two) times a day , Disp: , Rfl:     candesartan (ATACAND) 32 MG tablet, Take 1 tablet (32 mg total) by mouth daily, Disp: 90 tablet, Rfl: 1    FLUoxetine (PROzac) 20 mg capsule, , Disp: , Rfl:     gabapentin (NEURONTIN) 100 mg capsule, Take 1 capsule (100 mg total) by mouth 2 (two) times a day, Disp: 60 capsule, Rfl: 1    metFORMIN (GLUCOPHAGE) 500 mg tablet, TAKE 1 TABLET DAILY WITH BREAKFAST, Disp: 90 tablet, Rfl: 1    metoprolol tartrate (LOPRESSOR) 100 mg tablet, TAKE 1 TABLET EVERY 12 HOURS, Disp: 180 tablet, Rfl: 1    rosuvastatin (CRESTOR) 20 MG tablet, TAKE 1 TABLET EVERY DAY, Disp: 90 tablet, Rfl: 1    torsemide (DEMADEX) 20 mg tablet, TAKE 1 TABLET  "EVERY DAY, Disp: 90 tablet, Rfl: 3    zolpidem (AMBIEN) 10 mg tablet, daily at bedtime as needed, Disp: , Rfl:     chlorthalidone 25 mg tablet, , Disp: , Rfl:     fenofibrate (TRICOR) 145 mg tablet, Take 1 tablet (145 mg total) by mouth daily, Disp: 90 tablet, Rfl: 1    Xarelto 20 MG tablet, Take 20 mg by mouth daily (Patient not taking: Reported on 8/13/2024), Disp: , Rfl:     No Known Allergies    Physical Exam:    /77   Pulse 83   Ht 5' 6\" (1.676 m)   Wt 107 kg (236 lb)   BMI 38.09 kg/m²     Constitutional:normal, well developed, well nourished, alert, in no distress and non-toxic and no overt pain behavior.  Eyes:anicteric  HEENT:grossly intact  Neck:supple, symmetric, trachea midline and no masses   Pulmonary:even and unlabored  Cardiovascular:No edema or pitting edema present  Skin:Normal without rashes or lesions and well hydrated  Psychiatric:Mood and affect appropriate  Neurologic:Cranial Nerves II-XII grossly intact  Musculoskeletal:antalgic, forward flexed posture, tenderness to palpation bilateral lumbar paraspinals, decreased active and passive range of motion lumbar flexion and extension limited by pain, MMT 5-5 bilateral lower extremities, positive straight leg raise in the seated position radicular pain into the left leg, decree sensation to light touch in patchy distribution bilateral lower extremities      Imaging  MRI lumbar spine wo contrast    (Results Pending)         Orders Placed This Encounter   Procedures    XR spine lumbar minimum 4 views non injury    MRI lumbar spine wo contrast     "

## 2024-10-30 DIAGNOSIS — I48.91 ATRIAL FIBRILLATION WITH RAPID VENTRICULAR RESPONSE (HCC): ICD-10-CM

## 2024-10-30 DIAGNOSIS — I50.9 CHF (CONGESTIVE HEART FAILURE) (HCC): ICD-10-CM

## 2024-10-30 DIAGNOSIS — E11.69 TYPE 2 DIABETES MELLITUS WITH OTHER SPECIFIED COMPLICATION, WITHOUT LONG-TERM CURRENT USE OF INSULIN (HCC): ICD-10-CM

## 2024-10-30 DIAGNOSIS — I25.10 CORONARY ARTERY DISEASE INVOLVING NATIVE CORONARY ARTERY OF NATIVE HEART WITHOUT ANGINA PECTORIS: ICD-10-CM

## 2024-10-30 DIAGNOSIS — E78.1 HYPERTRIGLYCERIDEMIA: ICD-10-CM

## 2024-10-31 DIAGNOSIS — E11.69 TYPE 2 DIABETES MELLITUS WITH OTHER SPECIFIED COMPLICATION, WITHOUT LONG-TERM CURRENT USE OF INSULIN (HCC): ICD-10-CM

## 2024-10-31 DIAGNOSIS — E78.1 HYPERTRIGLYCERIDEMIA: ICD-10-CM

## 2024-10-31 DIAGNOSIS — I25.10 CORONARY ARTERY DISEASE INVOLVING NATIVE CORONARY ARTERY OF NATIVE HEART WITHOUT ANGINA PECTORIS: Primary | ICD-10-CM

## 2024-10-31 DIAGNOSIS — E78.2 MIXED HYPERLIPIDEMIA: Chronic | ICD-10-CM

## 2024-10-31 DIAGNOSIS — Z78.9 ALCOHOL USE: ICD-10-CM

## 2024-10-31 PROBLEM — E66.01 MORBID OBESITY WITH BMI OF 40.0-44.9, ADULT (HCC): Status: RESOLVED | Noted: 2023-09-15 | Resolved: 2024-10-31

## 2024-10-31 RX ORDER — ROSUVASTATIN CALCIUM 20 MG/1
20 TABLET, COATED ORAL DAILY
Qty: 60 TABLET | Refills: 0 | Status: SHIPPED | OUTPATIENT
Start: 2024-10-31

## 2024-10-31 RX ORDER — METOPROLOL TARTRATE 100 MG/1
100 TABLET ORAL EVERY 12 HOURS
Qty: 120 TABLET | Refills: 0 | Status: SHIPPED | OUTPATIENT
Start: 2024-10-31

## 2024-10-31 NOTE — TELEPHONE ENCOUNTER
Please call pt.   Pt due for appt. Schedule AWV after 12/7. Meds will be refilled only until appt date.   Needs labs.   Orders will be placed and need to be done for appt.

## 2024-12-22 DIAGNOSIS — I50.9 CHF (CONGESTIVE HEART FAILURE) (HCC): ICD-10-CM

## 2024-12-22 DIAGNOSIS — I48.91 ATRIAL FIBRILLATION WITH RAPID VENTRICULAR RESPONSE (HCC): ICD-10-CM

## 2024-12-22 DIAGNOSIS — I25.10 CORONARY ARTERY DISEASE INVOLVING NATIVE CORONARY ARTERY OF NATIVE HEART WITHOUT ANGINA PECTORIS: ICD-10-CM

## 2024-12-22 DIAGNOSIS — E11.69 TYPE 2 DIABETES MELLITUS WITH OTHER SPECIFIED COMPLICATION, WITHOUT LONG-TERM CURRENT USE OF INSULIN (HCC): ICD-10-CM

## 2024-12-22 DIAGNOSIS — E78.1 HYPERTRIGLYCERIDEMIA: ICD-10-CM

## 2024-12-26 RX ORDER — METOPROLOL TARTRATE 100 MG/1
100 TABLET ORAL EVERY 12 HOURS
Qty: 120 TABLET | Refills: 0 | Status: SHIPPED | OUTPATIENT
Start: 2024-12-26

## 2024-12-26 RX ORDER — ROSUVASTATIN CALCIUM 20 MG/1
20 TABLET, COATED ORAL DAILY
Qty: 60 TABLET | Refills: 0 | Status: SHIPPED | OUTPATIENT
Start: 2024-12-26

## 2025-02-21 DIAGNOSIS — I25.10 CORONARY ARTERY DISEASE INVOLVING NATIVE CORONARY ARTERY OF NATIVE HEART WITHOUT ANGINA PECTORIS: ICD-10-CM

## 2025-02-21 DIAGNOSIS — I48.91 ATRIAL FIBRILLATION WITH RAPID VENTRICULAR RESPONSE (HCC): ICD-10-CM

## 2025-02-21 DIAGNOSIS — E11.69 TYPE 2 DIABETES MELLITUS WITH OTHER SPECIFIED COMPLICATION, WITHOUT LONG-TERM CURRENT USE OF INSULIN (HCC): ICD-10-CM

## 2025-02-21 DIAGNOSIS — I50.9 CHF (CONGESTIVE HEART FAILURE) (HCC): ICD-10-CM

## 2025-02-21 DIAGNOSIS — E78.1 HYPERTRIGLYCERIDEMIA: ICD-10-CM

## 2025-02-21 NOTE — TELEPHONE ENCOUNTER
Reason for call:   [x] Refill   [] Prior Auth  [] Other:     Office:   [x] PCP/Provider -   [] Specialty/Provider -     Medication:     metFORMIN (GLUCOPHAGE) 500 mg TAKE 1 TABLET DAILY WITH BREAKFAST        metoprolol tartrate (LOPRESSOR) 100 mg : TAKE 1 TABLET EVERY 12 HOURS,          rosuvastatin (CRESTOR) 20 MG TAKE 1 TABLET EVERY DAY        Pharmacy: [facility]     Does the patient have enough for 3 days?   [x] Yes   [] No - Send as HP to POD

## 2025-02-24 RX ORDER — ROSUVASTATIN CALCIUM 20 MG/1
20 TABLET, COATED ORAL DAILY
Qty: 90 TABLET | Refills: 1 | OUTPATIENT
Start: 2025-02-24

## 2025-02-24 RX ORDER — METOPROLOL TARTRATE 100 MG/1
100 TABLET ORAL EVERY 12 HOURS
Qty: 180 TABLET | Refills: 1 | OUTPATIENT
Start: 2025-02-24

## 2025-02-24 NOTE — TELEPHONE ENCOUNTER
Spoke to patient sister, she advised patient has a new number [phone], which was added to the patients chart, she said she would let patient know as she is seeing him today, I also called and LMOM for patient to call us back as well   NIKHIL

## 2025-02-24 NOTE — TELEPHONE ENCOUNTER
All meds will be refused.   Last appt 12/2023  Needs appt and labs.   Orders for labs have been in chart and not done.   Please call to advise.

## 2025-02-27 ENCOUNTER — TELEPHONE (OUTPATIENT)
Dept: FAMILY MEDICINE CLINIC | Facility: CLINIC | Age: 70
End: 2025-02-27

## 2025-02-27 ENCOUNTER — RA CDI HCC (OUTPATIENT)
Dept: OTHER | Facility: HOSPITAL | Age: 70
End: 2025-02-27

## 2025-02-27 DIAGNOSIS — I13.0 HYPERTENSIVE HEART AND CHRONIC KIDNEY DISEASE WITH HEART FAILURE AND STAGE 1 THROUGH STAGE 4 CHRONIC KIDNEY DISEASE, OR CHRONIC KIDNEY DISEASE (HCC): Primary | ICD-10-CM

## 2025-02-27 DIAGNOSIS — E66.01 SEVERE OBESITY (BMI 35.0-39.9) WITH COMORBIDITY (HCC): ICD-10-CM

## 2025-02-27 PROBLEM — E11.22 TYPE 2 DIABETES MELLITUS WITH CHRONIC KIDNEY DISEASE (HCC): Status: ACTIVE | Noted: 2025-02-27

## 2025-02-27 PROBLEM — E66.811 OBESITY (BMI 30.0-34.9): Status: RESOLVED | Noted: 2021-04-01 | Resolved: 2025-02-27

## 2025-02-27 NOTE — PROGRESS NOTES
HCC coding opportunities          Chart Reviewed number of suggestions sent to Provider: 3  E11.22  I13.0  E66.01     Patients Insurance     Medicare Insurance: [facility] Medicare Advantage

## 2025-02-27 NOTE — TELEPHONE ENCOUNTER
----- Message from TONY Rosenberg sent at 2/27/2025 11:11 AM EST -----  Regarding: labs  Pt has upcoming appt for AWV, DM fu and lab review on 3/3.   Please call pt . He MUST have labs done . He has not had labwork since 2023 and is on medications which need to be monitored with labs.  Needs to have labs done prior to appt. Orders in chart.   Please call pt to advise.

## 2025-02-28 ENCOUNTER — TELEPHONE (OUTPATIENT)
Dept: FAMILY MEDICINE CLINIC | Facility: CLINIC | Age: 70
End: 2025-02-28

## 2025-02-28 NOTE — TELEPHONE ENCOUNTER
----- Message from TONY Rosenberg sent at 2/28/2025  7:12 AM EST -----  Regarding: labs  Pt has upcoming appt for CARMINE, harmeet, lab review. Labs have not been done. Needs to have labs done prior to appt. Orders in chart. Has not been seen in office since 2023. ABSOLUTELY needs to have labs prior to appt and prior to any medication refills.   Please call pt to advise.

## 2025-02-28 NOTE — TELEPHONE ENCOUNTER
LMOM for patient to call us back to let us know if he will be getting labs done today in preparation for Mondays appt.  If he is unable to we can reschedule the appt.

## 2025-03-07 ENCOUNTER — TELEPHONE (OUTPATIENT)
Dept: FAMILY MEDICINE CLINIC | Facility: CLINIC | Age: 70
End: 2025-03-07

## 2025-03-07 NOTE — TELEPHONE ENCOUNTER
----- Message from TONY Rosenberg sent at 3/7/2025  8:05 AM EST -----  Regarding: labs  Pt has upcoming appt on 3/10 for fu, lab review, AWV. Pt not seen in office and no labs since 2023.  Needs to have labs done prior to appt. As of today still not done. I will NOT be able to order any meds until he gets labs done. Orders in chart.   Please call pt to advise.

## 2025-03-07 NOTE — TELEPHONE ENCOUNTER
LMOM and notified patient that labs need to be done prior to upcoming appt. Asked patient to call us back to let us know if he will be able to get them done or his appt needs to be rescheduled.

## 2025-03-10 ENCOUNTER — APPOINTMENT (OUTPATIENT)
Dept: LAB | Facility: CLINIC | Age: 70
End: 2025-03-10
Payer: COMMERCIAL

## 2025-03-10 ENCOUNTER — TELEPHONE (OUTPATIENT)
Dept: FAMILY MEDICINE CLINIC | Facility: CLINIC | Age: 70
End: 2025-03-10

## 2025-03-10 DIAGNOSIS — E11.69 TYPE 2 DIABETES MELLITUS WITH OTHER SPECIFIED COMPLICATION, WITHOUT LONG-TERM CURRENT USE OF INSULIN (HCC): ICD-10-CM

## 2025-03-10 DIAGNOSIS — E78.2 MIXED HYPERLIPIDEMIA: Chronic | ICD-10-CM

## 2025-03-10 DIAGNOSIS — I25.10 CORONARY ARTERY DISEASE INVOLVING NATIVE CORONARY ARTERY OF NATIVE HEART WITHOUT ANGINA PECTORIS: ICD-10-CM

## 2025-03-10 DIAGNOSIS — E78.1 HYPERTRIGLYCERIDEMIA: ICD-10-CM

## 2025-03-10 DIAGNOSIS — Z78.9 ALCOHOL USE: ICD-10-CM

## 2025-03-10 LAB
ALBUMIN SERPL BCG-MCNC: 4.7 G/DL (ref 3.5–5)
ALP SERPL-CCNC: 124 U/L (ref 34–104)
ALT SERPL W P-5'-P-CCNC: 53 U/L (ref 7–52)
ANION GAP SERPL CALCULATED.3IONS-SCNC: 8 MMOL/L (ref 4–13)
AST SERPL W P-5'-P-CCNC: 47 U/L (ref 13–39)
BASOPHILS # BLD AUTO: 0.06 THOUSANDS/ÂΜL (ref 0–0.1)
BASOPHILS NFR BLD AUTO: 1 % (ref 0–1)
BILIRUB SERPL-MCNC: 0.56 MG/DL (ref 0.2–1)
BUN SERPL-MCNC: 11 MG/DL (ref 5–25)
CALCIUM SERPL-MCNC: 9.8 MG/DL (ref 8.4–10.2)
CHLORIDE SERPL-SCNC: 104 MMOL/L (ref 96–108)
CHOLEST SERPL-MCNC: 130 MG/DL (ref ?–200)
CO2 SERPL-SCNC: 28 MMOL/L (ref 21–32)
CREAT SERPL-MCNC: 0.89 MG/DL (ref 0.6–1.3)
EOSINOPHIL # BLD AUTO: 0.32 THOUSAND/ÂΜL (ref 0–0.61)
EOSINOPHIL NFR BLD AUTO: 3 % (ref 0–6)
ERYTHROCYTE [DISTWIDTH] IN BLOOD BY AUTOMATED COUNT: 12.9 % (ref 11.6–15.1)
EST. AVERAGE GLUCOSE BLD GHB EST-MCNC: 108 MG/DL
GFR SERPL CREATININE-BSD FRML MDRD: 86 ML/MIN/1.73SQ M
GLUCOSE P FAST SERPL-MCNC: 103 MG/DL (ref 65–99)
HBA1C MFR BLD: 5.4 %
HCT VFR BLD AUTO: 41.6 % (ref 36.5–49.3)
HDLC SERPL-MCNC: 63 MG/DL
HGB BLD-MCNC: 14.1 G/DL (ref 12–17)
IMM GRANULOCYTES # BLD AUTO: 0.04 THOUSAND/UL (ref 0–0.2)
IMM GRANULOCYTES NFR BLD AUTO: 0 % (ref 0–2)
LDLC SERPL CALC-MCNC: 27 MG/DL (ref 0–100)
LYMPHOCYTES # BLD AUTO: 2.08 THOUSANDS/ÂΜL (ref 0.6–4.47)
LYMPHOCYTES NFR BLD AUTO: 22 % (ref 14–44)
MCH RBC QN AUTO: 34.1 PG (ref 26.8–34.3)
MCHC RBC AUTO-ENTMCNC: 33.9 G/DL (ref 31.4–37.4)
MCV RBC AUTO: 101 FL (ref 82–98)
MONOCYTES # BLD AUTO: 1 THOUSAND/ÂΜL (ref 0.17–1.22)
MONOCYTES NFR BLD AUTO: 11 % (ref 4–12)
NEUTROPHILS # BLD AUTO: 5.92 THOUSANDS/ÂΜL (ref 1.85–7.62)
NEUTS SEG NFR BLD AUTO: 63 % (ref 43–75)
NONHDLC SERPL-MCNC: 67 MG/DL
NRBC BLD AUTO-RTO: 0 /100 WBCS
PLATELET # BLD AUTO: 201 THOUSANDS/UL (ref 149–390)
PMV BLD AUTO: 10.4 FL (ref 8.9–12.7)
POTASSIUM SERPL-SCNC: 3.7 MMOL/L (ref 3.5–5.3)
PROT SERPL-MCNC: 6.9 G/DL (ref 6.4–8.4)
RBC # BLD AUTO: 4.14 MILLION/UL (ref 3.88–5.62)
SODIUM SERPL-SCNC: 140 MMOL/L (ref 135–147)
TRIGL SERPL-MCNC: 198 MG/DL (ref ?–150)
WBC # BLD AUTO: 9.42 THOUSAND/UL (ref 4.31–10.16)

## 2025-03-10 PROCEDURE — 80053 COMPREHEN METABOLIC PANEL: CPT

## 2025-03-10 PROCEDURE — 83036 HEMOGLOBIN GLYCOSYLATED A1C: CPT

## 2025-03-10 PROCEDURE — 85025 COMPLETE CBC W/AUTO DIFF WBC: CPT

## 2025-03-10 PROCEDURE — 36415 COLL VENOUS BLD VENIPUNCTURE: CPT

## 2025-03-10 PROCEDURE — 80061 LIPID PANEL: CPT

## 2025-03-10 NOTE — TELEPHONE ENCOUNTER
LMOM for patient stating that his labs have not been completed for his appointment today. Patient hasn't had labs done since  2023 and is on medication that is monitored with labs

## 2025-03-17 ENCOUNTER — OFFICE VISIT (OUTPATIENT)
Dept: FAMILY MEDICINE CLINIC | Facility: CLINIC | Age: 70
End: 2025-03-17
Payer: COMMERCIAL

## 2025-03-17 VITALS
OXYGEN SATURATION: 95 % | DIASTOLIC BLOOD PRESSURE: 78 MMHG | RESPIRATION RATE: 18 BRPM | SYSTOLIC BLOOD PRESSURE: 116 MMHG | BODY MASS INDEX: 34.72 KG/M2 | HEART RATE: 95 BPM | TEMPERATURE: 97.4 F | HEIGHT: 69 IN | WEIGHT: 234.4 LBS

## 2025-03-17 DIAGNOSIS — E78.1 HYPERTRIGLYCERIDEMIA: ICD-10-CM

## 2025-03-17 DIAGNOSIS — J44.9 CHRONIC OBSTRUCTIVE PULMONARY DISEASE, UNSPECIFIED COPD TYPE (HCC): ICD-10-CM

## 2025-03-17 DIAGNOSIS — I50.9 CHRONIC CONGESTIVE HEART FAILURE, UNSPECIFIED HEART FAILURE TYPE (HCC): Primary | ICD-10-CM

## 2025-03-17 DIAGNOSIS — I25.10 CORONARY ARTERY DISEASE INVOLVING NATIVE CORONARY ARTERY OF NATIVE HEART WITHOUT ANGINA PECTORIS: ICD-10-CM

## 2025-03-17 DIAGNOSIS — I48.91 ATRIAL FIBRILLATION WITH RAPID VENTRICULAR RESPONSE (HCC): ICD-10-CM

## 2025-03-17 DIAGNOSIS — N18.30 STAGE 3 CHRONIC KIDNEY DISEASE, UNSPECIFIED WHETHER STAGE 3A OR 3B CKD (HCC): ICD-10-CM

## 2025-03-17 DIAGNOSIS — I50.9 CHF (CONGESTIVE HEART FAILURE) (HCC): ICD-10-CM

## 2025-03-17 DIAGNOSIS — F33.9 DEPRESSION, RECURRENT (HCC): Chronic | ICD-10-CM

## 2025-03-17 DIAGNOSIS — E78.2 MIXED HYPERLIPIDEMIA: Chronic | ICD-10-CM

## 2025-03-17 DIAGNOSIS — E11.69 TYPE 2 DIABETES MELLITUS WITH OTHER SPECIFIED COMPLICATION, WITHOUT LONG-TERM CURRENT USE OF INSULIN (HCC): ICD-10-CM

## 2025-03-17 DIAGNOSIS — Z72.0 TOBACCO ABUSE: ICD-10-CM

## 2025-03-17 DIAGNOSIS — I50.32 CHRONIC DIASTOLIC (CONGESTIVE) HEART FAILURE (HCC): ICD-10-CM

## 2025-03-17 DIAGNOSIS — F10.939 ALCOHOL USE, UNSPECIFIED WITH WITHDRAWAL, UNSPECIFIED (HCC): ICD-10-CM

## 2025-03-17 DIAGNOSIS — Z00.00 MEDICARE ANNUAL WELLNESS VISIT, SUBSEQUENT: ICD-10-CM

## 2025-03-17 DIAGNOSIS — E66.01 MORBID OBESITY WITH BMI OF 40.0-44.9, ADULT (HCC): ICD-10-CM

## 2025-03-17 DIAGNOSIS — I48.20 CHRONIC ATRIAL FIBRILLATION (HCC): ICD-10-CM

## 2025-03-17 PROCEDURE — 82043 UR ALBUMIN QUANTITATIVE: CPT | Performed by: NURSE PRACTITIONER

## 2025-03-17 PROCEDURE — G2211 COMPLEX E/M VISIT ADD ON: HCPCS | Performed by: NURSE PRACTITIONER

## 2025-03-17 PROCEDURE — 82570 ASSAY OF URINE CREATININE: CPT | Performed by: NURSE PRACTITIONER

## 2025-03-17 PROCEDURE — G0439 PPPS, SUBSEQ VISIT: HCPCS | Performed by: NURSE PRACTITIONER

## 2025-03-17 PROCEDURE — 99214 OFFICE O/P EST MOD 30 MIN: CPT | Performed by: NURSE PRACTITIONER

## 2025-03-17 RX ORDER — TORSEMIDE 20 MG/1
20 TABLET ORAL DAILY
Qty: 100 TABLET | Refills: 3 | Status: SHIPPED | OUTPATIENT
Start: 2025-03-17

## 2025-03-17 RX ORDER — METOPROLOL TARTRATE 100 MG/1
100 TABLET ORAL EVERY 12 HOURS
Qty: 200 TABLET | Refills: 1 | Status: SHIPPED | OUTPATIENT
Start: 2025-03-17

## 2025-03-17 RX ORDER — FENOFIBRATE 145 MG/1
145 TABLET, COATED ORAL DAILY
Qty: 100 TABLET | Refills: 1 | Status: SHIPPED | OUTPATIENT
Start: 2025-03-17

## 2025-03-17 RX ORDER — CANDESARTAN 32 MG/1
32 TABLET ORAL DAILY
Qty: 100 TABLET | Refills: 1 | Status: SHIPPED | OUTPATIENT
Start: 2025-03-17

## 2025-03-17 RX ORDER — ROSUVASTATIN CALCIUM 20 MG/1
20 TABLET, COATED ORAL DAILY
Qty: 100 TABLET | Refills: 1 | Status: SHIPPED | OUTPATIENT
Start: 2025-03-17

## 2025-03-17 NOTE — ASSESSMENT & PLAN NOTE
Wt Readings from Last 3 Encounters:   03/17/25 106 kg (234 lb 6.4 oz)   08/13/24 107 kg (236 lb)   12/07/23 115 kg (253 lb)               Orders:    candesartan (ATACAND) 32 MG tablet; Take 1 tablet (32 mg total) by mouth daily    torsemide (DEMADEX) 20 mg tablet; Take 1 tablet (20 mg total) by mouth daily

## 2025-03-17 NOTE — ASSESSMENT & PLAN NOTE
Depression Screening Follow-up Plan: Patient's depression screening was positive with a PHQ-9 score of 5. Patient with underlying depression and was advised to continue current medications as prescribed. Patient assessed for underlying major depression. They have no active suicidal ideations. Brief counseling provided and recommend additional follow-up/re-evaluation next office visit. Continue regular follow-up with their psychologist/therapist/psychiatrist who is managing their mental health condition(s).  Managed by psych. Anticipatory guidance.

## 2025-03-17 NOTE — ASSESSMENT & PLAN NOTE
Lab Results   Component Value Date    EGFR 86 03/10/2025    EGFR 67 09/08/2023    EGFR 70 03/14/2023    CREATININE 0.89 03/10/2025    CREATININE 1.12 09/08/2023    CREATININE 1.07 03/14/2023   Much improved. Labs now wnl.   Stay well hydrated.   Monitor.   Orders:    Comprehensive metabolic panel; Future    CBC and Platelet; Future

## 2025-03-17 NOTE — ASSESSMENT & PLAN NOTE
Decreased but still drinking  No withdrawal symptoms.   Anticipatory guidance. Counseled on risks.   Orders:    CBC and Platelet; Future

## 2025-03-17 NOTE — ASSESSMENT & PLAN NOTE
Orders:    metoprolol tartrate (LOPRESSOR) 100 mg tablet; Take 1 tablet (100 mg total) by mouth every 12 (twelve) hours    rosuvastatin (CRESTOR) 20 MG tablet; Take 1 tablet (20 mg total) by mouth daily

## 2025-03-17 NOTE — PROGRESS NOTES
Name: Filiberto Medina      : 1955      MRN: [id number]  Encounter Provider: TONY Rosenberg  Encounter Date: 3/17/2025   Encounter department: [facility]    Assessment & Plan  Medicare annual wellness visit, subsequent  Heart healthy, carbohydrate controlled diet- limit red meat, limit saturated fat, moderate salt intake, limit junk food, etc.   Regular exercise  Stress management  Routine labwork and screenings as ordered.          Chronic congestive heart failure, unspecified heart failure type (HCC)  Wt Readings from Last 3 Encounters:   25 106 kg (234 lb 6.4 oz)   24 107 kg (236 lb)   23 115 kg (253 lb)   Weight stable. Exam wnl. Managed by cardio.   Limit salt. Continue demadex.          Chronic obstructive pulmonary disease, unspecified COPD type (HCC)  Stable. Smoking cessation recommended. Monitor.        Chronic atrial fibrillation (HCC)  Off xarelto. Managed by cardio. Rate controlled. Monitor.   Orders:    CBC and Platelet; Future    Alcohol use, unspecified with withdrawal, unspecified (HCC)  Decreased but still drinking  No withdrawal symptoms.   Anticipatory guidance. Counseled on risks.   Orders:    CBC and Platelet; Future    Morbid obesity with BMI of 40.0-44.9, adult (HCC)  Weight loss is recommended to improve overall health.   Dietary changes- limit carbohydrates, decrease overall caloric intake, reduce portion sizes, healthier snack choices, limit saturated fats, increase intake of fruits and vegetables, limit junk food.   exercise to 3-5 times per week. Consider adding strength exercises to exercise routine.          Type 2 diabetes mellitus with other specified complication, without long-term current use of insulin (HCC)  Well controlled.   Metformin daily  Carb controlled diet.   Regular exercise.   Lab Results   Component Value Date    HGBA1C 5.4 03/10/2025   Orders:    Albumin / creatinine urine ratio    Hemoglobin A1C; Future     Comprehensive metabolic panel; Future    CBC and Platelet; Future    Lipid Panel with Direct LDL reflex; Future    rosuvastatin (CRESTOR) 20 MG tablet; Take 1 tablet (20 mg total) by mouth daily    metFORMIN (GLUCOPHAGE) 500 mg tablet; Take 1 tablet (500 mg total) by mouth daily with breakfast    Stage 3 chronic kidney disease, unspecified whether stage 3a or 3b CKD (HCC)  Lab Results   Component Value Date    EGFR 86 03/10/2025    EGFR 67 09/08/2023    EGFR 70 03/14/2023    CREATININE 0.89 03/10/2025    CREATININE 1.12 09/08/2023    CREATININE 1.07 03/14/2023   Much improved. Labs now wnl.   Stay well hydrated.   Monitor.   Orders:    Comprehensive metabolic panel; Future    CBC and Platelet; Future    Depression, recurrent (HCC)  Depression Screening Follow-up Plan: Patient's depression screening was positive with a PHQ-9 score of 5. Patient with underlying depression and was advised to continue current medications as prescribed. Patient assessed for underlying major depression. They have no active suicidal ideations. Brief counseling provided and recommend additional follow-up/re-evaluation next office visit. Continue regular follow-up with their psychologist/therapist/psychiatrist who is managing their mental health condition(s).  Managed by psych. Anticipatory guidance.        Mixed hyperlipidemia    Orders:    Comprehensive metabolic panel; Future    Lipid Panel with Direct LDL reflex; Future    rosuvastatin (CRESTOR) 20 MG tablet; Take 1 tablet (20 mg total) by mouth daily    Tobacco abuse         Coronary artery disease involving native coronary artery of native heart without angina pectoris    Orders:    metoprolol tartrate (LOPRESSOR) 100 mg tablet; Take 1 tablet (100 mg total) by mouth every 12 (twelve) hours    rosuvastatin (CRESTOR) 20 MG tablet; Take 1 tablet (20 mg total) by mouth daily    CHF (congestive heart failure) (HCC)  Wt Readings from Last 3 Encounters:   03/17/25 106 kg (234 lb 6.4 oz)    08/13/24 107 kg (236 lb)   12/07/23 115 kg (253 lb)               Orders:    metoprolol tartrate (LOPRESSOR) 100 mg tablet; Take 1 tablet (100 mg total) by mouth every 12 (twelve) hours    candesartan (ATACAND) 32 MG tablet; Take 1 tablet (32 mg total) by mouth daily    torsemide (DEMADEX) 20 mg tablet; Take 1 tablet (20 mg total) by mouth daily    Atrial fibrillation with rapid ventricular response (HCC)    Orders:    metoprolol tartrate (LOPRESSOR) 100 mg tablet; Take 1 tablet (100 mg total) by mouth every 12 (twelve) hours    Chronic diastolic (congestive) heart failure (HCC)  Wt Readings from Last 3 Encounters:   03/17/25 106 kg (234 lb 6.4 oz)   08/13/24 107 kg (236 lb)   12/07/23 115 kg (253 lb)               Orders:    candesartan (ATACAND) 32 MG tablet; Take 1 tablet (32 mg total) by mouth daily    torsemide (DEMADEX) 20 mg tablet; Take 1 tablet (20 mg total) by mouth daily    Hypertriglyceridemia    Orders:    rosuvastatin (CRESTOR) 20 MG tablet; Take 1 tablet (20 mg total) by mouth daily    fenofibrate (TRICOR) 145 mg tablet; Take 1 tablet (145 mg total) by mouth daily      Depression Screening and Follow-up Plan: Patient's depression screening was positive with a PHQ-9 score of 5.       Tobacco Cessation Counseling: Tobacco cessation counseling was provided. The patient is sincerely urged to quit consumption of tobacco. He is not ready to quit tobacco.       Preventive health issues were discussed with patient, and age appropriate screening tests were ordered as noted in patient's After Visit Summary. Personalized health advice and appropriate referrals for health education or preventive services given if needed, as noted in patient's After Visit Summary.    History of Present Illness     Here for follow up, med check, lab review, and AWV  Follows with psych who manages mental health issues/meds  No recent illness  Still drinking but only 2 glasses of wine with dinner  Still smoking but a pack of  "cigarettes will last a month.   Follows with cardio every 3 months, Arianna Nicole  Running low on meds. Needs refills.   Chronic back  pain. Saw ortho and pain management. Told has arthritis.   Not taking xarelto, \"can't afford it\"         Patient Care Team:  TONY Rosenberg as PCP - General (Family Medicine)    Review of Systems   Constitutional:  Negative for unexpected weight change.   Respiratory:  Negative for chest tightness and shortness of breath.    Cardiovascular:  Negative for chest pain, palpitations and leg swelling.   Gastrointestinal:  Negative for abdominal pain.   Genitourinary:  Negative for dysuria.   Musculoskeletal:  Positive for back pain (chronic).   Skin:  Negative for rash and wound.   Allergic/Immunologic: Negative for immunocompromised state.   Neurological:  Negative for dizziness and headaches.   Psychiatric/Behavioral:  Positive for dysphoric mood and sleep disturbance. Negative for self-injury and suicidal ideas. The patient is nervous/anxious.      Medical History Reviewed by provider this encounter:       Annual Wellness Visit Questionnaire   Filiberto is here for his Subsequent Wellness visit.     Health Risk Assessment:   Patient rates overall health as good. Patient feels that their physical health rating is same. Patient is dissatisfied with their life. Eyesight was rated as same. Hearing was rated as same. Patient feels that their emotional and mental health rating is slightly better. Patients states they are never, rarely angry. Patient states they are sometimes unusually tired/fatigued. Pain experienced in the last 7 days has been a lot. Patient's pain rating has been 10/10. Patient states that he has experienced no weight loss or gain in last 6 months.     Depression Screening:   PHQ-9 Score: 5      Fall Risk Screening:   In the past year, patient has experienced: no history of falling in past year      Home Safety:  Patient does not have trouble with stairs " inside or outside of their home. Patient has working smoke alarms and has working carbon monoxide detector. Home safety hazards include: none.     Nutrition:   Current diet is Regular.     Medications:   Patient is currently taking over-the-counter supplements. OTC medications include: see medication list. Patient is able to manage medications.     Activities of Daily Living (ADLs)/Instrumental Activities of Daily Living (IADLs):   Walk and transfer into and out of bed and chair?: Yes  Dress and groom yourself?: Yes    Bathe or shower yourself?: Yes    Feed yourself? Yes  Do your laundry/housekeeping?: Yes  Manage your money, pay your bills and track your expenses?: Yes  Make your own meals?: Yes    Do your own shopping?: Yes    Previous Hospitalizations:   Any hospitalizations or ED visits within the last 12 months?: No      Advance Care Planning:   Living will: No    Durable POA for healthcare: No    Advanced directive: No    ACP document given: Yes      Cognitive Screening:   Provider or family/friend/caregiver concerned regarding cognition?: No    PREVENTIVE SCREENINGS      Cardiovascular Screening:    General: History Lipid Disorder, Risks and Benefits Discussed and Screening Current      Diabetes Screening:     General: History Diabetes, Risks and Benefits Discussed and Screening Current      Colorectal Cancer Screening:     General: Screening Current      Prostate Cancer Screening:    General: Risks and Benefits Discussed      Osteoporosis Screening:    General: Risks and Benefits Discussed      Abdominal Aortic Aneurysm (AAA) Screening:    Risk factors include: age between 65-74 yo and tobacco use        General: Risks and Benefits Discussed      Lung Cancer Screening:     General: Screening Not Indicated and Risks and Benefits Discussed      Hepatitis C Screening:    General: Screening Current      Preventive Screening Comments: Recommended immunizations reviewed, risks/benefits discussed. Pt verbalized  understanding.         Screening, Brief Intervention, and Referral to Treatment (SBIRT)     Screening  Typical number of drinks in a day: 2    Single Item Drug Screening:  How often have you used an illegal drug (including marijuana) or a prescription medication for non-medical reasons in the past year? never    Single Item Drug Screen Score: 0  Interpretation: Negative screen for possible drug use disorder    Brief Intervention  Alcohol & drug use screenings were reviewed. No concerns regarding substance use disorder identified.     SDOH Risk Assessment  Social determinants of health (SDOH) risk assesment tool was completed. The tool at a minimum covered housing stability, food insecurity, transportation needs, and utility difficulty. Patient had at risk responses for the following SDOH domains: housing stability and utilities.     Social Drivers of Health     Financial Resource Strain: High Risk (12/7/2023)    Overall Financial Resource Strain (CARDIA)     Difficulty of Paying Living Expenses: Very hard   Transportation Needs: No Transportation Needs (12/7/2023)    PRAPARE - Transportation     Lack of Transportation (Medical): No     Lack of Transportation (Non-Medical): No     BMI Counseling: Body mass index is 34.61 kg/m². The BMI is above normal. Nutrition recommendations include moderation in carbohydrate intake, reducing intake of saturated fat and trans fat, and reducing intake of cholesterol. Exercise recommendations include exercising 3-5 times per week.    Falls Plan of Care: Balance, strength, and gait training instructions were provided.      Recent Results (from the past 4 weeks)   CBC and differential    Collection Time: 03/10/25 10:17 AM   Result Value Ref Range    WBC 9.42 4.31 - 10.16 Thousand/uL    RBC 4.14 3.88 - 5.62 Million/uL    Hemoglobin 14.1 12.0 - 17.0 g/dL    Hematocrit 41.6 36.5 - 49.3 %     (H) 82 - 98 fL    MCH 34.1 26.8 - 34.3 pg    MCHC 33.9 31.4 - 37.4 g/dL    RDW 12.9 11.6 -  "15.1 %    MPV 10.4 8.9 - 12.7 fL    Platelets 201 149 - 390 Thousands/uL    nRBC 0 /100 WBCs    Segmented % 63 43 - 75 %    Immature Grans % 0 0 - 2 %    Lymphocytes % 22 14 - 44 %    Monocytes % 11 4 - 12 %    Eosinophils Relative 3 0 - 6 %    Basophils Relative 1 0 - 1 %    Absolute Neutrophils 5.92 1.85 - 7.62 Thousands/µL    Absolute Immature Grans 0.04 0.00 - 0.20 Thousand/uL    Absolute Lymphocytes 2.08 0.60 - 4.47 Thousands/µL    Absolute Monocytes 1.00 0.17 - 1.22 Thousand/µL    Eosinophils Absolute 0.32 0.00 - 0.61 Thousand/µL    Basophils Absolute 0.06 0.00 - 0.10 Thousands/µL   Comprehensive metabolic panel    Collection Time: 03/10/25 10:17 AM   Result Value Ref Range    Sodium 140 135 - 147 mmol/L    Potassium 3.7 3.5 - 5.3 mmol/L    Chloride 104 96 - 108 mmol/L    CO2 28 21 - 32 mmol/L    ANION GAP 8 4 - 13 mmol/L    BUN 11 5 - 25 mg/dL    Creatinine 0.89 0.60 - 1.30 mg/dL    Glucose, Fasting 103 (H) 65 - 99 mg/dL    Calcium 9.8 8.4 - 10.2 mg/dL    AST 47 (H) 13 - 39 U/L    ALT 53 (H) 7 - 52 U/L    Alkaline Phosphatase 124 (H) 34 - 104 U/L    Total Protein 6.9 6.4 - 8.4 g/dL    Albumin 4.7 3.5 - 5.0 g/dL    Total Bilirubin 0.56 0.20 - 1.00 mg/dL    eGFR 86 ml/min/1.73sq m   Hemoglobin A1C    Collection Time: 03/10/25 10:17 AM   Result Value Ref Range    Hemoglobin A1C 5.4 Normal 4.0-5.6%; PreDiabetic 5.7-6.4%; Diabetic >=6.5%; Glycemic control for adults with diabetes <7.0% %     mg/dl   Lipid panel    Collection Time: 03/10/25 10:17 AM   Result Value Ref Range    Cholesterol 130 See Comment mg/dL    Triglycerides 198 (H) See Comment mg/dL    HDL, Direct 63 >=40 mg/dL    LDL Calculated 27 0 - 100 mg/dL    Non-HDL-Chol (CHOL-HDL) 67 mg/dl       Objective   /78   Pulse 95   Temp (!) 97.4 °F (36.3 °C)   Resp 18   Ht 5' 9\" (1.753 m)   Wt 106 kg (234 lb 6.4 oz)   SpO2 95%   BMI 34.61 kg/m²     Physical Exam  Vitals reviewed.   Constitutional:       General: He is not in acute distress.   "   Appearance: He is not ill-appearing.   Neck:      Vascular: No carotid bruit.   Cardiovascular:      Rate and Rhythm: Normal rate and regular rhythm.      Pulses: no weak pulses.           Dorsalis pedis pulses are 2+ on the right side and 2+ on the left side.        Posterior tibial pulses are 2+ on the right side and 2+ on the left side.   Pulmonary:      Effort: Pulmonary effort is normal. No respiratory distress.      Breath sounds: Normal breath sounds. No wheezing or rales.   Abdominal:      General: There is no distension.      Palpations: Abdomen is soft.   Musculoskeletal:      Right lower leg: No edema.   Feet:      Right foot:      Skin integrity: No ulcer, skin breakdown, erythema, warmth, callus or dry skin.      Left foot:      Skin integrity: No ulcer, skin breakdown, erythema, warmth, callus or dry skin.   Skin:     General: Skin is warm and dry.      Coloration: Skin is not jaundiced or pale.   Neurological:      General: No focal deficit present.      Mental Status: He is alert and oriented to person, place, and time.      Coordination: Coordination normal.      Gait: Gait normal.   Psychiatric:         Behavior: Behavior normal.         Thought Content: Thought content normal.         Judgment: Judgment normal.      Comments: Affect flat     Patient's shoes and socks removed.    Right Foot/Ankle   Right Foot Inspection  Skin Exam: skin normal and skin intact. No dry skin, no warmth, no callus, no erythema, no maceration, no abnormal color, no pre-ulcer, no ulcer and no callus.     Toe Exam: ROM and strength within normal limits.     Sensory   Monofilament testing: intact    Vascular  Capillary refills: < 3 seconds  The right DP pulse is 2+. The right PT pulse is 2+.     Left Foot/Ankle  Left Foot Inspection  Skin Exam: skin normal and skin intact. No dry skin, no warmth, no erythema, no maceration, normal color, no pre-ulcer, no ulcer and no callus.     Toe Exam: ROM and strength within normal  limits.     Sensory   Monofilament testing: intact    Vascular  Capillary refills: < 3 seconds  The left DP pulse is 2+. The left PT pulse is 2+.     Assign Risk Category  No deformity present  No loss of protective sensation  No weak pulses  Risk: 0

## 2025-03-17 NOTE — ASSESSMENT & PLAN NOTE
Orders:    Comprehensive metabolic panel; Future    Lipid Panel with Direct LDL reflex; Future    rosuvastatin (CRESTOR) 20 MG tablet; Take 1 tablet (20 mg total) by mouth daily

## 2025-03-17 NOTE — ASSESSMENT & PLAN NOTE
Well controlled.   Metformin daily  Carb controlled diet.   Regular exercise.   Lab Results   Component Value Date    HGBA1C 5.4 03/10/2025   Orders:    Albumin / creatinine urine ratio    Hemoglobin A1C; Future    Comprehensive metabolic panel; Future    CBC and Platelet; Future    Lipid Panel with Direct LDL reflex; Future    rosuvastatin (CRESTOR) 20 MG tablet; Take 1 tablet (20 mg total) by mouth daily    metFORMIN (GLUCOPHAGE) 500 mg tablet; Take 1 tablet (500 mg total) by mouth daily with breakfast

## 2025-03-17 NOTE — PATIENT INSTRUCTIONS
Medicare Preventive Visit Patient Instructions  Thank you for completing your Welcome to Medicare Visit or Medicare Annual Wellness Visit today. Your next wellness visit will be due in one year (3/18/2026).  The screening/preventive services that you may require over the next 5-10 years are detailed below. Some tests may not apply to you based off risk factors and/or age. Screening tests ordered at today's visit but not completed yet may show as past due. Also, please note that scanned in results may not display below.  Preventive Screenings:  Service Recommendations Previous Testing/Comments   Colorectal Cancer Screening  Colonoscopy    Fecal Occult Blood Test (FOBT)/Fecal Immunochemical Test (FIT)  Fecal DNA/Cologuard Test  Flexible Sigmoidoscopy Age: 45-75 years old   Colonoscopy: every 10 years (May be performed more frequently if at higher risk)  OR  FOBT/FIT: every 1 year  OR  Cologuard: every 3 years  OR  Sigmoidoscopy: every 5 years  Screening may be recommended earlier than age 45 if at higher risk for colorectal cancer. Also, an individualized decision between you and your healthcare provider will decide whether screening between the ages of 76-85 would be appropriate. Colonoscopy: Not on file  FOBT/FIT: Not on file  Cologuard: 10/10/2023  Sigmoidoscopy: Not on file    Screening Current     Prostate Cancer Screening Individualized decision between patient and health care provider in men between ages of 55-69   Medicare will cover every 12 months beginning on the day after your 50th birthday PSA: No results in last 5 years           Hepatitis C Screening Once for adults born between 1945 and 1965  More frequently in patients at high risk for Hepatitis C Hep C Antibody: 08/28/2020    Screening Current   Diabetes Screening 1-2 times per year if you're at risk for diabetes or have pre-diabetes Fasting glucose: 103 mg/dL (3/10/2025)  A1C: 5.4 % (3/10/2025)  Screening Not Indicated  History Diabetes    Cholesterol Screening Once every 5 years if you don't have a lipid disorder. May order more often based on risk factors. Lipid panel: 03/10/2025  Screening Not Indicated  History Lipid Disorder      Other Preventive Screenings Covered by Medicare:  Abdominal Aortic Aneurysm (AAA) Screening: covered once if your at risk. You're considered to be at risk if you have a family history of AAA or a male between the age of 65-75 who smoking at least 100 cigarettes in your lifetime.  Lung Cancer Screening: covers low dose CT scan once per year if you meet all of the following conditions: (1) Age 55-77; (2) No signs or symptoms of lung cancer; (3) Current smoker or have quit smoking within the last 15 years; (4) You have a tobacco smoking history of at least 20 pack years (packs per day x number of years you smoked); (5) You get a written order from a healthcare provider.  Glaucoma Screening: covered annually if you're considered high risk: (1) You have diabetes OR (2) Family history of glaucoma OR (3)  aged 50 and older OR (4)  American aged 65 and older  Osteoporosis Screening: covered every 2 years if you meet one of the following conditions: (1) Have a vertebral abnormality; (2) On glucocorticoid therapy for more than 3 months; (3) Have primary hyperparathyroidism; (4) On osteoporosis medications and need to assess response to drug therapy.  HIV Screening: covered annually if you're between the age of 15-65. Also covered annually if you are younger than 15 and older than 65 with risk factors for HIV infection. For pregnant patients, it is covered up to 3 times per pregnancy.    Immunizations:  Immunization Recommendations   Influenza Vaccine Annual influenza vaccination during flu season is recommended for all persons aged >= 6 months who do not have contraindications   Pneumococcal Vaccine   * Pneumococcal conjugate vaccine = PCV13 (Prevnar 13), PCV15 (Vaxneuvance), PCV20 (Prevnar 20)  *  Pneumococcal polysaccharide vaccine = PPSV23 (Pneumovax) Adults 19-63 yo with certain risk factors or if 65+ yo  If never received any pneumonia vaccine: recommend Prevnar 20 (PCV20)  Give PCV20 if previously received 1 dose of PCV13 or PPSV23   Hepatitis B Vaccine 3 dose series if at intermediate or high risk (ex: diabetes, end stage renal disease, liver disease)   Respiratory syncytial virus (RSV) Vaccine - COVERED BY MEDICARE PART D  * RSVPreF3 (Arexvy) CDC recommends that adults 60 years of age and older may receive a single dose of RSV vaccine using shared clinical decision-making (SCDM)   Tetanus (Td) Vaccine - COST NOT COVERED BY MEDICARE PART B Following completion of primary series, a booster dose should be given every 10 years to maintain immunity against tetanus. Td may also be given as tetanus wound prophylaxis.   Tdap Vaccine - COST NOT COVERED BY MEDICARE PART B Recommended at least once for all adults. For pregnant patients, recommended with each pregnancy.   Shingles Vaccine (Shingrix) - COST NOT COVERED BY MEDICARE PART B  2 shot series recommended in those 19 years and older who have or will have weakened immune systems or those 50 years and older     Health Maintenance Due:      Topic Date Due   • Colorectal Cancer Screening  10/10/2026   • Hepatitis C Screening  Completed   • Lung Cancer Screening  Discontinued     Immunizations Due:      Topic Date Due   • Influenza Vaccine (1) 09/01/2024   • COVID-19 Vaccine (4 - 2024-25 season) 09/01/2024     Advance Directives   What are advance directives?  Advance directives are legal documents that state your wishes and plans for medical care. These plans are made ahead of time in case you lose your ability to make decisions for yourself. Advance directives can apply to any medical decision, such as the treatments you want, and if you want to donate organs.   What are the types of advance directives?  There are many types of advance directives, and each  state has rules about how to use them. You may choose a combination of any of the following:  Living will:  This is a written record of the treatment you want. You can also choose which treatments you do not want, which to limit, and which to stop at a certain time. This includes surgery, medicine, IV fluid, and tube feedings.   Durable power of  for healthcare (DPAHC):  This is a written record that states who you want to make healthcare choices for you when you are unable to make them for yourself. This person, called a proxy, is usually a family member or a friend. You may choose more than 1 proxy.  Do not resuscitate (DNR) order:  A DNR order is used in case your heart stops beating or you stop breathing. It is a request not to have certain forms of treatment, such as CPR. A DNR order may be included in other types of advance directives.  Medical directive:  This covers the care that you want if you are in a coma, near death, or unable to make decisions for yourself. You can list the treatments you want for each condition. Treatment may include pain medicine, surgery, blood transfusions, dialysis, IV or tube feedings, and a ventilator (breathing machine).  Values history:  This document has questions about your views, beliefs, and how you feel and think about life. This information can help others choose the care that you would choose.  Why are advance directives important?  An advance directive helps you control your care. Although spoken wishes may be used, it is better to have your wishes written down. Spoken wishes can be misunderstood, or not followed. Treatments may be given even if you do not want them. An advance directive may make it easier for your family to make difficult choices about your care.   Cigarette Smoking and Your Health   Risks to your health if you smoke:  Nicotine and other chemicals found in tobacco damage every cell in your body. Even if you are a light smoker, you have an  increased risk for cancer, heart disease, and lung disease. If you are pregnant or have diabetes, smoking increases your risk for complications.   Benefits to your health if you stop smoking:   You decrease respiratory symptoms such as coughing, wheezing, and shortness of breath.   You reduce your risk for cancers of the lung, mouth, throat, kidney, bladder, pancreas, stomach, and cervix. If you already have cancer, you increase the benefits of chemotherapy. You also reduce your risk for cancer returning or a second cancer from developing.   You reduce your risk for heart disease, blood clots, heart attack, and stroke.   You reduce your risk for lung infections, and diseases such as pneumonia, asthma, chronic bronchitis, and emphysema.  Your circulation improves. More oxygen can be delivered to your body. If you have diabetes, you lower your risk for complications, such as kidney, artery, and eye diseases. You also lower your risk for nerve damage. Nerve damage can lead to amputations, poor vision, and blindness.  You improve your body's ability to heal and to fight infections.  For more information and support to stop smoking:   [employer]  Phone: 9- 324 - [phone]  Web Address: www.[employer]  Weight Management   Why it is important to manage your weight:  Being overweight increases your risk of health conditions such as heart disease, high blood pressure, type 2 diabetes, and certain types of cancer. It can also increase your risk for osteoarthritis, sleep apnea, and other respiratory problems. Aim for a slow, steady weight loss. Even a small amount of weight loss can lower your risk of health problems.  How to lose weight safely:  A safe and healthy way to lose weight is to eat fewer calories and get regular exercise. You can lose up about 1 pound a week by decreasing the number of calories you eat by 500 calories each day.   Healthy meal plan for weight management:  A healthy meal plan includes a variety  of foods, contains fewer calories, and helps you stay healthy. A healthy meal plan includes the following:  Eat whole-grain foods more often.  A healthy meal plan should contain fiber. Fiber is the part of grains, fruits, and vegetables that is not broken down by your body. Whole-grain foods are healthy and provide extra fiber in your diet. Some examples of whole-grain foods are whole-wheat breads and pastas, oatmeal, brown rice, and bulgur.  Eat a variety of vegetables every day.  Include dark, leafy greens such as spinach, kale, william greens, and mustard greens. Eat yellow and orange vegetables such as carrots, sweet potatoes, and winter squash.   Eat a variety of fruits every day.  Choose fresh or canned fruit (canned in its own juice or light syrup) instead of juice. Fruit juice has very little or no fiber.  Eat low-fat dairy foods.  Drink fat-free (skim) milk or 1% milk. Eat fat-free yogurt and low-fat cottage cheese. Try low-fat cheeses such as mozzarella and other reduced-fat cheeses.  Choose meat and other protein foods that are low in fat.  Choose beans or other legumes such as split peas or lentils. Choose fish, skinless poultry (chicken or turkey), or lean cuts of red meat (beef or pork). Before you cook meat or poultry, cut off any visible fat.   Use less fat and oil.  Try baking foods instead of frying them. Add less fat, such as margarine, sour cream, regular salad dressing and mayonnaise to foods. Eat fewer high-fat foods. Some examples of high-fat foods include french fries, doughnuts, ice cream, and cakes.  Eat fewer sweets.  Limit foods and drinks that are high in sugar. This includes candy, cookies, regular soda, and sweetened drinks.  Exercise:  Exercise at least 30 minutes per day on most days of the week. Some examples of exercise include walking, biking, dancing, and swimming. You can also fit in more physical activity by taking the stairs instead of the elevator or parking farther away from  stores. Ask your healthcare provider about the best exercise plan for you.      © Copyright [employer] 2018 Information is for End User's use only and may not be sold, redistributed or otherwise used for commercial purposes. All illustrations and images included in CareNotes® are the copyrighted property of [employer] or [employer]

## 2025-03-17 NOTE — ASSESSMENT & PLAN NOTE
Orders:    rosuvastatin (CRESTOR) 20 MG tablet; Take 1 tablet (20 mg total) by mouth daily    fenofibrate (TRICOR) 145 mg tablet; Take 1 tablet (145 mg total) by mouth daily

## 2025-03-18 LAB
CREAT UR-MCNC: 78.6 MG/DL
MICROALBUMIN UR-MCNC: 26.1 MG/L
MICROALBUMIN/CREAT 24H UR: 33 MG/G CREATININE (ref 0–30)

## 2025-05-13 ENCOUNTER — DOCUMENTATION (OUTPATIENT)
Dept: ADMINISTRATIVE | Facility: OTHER | Age: 70
End: 2025-05-13

## 2025-05-13 NOTE — PROGRESS NOTES
05/13/25 10:38 AM    Annual Wellness Visit outreach is not required, an AWV was completed at the PCP office.    Thank you.  Constance Torre MA  [facility]

## 2025-05-16 ENCOUNTER — TELEPHONE (OUTPATIENT)
Dept: FAMILY MEDICINE CLINIC | Facility: CLINIC | Age: 70
End: 2025-05-16

## 2025-05-16 NOTE — TELEPHONE ENCOUNTER
LMOM for patient to call the office back   Has patient had a DM eye exam recently if so when and where?

## 2025-07-16 ENCOUNTER — VBI (OUTPATIENT)
Dept: ADMINISTRATIVE | Facility: OTHER | Age: 70
End: 2025-07-16

## 2025-07-16 NOTE — TELEPHONE ENCOUNTER
07/16/25 1:01 PM     Chart reviewed for Diabetic Eye Exam ; nothing is submitted to the patient's insurance at this time.     Ramirez Mcginnis MA   [facility]

## (undated) DEVICE — TRAY EPIDURAL PERIFIX 20GA X 3.5IN TUOHY 8ML

## (undated) DEVICE — NEEDLE BLUNT 18 G X 1 1/2 W FILTER

## (undated) DEVICE — NEEDLE SPINAL 25G X 3.5 IN QUINCKE

## (undated) DEVICE — PLASTIC ADHESIVE BANDAGE: Brand: CURITY

## (undated) DEVICE — RADIOLOGY STERILE LABELS: Brand: CENTURION

## (undated) DEVICE — PAD GROUNDING IONIC RF DISP

## (undated) DEVICE — TOWEL SET X-RAY

## (undated) DEVICE — SYRINGE 5ML LL

## (undated) DEVICE — CHLORAPREP APPLICATOR TINTED 10.5ML ONE-STEP

## (undated) DEVICE — PAD GROUNDING SLF ADHESIVE

## (undated) DEVICE — FLEXIBLE ADHESIVE BANDAGE,X-LARGE: Brand: CURITY

## (undated) DEVICE — Device

## (undated) DEVICE — SKIN MARKER DUAL TIP WITH RULER CAP, FLEXIBLE RULER AND LABELS: Brand: DEVON

## (undated) DEVICE — DRAPE SHEET THREE QUARTER

## (undated) DEVICE — NEEDLE TUOHY 20G X 3.5 IN WINGED

## (undated) DEVICE — WIPES BABY PAMPERS SENSITIVE 36/PK

## (undated) DEVICE — SMALL NEEDLE COUNTER NEST

## (undated) DEVICE — GLOVE SRG BIOGEL 7.5